# Patient Record
Sex: FEMALE | Race: WHITE | Employment: PART TIME | URBAN - METROPOLITAN AREA
[De-identification: names, ages, dates, MRNs, and addresses within clinical notes are randomized per-mention and may not be internally consistent; named-entity substitution may affect disease eponyms.]

---

## 2017-01-05 ENCOUNTER — OFFICE VISIT (OUTPATIENT)
Dept: FAMILY MEDICINE CLINIC | Age: 32
End: 2017-01-05

## 2017-01-05 VITALS
TEMPERATURE: 98.2 F | HEIGHT: 64 IN | BODY MASS INDEX: 45.07 KG/M2 | WEIGHT: 264 LBS | RESPIRATION RATE: 18 BRPM | HEART RATE: 88 BPM | SYSTOLIC BLOOD PRESSURE: 128 MMHG | DIASTOLIC BLOOD PRESSURE: 79 MMHG

## 2017-01-05 DIAGNOSIS — J01.10 ACUTE NON-RECURRENT FRONTAL SINUSITIS: ICD-10-CM

## 2017-01-05 DIAGNOSIS — H65.02 ACUTE SEROUS OTITIS MEDIA OF LEFT EAR, RECURRENCE NOT SPECIFIED: ICD-10-CM

## 2017-01-05 RX ORDER — AZITHROMYCIN 250 MG/1
TABLET, FILM COATED ORAL
Qty: 6 TAB | Refills: 0 | Status: SHIPPED | OUTPATIENT
Start: 2017-01-05 | End: 2017-01-10

## 2017-01-05 NOTE — MR AVS SNAPSHOT
Visit Information Date & Time Provider Department Dept. Phone Encounter #  
 1/5/2017  8:20 AM Morrison Cogan, MD [address] [facility] [phone] [id number] Your Appointments 1/5/2017  8:20 AM  
ACUTE CARE with Morrison Cogan, MD  
[address] [facility] ([address] [place] [address]) Appt Note: cough, congestion [address] [address]  
[phone]  
  
   
 [address] [address]  
  
    
 1/6/2017  1:00 PM  
ULTRASOUND with JOSE Ramos ([address] [place] [address]) Appt Note: u/s cyst 2-3 month f/u   [address] [facility],[address] [facility] [address]  
[address] [place] [address]  
  
    
 1/6/2017  1:30 PM  
ESTABLISHED PATIENT with Alcides Jerome MD  
[facility] ([address] [place] [address]) Appt Note: u/s cyst 2-3 month f/u   [address] [facility] [address] [place] [address]  
[phone]  
  
   
 [address] [place] [address]  
  
    
 1/27/2017  8:30 AM  
ESTABLISHED PATIENT with Bo Verdugo MD  
Arthritis and [address] [place] [address]) Appt Note: fu 3 mo; 11-29-16 l/m to cancel 1-27-17 appt -kfb; r/s:fu 3 mo  
 [address] [facility] [address]  
[phone]  
  
   
 [address] [address]  
  
    
 2/15/2017  2:00 PM  
IMMUNIZATIONS/INJECTIONS with Morrison Cogan, MD  
[address] [facility] ([address]) Appt Note: depo injection [address] [address]  
[phone]  
  
   
 [address] [place] [address] Road [address] Upcoming Health Maintenance Date Due  
 PAP AKA CERVICAL CYTOLOGY 11/7/2019 DTaP/Tdap/Td series (4 - Td) 11/23/2026 Allergies as of 1/5/2017  Review Complete On: 1/5/2017 By: Morrison Cogan, MD  
  
 Severity Noted Reaction Type Reactions Latex  05/27/2010    Hives Augmentin [Amoxicillin-pot Clavulanate]  04/13/2011    Nausea and Vomiting Codeine  04/13/2011    Nausea and Vomiting Morphine Sulfate  04/13/2011    Nausea and Vomiting Nuts [Tree Nut]  10/27/2016    Swelling Pcn [Penicillins]  04/01/2010    Hives Current Immunizations  Reviewed on 11/23/2016 Name Date Human Papillomavirus 4/14/2008, 12/26/2007, 10/26/2007 Influenza Vaccine 10/14/2014, 10/29/2013 Influenza Vaccine Scott Me) 10/2/2015 Influenza Vaccine (Quad) PF 10/27/2016 Influenza Vaccine Split 10/14/2011, 11/3/2010 Influenza Vaccine Whole 10/26/2007 Pneumococcal Vaccine (Unspecified Type) 11/4/2012  5:36 PM  
 TD Vaccine 11/5/2002 TDAP Vaccine 10/14/2011 Tdap 11/23/2016 Not reviewed this visit You Were Diagnosed With   
  
 Codes Comments Acute non-recurrent frontal sinusitis     ICD-10-CM: J01.10 ICD-9-CM: 934.2 Acute serous otitis media of left ear, recurrence not specified     ICD-10-CM: H65.02 
ICD-9-CM: 381.01 Vitals BP Pulse Temp Resp Height(growth percentile) Weight(growth percentile) 128/79 (BP 1 Location: Left arm, BP Patient Position: Sitting) 88 98.2 °F (36.8 °C) (Oral) 18 5' 4\" (1.626 m) 264 lb (119.7 kg) BMI OB Status Smoking Status 45.32 kg/m2 Injection Never Smoker Vitals History BMI and BSA Data Body Mass Index Body Surface Area  
 45.32 kg/m 2 2.32 m 2 Preferred Pharmacy Pharm[facility] Name Phone [facility] [place] [phone] Your Updated Medication List  
  
   
This list is accurate as of: 1/5/17  8:11 AM.  Always use your most recent med list.  
  
  
  
  
 albuterol 90 mcg/actuation inhaler Commonly known as:  PROAIR HFA INHALE TWO PUFFS EVERY 6 HOURS AS NEEDED FOR WHEEZING  
  
 azithromycin 250 mg tablet Commonly known as:  Bev Rolon Please take as directed benzonatate 200 mg capsule Commonly known as:  TESSALON Take 1 Cap by mouth three (3) times daily as needed for Cough for up to 7 days. cetirizine 10 mg tablet Commonly known as:  ZYRTEC  
TAKE ONE TABLET BY MOUTH ONCE DAILY  
  
 ergocalciferol 50,000 unit capsule Commonly known as:  VITAMIN D2 Take 1 Cap by mouth every seven (7) days. ferrous sulfate 325 mg (65 mg iron) tablet TAKE ONE TABLET BY MOUTH TWICE DAILY  
  
 fluticasone 50 mcg/actuation nasal spray Commonly known as:  Shelvia Birks 2 Sprays by Both Nostrils route daily. hydroxychloroquine 200 mg tablet Commonly known as:  PLAQUENIL Take 1 Tab by mouth two (2) times a day. New dose  
  
 levothyroxine 175 mcg tablet Commonly known as:  SYNTHROID Take 1 Tab by mouth Daily (before breakfast). montelukast 10 mg tablet Commonly known as:  SINGULAIR  
TAKE ONE TABLET BY MOUTH ONCE DAILY  
  
 omeprazole 20 mg capsule Commonly known as:  PRILOSEC  
TAKE ONE CAPSULE BY MOUTH TWICE DAILY PARoxetine 20 mg tablet Commonly known as:  PAXIL TAKE ONE TABLET BY MOUTH IN THE EVENING  
  
 predniSONE 5 mg tablet Commonly known as:  DELTASONE  
10 mg once daily. Lower by 2.5 mg (1/2 tab) every  days SUMAtriptan 100 mg tablet Commonly known as:  IMITREX  
TAKE ONE TABLET BY MOUTH ONCE AS NEEDED FOR MIGRAINE FOR 1 DOSE. Prescriptions Sent to Pharmacy Refills  
 azithromycin (ZITHROMAX Z-AWA) 250 mg tablet 0 Sig: Please take as directed Class: Normal  
 Pharmacy: [address] [place] [address] [address] [place] Ph #: [phone] [facility]! Dear Ricardo Collier: Thank you for requesting a [employer]tica account. Our records indicate that you already have an active eÃ“tica account. You can access your account anytime at [email or web address] Did you know that you can access your hospital and ER discharge instructions at any time in [employer]? You can also review all of your test results from your hospital stay or ER visit. Additional Information If you have questions, please visit the Frequently Asked Questions section of the [employer] website at [email or web address] Remember, The Feed[employer] is NOT to be used for urgent needs. For medical emergencies, dial 911. Now available from your iPhone and Android! Please provide this summary of care documentation to your next provider. Your primary care clinician is listed as Loki Gonzalez. If you have any questions after today's visit, please call [phone].

## 2017-01-05 NOTE — PROGRESS NOTES
Pt here c/o cough, congestion, and nasal drainage x 1 week. States that cough has been productive with thick mucus. Has been taking Sudafed OTC. Pt has been afebrile.

## 2017-01-05 NOTE — PROGRESS NOTES
Pt here c/o cough, congestion, and nasal drainage x 1 week. States that cough has been productive with thick mucus. Has been taking Sudafed OTC. Pt has been afebrile. Subjective:   Farzaneh Angeles is a 36 y.o. female who complains of congestion, productive cough and generalized sinus pain for 5 days, gradually worsening since that time. She denies a history of shortness of breath and wheezing. Evaluation to date: none. Treatment to date: OTC products. Patient does not smoke cigarettes. Relevant PMH: No pertinent additional PMH. Patient Active Problem List   Diagnosis Code    Hypothyroid E03.9    Environmental allergies Z91.09    [place] (hard of hearing) H91.90    Strabismus H50.9    Anemia, iron deficiency D50.9    Asthma J45.909    Long-term use of Plaquenil Z79.899    Vitamin D deficiency E55.9    Polyuria R35.8    Periodic headache syndrome, not intractable G43. C0    Inflammatory polyarthritis (HCC) M06.4     Allergies   Allergen Reactions    Latex Hives    Augmentin [Amoxicillin-Pot Clavulanate] Nausea and Vomiting    Codeine Nausea and Vomiting    Morphine Sulfate Nausea and Vomiting    Nuts [Tree Nut] Swelling    Pcn [Penicillins] Hives        Review of Systems  Pertinent items are noted in HPI. Objective:     Visit Vitals    /79 (BP 1 Location: Left arm, BP Patient Position: Sitting)    Pulse 88    Temp 98.2 °F (36.8 °C) (Oral)    Resp 18    Ht 5' 4\" (1.626 m)    Wt 264 lb (119.7 kg)    BMI 45.32 kg/m2     General:  alert, cooperative, no distress   Eyes: conjunctivae/corneas clear. PERRL, EOM's intact. Fundi benign   Ears: Left TM erythematous   Sinuses: tenderness over generalized maxillary   Mouth:  Lips, mucosa, and tongue normal. Teeth and gums normal   Neck: supple, symmetrical, trachea midline and no adenopathy. Heart: S1 and S2 normal, no murmurs noted.     Lungs: clear to auscultation bilaterally        Assessment/Plan:   otitis media and sinusitis  Suggested symptomatic OTC remedies. Antibiotics per orders. RTC prn. ICD-10-CM ICD-9-CM    1. Acute non-recurrent frontal sinusitis J01.10 461.1 azithromycin (ZITHROMAX Z-AWA) 250 mg tablet   2. Acute serous otitis media of left ear, recurrence not specified H65.02 381.01 azithromycin (ZITHROMAX Z-AWA) 250 mg tablet     Encounter Diagnoses   Name Primary?  Acute non-recurrent frontal sinusitis     Acute serous otitis media of left ear, recurrence not specified      Orders Placed This Encounter    azithromycin (ZITHROMAX Z-AWA) 250 mg tablet   .

## 2017-01-20 ENCOUNTER — OFFICE VISIT (OUTPATIENT)
Dept: OBGYN CLINIC | Age: 32
End: 2017-01-20

## 2017-01-20 DIAGNOSIS — N89.8 VAGINAL WALL CYST: Primary | ICD-10-CM

## 2017-01-20 RX ORDER — MEDROXYPROGESTERONE ACETATE 150 MG/ML
INJECTION, SUSPENSION INTRAMUSCULAR
COMMUNITY
Start: 2017-01-10 | End: 2017-05-10 | Stop reason: SDUPTHER

## 2017-01-20 NOTE — PROGRESS NOTES
Ultrasound followup    Jessica Osborne is a 36 y.o. female is here today to review the results of her ultrasound evaluation. Her U/S evaluation is performed because of a previous encounter revealing a vaginal cyst which was identified 2.5 months ago. She is here for a(n) initial ultrasound study. The sonogram results are:  UTERUS IS ANTEVERTED, NORMAL IN SIZE AND ECHOGENICITY. THERE APPEARS TO BE A LEFT VAGINAL WALL CYST THAT MEASURES 2.1 X 1.9 X 2.1CM. ENDOMETRIUM MEASURES 3MM IN THICKNESS. NO EVIDENCE OF MASS OR ABNORMALITY SEEN WITHIN  THE ENDOMETRIAL CAVITY. RIGHT OVARY APPEARS WITHIN NORMAL LIMITS. A FOLLICULAR CYST IS SEEN. LEFT ADNEXA APPEARS WITHIN NORMAL LIMITS. NO FREE FLUID SEEN IN THE CDS. See detailed report for more information    Total face-to-face time with the patient was 15 minutes, and over half of this time was spent in patient counseling. Patient states the cyst is painful and elects to have it drained today. Procedure note: vaginal wall cyst drainage    Jessica Osborne is a 36 y.o. female [facility] who presents today with a tender vaginal wall cyst. She has elected to have it drained today. The risks, benefits and assets of the procedure were discussed. Her questions were answered, informed consent was obtained, and she had no further questions. Procedure: The vagina was prepped with betadine. Following the prep, a 18 g needle with a 10 cc syringe was used to drain the cyst. Approximately 10-15 cc's of white mucous fluid was released. Bleeding was minimal.   POST PROCEDURE: The patient tolerated the procedure well. There were no complications. She reports relief in her symptoms following the procedure. She will RTO in 4 weeks to see if cyst has re accumulated.

## 2017-02-06 DIAGNOSIS — E55.9 VITAMIN D DEFICIENCY: ICD-10-CM

## 2017-02-06 RX ORDER — ERGOCALCIFEROL 1.25 MG/1
50000 CAPSULE ORAL
Qty: 12 CAP | Refills: 1 | Status: SHIPPED | OUTPATIENT
Start: 2017-02-06 | End: 2017-08-02 | Stop reason: SDUPTHER

## 2017-02-06 NOTE — TELEPHONE ENCOUNTER
From: Farzaneh Angeles  To: Milli Bourgeois NP  Sent: 2/6/2017 9:15 AM EST  Subject: Medication Renewal Request    Original authorizing provider: RICKIE Estevez would like a refill of the following medications:  ergocalciferol (VITAMIN D2) 50,000 unit capsule Milli Bouregois NP]    Preferred pharmacy: [address] [place] [address]:

## 2017-02-15 ENCOUNTER — OFFICE VISIT (OUTPATIENT)
Dept: FAMILY MEDICINE CLINIC | Age: 32
End: 2017-02-15

## 2017-02-15 VITALS
RESPIRATION RATE: 18 BRPM | TEMPERATURE: 98.3 F | OXYGEN SATURATION: 99 % | DIASTOLIC BLOOD PRESSURE: 75 MMHG | HEART RATE: 90 BPM | SYSTOLIC BLOOD PRESSURE: 123 MMHG | BODY MASS INDEX: 45.75 KG/M2 | WEIGHT: 268 LBS | HEIGHT: 64 IN

## 2017-02-15 DIAGNOSIS — N94.6 DYSMENORRHEA: Primary | ICD-10-CM

## 2017-02-15 DIAGNOSIS — E03.9 HYPOTHYROIDISM, UNSPECIFIED TYPE: Chronic | ICD-10-CM

## 2017-02-15 RX ORDER — MEDROXYPROGESTERONE ACETATE 150 MG/ML
150 INJECTION, SUSPENSION INTRAMUSCULAR ONCE
Qty: 1 ML | Refills: 0 | Status: SHIPPED | COMMUNITY
Start: 2017-02-15 | End: 2017-02-15

## 2017-02-15 NOTE — MR AVS SNAPSHOT
Visit Information Date & Time Provider Department Dept. Phone Encounter #  
 2/15/2017  2:20 PM Lorraine Sanchez MD [facility] [phone] [id number] Your Appointments 2/15/2017  2:20 PM  
ESTABLISHED PATIENT with Lorraine Sanchez MD  
[facility] ([address]) Appt Note: depo injection; ck thyroid, depo injection [address] [address] [address] [address]  
[phone]  
  
   
 [address] [address] [address] [address]  
  
    
 2/17/2017  1:30 PM  
FOLLOW UP 10 with Paola Knox MD  
[facility] ([address]) Appt Note: 4wk gyn f/u MH - cysts [address] [facility]  
[phone]  
  
   
 [address] [facility] [address] [place] [address]  
  
    
 2/24/2017 11:00 AM  
ESTABLISHED PATIENT with Bryan Grant MD  
Arthritis and [address] [place] [address] Road) Appt Note: fu 3 mo; 11-29-16 l/m to cancel 1-27-17 appt -kfb; r/s:fu 3 mo; fu 3 mo  
 [address] [facility] [address]  
[phone]  
  
   
 [address] [facility] [address] Upcoming Health Maintenance Date Due  
 PAP AKA CERVICAL CYTOLOGY 11/7/2019 DTaP/Tdap/Td series (4 - Td) 11/23/2026 Allergies as of 2/15/2017  Review Complete On: 2/15/2017 By: Lorraine Sanchez MD  
  
 Severity Noted Reaction Type Reactions Latex  05/27/2010    Hives Augmentin [Amoxicillin-pot Clavulanate]  04/13/2011    Nausea and Vomiting Codeine  04/13/2011    Nausea and Vomiting Morphine Sulfate  04/13/2011    Nausea and Vomiting Nuts [Tree Nut]  10/27/2016    Swelling Pcn [Penicillins]  04/01/2010    Hives Current Immunizations  Reviewed on 11/23/2016 Name Date Human Papillomavirus 4/14/2008, 12/26/2007, 10/26/2007 Influenza Vaccine 10/14/2014, 10/29/2013 Influenza Vaccine Manuelita Forge) 10/2/2015 Influenza Vaccine (Quad) PF 10/27/2016 Influenza Vaccine Split 10/14/2011, 11/3/2010 Influenza Vaccine Whole 10/26/2007 Pneumococcal Vaccine (Unspecified Type) 11/4/2012  5:36 PM  
 TD Vaccine 11/5/2002 TDAP Vaccine 10/14/2011 Tdap 11/23/2016 Not reviewed this visit You Were Diagnosed With   
  
 Codes Comments Dysmenorrhea    -  Primary ICD-10-CM: N94.6 ICD-9-CM: 625.3 Hypothyroidism, unspecified type     ICD-10-CM: E03.9 ICD-9-CM: 749. 9 Vitals BP Pulse Temp Resp Height(growth percentile) Weight(growth percentile) 123/75 (BP 1 Location: Left arm, BP Patient Position: Sitting) 90 98.3 °F (36.8 °C) (Oral) 18 5' 4\" (1.626 m) 268 lb (121.6 kg) SpO2 BMI OB Status Smoking Status 99% 46 kg/m2 Injection Never Smoker Vitals History BMI and BSA Data Body Mass Index Body Surface Area  
 46 kg/m 2 2.34 m 2 [facility] [address] [place] [phone] Your Updated Medication List  
  
   
This list is accurate as of: 2/15/17  1:37 PM.  Always use your most recent med list.  
  
  
  
  
 albuterol 90 mcg/actuation inhaler Commonly known as:  PROAIR HFA INHALE TWO PUFFS EVERY 6 HOURS AS NEEDED FOR WHEEZING  
  
 cetirizine 10 mg tablet Commonly known as:  ZYRTEC  
TAKE ONE TABLET BY MOUTH ONCE DAILY  
  
 ergocalciferol 50,000 unit capsule Commonly known as:  VITAMIN D2 Take 1 Cap by mouth every seven (7) days. ferrous sulfate 325 mg (65 mg iron) tablet TAKE ONE TABLET BY MOUTH TWICE DAILY  
  
 fluticasone 50 mcg/actuation nasal spray Commonly known as:  Lindalee [place]ampton 2 Sprays by Both Nostrils route daily. hydroxychloroquine 200 mg tablet Commonly known as:  PLAQUENIL Take 1 Tab by mouth two (2) times a day. New dose  
  
 levothyroxine 175 mcg tablet Commonly known as:  SYNTHROID Take 1 Tab by mouth Daily (before breakfast). * medroxyPROGESTERone 150 mg/mL Syrg Commonly known as:  DEPO-PROVERA * medroxyPROGESTERone 150 mg/mL Syrg Commonly known as:  DEPO-PROVERA  
1 mL by IntraMUSCular route once for 1 dose. montelukast 10 mg tablet Commonly known as:  SINGULAIR  
TAKE ONE TABLET BY MOUTH ONCE DAILY  
  
 omeprazole 20 mg capsule Commonly known as:  PRILOSEC  
TAKE ONE CAPSULE BY MOUTH TWICE DAILY PARoxetine 20 mg tablet Commonly known as:  PAXIL TAKE ONE TABLET BY MOUTH IN THE EVENING  
  
 predniSONE 5 mg tablet Commonly known as:  DELTASONE  
10 mg once daily. Lower by 2.5 mg (1/2 tab) every  days SUMAtriptan 100 mg tablet Commonly known as:  IMITREX  
TAKE ONE TABLET BY MOUTH ONCE AS NEEDED FOR MIGRAINE FOR 1 DOSE. * Notice: This list has 2 medication(s) that are the same as other medications prescribed for you. Read the directions carefully, and ask your doctor or other care provider to review them with you. We Performed the Following HI THER/PROPH/DIAG INJECTION, SUBCUT/IM [id number] CPT(R)] TSH 3RD GENERATION [90536 CPT(R)] Introducing [facility]! Dear Abbey Colunga: Thank you for requesting a [employer] account. Our records indicate that you already have an active [employer] account. You can access your account anytime at [email or web address] Did you know that you can access your hospital and ER discharge instructions at any time in [employer] You can also review all of your test results from your hospital stay or ER visit. Additional Information If you have questions, please visit the Frequently Asked Questions section of the [employer] website at [email or web address] Remember, [employer] is NOT to be used for urgent needs. For medical emergencies, dial 911. Now available from your iPhone and Android! Please provide this summary of care documentation to your next provider. Your primary care clinician is listed as Chiara Newby.  If you have any questions after today's visit, please call [phone].

## 2017-02-15 NOTE — PROGRESS NOTES
Pt here to get Depo injection. Last injection was given on November 29, 2016. Also requesting to have TSH checked.

## 2017-02-15 NOTE — PROGRESS NOTES
Pt here to get Depo injection. Last injection was given on November 29, 2016. Also requesting to have TSH checked. Pt reports that she feels tired most of the time, also is concerned about weight gain, is struggling to eat a balanced diet. Subjective: (As above and below)     Chief Complaint   Patient presents with    Depo    Labs     TSH     she is a 36y.o. year old female who presents for evaluation. Reviewed PmHx, RxHx, FmHx, SocHx, AllgHx and updated in chart. Review of Systems - negative except as listed above    Objective:     Vitals:    02/15/17 1322   BP: 123/75   Pulse: 90   Resp: 18   Temp: 98.3 °F (36.8 °C)   TempSrc: Oral   SpO2: 99%   Weight: 268 lb (121.6 kg)   Height: 5' 4\" (1.626 m)     Physical Examination: General appearance - alert, well appearing, and in no distress  Mental status - normal mood, behavior, speech, dress, motor activity, and thought processes  Eyes - pupils equal and reactive, extraocular eye movements intact  Mouth - mucous membranes moist, pharynx normal without lesions  Chest - clear to auscultation, no wheezes, rales or rhonchi, symmetric air entry  Heart - normal rate, regular rhythm, normal S1, S2, no murmurs, rubs, clicks or gallops  Musculoskeletal - no joint tenderness, deformity or swelling    Assessment/ Plan:   1. Dysmenorrhea  -depo today  - THER/PROPH/DIAG INJECTION, SUBCUT/IM    2. Hypothyroidism, unspecified type  -recheck level  - TSH 3RD GENERATION     Follow-up Disposition: As needed  I have discussed the diagnosis with the patient and the intended plan as seen in the above orders. The patient has received an after-visit summary and questions were answered concerning future plans.      Medication Side Effects and Warnings were discussed with patient: yes  Patient Labs were reviewed: yes  Patient Past Records were reviewed:  yes    Ferny Rojo M.D.

## 2017-02-16 LAB — TSH SERPL DL<=0.005 MIU/L-ACNC: 16.25 UIU/ML (ref 0.45–4.5)

## 2017-02-16 NOTE — PROGRESS NOTES
Please advise pt that thyroid dose needs to be increased further, please confirm dose pt is taking anf that she is taking medication daily, an hour before eating on an empty stomach.

## 2017-02-16 NOTE — PROGRESS NOTES
Notified pt per Timothy Myers. Pt states that she is taking 175mcg daily, but is not taking medication an hour before eating.

## 2017-02-17 ENCOUNTER — OFFICE VISIT (OUTPATIENT)
Dept: OBGYN CLINIC | Age: 32
End: 2017-02-17

## 2017-02-17 VITALS — HEIGHT: 64 IN | WEIGHT: 268 LBS | BODY MASS INDEX: 45.75 KG/M2 | RESPIRATION RATE: 19 BRPM

## 2017-02-17 DIAGNOSIS — N89.8 VAGINAL WALL CYST: Primary | ICD-10-CM

## 2017-02-17 NOTE — PROGRESS NOTES
Ovarian cyst evaluation    Chief Complaint   Cyst   No LMP recorded. Patient is on Depo Provera. HPI    Cyst History:  36 y.o. female presents for follow up of a LEFT VAGINAL WALL CYST THAT MEASURES 2.1 X 1.9 X 2.1CM last seen on 01/20/2017. Current Method of Contraception is: injection  Her cyst was drained in the office on 01/20/2017 and she is here today to see if it has re-accumulated. Imaging History:  She had  ultrasound at last visit. Additional/Aggravating/Alleviating factors:    She states that the discomfort she thinks she was from the cyst is still present but less so. The patient states that bending and lifting makes the pain worse. The patient reports she has been feeling somewhat better. Past Medical History   Diagnosis Date    Allergies     Anemia 2008    Anxiety     Asthma      inhaler    Depression     Ear infection     Environmental allergies 4/1/2010    GERD (gastroesophageal reflux disease)     Hypothyroid 4/1/2010    Inflammatory arthritis     Lupus (HCC)     Nausea & vomiting     Other ill-defined conditions(799.89) noted 7/25/2012     \"Intellectually  Disabled\" signs consents/self. Mom &  concur    Otitis media 4/1/2010    Strabismus 4/1/2010    Unspecified adverse effect of anesthesia 2/10/2011     slow to wake up, sleepy    Wears hearing aid      Past Surgical History   Procedure Laterality Date    Hx orthopaedic       heel cord lengthing; right thumb surgery (pin placed)    Hx breast reduction  2005    Hx tonsil and adenoidectomy  1990    Hx cholecystectomy  2014    Hx knee arthroscopy Left     Hx other surgical       Ear surgery     Social History     Occupational History    Not on file.      Social History Main Topics    Smoking status: Never Smoker    Smokeless tobacco: Never Used    Alcohol use No    Drug use: No    Sexual activity: No     Family History   Problem Relation Age of Onset    Osteoporosis Other     Arthritis-osteo Other     Breast Cancer Other      maternal great aunt    Breast Cancer Other      Maternal great aunt    Stroke Maternal Aunt     Hypertension Sister        Allergies   Allergen Reactions    Latex Hives    Augmentin [Amoxicillin-Pot Clavulanate] Nausea and Vomiting    Codeine Nausea and Vomiting    Morphine Sulfate Nausea and Vomiting    Nuts [Tree Nut] Swelling    Pcn [Penicillins] Hives     Prior to Admission medications    Medication Sig Start Date End Date Taking? Authorizing Provider   ergocalciferol (VITAMIN D2) 50,000 unit capsule Take 1 Cap by mouth every seven (7) days. 2/6/17   Sid Priest NP   omeprazole (PRILOSEC) 20 mg capsule TAKE ONE CAPSULE BY MOUTH TWICE DAILY 2/1/17   Richi Pritchett NP   medroxyPROGESTERone (DEPO-PROVERA) 150 mg/mL syrg  1/10/17   Historical Provider   levothyroxine (SYNTHROID) 175 mcg tablet Take 1 Tab by mouth Daily (before breakfast). 12/26/16   Danny Suarez NP   predniSONE (DELTASONE) 5 mg tablet 10 mg once daily. Lower by 2.5 mg (1/2 tab) every  days 12/7/16   Vance Padgett MD   cetirizine (ZYRTEC) 10 mg tablet TAKE ONE TABLET BY MOUTH ONCE DAILY 12/2/16   iSd Priest NP   PARoxetine (PAXIL) 20 mg tablet TAKE ONE TABLET BY MOUTH IN THE EVENING 11/29/16   Danny Suarez NP   hydroxychloroquine (PLAQUENIL) 200 mg tablet Take 1 Tab by mouth two (2) times a day. New dose 10/27/16   Vance Padgett MD   albuterol (PROAIR HFA) 90 mcg/actuation inhaler INHALE TWO PUFFS EVERY 6 HOURS AS NEEDED FOR WHEEZING 10/10/16   Danny Suarez NP   montelukast (SINGULAIR) 10 mg tablet TAKE ONE TABLET BY MOUTH ONCE DAILY 9/16/16   Danny Suarez NP   fluticasone (FLONASE) 50 mcg/actuation nasal spray 2 Sprays by Both Nostrils route daily.  5/27/16   Sid Priest NP   SUMAtriptan (IMITREX) 100 mg tablet TAKE ONE TABLET BY MOUTH ONCE AS NEEDED FOR MIGRAINE FOR 1 DOSE. 4/11/16   Danny Suarez NP   ferrous sulfate 325 mg (65 mg iron) tablet TAKE ONE TABLET BY MOUTH TWICE DAILY 3/3/16   Violetta Acevedo MD        Review of Systems - History obtained from the patient  Negative for:   GI: nausea, vomiting, diarrhea, blood in stools  : see HPI  Skin: negative for rash, hives  Endocrine: negative for polyuria, polydypsia  Heme/lymph: negative for bleeding, bruising, lymph node enlargement or tenderness    Objective:    Visit Vitals    Resp 19    Ht 5' 4\" (1.626 m)    Wt 268 lb (121.6 kg)    BMI 46 kg/m2       Physical Exam:   PHYSICAL EXAMINATION    Constitutional  · Appearance: well-nourished, well developed, alert, in no acute distress    HENT  · Head and Face: appears normal    Gastrointestinal  · Abdominal Examination: abdomen non-tender to palpation, normal bowel sounds, no masses present  · Liver and spleen: no hepatomegaly present, spleen not palpable  · Hernias: no hernias identified    Genitourinary  · External Genitalia: normal appearance for age, no discharge present, no tenderness present, no inflammatory lesions present, no masses present, no atrophy present  · Vagina: cyst present in the left upper left sidewall, about 2 cm  · Bladder: non-tender to palpation  · Urethra: appears normal  · Cervix: normal   · Uterus:  · Adnexa:   · Perineum: perineum within normal limits, no evidence of trauma, no rashes or skin lesions present  · Anus: anus within normal limits, no hemorrhoids present  · Inguinal Lymph Nodes: no lymphadenopathy present    Skin  · General Inspection: no rash, no lesions identified    Neurologic/Psychiatric  · Mental Status:  · Orientation: grossly oriented to person, place and time  · Mood and Affect: mood normal, affect appropriate    Assessment:   Recurrent left vaginal sidewall cyst. She desires to have it surgically removed.     Plan:   She will see Lauren Saba for removal.

## 2017-02-27 RX ORDER — CETIRIZINE HCL 10 MG
TABLET ORAL
Qty: 90 TAB | Refills: 0 | Status: SHIPPED | OUTPATIENT
Start: 2017-02-27 | End: 2017-05-22 | Stop reason: SDUPTHER

## 2017-02-27 RX ORDER — MONTELUKAST SODIUM 10 MG/1
TABLET ORAL
Qty: 30 TAB | Refills: 5 | Status: SHIPPED | OUTPATIENT
Start: 2017-02-27 | End: 2017-06-05 | Stop reason: SDUPTHER

## 2017-03-02 RX ORDER — CLONAZEPAM 1 MG/1
TABLET ORAL
Qty: 30 TAB | Refills: 0 | OUTPATIENT
Start: 2017-03-02 | End: 2017-05-16 | Stop reason: SDUPTHER

## 2017-03-02 RX ORDER — HYDROCORTISONE 1 %
CREAM (GRAM) TOPICAL
Qty: 29 G | Refills: 5 | Status: SHIPPED | OUTPATIENT
Start: 2017-03-02 | End: 2018-02-07

## 2017-03-06 ENCOUNTER — OFFICE VISIT (OUTPATIENT)
Dept: FAMILY MEDICINE CLINIC | Age: 32
End: 2017-03-06

## 2017-03-06 VITALS
OXYGEN SATURATION: 97 % | RESPIRATION RATE: 18 BRPM | HEART RATE: 99 BPM | TEMPERATURE: 98.2 F | DIASTOLIC BLOOD PRESSURE: 69 MMHG | HEIGHT: 64 IN | SYSTOLIC BLOOD PRESSURE: 128 MMHG | WEIGHT: 270 LBS | BODY MASS INDEX: 46.1 KG/M2

## 2017-03-06 DIAGNOSIS — T78.40XA ALLERGIC REACTION, INITIAL ENCOUNTER: Primary | ICD-10-CM

## 2017-03-06 RX ORDER — EPINEPHRINE 0.3 MG/.3ML
0.3 INJECTION SUBCUTANEOUS
Qty: 2 SYRINGE | Refills: 0 | Status: SHIPPED | OUTPATIENT
Start: 2017-03-06 | End: 2018-02-22 | Stop reason: SDUPTHER

## 2017-03-06 NOTE — MR AVS SNAPSHOT
Visit Information Date & Time Provider Department Dept. Phone Encounter #  
 3/6/2017  3:15 PM MD Mireya Rogers [facility] [phone] [id number] Your Appointments 3/6/2017  3:15 PM  
ACUTE CARE with MD Mireya Rogers ([facility]) Appt Note: rash on legs N [address] [place] [address] Road [address]  
[phone]  
  
   
 N [address] [place] [address] Road [address]  
  
    
 3/15/2017  3:30 PM  
ESTABLISHED PATIENT with MD Mireya Rogers ([facility]) Appt Note: 1 mon fuv for thyroid N [address] [place] [address] Road [address]  
[phone]  
  
   
 N [address] [place] [address] Road [address]  
  
    
 3/24/2017  1:00 PM  
ESTABLISHED PATIENT with Tia Cao MD  
Arthritis and [address] [facility]) Appt Note: fu 3 mo; 11-29-16 l/m to cancel 1-27-17 appt -kfb; r/s:fu 3 mo; fu 3 mo; 3 month f/up  
 [address] [facility] [address] [facility] [id number] [phone]  
  
   
 [address] [address]  
  
    
 5/10/2017  2:30 PM  
ESTABLISHED PATIENT with MD Mireya Rogers [place] Kalee donnell ([facility]) Appt Note: depo N [address] [place] [address] Road [address]  
[phone] Upcoming Health Maintenance Date Due  
 PAP AKA CERVICAL CYTOLOGY 11/7/2019 DTaP/Tdap/Td series (4 - Td) 11/23/2026 Allergies as of 3/6/2017  Review Complete On: 3/6/2017 By: Eloy Powers MD  
  
 Severity Noted Reaction Type Reactions Latex  05/27/2010    Hives Augmentin [Amoxicillin-pot Clavulanate]  04/13/2011    Nausea and Vomiting Codeine  04/13/2011    Nausea and Vomiting Morphine Sulfate  04/13/2011    Nausea and Vomiting Nuts [Tree Nut]  10/27/2016    Swelling Pcn [Penicillins]  04/01/2010    Hives Current Immunizations  Reviewed on 11/23/2016 Name Date Human Papillomavirus 4/14/2008, 12/26/2007, 10/26/2007 Influenza Vaccine 10/14/2014, 10/29/2013 Influenza Vaccine Tia Rolafts) 10/2/2015 Influenza Vaccine (Quad) PF 10/27/2016 Influenza Vaccine Split 10/14/2011, 11/3/2010 Influenza Vaccine Whole 10/26/2007 Pneumococcal Vaccine (Unspecified Type) 11/4/2012  5:36 PM  
 TD Vaccine 11/5/2002 TDAP Vaccine 10/14/2011 Tdap 11/23/2016 Not reviewed this visit You Were Diagnosed With   
  
 Codes Comments Allergic reaction, initial encounter    -  Primary ICD-10-CM: T78.40XA ICD-9-CM: 583. 3 Vitals BP Pulse Temp Resp Height(growth percentile) Weight(growth percentile) 128/69 (BP 1 Location: Left arm, BP Patient Position: Sitting) 99 98.2 °F (36.8 °C) (Oral) 18 5' 4\" (1.626 m) 270 lb (122.5 kg) SpO2 BMI OB Status Smoking Status 97% 46.35 kg/m2 Injection Never Smoker Vitals History BMI and BSA Data Body Mass Index Body Surface Area  
 46.35 kg/m 2 2.35 m 2 Preferred Pharmacy Pharmacy Name Phone [id number] [place] [place] [place] [phone] Your Updated Medication List  
  
   
This list is accurate as of: 3/6/17  2:24 PM.  Always use your most recent med list.  
  
  
  
  
 albuterol 90 mcg/actuation inhaler Commonly known as:  PROAIR HFA INHALE TWO PUFFS EVERY 6 HOURS AS NEEDED FOR WHEEZING  
  
 cetirizine 10 mg tablet Commonly known as:  ZYRTEC  
TAKE ONE TABLET BY MOUTH ONCE DAILY  
  
 clonazePAM 1 mg tablet Commonly known as:  KlonoPIN  
TAKE ONE TABLET BY MOUTH ONCE DAILY AS NEEDED  
  
 EPINEPHrine 0.3 mg/0.3 mL injection Commonly known as:  EPIPEN 2-AWA  
0.3 mL by IntraMUSCular route once as needed for up to 1 dose. ergocalciferol 50,000 unit capsule Commonly known as:  VITAMIN D2 Take 1 Cap by mouth every seven (7) days. ferrous sulfate 325 mg (65 mg iron) tablet TAKE ONE TABLET BY MOUTH TWICE DAILY fluticasone 50 mcg/actuation nasal spray Commonly known as:  Niharika Finely 2 Sprays by Both Nostrils route daily. hydrocortisone 1 % topical cream  
Commonly known as:  CORTAID  
APPLY THIN LAYER OF CREAM TO AFFECTED AREA ON HANDS TWO TIMES DAILY. hydroxychloroquine 200 mg tablet Commonly known as:  PLAQUENIL Take 1 Tab by mouth two (2) times a day. New dose  
  
 levothyroxine 175 mcg tablet Commonly known as:  SYNTHROID Take 1 Tab by mouth Daily (before breakfast). medroxyPROGESTERone 150 mg/mL Syrg Commonly known as:  DEPO-PROVERA  
  
 montelukast 10 mg tablet Commonly known as:  SINGULAIR  
TAKE ONE TABLET BY MOUTH ONCE DAILY  
  
 omeprazole 20 mg capsule Commonly known as:  PRILOSEC  
TAKE ONE CAPSULE BY MOUTH TWICE DAILY PARoxetine 20 mg tablet Commonly known as:  PAXIL TAKE ONE TABLET BY MOUTH IN THE EVENING  
  
 predniSONE 5 mg tablet Commonly known as:  DELTASONE  
10 mg once daily. Lower by 2.5 mg (1/2 tab) every  days SUMAtriptan 100 mg tablet Commonly known as:  IMITREX  
TAKE ONE TABLET BY MOUTH ONCE AS NEEDED FOR MIGRAINE FOR 1 DOSE. Prescriptions Sent to Pharmacy Refills EPINEPHrine (EPIPEN 2-AWA) 0.3 mg/0.3 mL injection 0 Si.3 mL by IntraMUSCular route once as needed for up to 1 dose. Class: Normal  
 Pharmacy: [facility] [place] Ph #: [phone] Route: IntraMUSCular Introducing [facility]! Dear Ferny Albright: Thank you for requesting a Alorica account. Our records indicate that you already have an active Alorica account. You can access your account anytime at [email or web address] Did you know that you can access your hospital and ER discharge instructions at any time in Alorica? You can also review all of your test results from your hospital stay or ER visit. Additional Information If you have questions, please visit the Frequently Asked Questions section of the Evverhart website at [email or web address] Remember, ClickBus is NOT to be used for urgent needs. For medical emergencies, dial 911. Now available from your iPhone and Android! Please provide this summary of care documentation to your next provider. Your primary care clinician is listed as Sandip Campuzano. If you have any questions after today's visit, please call [phone].

## 2017-03-06 NOTE — PROGRESS NOTES
Pt here with mother c/o sore throat x 1 week. Also reports having itching on face and chest.  States that she ate Peanuts last week despite being allergic to it. Has been taking Benadryl OTC.

## 2017-03-06 NOTE — PROGRESS NOTES
Pt here with mother c/o sore throat x 1 week. Also reports having itching on face and chest.  States that she ate Peanuts last week despite being allergic to it. Has been taking Benadryl OTC. Pt ate peanut brittle on 3/2, pt reports that symptoms have improved but still has some itching. Subjective: (As above and below)     Chief Complaint   Patient presents with    Sore Throat    Skin Problem     she is a 36y.o. year old female who presents for evaluation. Reviewed PmHx, RxHx, FmHx, SocHx, AllgHx and updated in chart. Review of Systems - negative except as listed above    Objective:     Vitals:    03/06/17 1411   BP: 128/69   Pulse: 99   Resp: 18   Temp: 98.2 °F (36.8 °C)   TempSrc: Oral   SpO2: 97%   Weight: 270 lb (122.5 kg)   Height: 5' 4\" (1.626 m)     Physical Examination: General appearance - alert, well appearing, and in no distress  Mental status - normal mood, behavior, speech, dress, motor activity, and thought processes  Mouth - mucous membranes moist, pharynx normal without lesions  Chest - clear to auscultation, no wheezes, rales or rhonchi, symmetric air entry  Heart - normal rate, regular rhythm, normal S1, S2, no murmurs, rubs, clicks or gallops  Skin - normal coloration and turgor, no rashes, no suspicious skin lesions noted    Assessment/ Plan:   1. Allergic reaction, initial encounter  -resolving, epipen prescribed for future just in case use, reviewed need to avoid peanut products     Follow-up Disposition: As needed  I have discussed the diagnosis with the patient and the intended plan as seen in the above orders. The patient has received an after-visit summary and questions were answered concerning future plans.      Medication Side Effects and Warnings were discussed with patient: yes  Patient Labs were reviewed: yes  Patient Past Records were reviewed:  yes    Rhoda Hernandez M.D.

## 2017-03-15 ENCOUNTER — OFFICE VISIT (OUTPATIENT)
Dept: FAMILY MEDICINE CLINIC | Age: 32
End: 2017-03-15

## 2017-03-15 VITALS
HEART RATE: 90 BPM | RESPIRATION RATE: 18 BRPM | HEIGHT: 64 IN | SYSTOLIC BLOOD PRESSURE: 126 MMHG | OXYGEN SATURATION: 99 % | DIASTOLIC BLOOD PRESSURE: 77 MMHG | WEIGHT: 270 LBS | BODY MASS INDEX: 46.1 KG/M2 | TEMPERATURE: 98 F

## 2017-03-15 DIAGNOSIS — E03.9 HYPOTHYROIDISM, UNSPECIFIED TYPE: Primary | Chronic | ICD-10-CM

## 2017-03-15 DIAGNOSIS — L72.9 SKIN CYST: ICD-10-CM

## 2017-03-15 NOTE — MR AVS SNAPSHOT
Visit Information Date & Time Provider Department Dept. Phone Encounter #  
 3/15/2017  3:30 PM Yuki Mars MD [address] [facility] [phone] [id number] Your Appointments 3/15/2017  3:30 PM  
ESTABLISHED PATIENT with Yuki Mars MD  
[address] [facility] ([address]) Appt Note: 1 mon fuv for thyroid  $10 cp 2/17/17  bal $320.74  tg  
 N [address] [place] [address] Road [address]  
[phone]  
  
   
 N [address] [place] [address] Road [address]  
  
    
 3/24/2017  1:00 PM  
ESTABLISHED PATIENT with Nathalie Glover MD  
Arthritis and [address]) Appt Note: fu 3 mo; 11-29-16 l/m to cancel 1-27-17 appt -kfb; r/s:fu 3 mo; fu 3 mo; 3 month f/up  
 [address] [facility] [address]  
[phone]  
  
   
 [address] [address]  
  
    
 5/10/2017  2:30 PM  
ESTABLISHED PATIENT with Yuki Mars MD  
[address] [facility] ([address]) Appt Note: depo N [address] [place] [id number] [phone] Upcoming Health Maintenance Date Due  
 PAP AKA CERVICAL CYTOLOGY 11/7/2019 DTaP/Tdap/Td series (4 - Td) 11/23/2026 Allergies as of 3/15/2017  Review Complete On: 3/15/2017 By: Yuki Mars MD  
  
 Severity Noted Reaction Type Reactions Latex  05/27/2010    Hives Augmentin [Amoxicillin-pot Clavulanate]  04/13/2011    Nausea and Vomiting Codeine  04/13/2011    Nausea and Vomiting Morphine Sulfate  04/13/2011    Nausea and Vomiting Nuts [Tree Nut]  10/27/2016    Swelling Pcn [Penicillins]  04/01/2010    Hives Current Immunizations  Reviewed on 11/23/2016 Name Date Human Papillomavirus 4/14/2008, 12/26/2007, 10/26/2007 Influenza Vaccine 10/14/2014, 10/29/2013 Influenza Vaccine Charlene Lanius) 10/2/2015 Influenza Vaccine (Quad) PF 10/27/2016 Influenza Vaccine Split 10/14/2011, 11/3/2010 Influenza Vaccine Whole 10/26/2007 Pneumococcal Vaccine (Unspecified Type) 11/4/2012  5:36 PM  
 TD Vaccine 11/5/2002 TDAP Vaccine 10/14/2011 Tdap 11/23/2016 Not reviewed this visit You Were Diagnosed With   
  
 Codes Comments Hypothyroidism, unspecified type    -  Primary ICD-10-CM: E03.9 ICD-9-CM: 244.9 Skin cyst     ICD-10-CM: L72.9 ICD-9-CM: 706. 2 Vitals BP Pulse Temp Resp Height(growth percentile) Weight(growth percentile) 126/77 (BP 1 Location: Left arm, BP Patient Position: Sitting) 90 98 °F (36.7 °C) (Oral) 18 5' 4\" (1.626 m) 270 lb (122.5 kg) SpO2 BMI OB Status Smoking Status 99% 46.35 kg/m2 Injection Never Smoker Vitals History BMI and BSA Data Body Mass Index Body Surface Area  
 46.35 kg/m 2 2.35 m 2 Preferred Pharmacy Pharmacy Name Phone [facility] [address] [place] [phone] Your Updated Medication List  
  
   
This list is accurate as of: 3/15/17  3:03 PM.  Always use your most recent med list.  
  
  
  
  
 albuterol 90 mcg/actuation inhaler Commonly known as:  PROAIR HFA INHALE TWO PUFFS EVERY 6 HOURS AS NEEDED FOR WHEEZING  
  
 cetirizine 10 mg tablet Commonly known as:  ZYRTEC  
TAKE ONE TABLET BY MOUTH ONCE DAILY  
  
 clonazePAM 1 mg tablet Commonly known as:  KlonoPIN  
TAKE ONE TABLET BY MOUTH ONCE DAILY AS NEEDED  
  
 EPINEPHrine 0.3 mg/0.3 mL injection Commonly known as:  EPIPEN 2-AWA  
0.3 mL by IntraMUSCular route once as needed for up to 1 dose. ergocalciferol 50,000 unit capsule Commonly known as:  VITAMIN D2 Take 1 Cap by mouth every seven (7) days. ferrous sulfate 325 mg (65 mg iron) tablet TAKE ONE TABLET BY MOUTH TWICE DAILY  
  
 fluticasone 50 mcg/actuation nasal spray Commonly known as:  Slime Serene 2 Sprays by Both Nostrils route daily.   
  
 hydrocortisone 1 % topical cream  
Commonly known as:  CORTAID  
 APPLY THIN LAYER OF CREAM TO AFFECTED AREA ON HANDS TWO TIMES DAILY. hydroxychloroquine 200 mg tablet Commonly known as:  PLAQUENIL Take 1 Tab by mouth two (2) times a day. New dose  
  
 levothyroxine 175 mcg tablet Commonly known as:  SYNTHROID Take 1 Tab by mouth Daily (before breakfast). medroxyPROGESTERone 150 mg/mL Syrg Commonly known as:  DEPO-PROVERA  
  
 montelukast 10 mg tablet Commonly known as:  SINGULAIR  
TAKE ONE TABLET BY MOUTH ONCE DAILY  
  
 omeprazole 20 mg capsule Commonly known as:  PRILOSEC  
TAKE ONE CAPSULE BY MOUTH TWICE DAILY PARoxetine 20 mg tablet Commonly known as:  PAXIL TAKE ONE TABLET BY MOUTH IN THE EVENING  
  
 predniSONE 5 mg tablet Commonly known as:  DELTASONE  
10 mg once daily. Lower by 2.5 mg (1/2 tab) every  days SUMAtriptan 100 mg tablet Commonly known as:  IMITREX  
TAKE ONE TABLET BY MOUTH ONCE AS NEEDED FOR MIGRAINE FOR 1 DOSE. We Performed the Following TSH 3RD GENERATION [37937 CPT(R)] Introducing [facility]! Dear [facility]: Thank you for requesting a Valneva account. Our records indicate that you already have an active Valneva account. You can access your account anytime at [email or web address] Broadband Did you know that you can access your hospital and ER discharge instructions at any time in Valneva? You can also review all of your test results from your hospital stay or ER visit. Additional Information If you have questions, please visit the Frequently Asked Questions section of the Valneva website at [email or web address] Remember, Valneva is NOT to be used for urgent needs. For medical emergencies, dial 911. Now available from your iPhone and Android! Please provide this summary of care documentation to your next provider. Your primary care clinician is listed as Cain Reaves.  If you have any questions after today's visit, please call [phone].

## 2017-03-15 NOTE — PROGRESS NOTES
Pt here to have TSH rechecked. Also c/o lump on right breast that she noticed 2 days ago. Reports having pain in breast when pressure is applied.

## 2017-03-15 NOTE — PROGRESS NOTES
Pt here to have TSH rechecked. Also c/o lump on right breast that she noticed 2 days ago. Reports having pain in breast when pressure is applied. Subjective: (As above and below)     Chief Complaint   Patient presents with    Labs     TSH    Breast Mass     on right breast     she is a 36y.o. year old female who presents for evaluation. Reviewed PmHx, RxHx, FmHx, SocHx, AllgHx and updated in chart. Review of Systems - negative except as listed above    Objective:     Vitals:    03/15/17 1440   BP: 126/77   Pulse: 90   Resp: 18   Temp: 98 °F (36.7 °C)   TempSrc: Oral   SpO2: 99%   Weight: 270 lb (122.5 kg)   Height: 5' 4\" (1.626 m)     Physical Examination: General appearance - alert, well appearing, and in no distress  Mental status - normal mood, behavior, speech, dress, motor activity, and thought processes  Mouth - mucous membranes moist, pharynx normal without lesions  Chest - clear to auscultation, no wheezes, rales or rhonchi, symmetric air entry  Heart - normal rate, regular rhythm, normal S1, S2, no murmurs, rubs, clicks or gallops  Skin - superficial erythematous skin cyst, small amount of drainage, less than 1cm in diameter    Assessment/ Plan:   1. Hypothyroidism, unspecified type  -recheck level  - TSH 3RD GENERATION     2. Skin cyst  -warm compresses, antibacterial ointment    Follow-up Disposition: As needed  I have discussed the diagnosis with the patient and the intended plan as seen in the above orders. The patient has received an after-visit summary and questions were answered concerning future plans.      Medication Side Effects and Warnings were discussed with patient: yes  Patient Labs were reviewed: yes  Patient Past Records were reviewed:  yes    Georgiana Meredith M.D.

## 2017-03-16 LAB — TSH SERPL DL<=0.005 MIU/L-ACNC: 11.03 UIU/ML (ref 0.45–4.5)

## 2017-03-16 RX ORDER — LEVOTHYROXINE SODIUM 200 UG/1
200 TABLET ORAL
Qty: 30 TAB | Refills: 2 | Status: SHIPPED | OUTPATIENT
Start: 2017-03-16 | End: 2017-06-02 | Stop reason: SDUPTHER

## 2017-03-16 NOTE — PROGRESS NOTES
Notified pt per Elkin Dobson. Pt states that she has been taking increased dose of levothyroxine. Advised pt that new RX will be sent to pharmacy on file.

## 2017-03-31 RX ORDER — ALBUTEROL SULFATE 90 UG/1
AEROSOL, METERED RESPIRATORY (INHALATION)
Qty: 9 INHALER | Refills: 0 | Status: SHIPPED | OUTPATIENT
Start: 2017-03-31 | End: 2018-02-22 | Stop reason: SDUPTHER

## 2017-04-03 ENCOUNTER — OFFICE VISIT (OUTPATIENT)
Dept: FAMILY MEDICINE CLINIC | Age: 32
End: 2017-04-03

## 2017-04-03 VITALS
RESPIRATION RATE: 18 BRPM | WEIGHT: 268 LBS | DIASTOLIC BLOOD PRESSURE: 83 MMHG | HEIGHT: 64 IN | SYSTOLIC BLOOD PRESSURE: 122 MMHG | HEART RATE: 88 BPM | OXYGEN SATURATION: 98 % | BODY MASS INDEX: 45.75 KG/M2 | TEMPERATURE: 98.2 F

## 2017-04-03 DIAGNOSIS — R06.83 SNORING: ICD-10-CM

## 2017-04-03 DIAGNOSIS — F41.9 ANXIETY: Primary | ICD-10-CM

## 2017-04-03 RX ORDER — PAROXETINE 30 MG/1
30 TABLET, FILM COATED ORAL DAILY
Qty: 30 TAB | Refills: 5 | Status: SHIPPED | OUTPATIENT
Start: 2017-04-03 | End: 2017-09-27 | Stop reason: SDUPTHER

## 2017-04-03 NOTE — PROGRESS NOTES
Pt here with mother c/o frequent panic attacks x 3 days. Reports that she has been having episodes of hyperventilating during panic attack. Would like to discuss medications. Also requesting an order to have a sleep study.

## 2017-04-03 NOTE — MR AVS SNAPSHOT
Visit Information Date & Time Provider Department Dept. Phone Encounter #  
 4/3/2017 10:10 AM Antonieta Snowden MD 59[address] [facility] [phone] [id number] Your Appointments 4/26/2017  1:45 PM  
ESTABLISHED PATIENT with Antonieta Snowden MD  
[address] [facility] ([address]) Appt Note: thyroid check [address]  [id number] [place] [address] Road [address]  
[phone]  
  
   
 [address] [address]  
  
    
 4/28/2017  2:00 PM  
ESTABLISHED PATIENT with Carlita Paulson MD  
Arthritis and [address]) Appt Note: fu 3 mo; 11-29-16 l/m to cancel 1-27-17 appt -kfb; r/s:fu 3 mo; fu 3 mo; 3 month f/up; 4 mo f/u  
 222 [facility] [address]  
[phone]  
  
   
 [address] [address]  
  
    
 5/10/2017  2:30 PM  
ESTABLISHED PATIENT with Antonieta Snowden MD  
5900 [facility] ([address]) Appt Note: depo N [address] [place] [address] Road [address] [phone] Upcoming Health Maintenance Date Due  
 PAP AKA CERVICAL CYTOLOGY 11/7/2019 DTaP/Tdap/Td series (4 - Td) 11/23/2026 Allergies as of 4/3/2017  Review Complete On: 4/3/2017 By: Antonieta Snowden MD  
  
 Severity Noted Reaction Type Reactions Latex  05/27/2010    Hives Augmentin [Amoxicillin-pot Clavulanate]  04/13/2011    Nausea and Vomiting Codeine  04/13/2011    Nausea and Vomiting Morphine Sulfate  04/13/2011    Nausea and Vomiting Nuts [Tree Nut]  10/27/2016    Swelling Pcn [Penicillins]  04/01/2010    Hives Current Immunizations  Reviewed on 11/23/2016 Name Date Human Papillomavirus 4/14/2008, 12/26/2007, 10/26/2007 Influenza Vaccine 10/14/2014, 10/29/2013 Influenza Vaccine Avanell Randyler) 10/2/2015 Influenza Vaccine (Quad) PF 10/27/2016 Influenza Vaccine Split 10/14/2011, 11/3/2010 Influenza Vaccine Whole 10/26/2007 Pneumococcal Vaccine (Unspecified Type) 11/4/2012  5:36 PM  
 TD Vaccine 11/5/2002 TDAP Vaccine 10/14/2011 Tdap 11/23/2016 Not reviewed this visit You Were Diagnosed With   
  
 Codes Comments Anxiety    -  Primary ICD-10-CM: F41.9 ICD-9-CM: 300.00 Snoring     ICD-10-CM: R06.83 
ICD-9-CM: 786.09 Vitals BP Pulse Temp Resp Height(growth percentile) Weight(growth percentile) 122/83 (BP 1 Location: Left arm, BP Patient Position: Sitting) 88 98.2 °F (36.8 °C) (Oral) 18 5' 4\" (1.626 m) 268 lb (121.6 kg) SpO2 BMI OB Status Smoking Status 98% 46 kg/m2 Injection Never Smoker Vitals History BMI and BSA Data Body Mass Index Body Surface Area  
 46 kg/m 2 2.34 m 2 Preferred Pharmacy Pharmacy Name Phone [place] [id number] [facility] [address] [place] [address] [phone] Your Updated Medication List  
  
   
This list is accurate as of: 4/3/17 10:27 AM.  Always use your most recent med list.  
  
  
  
  
 cetirizine 10 mg tablet Commonly known as:  ZYRTEC  
TAKE ONE TABLET BY MOUTH ONCE DAILY  
  
 clonazePAM 1 mg tablet Commonly known as:  KlonoPIN  
TAKE ONE TABLET BY MOUTH ONCE DAILY AS NEEDED  
  
 EPINEPHrine 0.3 mg/0.3 mL injection Commonly known as:  EPIPEN 2-AWA  
0.3 mL by IntraMUSCular route once as needed for up to 1 dose. ergocalciferol 50,000 unit capsule Commonly known as:  VITAMIN D2 Take 1 Cap by mouth every seven (7) days. ferrous sulfate 325 mg (65 mg iron) tablet TAKE ONE TABLET BY MOUTH TWICE DAILY  
  
 fluticasone 50 mcg/actuation nasal spray Commonly known as:  Sarah Bough 2 Sprays by Both Nostrils route daily. hydrocortisone 1 % topical cream  
Commonly known as:  CORTAID  
APPLY THIN LAYER OF CREAM TO AFFECTED AREA ON HANDS TWO TIMES DAILY. hydroxychloroquine 200 mg tablet Commonly known as:  PLAQUENIL Take 1 Tab by mouth two (2) times a day. New dose levothyroxine 200 mcg tablet Commonly known as:  SYNTHROID Take 1 Tab by mouth Daily (before breakfast). medroxyPROGESTERone 150 mg/mL Syrg Commonly known as:  DEPO-PROVERA  
  
 montelukast 10 mg tablet Commonly known as:  SINGULAIR  
TAKE ONE TABLET BY MOUTH ONCE DAILY  
  
 omeprazole 20 mg capsule Commonly known as:  PRILOSEC  
TAKE ONE CAPSULE BY MOUTH TWICE DAILY PARoxetine 30 mg tablet Commonly known as:  PAXIL Take 1 Tab by mouth daily. PROAIR HFA 90 mcg/actuation inhaler Generic drug:  albuterol INHALE TWO PUFFS BY MOUTH EVERY 6 HOURS AS NEEDED FOR WHEEZING  
  
 SUMAtriptan 100 mg tablet Commonly known as:  IMITREX  
TAKE ONE TABLET BY MOUTH ONCE AS NEEDED FOR MIGRAINE FOR 1 DOSE. Prescriptions Sent to Pharmacy Refills PARoxetine (PAXIL) 30 mg tablet 5 Sig: Take 1 Tab by mouth daily. Class: Normal  
 Pharmacy: [place] [address], [address] [place] [address] #: [phone] Route: Oral  
  
We Performed the Following REFERRAL TO SLEEP STUDIES [REF99 Custom] Comments:  
 Please evaluate patient for snoring. Referral Information Referral ID Referred By Referred To  
  
 [id number] Melvin JONES MD [address] [place] [address] [address]  Phone: [phone] Fax: [phone] Visits Status Start Date End Date 1 New Request 4/3/17 4/3/18 If your referral has a status of pending review or denied, additional information will be sent to support the outcome of this decision. Introducing [facility] & HEALTH SERVICES! Dear Darin Gan: Thank you for requesting a Bubbli account. Our records indicate that you already have an active Bubbli account. You can access your account anytime at [email or web address] At Home Did you know that you can access your hospital and ER discharge instructions at any time in Bubbli?   You can also review all of your test results from your hospital stay or ER visit. Additional Information If you have questions, please visit the Frequently Asked Questions section of the FoxyP2 website at [email or web address] Remember, FoxyP2 is NOT to be used for urgent needs. For medical emergencies, dial 911. Now available from your iPhone and Android! Please provide this summary of care documentation to your next provider. Your primary care clinician is listed as Katia Maher. If you have any questions after today's visit, please call [phone].

## 2017-04-03 NOTE — PROGRESS NOTES
Pt here with mother c/o frequent panic attacks x 3 days. Reports that she has been having episodes of hyperventilating during panic attack. Would like to discuss medications. Also requesting an order to have a sleep study. Subjective: (As above and below)     Chief Complaint   Patient presents with    Panic Attack     she is a 36y.o. year old female who presents for evaluation. Reviewed PmHx, RxHx, FmHx, SocHx, AllgHx and updated in chart. Review of Systems - negative except as listed above    Objective:     Vitals:    04/03/17 0947   BP: 122/83   Pulse: 88   Resp: 18   Temp: 98.2 °F (36.8 °C)   TempSrc: Oral   SpO2: 98%   Weight: 268 lb (121.6 kg)   Height: 5' 4\" (1.626 m)     Physical Examination: General appearance - alert, well appearing, and in no distress  Mental status - normal mood, behavior, speech, dress, motor activity, and thought processes  Eyes - pupils equal and reactive, extraocular eye movements intact  Mouth - mucous membranes moist, pharynx normal without lesions  Chest - clear to auscultation, no wheezes, rales or rhonchi, symmetric air entry  Heart - normal rate, regular rhythm, normal S1, S2, no murmurs, rubs, clicks or gallops  Musculoskeletal - no joint tenderness, deformity or swelling    Assessment/ Plan:   1. Anxiety  -increase to 30mg daily, continue to use Klonopin as needed  - PARoxetine (PAXIL) 30 mg tablet; Take 1 Tab by mouth daily. Dispense: 30 Tab; Refill: 5    2. Snoring  -refer for study  - REFERRAL TO SLEEP STUDIES     Follow-up Disposition: As needed  I have discussed the diagnosis with the patient and the intended plan as seen in the above orders. The patient has received an after-visit summary and questions were answered concerning future plans.      Medication Side Effects and Warnings were discussed with patient: yes  Patient Labs were reviewed: yes  Patient Past Records were reviewed:  yes    Chele Burton M.D.

## 2017-04-21 ENCOUNTER — TELEPHONE (OUTPATIENT)
Dept: FAMILY MEDICINE CLINIC | Age: 32
End: 2017-04-21

## 2017-04-21 DIAGNOSIS — R10.9 ABDOMINAL PAIN, UNSPECIFIED SITE: Primary | ICD-10-CM

## 2017-05-01 RX ORDER — OMEPRAZOLE 20 MG/1
CAPSULE, DELAYED RELEASE ORAL
Qty: 60 CAP | Refills: 0 | Status: SHIPPED | OUTPATIENT
Start: 2017-05-01 | End: 2017-06-02 | Stop reason: SDUPTHER

## 2017-05-10 ENCOUNTER — OFFICE VISIT (OUTPATIENT)
Dept: FAMILY MEDICINE CLINIC | Age: 32
End: 2017-05-10

## 2017-05-10 VITALS
SYSTOLIC BLOOD PRESSURE: 123 MMHG | WEIGHT: 268.4 LBS | HEART RATE: 88 BPM | HEIGHT: 64 IN | DIASTOLIC BLOOD PRESSURE: 82 MMHG | BODY MASS INDEX: 45.82 KG/M2 | TEMPERATURE: 98.2 F | OXYGEN SATURATION: 99 % | RESPIRATION RATE: 18 BRPM

## 2017-05-10 DIAGNOSIS — N92.6 IRREGULAR MENSES: ICD-10-CM

## 2017-05-10 DIAGNOSIS — E03.9 HYPOTHYROIDISM, UNSPECIFIED TYPE: Primary | Chronic | ICD-10-CM

## 2017-05-10 RX ORDER — MEDROXYPROGESTERONE ACETATE 150 MG/ML
150 INJECTION, SUSPENSION INTRAMUSCULAR ONCE
Qty: 1 SYRINGE | Refills: 0 | Status: SHIPPED | COMMUNITY
Start: 2017-05-10 | End: 2017-05-10

## 2017-05-10 NOTE — MR AVS SNAPSHOT
Visit Information Date & Time Provider Department Dept. Phone Encounter #  
 5/10/2017  2:30 PM Kelsey Torrez MD 5900 [facility] [phone] [id number] Follow-up Instructions Return in about 3 months (around 8/10/2017) for Depo Provera. Follow-up and Disposition History Your Appointments 5/12/2017  2:30 PM  
ESTABLISHED PATIENT with Maxwell Cardenas MD  
Arthritis and [address] [facility] Appt Note: fu 3 mo; 11-29-16 l/m to cancel 1-27-17 appt -kfb; r/s:fu 3 mo; fu 3 mo; 3 month f/up; 4 mo f/u; f/u  
 [address] [place] [address]  
[phone]  
  
   
 [address] [address]  
  
    
 6/2/2017 10:40 AM  
New Patient with Cyndy Montana MD  
[address] ([facility]) Appt Note: NP_ ref by Dr Acevedo_ snoring, O2 desat_ [facility]IL[facility]; [facility] 99 [phone] 9191 [facility] [address] Upcoming Health Maintenance Date Due INFLUENZA AGE 9 TO ADULT 8/1/2017 PAP AKA CERVICAL CYTOLOGY 11/7/2019 DTaP/Tdap/Td series (4 - Td) 11/23/2026 Allergies as of 5/10/2017  Review Complete On: 5/10/2017 By: Kelsey Torrez MD  
  
 Severity Noted Reaction Type Reactions Latex  05/27/2010    Hives Augmentin [Amoxicillin-pot Clavulanate]  04/13/2011    Nausea and Vomiting Codeine  04/13/2011    Nausea and Vomiting Morphine Sulfate  04/13/2011    Nausea and Vomiting Nuts [Tree Nut]  10/27/2016    Swelling Pcn [Penicillins]  04/01/2010    Hives Current Immunizations  Reviewed on 11/23/2016 Name Date Human Papillomavirus 4/14/2008, 12/26/2007, 10/26/2007 Influenza Vaccine 10/14/2014, 10/29/2013 Influenza Vaccine Deangelo ) 10/2/2015 Influenza Vaccine (Quad) PF 10/27/2016 Influenza Vaccine Split 10/14/2011, 11/3/2010 Influenza Vaccine Whole 10/26/2007 Pneumococcal Vaccine (Unspecified Type) 11/4/2012  5:36 PM  
 TD Vaccine 11/5/2002 TDAP Vaccine 10/14/2011 Tdap 11/23/2016 Not reviewed this visit You Were Diagnosed With   
  
 Codes Comments Hypothyroidism, unspecified type    -  Primary ICD-10-CM: E03.9 ICD-9-CM: 244.9 Irregular menses     ICD-10-CM: N92.6 ICD-9-CM: 626.4 Vitals BP Pulse Temp Resp Height(growth percentile) Weight(growth percentile) 123/82 88 98.2 °F (36.8 °C) (Oral) 18 5' 4\" (1.626 m) 268 lb 6.4 oz (121.7 kg) SpO2 BMI OB Status Smoking Status 99% 46.07 kg/m2 Injection Never Smoker Vitals History BMI and BSA Data Body Mass Index Body Surface Area 46.07 kg/m 2 2.34 m 2 Preferred Pharmacy Pharmacy Name Phone [facility] [address] [phone] Your Updated Medication List  
  
   
This list is accurate as of: 5/10/17  3:08 PM.  Always use your most recent med list.  
  
  
  
  
 cetirizine 10 mg tablet Commonly known as:  ZYRTEC  
TAKE ONE TABLET BY MOUTH ONCE DAILY  
  
 clonazePAM 1 mg tablet Commonly known as:  KlonoPIN  
TAKE ONE TABLET BY MOUTH ONCE DAILY AS NEEDED  
  
 EPINEPHrine 0.3 mg/0.3 mL injection Commonly known as:  EPIPEN 2-AWA  
0.3 mL by IntraMUSCular route once as needed for up to 1 dose. ergocalciferol 50,000 unit capsule Commonly known as:  VITAMIN D2 Take 1 Cap by mouth every seven (7) days. ferrous sulfate 325 mg (65 mg iron) tablet TAKE ONE TABLET BY MOUTH TWICE DAILY  
  
 fluticasone 50 mcg/actuation nasal spray Commonly known as:  Agustin Noss 2 Sprays by Both Nostrils route daily. hydrocortisone 1 % topical cream  
Commonly known as:  CORTAID  
APPLY THIN LAYER OF CREAM TO AFFECTED AREA ON HANDS TWO TIMES DAILY. hydroxychloroquine 200 mg tablet Commonly known as:  PLAQUENIL Take 1 Tab by mouth two (2) times a day. levothyroxine 200 mcg tablet Commonly known as:  SYNTHROID Take 1 Tab by mouth Daily (before breakfast). medroxyPROGESTERone 150 mg/mL Syrg Commonly known as:  DEPO-PROVERA  
1 mL by IntraMUSCular route once for 1 dose. montelukast 10 mg tablet Commonly known as:  SINGULAIR  
TAKE ONE TABLET BY MOUTH ONCE DAILY  
  
 omeprazole 20 mg capsule Commonly known as:  PRILOSEC  
TAKE ONE CAPSULE BY MOUTH TWICE DAILY PARoxetine 30 mg tablet Commonly known as:  PAXIL Take 1 Tab by mouth daily. PROAIR HFA 90 mcg/actuation inhaler Generic drug:  albuterol INHALE TWO PUFFS BY MOUTH EVERY 6 HOURS AS NEEDED FOR WHEEZING  
  
 SUMAtriptan 100 mg tablet Commonly known as:  IMITREX  
TAKE ONE TABLET BY MOUTH ONCE AS NEEDED FOR MIGRAINE FOR 1 DOSE. We Performed the Following TSH 3RD GENERATION [42754 CPT(R)] Follow-up Instructions Return in about 3 months (around 8/10/2017) for Depo Provera. Introducing [facility] & HEALTH SERVICES! Dear Pool Ruff: Thank you for requesting a phorus account. Our records indicate that you already have an active phorus account. You can access your account anytime at [email or web address] Did you know that you can access your hospital and ER discharge instructions at any time in phorus? You can also review all of your test results from your hospital stay or ER visit. Additional Information If you have questions, please visit the Frequently Asked Questions section of the phorus website at [email or web address] Remember, phorus is NOT to be used for urgent needs. For medical emergencies, dial 911. Now available from your iPhone and Android! Please provide this summary of care documentation to your next provider. Your primary care clinician is listed as Juliana Hutchison. If you have any questions after today's visit, please call [phone].

## 2017-05-10 NOTE — PROGRESS NOTES
Chief Complaint   Patient presents with    Thyroid Problem     Recheck Thyroid    Sinus Pain     c/o sinus headaches begin Sunday    Hand Swelling     c/o bilateral hand swelling    Depo     1. Have you been to the ER, urgent care clinic since your last visit? Hospitalized since your last visit? No    2. Have you seen or consulted any other health care providers outside of the [address] [place] [address] since your last visit? Include any pap smears or colon screening. Yes When: Dr. Cierra James, Dr. Lincoln Ram- Rheumatology      Subjective: (As above and below)     Chief Complaint   Patient presents with    Thyroid Problem     Recheck Thyroid    Sinus Pain     c/o sinus headaches begin Sunday    Hand Swelling     c/o bilateral hand swelling    Depo     she is a 36y.o. year old female who presents for evaluation. Reviewed PmHx, RxHx, FmHx, SocHx, AllgHx and updated in chart. Review of Systems - negative except as listed above    Objective:     Vitals:    05/10/17 1450   BP: 123/82   Pulse: 88   Resp: 18   Temp: 98.2 °F (36.8 °C)   TempSrc: Oral   SpO2: 99%   Weight: 268 lb 6.4 oz (121.7 kg)   Height: 5' 4\" (1.626 m)     Physical Examination: General appearance - alert, well appearing, and in no distress  Mental status - normal mood, behavior, speech, dress, motor activity, and thought processes  Eyes - pupils equal and reactive, extraocular eye movements intact  Nose - normal and patent, no erythema, discharge or polyps  Mouth - mucous membranes moist, pharynx normal without lesions  Chest - clear to auscultation, no wheezes, rales or rhonchi, symmetric air entry  Heart - normal rate, regular rhythm, normal S1, S2, no murmurs, rubs, clicks or gallops  Musculoskeletal - diffuse joint pain in BUE    Assessment/ Plan:   1. Hypothyroidism, unspecified type  -recheck level  - TSH 3RD GENERATION    2.  Irregular menses  -depo given today  - medroxyPROGESTERone (DEPO-PROVERA) 150 mg/mL syrg; 1 mL by IntraMUSCular route once for 1 dose. Dispense: 1 Syringe; Refill: 0     Follow-up Disposition:  Return in about 3 months (around 8/10/2017) for Depo Provera. I have discussed the diagnosis with the patient and the intended plan as seen in the above orders. The patient has received an after-visit summary and questions were answered concerning future plans.      Medication Side Effects and Warnings were discussed with patient: yes  Patient Labs were reviewed: yes  Patient Past Records were reviewed:  yes    James Bella M.D.

## 2017-05-10 NOTE — PROGRESS NOTES
Chief Complaint   Patient presents with    Thyroid Problem     Recheck Thyroid    Sinus Pain     c/o sinus headaches begin Sunday    Hand Swelling     c/o bilateral hand swelling    Depo     1. Have you been to the ER, urgent care clinic since your last visit? Hospitalized since your last visit? No    2. Have you seen or consulted any other health care providers outside of the [address] [place] [address] since your last visit? Include any pap smears or colon screening. Yes When: Dr. Diaz Diego, Dr. Andrea Montana- Rheumatology    Date last pap: 11/04/2016  Last Depo-Provera: 02/15/2017  Side Effects if any: Patient denies side effects of depo provera, denies abnormal bleeding/spotting  Serum HCG indicated? No indicated  Depo-Provera 150 mg administered (R) Deltoid IM Route, Patient tolerated well.   Next appointment due July 26-August 9

## 2017-05-11 LAB — TSH SERPL DL<=0.005 MIU/L-ACNC: 10.96 UIU/ML (ref 0.45–4.5)

## 2017-05-12 ENCOUNTER — OFFICE VISIT (OUTPATIENT)
Dept: RHEUMATOLOGY | Age: 32
End: 2017-05-12

## 2017-05-12 VITALS
WEIGHT: 268.4 LBS | HEIGHT: 64 IN | DIASTOLIC BLOOD PRESSURE: 70 MMHG | OXYGEN SATURATION: 99 % | TEMPERATURE: 98.1 F | RESPIRATION RATE: 18 BRPM | HEART RATE: 81 BPM | BODY MASS INDEX: 45.82 KG/M2 | SYSTOLIC BLOOD PRESSURE: 115 MMHG

## 2017-05-12 DIAGNOSIS — E03.9 HYPOTHYROIDISM, UNSPECIFIED TYPE: Chronic | ICD-10-CM

## 2017-05-12 DIAGNOSIS — Z79.899 LONG-TERM USE OF PLAQUENIL: ICD-10-CM

## 2017-05-12 DIAGNOSIS — M06.4 INFLAMMATORY POLYARTHRITIS (HCC): Primary | ICD-10-CM

## 2017-05-12 NOTE — PATIENT INSTRUCTIONS
Continue Plaquenil (hydroxychloroquine) twice daily    Review thyroid with Dr. Cheryl Sterling appointment if continued or increasing pain/ swelling    Urgent care if increasing pain or swelling of calf/ lower leg

## 2017-05-12 NOTE — PROGRESS NOTES
Thyroid remains underactive, please confirm that pt is taking medication and at currently listed dose.

## 2017-05-12 NOTE — MR AVS SNAPSHOT
Visit Information Date & Time Provider Department Dept. Phone Encounter #  
 5/12/2017  2:30 PM Zayda Farmer MD Arthritis and [address] [facility] [id number] Your Appointments 5/12/2017  2:30 PM  
ESTABLISHED PATIENT with Zayda Farmer MD  
Arthritis and [address]) Appt Note: fu 3 mo; 11-29-16 l/m to cancel 1-27-17 appt -kfb; r/s:fu 3 mo; fu 3 mo; 3 month f/up; 4 mo f/u; f/u  
 [address]  
[phone]  
  
   
 [address] [address]  
  
    
 6/2/2017 10:40 AM  
New Patient with Jose Hand MD  
[address] ([address]) Appt Note: NP_ ref by Dr Acevedo_ snoring, O2 desat_ [facility]; .[facility] [address] [place] [phone] 9[address] [facility] [address] Upcoming Health Maintenance Date Due INFLUENZA AGE 9 TO ADULT 8/1/2017 PAP AKA CERVICAL CYTOLOGY 11/7/2019 DTaP/Tdap/Td series (4 - Td) 11/23/2026 Allergies as of 5/12/2017  Review Complete On: 5/12/2017 By: Zayda Farmer MD  
  
 Severity Noted Reaction Type Reactions Latex  05/27/2010    Hives Augmentin [Amoxicillin-pot Clavulanate]  04/13/2011    Nausea and Vomiting Codeine  04/13/2011    Nausea and Vomiting Morphine Sulfate  04/13/2011    Nausea and Vomiting Nuts [Tree Nut]  10/27/2016    Swelling Pcn [Penicillins]  04/01/2010    Hives Current Immunizations  Reviewed on 11/23/2016 Name Date Human Papillomavirus 4/14/2008, 12/26/2007, 10/26/2007 Influenza Vaccine 10/14/2014, 10/29/2013 Influenza Vaccine Evins Sluder) 10/2/2015 Influenza Vaccine (Quad) PF 10/27/2016 Influenza Vaccine Split 10/14/2011, 11/3/2010 Influenza Vaccine Whole 10/26/2007 Pneumococcal Vaccine (Unspecified Type) 11/4/2012  5:36 PM  
 TD Vaccine 11/5/2002 TDAP Vaccine 10/14/2011 Tdap 11/23/2016 Not reviewed this visit You Were Diagnosed With   
  
 Codes Comments Infl[facility]arth[facility] ([facility]    -  Primary ICD-10-CM: N64.7 ICD-9-CM: 714.9 Hypothyroidism, unspecified type     ICD-10-CM: E03.9 ICD-9-CM: 244.9 Long-term use of Plaquenil     ICD-10-CM: Z79.899 ICD-9-CM: V58.69 Vitals BP Pulse Temp Resp Height(growth percentile) Weight(growth percentile) 115/70 (BP 1 Location: Right arm, BP Patient Position: Sitting) 81 98.1 °F (36.7 °C) (Oral) 18 5' 4\" (1.626 m) 268 lb 6.4 oz (121.7 kg) SpO2 BMI OB Status Smoking Status 99% 46.07 kg/m2 Injection Never Smoker Vitals History BMI and BSA Data Body Mass Index Body Surface Area 46.07 kg/m 2 2.34 m 2 Preferred Pharmacy [facility] Phone [facility] [address] [place] [phone] Your Updated Medication List  
  
   
This list is accurate as of: 5/12/17 11:30 AM.  Always use your most recent med list.  
  
  
  
  
 cetirizine 10 mg tablet Commonly known as:  ZYRTEC  
TAKE ONE TABLET BY MOUTH ONCE DAILY  
  
 clonazePAM 1 mg tablet Commonly known as:  KlonoPIN  
TAKE ONE TABLET BY MOUTH ONCE DAILY AS NEEDED  
  
 EPINEPHrine 0.3 mg/0.3 mL injection Commonly known as:  EPIPEN 2-AWA  
0.3 mL by IntraMUSCular route once as needed for up to 1 dose. ergocalciferol 50,000 unit capsule Commonly known as:  VITAMIN D2 Take 1 Cap by mouth every seven (7) days. ferrous sulfate 325 mg (65 mg iron) tablet TAKE ONE TABLET BY MOUTH TWICE DAILY  
  
 fluticasone 50 mcg/actuation nasal spray Commonly known as:  Sangeeta Smack 2 Sprays by Both Nostrils route daily. hydrocortisone 1 % topical cream  
Commonly known as:  CORTAID  
APPLY THIN LAYER OF CREAM TO AFFECTED AREA ON HANDS TWO TIMES DAILY. hydroxychloroquine 200 mg tablet Commonly known as:  PLAQUENIL  
 Take 1 Tab by mouth two (2) times a day. levothyroxine 200 mcg tablet Commonly known as:  SYNTHROID Take 1 Tab by mouth Daily (before breakfast). montelukast 10 mg tablet Commonly known as:  SINGULAIR  
TAKE ONE TABLET BY MOUTH ONCE DAILY  
  
 omeprazole 20 mg capsule Commonly known as:  PRILOSEC  
TAKE ONE CAPSULE BY MOUTH TWICE DAILY PARoxetine 30 mg tablet Commonly known as:  PAXIL Take 1 Tab by mouth daily. PROAIR HFA 90 mcg/actuation inhaler Generic drug:  albuterol INHALE TWO PUFFS BY MOUTH EVERY 6 HOURS AS NEEDED FOR WHEEZING  
  
 SUMAtriptan 100 mg tablet Commonly known as:  IMITREX  
TAKE ONE TABLET BY MOUTH ONCE AS NEEDED FOR MIGRAINE FOR 1 DOSE. We Performed the Following METHYLPREDNISOLONE ACETATE INJECTION 20 MG [ HCP] Comments:  
 Submit quantity per 20 mg injection METHYLPREDNISOLONE ACETATE INJECTION 40 MG [ HCP] Comments:  
 Submit quantity per 40 mg injection THER/PROPH/DIAG INJECTION, SUBCUT/IM [id number] CPT(R)] Patient Instructions Continue Plaquenil (hydroxychloroquine) twice daily Review thyroid with Dr. Tracey Quick Sooner appointment if continued or increasing pain/ swelling Urgent care if increasing pain or swelling of calf/ lower leg Introducing [facility] & HEALTH SERVICES! Dear Joseph Chavez: Thank you for requesting a Grupo IMO account. Our records indicate that you already have an active Grupo IMO account. You can access your account anytime at [email or web address] Did you know that you can access your hospital and ER discharge instructions at any time in Grupo IMO? You can also review all of your test results from your hospital stay or ER visit. Additional Information If you have questions, please visit the Frequently Asked Questions section of the Grupo IMO website at [email or web address] Remember, Grupo IMO is NOT to be used for urgent needs.  For medical emergencies, dial 911. Now available from your iPhone and Android! Please provide this summary of care documentation to your next provider. Your primary care clinician is listed as Savana Flores. If you have any questions after today's visit, please call [phone].

## 2017-05-12 NOTE — PROGRESS NOTES
HISTORY OF PRESENT ILLNESS  Kalyani Perea is a 36 y.o. female. She presents with her mother. HPI Patient presents for follow up of inflammatory arthritis. She has noted pain and swelling of the left ankle and lower leg beginning on Wednesday. No trauma reported. She has some pain on the right lower leg as well. She had been doing well. AM stiffness of 30 minutes is noted. She notes no photosensitivity. She has no rash. She has no sicca symptoms. She is taking and tolerating Plaquenil 200 mg twice daily (better at twice daily dosing compared with once daily). She will see the eye doctor next month. She takes Tylenol if needed for pain (perhaps every other day on average). Current Outpatient Prescriptions   Medication Sig Dispense Refill    omeprazole (PRILOSEC) 20 mg capsule TAKE ONE CAPSULE BY MOUTH TWICE DAILY 60 Cap 0    hydroxychloroquine (PLAQUENIL) 200 mg tablet Take 1 Tab by mouth two (2) times a day. 60 Tab 3    PARoxetine (PAXIL) 30 mg tablet Take 1 Tab by mouth daily. 30 Tab 5    PROAIR HFA 90 mcg/actuation inhaler INHALE TWO PUFFS BY MOUTH EVERY 6 HOURS AS NEEDED FOR WHEEZING 9 Inhaler 0    levothyroxine (SYNTHROID) 200 mcg tablet Take 1 Tab by mouth Daily (before breakfast). 30 Tab 2    clonazePAM (KLONOPIN) 1 mg tablet TAKE ONE TABLET BY MOUTH ONCE DAILY AS NEEDED 30 Tab 0    hydrocortisone (CORTAID) 1 % topical cream APPLY THIN LAYER OF CREAM TO AFFECTED AREA ON HANDS TWO TIMES DAILY. 29 g 5    cetirizine (ZYRTEC) 10 mg tablet TAKE ONE TABLET BY MOUTH ONCE DAILY 90 Tab 0    montelukast (SINGULAIR) 10 mg tablet TAKE ONE TABLET BY MOUTH ONCE DAILY 30 Tab 5    ergocalciferol (VITAMIN D2) 50,000 unit capsule Take 1 Cap by mouth every seven (7) days. 12 Cap 1    fluticasone (FLONASE) 50 mcg/actuation nasal spray 2 Sprays by Both Nostrils route daily.  1 Bottle 2    SUMAtriptan (IMITREX) 100 mg tablet TAKE ONE TABLET BY MOUTH ONCE AS NEEDED FOR MIGRAINE FOR 1 DOSE. 6 Tab 5    ferrous sulfate 325 mg (65 mg iron) tablet TAKE ONE TABLET BY MOUTH TWICE DAILY 60 Tab 5    EPINEPHrine (EPIPEN 2-AWA) 0.3 mg/0.3 mL injection 0.3 mL by IntraMUSCular route once as needed for up to 1 dose. 2 Syringe 0     Allergies   Allergen Reactions    Latex Hives    Augmentin [Amoxicillin-Pot Clavulanate] Nausea and Vomiting    Codeine Nausea and Vomiting    Morphine Sulfate Nausea and Vomiting    Nuts [Tree Nut] Swelling    Pcn [Penicillins] Hives       Review of Systems   Constitutional: Negative for fever. Eyes: Negative for blurred vision. Cardiovascular: Positive for leg swelling. Gastrointestinal:        +dysphagia   Musculoskeletal: Positive for falls. Endo/Heme/Allergies: Positive for polydipsia. Physical Exam   Vitals reviewed. GENERAL: well developed, well nourished, in no apparent distress   O/P: clear  EYES: lids and conjunctiva are normal;   O/P clear with normal mucosa  NECK: Neck is supple with full range of motion;   RESPIRATORY: lungs clear with BS=B/L   CARDIOVASCULAR: trace edema bilaterally; no cords, significant calf tenderness, or Homans.  RR  GASTROINTESTINAL: soft; no tenderness;   LYMPHATIC: no enlargement of cervical nodes;   MUSCULOSKELETAL:  -peripheral joints FROM  -synovitis left 2-3 mcps, right 2-4 mcps, ankles, and bilateral 2-4 mtps  SKIN: no acute rash  PSYCHIATRIC: mental status: alert and oriented x 3; good insight and judgement;     Lab Results   Component Value Date/Time    WBC 8.2 10/28/2016 02:47 PM    WBC 8.5 05/08/2012 09:33 AM    HGB 13.8 10/28/2016 02:47 PM    HCT 41.4 10/28/2016 02:47 PM    PLATELET 061 68/82/3181 02:47 PM    MCV 94 10/28/2016 02:47 PM     Lab Results   Component Value Date/Time    Sed rate (ESR) 8 10/28/2016 02:47 PM     Lab Results   Component Value Date/Time    C-Reactive protein 0.31 05/25/2010 07:14 PM     Lab Results   Component Value Date/Time    Sodium 140 10/28/2016 02:47 PM    Potassium 3.9 10/28/2016 02:47 PM    Chloride 101 10/28/2016 02:47 PM    CO2 25 10/28/2016 02:47 PM    Anion gap 10 11/03/2012 06:38 PM    Glucose 95 10/28/2016 02:47 PM    BUN 9 10/28/2016 02:47 PM    Creatinine 0.95 10/28/2016 02:47 PM    BUN/Creatinine ratio 9 10/28/2016 02:47 PM    GFR est AA 94 10/28/2016 02:47 PM    GFR est non-AA 81 10/28/2016 02:47 PM    Calcium 9.5 10/28/2016 02:47 PM    Bilirubin, total 0.7 10/28/2016 02:47 PM    AST (SGOT) 20 10/28/2016 02:47 PM    Alk. phosphatase 90 10/28/2016 02:47 PM    Protein, total 6.7 10/28/2016 02:47 PM    Albumin 4.6 10/28/2016 02:47 PM    Globulin 3.6 11/03/2012 06:38 PM    A-G Ratio 2.2 10/28/2016 02:47 PM    ALT (SGPT) 26 10/28/2016 02:47 PM     Lab Results   Component Value Date/Time    VITAMIN D, 25-HYDROXY 28.8 10/28/2016 02:47 PM       Lab Results   Component Value Date/Time    TSH 10.960 05/10/2017 03:07 PM     Indication: diffuse joint pain. Using aseptic technique, the following medication was administered into the left deltoid: methylprednisolone 60 mg. Procedure was tolerated well. ASSESSMENT and PLAN    ICD-10-CM ICD-9-CM    1. Inflammatory polyarthritis ([facility] [address]): Symmetric inflammatory arthritis. GABE screen positive with negative CHRIS. RF and CCP negative. SSB positive. DDX includes seronegative RA (most likely), lupus, or sjogren's disease. Symptoms had flared lowering Plaquenil to once daily but have overall improved with 200 mg BID. Flare of symptoms over past few days. Current leg swelling likely related to arthritis flare. M06.4 714.9 METHYLPREDNISOLONE ACETATE INJECTION 20 MG  -Plaquenil 200 mg BID. If overall doing well at next visit, lower to 300 mg daily total dose  -comfortable, low impact exercise encouraged  -if increasing leg swelling or calf tenderness, seek urgent care      METHYLPREDNISOLONE ACETATE INJECTION 40 MG      THER/PROPH/DIAG INJECTION, SUBCUT/IM   2.  Hypothyroidism, unspecified type: recently increased LT4 dose with TSH this weak still above 10 E03.9 244.9 -she will call Ms. Narciso Light to review LT4 dosing  -discussed proper mode of taking medication   3.  Long-term use of Plaquenil Z79.899 V58.69 Plaquenil eye monitoring

## 2017-05-16 ENCOUNTER — TELEPHONE (OUTPATIENT)
Dept: FAMILY MEDICINE CLINIC | Age: 32
End: 2017-05-16

## 2017-05-16 RX ORDER — CLONAZEPAM 1 MG/1
1 TABLET ORAL
Qty: 30 TAB | Refills: 2 | OUTPATIENT
Start: 2017-05-16 | End: 2017-10-19 | Stop reason: SDUPTHER

## 2017-05-16 RX ORDER — LEVOTHYROXINE SODIUM 25 UG/1
25 TABLET ORAL
Qty: 30 TAB | Refills: 1 | Status: SHIPPED | OUTPATIENT
Start: 2017-05-16 | End: 2017-07-12 | Stop reason: SDUPTHER

## 2017-05-16 NOTE — TELEPHONE ENCOUNTER
From: Ari Valdes  To: Gege Barbosa NP  Sent: 5/16/2017 9:59 AM EDT  Subject: Medication Renewal Request    Original authorizing provider: RICKIE Adan would like a refill of the following medications:  clonazePAM (KLONOPIN) 1 mg tablet Gege Barbosa NP]    Preferred pharmacy: [place] [address]:  can it please be called in today as I am going to [facility] today

## 2017-05-23 RX ORDER — CETIRIZINE HCL 10 MG
TABLET ORAL
Qty: 90 TAB | Refills: 0 | Status: SHIPPED | OUTPATIENT
Start: 2017-05-23 | End: 2017-08-27 | Stop reason: SDUPTHER

## 2017-05-30 ENCOUNTER — OFFICE VISIT (OUTPATIENT)
Dept: FAMILY MEDICINE CLINIC | Age: 32
End: 2017-05-30

## 2017-05-30 VITALS
TEMPERATURE: 98 F | DIASTOLIC BLOOD PRESSURE: 67 MMHG | WEIGHT: 264 LBS | RESPIRATION RATE: 18 BRPM | OXYGEN SATURATION: 98 % | SYSTOLIC BLOOD PRESSURE: 108 MMHG | HEIGHT: 64 IN | HEART RATE: 88 BPM | BODY MASS INDEX: 45.07 KG/M2

## 2017-05-30 DIAGNOSIS — N61.1 ABSCESS OF RIGHT BREAST: ICD-10-CM

## 2017-05-30 DIAGNOSIS — J01.00 ACUTE MAXILLARY SINUSITIS, RECURRENCE NOT SPECIFIED: ICD-10-CM

## 2017-05-30 DIAGNOSIS — H66.91 ACUTE OTITIS MEDIA, RIGHT: Primary | ICD-10-CM

## 2017-05-30 RX ORDER — SULFAMETHOXAZOLE AND TRIMETHOPRIM 800; 160 MG/1; MG/1
1 TABLET ORAL 2 TIMES DAILY
Qty: 20 TAB | Refills: 0 | Status: SHIPPED | OUTPATIENT
Start: 2017-05-30 | End: 2017-06-09

## 2017-05-30 RX ORDER — HYDROMORPHONE HYDROCHLORIDE 2 MG/1
TABLET ORAL
COMMUNITY
Start: 2017-03-31 | End: 2017-07-10 | Stop reason: ALTCHOICE

## 2017-05-30 NOTE — MR AVS SNAPSHOT
Visit Information Date & Time Provider Department Dept. Phone Encounter #  
 5/30/2017  3:00 PM Evangelina Tyler NP [address] [phone] [id number] Follow-up Instructions Return if symptoms worsen or fail to improve. Your Appointments 6/2/2017 10:40 AM  
New Patient with Ronda Patricio MD  
[address] ([facility]) Appt Note: NP_ ref by Dr Acevedo_ snoring, O2 desat_ [facility]; .[place] [address] [place] [id number] [address] [facility] [id number]  
  
    
 6/29/2017  9:30 AM  
ESTABLISHED PATIENT with Sri Padilla MD  
[facility] ([facility]) Appt Note: Pt, 6 wk chk up, ET, 5/16/17  
 [address] [id number] [phone]  
  
   
 [address] [address] [address]  
  
    
 8/18/2017 11:30 AM  
ESTABLISHED PATIENT with Jake Reed MD  
Arthritis and [address] [facility]) Appt Note: fu 3 mo  
 [address] [facility] [address]  
[phone]  
  
   
 [address] [facility] [address] [address] Upcoming Health Maintenance Date Due INFLUENZA AGE 9 TO ADULT 8/1/2017 PAP AKA CERVICAL CYTOLOGY 11/7/2019 DTaP/Tdap/Td series (4 - Td) 11/23/2026 Allergies as of 5/30/2017  Review Complete On: 5/30/2017 By: Evangelina Tyler NP Severity Noted Reaction Type Reactions Latex  05/27/2010    Hives Augmentin [Amoxicillin-pot Clavulanate]  04/13/2011    Nausea and Vomiting Codeine  04/13/2011    Nausea and Vomiting Morphine Sulfate  04/13/2011    Nausea and Vomiting Nuts [Tree Nut]  10/27/2016    Swelling Pcn [Penicillins]  04/01/2010    Hives Current Immunizations  Reviewed on 11/23/2016 Name Date Human Papillomavirus 4/14/2008, 12/26/2007, 10/26/2007 Influenza Vaccine 10/14/2014, 10/29/2013 Influenza Vaccine Brandee Amanda) 10/2/2015 Influenza Vaccine (Quad) PF 10/27/2016 Influenza Vaccine Split 10/14/2011, 11/3/2010 Influenza Vaccine Whole 10/26/2007 Pneumococcal Vaccine (Unspecified Type) 11/4/2012  5:36 PM  
 TD Vaccine 11/5/2002 TDAP Vaccine 10/14/2011 Tdap 11/23/2016 Not reviewed this visit You Were Diagnosed With   
  
 Codes Comments Acute otitis media, right    -  Primary ICD-10-CM: H66.91 
ICD-9-CM: 382. 9 Acute maxillary sinusitis, recurrence not specified     ICD-10-CM: J01.00 ICD-9-CM: 461.0 Abscess of right breast     ICD-10-CM: N61.1 ICD-9-CM: 611.0 Vitals BP Pulse Temp Resp Height(growth percentile) Weight(growth percentile) 108/67 (BP 1 Location: Right arm, BP Patient Position: Sitting) 88 98 °F (36.7 °C) (Oral) 18 5' 4\" (1.626 m) 264 lb (119.7 kg) SpO2 BMI OB Status Smoking Status 98% 45.32 kg/m2 Injection Never Smoker Vitals History BMI and BSA Data Body Mass Index Body Surface Area  
 45.32 kg/m 2 2.32 m 2 Preferred Pharmacy Pharmacy Name Phone [id number] [place] [address] [phone] Your Updated Medication List  
  
   
This list is accurate as of: 5/30/17  3:25 PM.  Always use your most recent med list.  
  
  
  
  
 cetirizine 10 mg tablet Commonly known as:  ZYRTEC  
TAKE ONE TABLET BY MOUTH ONCE DAILY  
  
 clonazePAM 1 mg tablet Commonly known as:  Felisha Medici Take 1 Tab by mouth daily as needed. EPINEPHrine 0.3 mg/0.3 mL injection Commonly known as:  EPIPEN 2-AWA  
0.3 mL by IntraMUSCular route once as needed for up to 1 dose. ergocalciferol 50,000 unit capsule Commonly known as:  VITAMIN D2 Take 1 Cap by mouth every seven (7) days. ferrous sulfate 325 mg (65 mg iron) tablet TAKE ONE TABLET BY MOUTH TWICE DAILY  
  
 fluticasone 50 mcg/actuation nasal spray Commonly known as:  Vladimir James 2 Sprays by Both Nostrils route daily. hydrocortisone 1 % topical cream  
Commonly known as:  CORTAID  
APPLY THIN LAYER OF CREAM TO AFFECTED AREA ON HANDS TWO TIMES DAILY. HYDROmorphone 2 mg tablet Commonly known as:  DILAUDID  
  
 hydroxychloroquine 200 mg tablet Commonly known as:  PLAQUENIL Take 1 Tab by mouth two (2) times a day. * levothyroxine 200 mcg tablet Commonly known as:  SYNTHROID Take 1 Tab by mouth Daily (before breakfast). * levothyroxine 25 mcg tablet Commonly known as:  SYNTHROID Take 1 Tab by mouth Daily (before breakfast). Please take with 200mcg tablet daily  
  
 montelukast 10 mg tablet Commonly known as:  SINGULAIR  
TAKE ONE TABLET BY MOUTH ONCE DAILY  
  
 omeprazole 20 mg capsule Commonly known as:  PRILOSEC  
TAKE ONE CAPSULE BY MOUTH TWICE DAILY PARoxetine 30 mg tablet Commonly known as:  PAXIL Take 1 Tab by mouth daily. PROAIR HFA 90 mcg/actuation inhaler Generic drug:  albuterol INHALE TWO PUFFS BY MOUTH EVERY 6 HOURS AS NEEDED FOR WHEEZING  
  
 SUMAtriptan 100 mg tablet Commonly known as:  IMITREX  
TAKE ONE TABLET BY MOUTH ONCE AS NEEDED FOR MIGRAINE FOR 1 DOSE.  
  
 trimethoprim-sulfamethoxazole 160-800 mg per tablet Commonly known as:  BACTRIM DS, SEPTRA DS Take 1 Tab by mouth two (2) times a day for 10 days. * Notice: This list has 2 medication(s) that are the same as other medications prescribed for you. Read the directions carefully, and ask your doctor or other care provider to review them with you. Prescriptions Sent to Pharmacy Refills  
 trimethoprim-sulfamethoxazole (BACTRIM DS, SEPTRA DS) 160-800 mg per tablet 0 Sig: Take 1 Tab by mouth two (2) times a day for 10 days. Class: Normal  
 Pharmacy: [address] [facility] Ph #: [phone] Route: Oral  
  
We Performed the Following MRSA SCREENING CULTURE [41823 CPT(R)] Follow-up Instructions Return if symptoms worsen or fail to improve. Introducing [facility]! Dear Lorie Huff: Thank you for requesting a [employer] account. Our records indicate that you already have an active [employer] account. You can access your account anytime at [email or web address] Did you know that you can access your hospital and ER discharge instructions at any time in [employer]? You can also review all of your test results from your hospital stay or ER visit. Additional Information If you have questions, please visit the Frequently Asked Questions section of the [employer] website at [email or web address] Remember, [employer] is NOT to be used for urgent needs. For medical emergencies, dial 911. Now available from your iPhone and Android! Please provide this summary of care documentation to your next provider. Your primary care clinician is listed as Shruti Chanel. If you have any questions after today's visit, please call [phone].

## 2017-05-30 NOTE — PROGRESS NOTES
Chief Complaint   Patient presents with    Ear Pain    Head Pain    Skin Problem     rt breast     Patient in office today for ear pain and head pain that began Friday; have been treating with OTC sudafed with no relief;     Pt states skin problem began Thursday; have not treated with otc. 1. Have you been to the ER, urgent care clinic since your last visit? Hospitalized since your last visit? No    2. Have you seen or consulted any other health care providers outside of the [address] [place] [address] since your last visit? Include any pap smears or colon screening. No      Denies any sick contacts. Denies any recent abx. Sx include right ear pain and sinus congestion / pain. Sinus congestion is thick and dark. Noticed skin problem on right breast a few days ago. Denies any fever or chills. Has not applied anything OTC. Denies previously having boils. Denies any other concerns at this time. Chief Complaint   Patient presents with    Ear Pain    Head Pain    Skin Problem     rt breast     she is a 36y.o. year old female who presents for evalution. Reviewed PmHx, RxHx, FmHx, SocHx, AllgHx and updated and dated in the chart.     Review of Systems - negative except as listed above in the HPI    Objective:     Vitals:    05/30/17 1508   BP: 108/67   Pulse: 88   Resp: 18   Temp: 98 °F (36.7 °C)   TempSrc: Oral   SpO2: 98%   Weight: 264 lb (119.7 kg)   Height: 5' 4\" (1.626 m)     Physical Examination: General appearance - alert, well appearing, and in no distress  Eyes - pupils equal and reactive, extraocular eye movements intact  Ears - left ear normal, right TM red, dull, bulging  Nose - mucosal congestion, mucosal erythema, clear rhinorrhea and sinus tenderness noted bilateral maxillary and frontal sinuses with percussion  Mouth - mucous membranes moist, pharynx normal without lesions  Neck - supple, no significant adenopathy  Chest - clear to auscultation, no wheezes, rales or rhonchi, symmetric air entry  Heart - normal rate, regular rhythm, normal S1, S2, no murmurs  Skin - superficial skin infection that is approx dime sized present on right lateral breast that is draining mucoid drainage, no surrounding erythema swelling or redness concerning for cellulitis    Assessment/ Plan:   Gladys Carroll was seen today for ear pain, head pain and skin problem. Diagnoses and all orders for this visit:    Acute otitis media, right / Acute maxillary sinusitis, recurrence not specified  Bactrim should cover sx. Push fluids. Rest. Saline nasal spray for nasal congestion. OTC motrin/apap for fevers. RTC if sx persist or worsen. Abscess of right breast  -     MRSA SCREENING CULTURE  -     trimethoprim-sulfamethoxazole (BACTRIM DS, SEPTRA DS) 160-800 mg per tablet; Take 1 Tab by mouth two (2) times a day for 10 days. Complete as directed. Reviewed SEs/ADRs of medication. Enc to keep area clean and monitor for worsening sx. Follow-up Disposition:  Return if symptoms worsen or fail to improve. I have discussed the diagnosis with the patient and the intended plan as seen in the above orders. The patient has received an after-visit summary and questions were answered concerning future plans. Medication Side Effects and Warnings were discussed with patient: yes  Patient Labs were reviewed and or requested: no  Patient Past Records were reviewed and or requested  yes  Patient / Caregiver Understanding of treatment plan was verbalized during office visit YES    RUSTY Dang    There are no Patient Instructions on file for this visit.

## 2017-05-30 NOTE — PROGRESS NOTES
Chief Complaint   Patient presents with    Ear Pain    Head Pain    Skin Problem     rt breast     Patient in office today for ear pain and head pain that began Friday; have been treating sudafed with no relief; pt states skin problem began Thursday; have not treated with otc. 1. Have you been to the ER, urgent care clinic since your last visit? Hospitalized since your last visit? No    2. Have you seen or consulted any other health care providers outside of the [address] [place] [address] since your last visit? Include any pap smears or colon screening.  No

## 2017-06-02 LAB — MRSA SPEC QL CULT: NEGATIVE

## 2017-06-02 RX ORDER — OMEPRAZOLE 20 MG/1
CAPSULE, DELAYED RELEASE ORAL
Qty: 60 CAP | Refills: 0 | Status: SHIPPED | OUTPATIENT
Start: 2017-06-02 | End: 2017-07-02 | Stop reason: SDUPTHER

## 2017-06-02 RX ORDER — LEVOTHYROXINE SODIUM 200 UG/1
TABLET ORAL
Qty: 30 TAB | Refills: 0 | Status: SHIPPED | OUTPATIENT
Start: 2017-06-02 | End: 2017-06-05 | Stop reason: SDUPTHER

## 2017-06-05 RX ORDER — MONTELUKAST SODIUM 10 MG/1
TABLET ORAL
Qty: 30 TAB | Refills: 5 | Status: SHIPPED | OUTPATIENT
Start: 2017-06-05 | End: 2018-08-14 | Stop reason: SDUPTHER

## 2017-06-05 RX ORDER — LEVOTHYROXINE SODIUM 200 UG/1
200 TABLET ORAL
Qty: 30 TAB | Refills: 5 | Status: SHIPPED | OUTPATIENT
Start: 2017-06-05 | End: 2017-12-26 | Stop reason: SDUPTHER

## 2017-06-05 NOTE — TELEPHONE ENCOUNTER
From: Maia Vela  To:  Will Villa MD  Sent: 6/5/2017 10:12 AM EDT  Subject: Medication Renewal Request    Original authorizing provider: MD Maia Mclaughlin would like a refill of the following medications:  levothyroxine (SYNTHROID) 200 mcg tablet Robles Acevedo MD]    Preferred pharmacy: [address]    Comment:      Medication renewals requested in this message routed to other providers:  montelukast (SINGULAIR) 10 mg tablet Santos Crow NP]

## 2017-06-05 NOTE — TELEPHONE ENCOUNTER
From: Ethan Dave  To:  Leno Louie NP  Sent: 6/5/2017 10:12 AM EDT  Subject: Medication Renewal Request    Original authorizing provider: RICKIE Cooper would like a refill of the following medications:  montelukast (SINGULAIR) 10 mg tablet Leno Louie NP]    Preferred pharmacy: [address]    Comment:      Medication renewals requested in this message routed to other providers:  levothyroxine (SYNTHROID) 200 mcg tablet Scot Schirmer Risser, MD]

## 2017-06-15 DIAGNOSIS — G43.C0 PERIODIC HEADACHE SYNDROME, NOT INTRACTABLE: ICD-10-CM

## 2017-06-15 RX ORDER — SUMATRIPTAN 100 MG/1
TABLET, FILM COATED ORAL
Qty: 6 TAB | Refills: 0 | Status: SHIPPED | OUTPATIENT
Start: 2017-06-15 | End: 2018-02-22 | Stop reason: SDUPTHER

## 2017-06-22 ENCOUNTER — TELEPHONE (OUTPATIENT)
Dept: RHEUMATOLOGY | Age: 32
End: 2017-06-22

## 2017-06-22 RX ORDER — PREDNISONE 5 MG/1
TABLET ORAL
Qty: 16 TAB | Refills: 0 | Status: SHIPPED | OUTPATIENT
Start: 2017-06-22 | End: 2017-07-10 | Stop reason: ALTCHOICE

## 2017-06-22 NOTE — TELEPHONE ENCOUNTER
Returned call and spoke with pt who stated she is having pain in her left knee and left hip and would like a steroid pack called into the pharmacy. Last office visit was 5/12/17.

## 2017-06-22 NOTE — TELEPHONE ENCOUNTER
patient would like a return call regarding getting prednisone called in for joint pain [phone]

## 2017-06-23 NOTE — TELEPHONE ENCOUNTER
Patient returned call, and was informed to get the Prednisone Patricia sent to [facility], and call next week if not feeling any better.

## 2017-06-27 ENCOUNTER — TELEPHONE (OUTPATIENT)
Dept: FAMILY MEDICINE CLINIC | Age: 32
End: 2017-06-27

## 2017-06-27 DIAGNOSIS — M25.50 PAIN IN JOINT, SITE UNSPECIFIED: Primary | ICD-10-CM

## 2017-07-05 RX ORDER — OMEPRAZOLE 20 MG/1
CAPSULE, DELAYED RELEASE ORAL
Qty: 60 CAP | Refills: 0 | Status: SHIPPED | OUTPATIENT
Start: 2017-07-05 | End: 2017-07-10 | Stop reason: SDUPTHER

## 2017-07-10 ENCOUNTER — OFFICE VISIT (OUTPATIENT)
Dept: FAMILY MEDICINE CLINIC | Age: 32
End: 2017-07-10

## 2017-07-10 VITALS
OXYGEN SATURATION: 99 % | BODY MASS INDEX: 46.26 KG/M2 | RESPIRATION RATE: 22 BRPM | WEIGHT: 271 LBS | SYSTOLIC BLOOD PRESSURE: 113 MMHG | TEMPERATURE: 99.7 F | HEIGHT: 64 IN | HEART RATE: 104 BPM | DIASTOLIC BLOOD PRESSURE: 78 MMHG

## 2017-07-10 DIAGNOSIS — E03.9 ACQUIRED HYPOTHYROIDISM: Primary | ICD-10-CM

## 2017-07-10 DIAGNOSIS — K21.9 GASTROESOPHAGEAL REFLUX DISEASE WITHOUT ESOPHAGITIS: ICD-10-CM

## 2017-07-10 RX ORDER — OMEPRAZOLE 40 MG/1
CAPSULE, DELAYED RELEASE ORAL
Qty: 60 CAP | Refills: 0 | Status: SHIPPED | OUTPATIENT
Start: 2017-07-10 | End: 2017-09-22

## 2017-07-10 NOTE — MR AVS SNAPSHOT
Visit Information Date & Time Provider Department Dept. Phone Encounter #  
 7/10/2017  1:45 PM Lakeisha Romeo MD [address] [facility] [phone] [id number] Your Appointments 7/13/2017  3:00 PM  
ESTABLISHED PATIENT with Lakeisha Romeo MD  
[facility] ([facility]) Appt Note: Pt, 6 wk chk up, ET, 5/16/17; $10.00 cp; [address]  [address] [address] [phone]  
  
   
 [address] [address]  
  
    
 7/27/2017  9:40 AM  
New Patient with Lily Khoury MD  
[address] ([facility]) Appt Note: NP_ ref by Dr Acevedo_ snoring, O2 desat_ [employer] HMO; .RTS; pt rescheduled [address] [facility] [address] [phone] 9[facility] [phone] 8/18/2017 11:30 AM  
ESTABLISHED PATIENT with Dwight Weinberg MD  
Arthritis and [address] [facility]) Appt Note: fu 3 mo  
 [address] [facility] [id number] [phone]  
  
   
 [address] [facility] [address] [address] Upcoming Health Maintenance Date Due INFLUENZA AGE 9 TO ADULT 8/1/2017 PAP AKA CERVICAL CYTOLOGY 11/7/2019 DTaP/Tdap/Td series (4 - Td) 11/23/2026 Allergies as of 7/10/2017  Review Complete On: 7/10/2017 By: Lakeisha Romeo MD  
  
 Severity Noted Reaction Type Reactions Latex  05/27/2010    Hives Augmentin [Amoxicillin-pot Clavulanate]  04/13/2011    Nausea and Vomiting Codeine  04/13/2011    Nausea and Vomiting Morphine Sulfate  04/13/2011    Nausea and Vomiting Nuts [Tree Nut]  10/27/2016    Swelling Pcn [Penicillins]  04/01/2010    Hives Current Immunizations  Reviewed on 11/23/2016 Name Date Human Papillomavirus 4/14/2008, 12/26/2007, 10/26/2007 Influenza Vaccine 10/14/2014, 10/29/2013 Influenza Vaccine Indra Balderas) 10/2/2015 Influenza Vaccine (Quad) PF 10/27/2016 Influenza Vaccine Split 10/14/2011, 11/3/2010 Influenza Vaccine Whole 10/26/2007 Pneumococcal Vaccine (Unspecified Type) 11/4/2012  5:36 PM  
 TD Vaccine 11/5/2002 TDAP Vaccine 10/14/2011 Tdap 11/23/2016 Not reviewed this visit You Were Diagnosed With   
  
 Codes Comments Acquired hypothyroidism    -  Primary ICD-10-CM: E03.9 ICD-9-CM: 244.9 Gastroesophageal reflux disease without esophagitis     ICD-10-CM: K21.9 ICD-9-CM: 530.81 Vitals BP Pulse Temp Resp Height(growth percentile) Weight(growth percentile) 113/78 (BP 1 Location: Left arm, BP Patient Position: Sitting) (!) 104 99.7 °F (37.6 °C) (Oral) 22 5' 4\" (1.626 m) 271 lb (122.9 kg) SpO2 BMI OB Status Smoking Status 99% 46.52 kg/m2 Injection Never Smoker Vitals History BMI and BSA Data Body Mass Index Body Surface Area  
 46.52 kg/m 2 2.36 m 2 Preferred Pharmacy Pharmacy Name Phone [facility] - [place], [address] [phone] Your Updated Medication List  
  
   
This list is accurate as of: 7/10/17  2:25 PM.  Always use your most recent med list.  
  
  
  
  
 cetirizine 10 mg tablet Commonly known as:  ZYRTEC  
TAKE ONE TABLET BY MOUTH ONCE DAILY  
  
 clonazePAM 1 mg tablet Commonly known as:  Kenna Lone Take 1 Tab by mouth daily as needed. EPINEPHrine 0.3 mg/0.3 mL injection Commonly known as:  EPIPEN 2-AWA  
0.3 mL by IntraMUSCular route once as needed for up to 1 dose. ergocalciferol 50,000 unit capsule Commonly known as:  VITAMIN D2 Take 1 Cap by mouth every seven (7) days. ferrous sulfate 325 mg (65 mg iron) tablet TAKE ONE TABLET BY MOUTH TWICE DAILY  
  
 fluticasone 50 mcg/actuation nasal spray Commonly known as:  Martin Bob 2 Sprays by Both Nostrils route daily.   
  
 hydrocortisone 1 % topical cream  
Commonly known as:  CORTAID  
 APPLY THIN LAYER OF CREAM TO AFFECTED AREA ON HANDS TWO TIMES DAILY. hydroxychloroquine 200 mg tablet Commonly known as:  PLAQUENIL Take 1 Tab by mouth two (2) times a day. * levothyroxine 25 mcg tablet Commonly known as:  SYNTHROID Take 1 Tab by mouth Daily (before breakfast). Please take with 200mcg tablet daily * levothyroxine 200 mcg tablet Commonly known as:  SYNTHROID Take 1 Tab by mouth Daily (before breakfast). montelukast 10 mg tablet Commonly known as:  SINGULAIR  
TAKE ONE TABLET BY MOUTH ONCE DAILY  
  
 omeprazole 40 mg capsule Commonly known as:  PRILOSEC  
TAKE ONE CAPSULE BY MOUTH TWICE DAILY PARoxetine 30 mg tablet Commonly known as:  PAXIL Take 1 Tab by mouth daily. PROAIR HFA 90 mcg/actuation inhaler Generic drug:  albuterol INHALE TWO PUFFS BY MOUTH EVERY 6 HOURS AS NEEDED FOR WHEEZING  
  
 SUMAtriptan 100 mg tablet Commonly known as:  IMITREX  
TAKE ONE TABLET BY MOUTH ONCE AS NEEDED FOR MIGRAINE FOR ONE DOSE * Notice: This list has 2 medication(s) that are the same as other medications prescribed for you. Read the directions carefully, and ask your doctor or other care provider to review them with you. Prescriptions Sent to Pharmacy Refills  
 omeprazole (PRILOSEC) 40 mg capsule 0 Sig: TAKE ONE CAPSULE BY MOUTH TWICE DAILY Class: Normal  
 Pharmacy: [place] [address] [address] [place] [address] #: [phone] We Performed the Following TSH 3RD GENERATION [52098 CPT(R)] Introducing [facility] & HEALTH SERVICES! Dear Satish Lyles: Thank you for requesting a [employer] account. Our records indicate that you already have an active [employer] account. You can access your account anytime at [email or web address] Did you know that you can access your hospital and ER discharge instructions at any time in [employer]?   You can also review all of your test results from your hospital stay or ER visit. Additional Information If you have questions, please visit the Frequently Asked Questions section of the Heroku website at [email or web address] Remember, Heroku is NOT to be used for urgent needs. For medical emergencies, dial 911. Now available from your iPhone and Android! Please provide this summary of care documentation to your next provider. Your primary care clinician is listed as Lorin Valle. If you have any questions after today's visit, please call [phone].

## 2017-07-10 NOTE — PROGRESS NOTES
Chief Complaint   Patient presents with    Headache    Ear Pain     Left     Patient seen in the office today for c/o of left ear pain and consistent h/a.  Patient also reports she has pain at her umbilical area when she urinates

## 2017-07-10 NOTE — PROGRESS NOTES
Chief Complaint   Patient presents with    Headache    Ear Pain     Left     Patient seen in the office today for c/o of left ear pain and consistent h/a. Patient also reports she has pain at her epigastric area before and after bowel movement, worse when eating greasy or heavy foods/meals. Subjective: (As above and below)     Chief Complaint   Patient presents with    Headache    Ear Pain     Left     she is a 36y.o. year old female who presents for evaluation. Reviewed PmHx, RxHx, FmHx, SocHx, AllgHx and updated in chart. Review of Systems - negative except as listed above    Objective:     Vitals:    07/10/17 1352   BP: 113/78   Pulse: (!) 104   Resp: 22   Temp: 99.7 °F (37.6 °C)   TempSrc: Oral   SpO2: 99%   Weight: 271 lb (122.9 kg)   Height: 5' 4\" (1.626 m)     Physical Examination: General appearance - alert, well appearing, and in no distress  Mental status - normal mood, behavior, speech, dress, motor activity, and thought processes  Eyes - pupils equal and reactive, extraocular eye movements intact  Mouth - mucous membranes moist, pharynx normal without lesions  Chest - clear to auscultation, no wheezes, rales or rhonchi, symmetric air entry  Heart - normal rate, regular rhythm, normal S1, S2, no murmurs, rubs, clicks or gallops  Abd- epigastric tenderness, ND, normal bowel sounds    Assessment/ Plan:   1. Acquired hypothyroidism  -check levels  - TSH 3RD GENERATION    2. Gastroesophageal reflux disease without esophagitis  -increase dose x 1 month  - omeprazole (PRILOSEC) 40 mg capsule; TAKE ONE CAPSULE BY MOUTH TWICE DAILY  Dispense: 60 Cap; Refill: 0     Follow-up Disposition: As needed  I have discussed the diagnosis with the patient and the intended plan as seen in the above orders. The patient has received an after-visit summary and questions were answered concerning future plans.      Medication Side Effects and Warnings were discussed with patient: yes  Patient Labs were reviewed: yes  Patient Past Records were reviewed:  yes    Fredi Johns M.D.

## 2017-07-11 ENCOUNTER — DOCUMENTATION ONLY (OUTPATIENT)
Dept: FAMILY MEDICINE CLINIC | Age: 32
End: 2017-07-11

## 2017-07-11 LAB — TSH SERPL DL<=0.005 MIU/L-ACNC: 2.66 UIU/ML (ref 0.45–4.5)

## 2017-07-12 RX ORDER — LEVOTHYROXINE SODIUM 25 UG/1
25 TABLET ORAL
Qty: 30 TAB | Refills: 5 | Status: SHIPPED | OUTPATIENT
Start: 2017-07-12 | End: 2017-12-26 | Stop reason: SDUPTHER

## 2017-07-12 NOTE — TELEPHONE ENCOUNTER
From: Diane Colby  To: Mya Washington MD  Sent: 7/12/2017 5:22 AM EDT  Subject: Medication Renewal Request    Original authorizing provider: MD Beck Morse.  Reji Burkett would like a refill of the following medications:  levothyroxine (SYNTHROID) 25 mcg tablet Sunday Acevedo MD]    Preferred pharmacy: [address]    Comment:

## 2017-07-29 RX ORDER — OMEPRAZOLE 20 MG/1
CAPSULE, DELAYED RELEASE ORAL
Qty: 60 CAP | Refills: 0 | Status: SHIPPED | OUTPATIENT
Start: 2017-07-29 | End: 2017-08-27 | Stop reason: SDUPTHER

## 2017-08-01 ENCOUNTER — TELEPHONE (OUTPATIENT)
Dept: FAMILY MEDICINE CLINIC | Age: 32
End: 2017-08-01

## 2017-08-01 DIAGNOSIS — Z79.899 LONG-TERM USE OF PLAQUENIL: ICD-10-CM

## 2017-08-01 DIAGNOSIS — R06.83 SNORING: Primary | ICD-10-CM

## 2017-08-01 NOTE — TELEPHONE ENCOUNTER
----- Message from Lauri Burkett sent at 8/1/2017 10:00 AM EDT -----  Regarding: Referral Request  Contact: [phone]  Doctor Acevedo   I  need a referral for to do see doctor Carlos Hannah   sleep study  doctor my apt is August 31st at 10:00   here is the phone number [phone] and the fax number [phone]   I also need one for Doctor [facility]  at [facility]  the apt is on 8/16/17  the reason I am seeing doctor Marylene Blakes is because  long term use of plquniel   here is the phone number [phone] I do not have the fax machine number   if you have any Questions here is my cell phone number

## 2017-08-02 DIAGNOSIS — E55.9 VITAMIN D DEFICIENCY: ICD-10-CM

## 2017-08-03 RX ORDER — ERGOCALCIFEROL 1.25 MG/1
50000 CAPSULE ORAL
Qty: 12 CAP | Refills: 1 | Status: SHIPPED | OUTPATIENT
Start: 2017-08-03 | End: 2018-01-10 | Stop reason: SDUPTHER

## 2017-08-09 ENCOUNTER — TELEPHONE (OUTPATIENT)
Dept: RHEUMATOLOGY | Age: 32
End: 2017-08-09

## 2017-08-09 RX ORDER — METHYLPREDNISOLONE 4 MG/1
TABLET ORAL
Qty: 1 DOSE PACK | Refills: 0 | Status: SHIPPED | OUTPATIENT
Start: 2017-08-09 | End: 2017-09-22

## 2017-08-09 NOTE — TELEPHONE ENCOUNTER
----- Message from Hardy Maria LPN sent at 0/0/6451 12:28 PM EDT -----  Regarding: FW: Dr. Aubree Panda      ----- Message -----     From: Lesli Sandoval     Sent: 8/9/2017  11:08 AM       To: [facility] Nurse Pool  Subject: FW: Dr. Aubree Panda                          Appointment scheduled for September 19 at 2pm  ----- Message -----     From: Kimberly Rodas     Sent: 8/9/2017   8:40 AM       To: [facility] Office Pool  Subject: Dr. Ishmael Kline is requesting Prednisone due to an arthritis flare up and uses the [facility] on file. Pt is also requesting a r/s'd f/u appt next month. Pt best contact [phone].

## 2017-08-09 NOTE — TELEPHONE ENCOUNTER
Called and spoke to pt, informed of the following; Rx for medrol dose pack sent to [address] [place] [address] on file. As long as no fever or acute infection, take dose pack I will call in (not with ibuprofen). Please call at week's end to confirm she is improving, per Dr. Tomi Kawasaki.

## 2017-08-09 NOTE — TELEPHONE ENCOUNTER
As long as no fever or acute infection, take dose pack I will call in (not with ibuprofen). Please call at week's end to confirm she is improving.

## 2017-08-09 NOTE — TELEPHONE ENCOUNTER
Called and spoke to pt,  is having a flare, reports pain in feet and ankles 7/10 and some swelling.  started taking Ibuprofen 400 mg once daily on yesterday but would like Rx for prednisone to help with symptoms. Pt states she made an apt for September 18 th but would need to reschedule due to work. Clarified with pt apt is on 19 th at 2 pm. Pt  is able to make that apt.

## 2017-08-22 ENCOUNTER — OFFICE VISIT (OUTPATIENT)
Dept: FAMILY MEDICINE CLINIC | Age: 32
End: 2017-08-22

## 2017-08-22 VITALS
HEART RATE: 81 BPM | BODY MASS INDEX: 45.93 KG/M2 | DIASTOLIC BLOOD PRESSURE: 82 MMHG | TEMPERATURE: 98.1 F | SYSTOLIC BLOOD PRESSURE: 120 MMHG | WEIGHT: 269 LBS | OXYGEN SATURATION: 98 % | RESPIRATION RATE: 18 BRPM | HEIGHT: 64 IN

## 2017-08-22 DIAGNOSIS — J01.00 ACUTE NON-RECURRENT MAXILLARY SINUSITIS: Primary | ICD-10-CM

## 2017-08-22 RX ORDER — POLYETHYLENE GLYCOL 3350 17 G/17G
17 POWDER, FOR SOLUTION ORAL DAILY
Qty: 510 G | Refills: 2 | Status: SHIPPED | OUTPATIENT
Start: 2017-08-22 | End: 2017-09-21

## 2017-08-22 RX ORDER — AZITHROMYCIN 250 MG/1
TABLET, FILM COATED ORAL
Qty: 6 TAB | Refills: 0 | Status: SHIPPED | OUTPATIENT
Start: 2017-08-22 | End: 2017-08-27

## 2017-08-22 NOTE — PROGRESS NOTES
Pt here c/o cough, congestion, and sinus pressure x 4 days. States that cough has been non productive. Also reports having bilateral ear pain. Has been taking Sudafed OTC.

## 2017-08-22 NOTE — PROGRESS NOTES
Pt here c/o cough, congestion, and sinus pressure x 4 days. States that cough has been non productive. Also reports having bilateral ear pain. Has been taking Sudafed OTC. Subjective:   Judah Bradshaw is a 36 y.o. female who complains of congestion and generalized sinus pain for 6 days, gradually worsening since that time. She denies a history of shortness of breath and wheezing. Evaluation to date: none. Treatment to date: OTC products. Patient does not smoke cigarettes. Relevant PMH: No pertinent additional PMH. Patient Active Problem List   Diagnosis Code    Hypothyroid E03.9    Environmental allergies Z91.09    [place] (hard of hearing) H91.90    Strabismus H50.9    Anemia, iron deficiency D50.9    Asthma J45.909    Long-term use of Plaquenil Z79.899    Vitamin D deficiency E55.9    Polyuria R35.8    Periodic headache syndrome, not intractable G43. C0    Inflammatory polyarthritis (HCC) M06.4     Allergies   Allergen Reactions    Latex Hives    Augmentin [Amoxicillin-Pot Clavulanate] Nausea and Vomiting    Codeine Nausea and Vomiting    Morphine Sulfate Nausea and Vomiting    Nuts [Tree Nut] Swelling    Pcn [Penicillins] Hives        Review of Systems  Pertinent items are noted in HPI. Objective:     Visit Vitals    /82 (BP 1 Location: Left arm, BP Patient Position: Sitting)    Pulse 81    Temp 98.1 °F (36.7 °C) (Oral)    Resp 18    Ht 5' 4\" (1.626 m)    Wt 269 lb (122 kg)    SpO2 98%    BMI 46.17 kg/m2     General:  alert, cooperative, no distress   Eyes: conjunctivae/corneas clear. PERRL, EOM's intact. Fundi benign   Ears: normal TM's and external ear canals AU   Sinuses: tenderness over generalized maxillary   Mouth:  Lips, mucosa, and tongue normal. Teeth and gums normal   Neck: supple, symmetrical, trachea midline and no adenopathy. Heart: S1 and S2 normal, no murmurs noted.     Lungs: clear to auscultation bilaterally        Assessment/Plan: sinusitis  Suggested symptomatic OTC remedies. Antibiotics per orders. RTC prn. ICD-10-CM ICD-9-CM    1. Acute non-recurrent maxillary sinusitis J01.00 461.0 azithromycin (ZITHROMAX Z-AWA) 250 mg tablet     Encounter Diagnoses   Name Primary?  Acute non-recurrent maxillary sinusitis Yes     Orders Placed This Encounter    azithromycin (ZITHROMAX Z-AWA) 250 mg tablet   .

## 2017-08-22 NOTE — MR AVS SNAPSHOT
Visit Information Date & Time Provider Department Dept. Phone Encounter #  
 8/22/2017  8:20 AM Lizzeth Ogden MD [facility] [facility] [phone] [id number] Your Appointments 8/22/2017  8:20 AM  
ACUTE CARE with Jose Acevedo MD  
[address] [facility] ([address]) Appt Note: pt has a possible cold. pt wont have CP$. st 8/21/17  
 [address] [place] [address] [address] [address]  
[phone]  
  
   
 [address] [address]  
  
    
 9/19/2017  2:00 PM  
ESTABLISHED PATIENT with Davon Mtz MD  
[address] ([address]) Appt Note: fu appointment with Dr. Maya Phillips [facility] [address]  
[phone]  
  
   
 [address] [place] [address]  
  
    
 10/16/2017  3:00 PM  
New Patient with Corrina Figueredo MD  
[address] ([address]) Appt Note: NP_ ref by Dr Acevedo_ snoring, O2 desat_ [employer] HMO; .RTS; pt rescheduled; pt rs. No authorization required ref # [id number]; R/S  
 [address] [place] [phone] 9[address] [facility] [address] Upcoming Health Maintenance Date Due INFLUENZA AGE 9 TO ADULT 8/1/2017 PAP AKA CERVICAL CYTOLOGY 11/7/2019 DTaP/Tdap/Td series (4 - Td) 11/23/2026 Allergies as of 8/22/2017  Review Complete On: 8/22/2017 By: Lizzeth Ogden MD  
  
 Severity Noted Reaction Type Reactions Latex  05/27/2010    Hives Augmentin [Amoxicillin-pot Clavulanate]  04/13/2011    Nausea and Vomiting Codeine  04/13/2011    Nausea and Vomiting Morphine Sulfate  04/13/2011    Nausea and Vomiting Nuts [Tree Nut]  10/27/2016    Swelling Pcn [Penicillins]  04/01/2010    Hives Current Immunizations  Reviewed on 11/23/2016 Name Date Human Papillomavirus 4/14/2008, 12/26/2007, 10/26/2007 Influenza Vaccine 10/14/2014, 10/29/2013 Influenza Vaccine Christina Brink) 10/2/2015 Influenza Vaccine (Quad) PF 10/27/2016 Influenza Vaccine Split 10/14/2011, 11/3/2010 Influenza Vaccine Whole 10/26/2007 TD Vaccine 11/5/2002 TDAP Vaccine 10/14/2011 Tdap 11/23/2016 ZZZ-RETIRED (DO NOT USE) Pneumococcal Vaccine (Unspecified Type) 11/4/2012  5:36 PM  
  
 Not reviewed this visit You Were Diagnosed With   
  
 Codes Comments Acute non-recurrent maxillary sinusitis    -  Primary ICD-10-CM: J01.00 ICD-9-CM: 461.0 Vitals BP Pulse Temp Resp Height(growth percentile) Weight(growth percentile) 120/82 (BP 1 Location: Left arm, BP Patient Position: Sitting) 81 98.1 °F (36.7 °C) (Oral) 18 5' 4\" (1.626 m) 269 lb (122 kg) SpO2 BMI OB Status Smoking Status 98% 46.17 kg/m2 Injection Never Smoker Vitals History BMI and BSA Data Body Mass Index Body Surface Area  
 46.17 kg/m 2 2.35 m 2 Preferred Pharmacy Pharmacy Name Phone [facility] - [place], [address] [phone] Your Updated Medication List  
  
   
This list is accurate as of: 8/22/17  8:14 AM.  Always use your most recent med list.  
  
  
  
  
 azithromycin 250 mg tablet Commonly known as:  Betsy Triana Please take as directed  
  
 cetirizine 10 mg tablet Commonly known as:  ZYRTEC  
TAKE ONE TABLET BY MOUTH ONCE DAILY  
  
 clonazePAM 1 mg tablet Commonly known as:  Fabio Daley Take 1 Tab by mouth daily as needed. EPINEPHrine 0.3 mg/0.3 mL injection Commonly known as:  EPIPEN 2-AWA  
0.3 mL by IntraMUSCular route once as needed for up to 1 dose. ergocalciferol 50,000 unit capsule Commonly known as:  VITAMIN D2 Take 1 Cap by mouth every seven (7) days. ferrous sulfate 325 mg (65 mg iron) tablet TAKE ONE TABLET BY MOUTH TWICE DAILY  
  
 fluticasone 50 mcg/actuation nasal spray Commonly known as:  [facility] 2 Sprays by Both Nostrils route daily. hydrocortisone 1 % topical cream  
Commonly known as:  CORTAID  
APPLY THIN LAYER OF CREAM TO AFFECTED AREA ON HANDS TWO TIMES DAILY. hydroxychloroquine 200 mg tablet Commonly known as:  PLAQUENIL Take 1 Tab by mouth two (2) times a day. * levothyroxine 200 mcg tablet Commonly known as:  SYNTHROID Take 1 Tab by mouth Daily (before breakfast). * levothyroxine 25 mcg tablet Commonly known as:  SYNTHROID Take 1 Tab by mouth Daily (before breakfast). Please take with 200mcg tablet daily  
  
 methylPREDNISolone 4 mg tablet Commonly known as:  Warrick Soda Take each day's dose in AM with food. Do not take with ibuprofen or other NSAIDS  
  
 montelukast 10 mg tablet Commonly known as:  SINGULAIR  
TAKE ONE TABLET BY MOUTH ONCE DAILY  
  
 * omeprazole 40 mg capsule Commonly known as:  PRILOSEC  
TAKE ONE CAPSULE BY MOUTH TWICE DAILY  
  
 * omeprazole 20 mg capsule Commonly known as:  PRILOSEC  
TAKE ONE CAPSULE BY MOUTH TWICE DAILY PARoxetine 30 mg tablet Commonly known as:  PAXIL Take 1 Tab by mouth daily. polyethylene glycol 17 gram/dose powder Commonly known as:  [place] Take 17 g by mouth daily for 30 days. PROAIR HFA 90 mcg/actuation inhaler Generic drug:  albuterol INHALE TWO PUFFS BY MOUTH EVERY 6 HOURS AS NEEDED FOR WHEEZING  
  
 SUMAtriptan 100 mg tablet Commonly known as:  IMITREX  
TAKE ONE TABLET BY MOUTH ONCE AS NEEDED FOR MIGRAINE FOR ONE DOSE * Notice: This list has 4 medication(s) that are the same as other medications prescribed for you. Read the directions carefully, and ask your doctor or other care provider to review them with you. Prescriptions Sent to Pharmacy Refills  
 azithromycin (ZITHROMAX Z-AWA) 250 mg tablet 0 Sig: Please take as directed  Class: Normal  
 Pharmacy: [facility], [address] [place] [address] Ph #: [phone]  
 polyethylene glycol (MIRALAX) 17 gram/dose powder 2 Sig: Take 17 g by mouth daily for 30 days. Class: Normal  
 Pharmacy: [address] Ph #: [phone] Route: Oral  
  
Introducing [facility] & HEALTH SERVICES! Dear Priscilla Corrales: Thank you for requesting a JAYS account. Our records indicate that you already have an active JAYS account. You can access your account anytime at [email or web address] Did you know that you can access your hospital and ER discharge instructions at any time in JAYS? You can also review all of your test results from your hospital stay or ER visit. Additional Information If you have questions, please visit the Frequently Asked Questions section of the JAYS website at [email or web address] Remember, JAYS is NOT to be used for urgent needs. For medical emergencies, dial 911. Now available from your iPhone and Android! Please provide this summary of care documentation to your next provider. Your primary care clinician is listed as Nik Galan. If you have any questions after today's visit, please call [phone].

## 2017-08-28 RX ORDER — OMEPRAZOLE 20 MG/1
CAPSULE, DELAYED RELEASE ORAL
Qty: 60 CAP | Refills: 0 | Status: SHIPPED | OUTPATIENT
Start: 2017-08-28 | End: 2017-09-22 | Stop reason: SDUPTHER

## 2017-08-28 RX ORDER — CETIRIZINE HCL 10 MG
TABLET ORAL
Qty: 90 TAB | Refills: 0 | Status: SHIPPED | OUTPATIENT
Start: 2017-08-28 | End: 2017-11-25 | Stop reason: SDUPTHER

## 2017-08-29 ENCOUNTER — TELEPHONE (OUTPATIENT)
Dept: FAMILY MEDICINE CLINIC | Age: 32
End: 2017-08-29

## 2017-08-29 DIAGNOSIS — N20.0 KIDNEY STONE: Primary | ICD-10-CM

## 2017-08-29 RX ORDER — BENZONATATE 200 MG/1
200 CAPSULE ORAL
Qty: 30 CAP | Refills: 0 | Status: SHIPPED | OUTPATIENT
Start: 2017-08-29 | End: 2017-09-05

## 2017-08-29 RX ORDER — HYDROXYCHLOROQUINE SULFATE 200 MG/1
TABLET, FILM COATED ORAL
Qty: 60 TAB | Refills: 3 | Status: SHIPPED | OUTPATIENT
Start: 2017-08-29 | End: 2018-02-07

## 2017-08-29 NOTE — TELEPHONE ENCOUNTER
Pt states that she was seen last for cough. Cough is still persistent. Pt would like a cough med. [phone].

## 2017-09-22 ENCOUNTER — OFFICE VISIT (OUTPATIENT)
Dept: FAMILY MEDICINE CLINIC | Age: 32
End: 2017-09-22

## 2017-09-22 VITALS
SYSTOLIC BLOOD PRESSURE: 135 MMHG | HEART RATE: 103 BPM | BODY MASS INDEX: 46.78 KG/M2 | HEIGHT: 64 IN | RESPIRATION RATE: 18 BRPM | WEIGHT: 274 LBS | TEMPERATURE: 97.7 F | DIASTOLIC BLOOD PRESSURE: 84 MMHG | OXYGEN SATURATION: 98 %

## 2017-09-22 DIAGNOSIS — H10.9 BACTERIAL CONJUNCTIVITIS OF RIGHT EYE: ICD-10-CM

## 2017-09-22 DIAGNOSIS — J01.00 ACUTE MAXILLARY SINUSITIS, RECURRENCE NOT SPECIFIED: Primary | ICD-10-CM

## 2017-09-22 DIAGNOSIS — H66.91 ACUTE RIGHT OTITIS MEDIA: ICD-10-CM

## 2017-09-22 DIAGNOSIS — K21.9 GASTROESOPHAGEAL REFLUX DISEASE WITHOUT ESOPHAGITIS: ICD-10-CM

## 2017-09-22 RX ORDER — CEFDINIR 300 MG/1
300 CAPSULE ORAL 2 TIMES DAILY
Qty: 20 CAP | Refills: 0 | Status: SHIPPED | OUTPATIENT
Start: 2017-09-22 | End: 2017-10-02

## 2017-09-22 RX ORDER — MEDROXYPROGESTERONE ACETATE 150 MG/ML
150 INJECTION, SUSPENSION INTRAMUSCULAR ONCE
COMMUNITY
End: 2018-01-09

## 2017-09-22 RX ORDER — POLYMYXIN B SULFATE AND TRIMETHOPRIM 1; 10000 MG/ML; [USP'U]/ML
1 SOLUTION OPHTHALMIC EVERY 6 HOURS
Qty: 10 ML | Refills: 0 | Status: SHIPPED | OUTPATIENT
Start: 2017-09-22 | End: 2017-10-05

## 2017-09-22 RX ORDER — OMEPRAZOLE 20 MG/1
CAPSULE, DELAYED RELEASE ORAL
Qty: 60 CAP | Refills: 5 | Status: SHIPPED | OUTPATIENT
Start: 2017-09-22 | End: 2018-02-22 | Stop reason: SDUPTHER

## 2017-09-22 NOTE — PROGRESS NOTES
Chief Complaint   Patient presents with    Nasal Congestion    Cough    Sore Throat     Patient in office today for sx that began Monday; have been treating with robitussin with no relief noted. 1. Have you been to the ER, urgent care clinic since your last visit? Hospitalized since your last visit? No    2. Have you seen or consulted any other health care providers outside of the [address] [place] [address] since your last visit? Include any pap smears or colon screening.  No

## 2017-09-22 NOTE — MR AVS SNAPSHOT
Visit Information Date & Time Provider Department Dept. Phone Encounter #  
 9/22/2017  7:00 AM Svetlana Arce NP [address] [facility] [phone] [id number] Your Appointments 10/16/2017  3:00 PM  
New Patient with Rafaela Yung MD  
[address] [employer] (José Miguel Quezada) Appt Note: NP_ ref by Dr Acevedo_ snoring, O2 desat_ [employer] HMO; .RTS; pt rescheduled; pt rs. No authorization required ref # [id number]; R/S  
 [address] [facility] [address] [address] [facility] [address] Upcoming Health Maintenance Date Due INFLUENZA AGE 9 TO ADULT 8/1/2017 PAP AKA CERVICAL CYTOLOGY 11/7/2019 DTaP/Tdap/Td series (4 - Td) 11/23/2026 Allergies as of 9/22/2017  Review Complete On: 9/22/2017 By: Svetlana Arce NP Severity Noted Reaction Type Reactions Latex  05/27/2010    Hives Augmentin [Amoxicillin-pot Clavulanate]  04/13/2011    Nausea and Vomiting Codeine  04/13/2011    Nausea and Vomiting Morphine Sulfate  04/13/2011    Nausea and Vomiting Nuts [Tree Nut]  10/27/2016    Swelling Pcn [Penicillins]  04/01/2010    Hives Current Immunizations  Reviewed on 11/23/2016 Name Date Human Papillomavirus 4/14/2008, 12/26/2007, 10/26/2007 Influenza Vaccine 9/16/2017, 10/14/2014, 10/29/2013 Influenza Vaccine [facility] 10/2/2015 Influenza Vaccine (Quad) PF 10/27/2016 Influenza Vaccine Split 10/14/2011, 11/3/2010 Influenza Vaccine Whole 10/26/2007 TD Vaccine 11/5/2002 TDAP Vaccine 10/14/2011 Tdap 11/23/2016 ZZZ-RETIRED (DO NOT USE) Pneumococcal Vaccine (Unspecified Type) 11/4/2012  5:36 PM  
  
 Not reviewed this visit You Were Diagnosed With   
  
 Codes Comments Acute maxillary sinusitis, recurrence not specified    -  Primary ICD-10-CM: J01.00 ICD-9-CM: 461.0  Acute right otitis media     ICD-10-CM: H66.91 
 ICD-9-CM: 382.9 Bacterial conjunctivitis of right eye     ICD-10-CM: H10.9 ICD-9-CM: 372.39, 041.9 Gastroesophageal reflux disease without esophagitis     ICD-10-CM: K21.9 ICD-9-CM: 530.81 Vitals BP Pulse Temp Resp Height(growth percentile) Weight(growth percentile) 135/84 (BP 1 Location: Right arm, BP Patient Position: Sitting) (!) 103 97.7 °F (36.5 °C) (Oral) 18 5' 4\" (1.626 m) 274 lb (124.3 kg) SpO2 BMI OB Status Smoking Status 98% 47.03 kg/m2 Injection Never Smoker Vitals History BMI and BSA Data Body Mass Index Body Surface Area 47.03 kg/m 2 2.37 m 2 [facility] [place] [place] [phone] Your Updated Medication List  
  
   
This list is accurate as of: 9/22/17  7:57 AM.  Always use your most recent med list.  
  
  
  
  
 cefdinir 300 mg capsule Commonly known as:  OMNICEF Take 1 Cap by mouth two (2) times a day for 10 days. cetirizine 10 mg tablet Commonly known as:  ZYRTEC  
TAKE ONE TABLET BY MOUTH ONCE DAILY  
  
 clonazePAM 1 mg tablet Commonly known as:  Ramirez Primer Take 1 Tab by mouth daily as needed. DEPO-PROVERA 150 mg/mL injection Generic drug:  medroxyPROGESTERone 150 mg by IntraMUSCular route once. EPINEPHrine 0.3 mg/0.3 mL injection Commonly known as:  EPIPEN 2-AWA  
0.3 mL by IntraMUSCular route once as needed for up to 1 dose. ergocalciferol 50,000 unit capsule Commonly known as:  VITAMIN D2 Take 1 Cap by mouth every seven (7) days. ferrous sulfate 325 mg (65 mg iron) tablet TAKE ONE TABLET BY MOUTH TWICE DAILY  
  
 fluticasone 50 mcg/actuation nasal spray Commonly known as:  Ruthe Para 2 Sprays by Both Nostrils route daily. hydrocortisone 1 % topical cream  
Commonly known as:  CORTAID  
APPLY THIN LAYER OF CREAM TO AFFECTED AREA ON HANDS TWO TIMES DAILY. hydroxychloroquine 200 mg tablet Commonly known as:  PLAQUENIL  
TAKE ONE TABLET BY MOUTH TWICE DAILY * levothyroxine 200 mcg tablet Commonly known as:  SYNTHROID Take 1 Tab by mouth Daily (before breakfast). * levothyroxine 25 mcg tablet Commonly known as:  SYNTHROID Take 1 Tab by mouth Daily (before breakfast). Please take with 200mcg tablet daily  
  
 montelukast 10 mg tablet Commonly known as:  SINGULAIR  
TAKE ONE TABLET BY MOUTH ONCE DAILY  
  
 omeprazole 20 mg capsule Commonly known as:  PRILOSEC  
TAKE ONE CAPSULE BY MOUTH TWICE DAILY PARoxetine 30 mg tablet Commonly known as:  PAXIL Take 1 Tab by mouth daily. PROAIR HFA 90 mcg/actuation inhaler Generic drug:  albuterol INHALE TWO PUFFS BY MOUTH EVERY 6 HOURS AS NEEDED FOR WHEEZING  
  
 SUMAtriptan 100 mg tablet Commonly known as:  IMITREX  
TAKE ONE TABLET BY MOUTH ONCE AS NEEDED FOR MIGRAINE FOR ONE DOSE  
  
 trimethoprim-polymyxin b ophthalmic solution Commonly known as:  POLYTRIM Administer 1 Drop to right eye every six (6) hours. * Notice: This list has 2 medication(s) that are the same as other medications prescribed for you. Read the directions carefully, and ask your doctor or other care provider to review them with you. Prescriptions Sent to Pharmacy Refills  
 omeprazole (PRILOSEC) 20 mg capsule 5 Sig: TAKE ONE CAPSULE BY MOUTH TWICE DAILY Class: Normal  
 Pharmacy: [address] [place] [address] Ph #: [phone]  
 trimethoprim-polymyxin b (POLYTRIM) ophthalmic solution 0 Sig: Administer 1 Drop to right eye every six (6) hours. Class: Normal  
 Pharmacy: [address] Ph #: [phone] Route: Right Eye  
 cefdinir (OMNICEF) 300 mg capsule 0 Sig: Take 1 Cap by mouth two (2) times a day for 10 days.   
 Class: Normal  
 Pharmacy: [address] #: [phone] Route: Oral  
  
Introducing [facility]! Dear Ravi Dubose: Thank you for requesting a [employer] account. Our records indicate that you already have an active [employer] account. You can access your account anytime at [email or web address] Did you know that you can access your hospital and ER discharge instructions at any time in [employer]? You can also review all of your test results from your hospital stay or ER visit. Additional Information If you have questions, please visit the Frequently Asked Questions section of the [employer] website at [email or web address] Remember, [employer] is NOT to be used for urgent needs. For medical emergencies, dial 911. Now available from your iPhone and Android! Please provide this summary of care documentation to your next provider. Your primary care clinician is listed as Verona Nance. If you have any questions after today's visit, please call [phone].

## 2017-09-22 NOTE — PROGRESS NOTES
Chief Complaint   Patient presents with    Nasal Congestion    Cough    Sore Throat     Patient in office today for sx that began Monday; have been treating with robitussin with no relief noted. 1. Have you been to the ER, urgent care clinic since your last visit? Hospitalized since your last visit? No    2. Have you seen or consulted any other health care providers outside of the [address] [place] [address] since your last visit? Include any pap smears or colon screening. No    Sick contacts include mother who is also in the office c/o similar sx. Pt was on abx for sinusitis 1 month ago. Completed zpack as directed. Has thick green nasal congestion. Sinus pressure and congestion. Eye congestion. Reports cough that is productive. Denies any fever. Denies any SOB and dyspnea. Denies any relief with taking robitussin OTC. Has been taking her allergy daily including nasal spray. Denies any other concerns at this time. Chief Complaint   Patient presents with    Nasal Congestion    Cough    Sore Throat     she is a 36y.o. year old female who presents for evalution. Reviewed PmHx, RxHx, FmHx, SocHx, AllgHx and updated and dated in the chart.     Review of Systems - negative except as listed above in the HPI    Objective:     Vitals:    09/22/17 0710   BP: 135/84   Pulse: (!) 103   Resp: 18   Temp: 97.7 °F (36.5 °C)   TempSrc: Oral   SpO2: 98%   Weight: 274 lb (124.3 kg)   Height: 5' 4\" (1.626 m)     Physical Examination: General appearance - alert, well appearing, and in no distress  Mental status - normal mood, behavior, speech, dress, motor activity, and thought processes  Eyes - pupils equal and reactive, extraocular eye movements intact  Ears - bilateral TM's and external ear canals normal  Nose - normal and patent, no erythema, discharge or polyps and normal nontender sinuses  Mouth - mucous membranes moist, pharynx normal without lesions  Neck - supple, no significant adenopathy, carotids upstroke normal bilaterally, no bruits, thyroid exam: thyroid is normal in size without nodules or tenderness  Chest - clear to auscultation, no wheezes, rales or rhonchi, symmetric air entry  Heart - normal rate, regular rhythm, normal S1, S2, no murmurs    Assessment/ Plan:   Diagnoses and all orders for this visit:    1. Acute maxillary sinusitis, recurrence not specified / 2. Acute right otitis media  -     cefdinir (OMNICEF) 300 mg capsule; Take 1 Cap by mouth two (2) times a day for 10 days. Start and complete full course of omnicef. Dwp ADRs/SEs of medication. Push fluids. Rest. Saline nasal spray for nasal congestion. OTC motrin/apap for fevers. RTC if sx persist or worsen. 3. Bacterial conjunctivitis of right eye  -     trimethoprim-polymyxin b (POLYTRIM) ophthalmic solution; Administer 1 Drop to right eye every six (6) hours. Administer as directed. Reviewed other supportive measures. Enc frequent hand hygiene. Follow up if sx persist or worsen. 4. Gastroesophageal reflux disease without esophagitis  -     omeprazole (PRILOSEC) 20 mg capsule; TAKE ONE CAPSULE BY MOUTH TWICE DAILY  Refilled rx. Follow-up Disposition:  Return if symptoms worsen or fail to improve. I have discussed the diagnosis with the patient and the intended plan as seen in the above orders. The patient has received an after-visit summary and questions were answered concerning future plans. Medication Side Effects and Warnings were discussed with patient: yes  Patient Labs were reviewed and or requested: yes  Patient Past Records were reviewed and or requested  yes  Patient / Caregiver Understanding of treatment plan was verbalized during office visit YES    RUSTY Rudolph    There are no Patient Instructions on file for this visit.

## 2017-09-27 DIAGNOSIS — F41.9 ANXIETY: ICD-10-CM

## 2017-09-28 RX ORDER — PAROXETINE 30 MG/1
TABLET, FILM COATED ORAL
Qty: 30 TAB | Refills: 5 | Status: SHIPPED | OUTPATIENT
Start: 2017-09-28 | End: 2018-02-07 | Stop reason: ALTCHOICE

## 2017-10-03 ENCOUNTER — TELEPHONE (OUTPATIENT)
Dept: RHEUMATOLOGY | Age: 32
End: 2017-10-03

## 2017-10-03 DIAGNOSIS — M25.532 LEFT WRIST PAIN: Primary | ICD-10-CM

## 2017-10-03 NOTE — TELEPHONE ENCOUNTER
Patient is having pain left wrist, fingers and hip is requesting to have predisone called in no appointments available soon.   [phone]

## 2017-10-03 NOTE — TELEPHONE ENCOUNTER
Called patient she is not able to come in any this week due to her work schedule. She will call when she has some availability.

## 2017-10-03 NOTE — TELEPHONE ENCOUNTER
Returned patient's call [phone] has been experiencing left hip & wrist pain the last week. Currently patient is taking her prescribed medications and Ibuprofen with no relief. She would like a prescription for Prednisone sent to pharmacy on file. Advised patient she had not been seen since 5/2017 and needed a follow up appointment. At this patient stated she does not have transportation. Please advise.

## 2017-10-03 NOTE — TELEPHONE ENCOUNTER
Please see if she is able to come in for appointment this week, as I have not seen her since May. If any trauma to painful areas (such as fall), please seek urgent attention.

## 2017-10-05 ENCOUNTER — OFFICE VISIT (OUTPATIENT)
Dept: FAMILY MEDICINE CLINIC | Age: 32
End: 2017-10-05

## 2017-10-05 VITALS
RESPIRATION RATE: 18 BRPM | DIASTOLIC BLOOD PRESSURE: 79 MMHG | SYSTOLIC BLOOD PRESSURE: 115 MMHG | WEIGHT: 274 LBS | HEART RATE: 93 BPM | OXYGEN SATURATION: 98 % | TEMPERATURE: 98 F | BODY MASS INDEX: 46.78 KG/M2 | HEIGHT: 64 IN

## 2017-10-05 DIAGNOSIS — M79.89 PAIN AND SWELLING OF LEFT LOWER LEG: Primary | ICD-10-CM

## 2017-10-05 DIAGNOSIS — S80.812A ABRASION, LEFT LOWER LEG, INITIAL ENCOUNTER: ICD-10-CM

## 2017-10-05 DIAGNOSIS — L03.116 CELLULITIS OF LEFT LEG: ICD-10-CM

## 2017-10-05 DIAGNOSIS — M79.662 PAIN AND SWELLING OF LEFT LOWER LEG: Primary | ICD-10-CM

## 2017-10-05 DIAGNOSIS — E03.9 HYPOTHYROIDISM, UNSPECIFIED TYPE: ICD-10-CM

## 2017-10-05 RX ORDER — SULFAMETHOXAZOLE AND TRIMETHOPRIM 800; 160 MG/1; MG/1
1 TABLET ORAL 2 TIMES DAILY
Qty: 20 TAB | Refills: 0 | Status: SHIPPED | OUTPATIENT
Start: 2017-10-05 | End: 2017-10-15

## 2017-10-05 NOTE — MR AVS SNAPSHOT
Visit Information Date & Time Provider Department Dept. Phone Encounter #  
 10/5/2017  2:30 PM Ness Saldaña NP [address] [facility] [phone] [id number] Follow-up Instructions Return if symptoms worsen or fail to improve. Your Appointments 10/5/2017  2:30 PM  
ACUTE CARE with Ness Saldaña NP [address] ([facility]) Appt Note: hip pain, sore on foot N [address] [place] [address] Road [address] [phone]  
  
   
 [address] [place] [address] Road [address]  
  
    
 10/13/2017 10:30 AM  
PHYSICAL PRE OP with Ness Saldaña NP [address] [facility] ([facility]) Appt Note: cpe with fasting labs N [address] [place] [address] Road [address] [phone]  
  
   
 N [address] [place] [address] Road [address]  
  
    
 10/16/2017  3:00 PM  
New Patient with Annemarie Ramirez MD  
[address] ([facility]) Appt Note: NP_ ref by Dr Acevedo_ snoring, O2 desat_ [employer] HMO; .RTS; pt rescheduled; pt rs. No authorization required ref # [id number]; R/S  
 [address] [facility] [id number] 9[address] [facility] [address]  
  
    
 12/28/2017 10:00 AM  
ESTABLISHED PATIENT with Arcadio Mendoza MD  
[address] ([facility]) Appt Note: f/u  
 [address] [facility] [facility] [address]  
[phone]  
  
   
 [address] [facility] 7 [address] Upcoming Health Maintenance Date Due  
 PAP AKA CERVICAL CYTOLOGY 11/7/2019 DTaP/Tdap/Td series (4 - Td) 11/23/2026 Allergies as of 10/5/2017  Review Complete On: 10/5/2017 By: Ness Saldaña NP Severity Noted Reaction Type Reactions Latex  05/27/2010    Hives Augmentin [Amoxicillin-pot Clavulanate]  04/13/2011    Nausea and Vomiting Codeine  04/13/2011    Nausea and Vomiting Morphine Sulfate  04/13/2011    Nausea and Vomiting Nuts [Tree Nut]  10/27/2016    Swelling Pcn [Penicillins]  04/01/2010    Hives Current Immunizations  Reviewed on 11/23/2016 Name Date Human Papillomavirus 4/14/2008, 12/26/2007, 10/26/2007 Influenza Vaccine 9/16/2017, 10/14/2014, 10/29/2013 Influenza Vaccine Veroariana Wells) 10/2/2015 Influenza Vaccine (Quad) PF 10/27/2016 Influenza Vaccine Split 10/14/2011, 11/3/2010 Influenza Vaccine Whole 10/26/2007 TD Vaccine 11/5/2002 TDAP Vaccine 10/14/2011 Tdap 11/23/2016 ZZZ-RETIRED (DO NOT USE) Pneumococcal Vaccine (Unspecified Type) 11/4/2012  5:36 PM  
  
 Not reviewed this visit You Were Diagnosed With   
  
 Codes Comments Pain and swelling of left lower leg    -  Primary ICD-10-CM: M79.662, M79.89 ICD-9-CM: 729.5, 729.81 Abrasion, left lower leg, initial encounter     ICD-10-CM: R27.331G ICD-9-CM: 916.0 Cellulitis of left leg     ICD-10-CM: L03.116 ICD-9-CM: 682.6 Hypothyroidism, unspecified type     ICD-10-CM: E03.9 ICD-9-CM: 778. 9 Vitals BP Pulse Temp Resp Height(growth percentile) Weight(growth percentile) 115/79 (BP 1 Location: Right arm, BP Patient Position: Sitting) 93 98 °F (36.7 °C) (Oral) 18 5' 4\" (1.626 m) 274 lb (124.3 kg) SpO2 BMI OB Status Smoking Status 98% 47.03 kg/m2 Injection Never Smoker Vitals History BMI and BSA Data Body Mass Index Body Surface Area 47.03 kg/m 2 2.37 m 2 Pref[facility] [address] [place] [phone] Your Updated Medication List  
  
   
This list is accurate as of: 10/5/17  2:16 PM.  Always use your most recent med list.  
  
  
  
  
 cetirizine 10 mg tablet Commonly known as:  ZYRTEC  
TAKE ONE TABLET BY MOUTH ONCE DAILY  
  
 clonazePAM 1 mg tablet Commonly known as:  Elta Halsted Take 1 Tab by mouth daily as needed. DEPO-PROVERA 150 mg/mL injection Generic drug:  medroxyPROGESTERone 150 mg by IntraMUSCular route once. EPINEPHrine 0.3 mg/0.3 mL injection Commonly known as:  EPIPEN 2-AWA  
0.3 mL by IntraMUSCular route once as needed for up to 1 dose. ergocalciferol 50,000 unit capsule Commonly known as:  VITAMIN D2 Take 1 Cap by mouth every seven (7) days. ferrous sulfate 325 mg (65 mg iron) tablet TAKE ONE TABLET BY MOUTH TWICE DAILY  
  
 fluticasone 50 mcg/actuation nasal spray Commonly known as:  [place] 2 Sprays by Both Nostrils route daily. hydrocortisone 1 % topical cream  
Commonly known as:  CORTAID  
APPLY THIN LAYER OF CREAM TO AFFECTED AREA ON HANDS TWO TIMES DAILY. hydroxychloroquine 200 mg tablet Commonly known as:  PLAQUENIL  
TAKE ONE TABLET BY MOUTH TWICE DAILY * levothyroxine 200 mcg tablet Commonly known as:  SYNTHROID Take 1 Tab by mouth Daily (before breakfast). * levothyroxine 25 mcg tablet Commonly known as:  SYNTHROID Take 1 Tab by mouth Daily (before breakfast). Please take with 200mcg tablet daily  
  
 montelukast 10 mg tablet Commonly known as:  SINGULAIR  
TAKE ONE TABLET BY MOUTH ONCE DAILY  
  
 omeprazole 20 mg capsule Commonly known as:  PRILOSEC  
TAKE ONE CAPSULE BY MOUTH TWICE DAILY PARoxetine 30 mg tablet Commonly known as:  PAXIL TAKE ONE TABLET BY MOUTH ONCE DAILY PROAIR HFA 90 mcg/actuation inhaler Generic drug:  albuterol INHALE TWO PUFFS BY MOUTH EVERY 6 HOURS AS NEEDED FOR WHEEZING  
  
 SUMAtriptan 100 mg tablet Commonly known as:  IMITREX  
TAKE ONE TABLET BY MOUTH ONCE AS NEEDED FOR MIGRAINE FOR ONE DOSE  
  
 trimethoprim-sulfamethoxazole 160-800 mg per tablet Commonly known as:  BACTRIM DS, SEPTRA DS Take 1 Tab by mouth two (2) times a day for 10 days. * Notice:   This list has 2 medication(s) that are the same as other medications prescribed for you. Read the directions carefully, and ask your doctor or other care provider to review them with you. Prescriptions Sent to Pharmacy Refills  
 trimethoprim-sulfamethoxazole (BACTRIM DS, SEPTRA DS) 160-800 mg per tablet 0 Sig: Take 1 Tab by mouth two (2) times a day for 10 days. Class: Normal  
 Pharmacy: [address] [place] Ph #: [phone] Route: Oral  
  
We Performed the Following CBC WITH AUTOMATED DIFF [22324 CPT(R)] D DIMER [id number] CPT(R)] METABOLIC PANEL, COMPREHENSIVE [04760 CPT(R)] TSH 3RD GENERATION [66911 CPT(R)] Follow-up Instructions Return if symptoms worsen or fail to improve. Patient Instructions Cellulitis: Care Instructions Your Care Instructions Cellulitis is a skin infection. It often occurs after a break in the skin from a scrape, cut, bite, or puncture, or after a rash. The doctor has checked you carefully, but problems can develop later. If you notice any problems or new symptoms, get medical treatment right away. Follow-up care is a key part of your treatment and safety. Be sure to make and go to all appointments, and call your doctor if you are having problems. It's also a good idea to know your test results and keep a list of the medicines you take. How can you care for yourself at home? · Take your antibiotics as directed. Do not stop taking them just because you feel better. You need to take the full course of antibiotics. · Prop up the infected area on pillows to reduce pain and swelling. Try to keep the area above the level of your heart as often as you can. · If your doctor told you how to care for your wound, follow your doctor's instructions. If you did not get instructions, follow this general advice: ¨ Wash the wound with clean water 2 times a day. Don't use hydrogen peroxide or alcohol, which can slow healing. ¨ You may cover the wound with a thin layer of petroleum jelly, such as Vaseline, and a nonstick bandage. ¨ Apply more petroleum jelly and replace the bandage as needed. · Be safe with medicines. Take pain medicines exactly as directed. ¨ If the doctor gave you a prescription medicine for pain, take it as prescribed. ¨ If you are not taking a prescription pain medicine, ask your doctor if you can take an over-the-counter medicine. To prevent cellulitis in the future · Try to prevent cuts, scrapes, or other injuries to your skin. Cellulitis most often occurs where there is a break in the skin. · If you get a scrape, cut, mild burn, or bite, wash the wound with clean water as soon as you can to help avoid infection. Don't use hydrogen peroxide or alcohol, which can slow healing. · If you have swelling in your legs (edema), support stockings and good skin care may help prevent leg sores and cellulitis. · Take care of your feet, especially if you have diabetes or other conditions that increase the risk of infection. Wear shoes and socks. Do not go barefoot. If you have athlete's foot or other skin problems on your feet, talk to your doctor about how to treat them. When should you call for help? Call your doctor now or seek immediate medical care if: 
· You have signs that your infection is getting worse, such as: 
¨ Increased pain, swelling, warmth, or redness. ¨ Red streaks leading from the area. ¨ Pus draining from the area. ¨ A fever. · You get a rash. Watch closely for changes in your health, and be sure to contact your doctor if: 
· You are not getting better after 1 day (24 hours). · You do not get better as expected. Where can you learn more? Go to http://sallie-bladimir.info/. Desiree Etienne in the search box to learn more about \"Cellulitis: Care Instructions. \" Current as of: October 13, 2016 Content Version: 11.3 © 3739-2736 Healthwise, Incorporated. Care instructions adapted under license by [employer] (which disclaims liability or warranty for this information). If you have questions about a medical condition or this instruction, always ask your healthcare professional. [address] any warranty or liability for your use of this information. Introducing [facility]! Dear Ru Allen: Thank you for requesting a "[employer] account. Our records indicate that you already have an active "[employer] account. You can access your account anytime at [email or web address] Did you know that you can access your hospital and ER discharge instructions at any time in "[employer]? You can also review all of your test results from your hospital stay or ER visit. Additional Information If you have questions, please visit the Frequently Asked Questions section of the "[employer] website at [email or web address] Remember, "[employer] is NOT to be used for urgent needs. For medical emergencies, dial 911. Now available from your iPhone and Android! Please provide this summary of care documentation to your next provider. Your primary care clinician is listed as Meryl Mulligan. If you have any questions after today's visit, please call [phone].

## 2017-10-05 NOTE — PROGRESS NOTES
Chief Complaint   Patient presents with    Knee Swelling     left    Ankle swelling     left     Patient in office today for knee and ankle swelling that began couple days ago; denies injury; have been treating with ibuprofen. 1. Have you been to the ER, urgent care clinic since your last visit? Hospitalized since your last visit? No    2. Have you seen or consulted any other health care providers outside of the [address] [place] [address] since your last visit? Include any pap smears or colon screening.  No

## 2017-10-05 NOTE — PROGRESS NOTES
Chief Complaint   Patient presents with    Knee Swelling     left    Ankle swelling     left     Patient in office today for knee and ankle swelling that began couple days ago; denies injury; have been treating with ibuprofen. 1. Have you been to the ER, urgent care clinic since your last visit? Hospitalized since your last visit? No    2. Have you seen or consulted any other health care providers outside of the [address] [place] [address] since your last visit? Include any pap smears or colon screening. No    Initially had a place on the left foot that she was scratching. Turned into a sore a few days ago and swelling started around that time. Has progressively worsened. Reports pain at the site of the sore and also in the lateral knee. Denies any trauma or injury to the knee. Has been taking motrin which helps a little. Denies any other concerns at this time. Chief Complaint   Patient presents with    Knee Swelling     left    Ankle swelling     left     she is a 36y.o. year old female who presents for evalution. Reviewed PmHx, RxHx, FmHx, SocHx, AllgHx and updated and dated in the chart.     Review of Systems - negative except as listed above in the HPI    Objective:     Vitals:    10/05/17 1342   BP: 115/79   Pulse: 93   Resp: 18   Temp: 98 °F (36.7 °C)   TempSrc: Oral   SpO2: 98%   Weight: 274 lb (124.3 kg)   Height: 5' 4\" (1.626 m)     Physical Examination: General appearance - alert, well appearing, and in no distress and overweight  Mental status - normal mood, behavior, speech, dress, motor activity, and thought processes  Chest - clear to auscultation, no wheezes, rales or rhonchi, symmetric air entry  Heart - normal rate, regular rhythm, normal S1, S2, no murmurs  Musculoskeletal - abnormal exam of left foot and ankle, reports pain with ankle ROM but ROM intact  Extremities - peripheral pulses normal, trace ankle and pedal edema, no clubbing or cyanosis; reports pain with deep palpation of the left calf  Skin - dime sized abrasion present on top of left foot with surrounding erythema     Assessment/ Plan:   Diagnoses and all orders for this visit:    1. Pain and swelling of left lower leg  -     METABOLIC PANEL, COMPREHENSIVE  -     CBC WITH AUTOMATED DIFF  -     D DIMER  Will notify results and deviate plan based on findings. If d dimer positive will order stat venous duplex for further evaluation. 2. Abrasion, left lower leg, initial encounter / 3. Cellulitis of left leg  -     trimethoprim-sulfamethoxazole (BACTRIM DS, SEPTRA DS) 160-800 mg per tablet; Take 1 Tab by mouth two (2) times a day for 10 days. Start bactrim as directed. Reviewed SEs/aDRs of medication. Enc pt to continue to monitor closely. Reviewed other supportive measures. Follow up if sx persist or worsen. 4. Hypothyroidism, unspecified type  -     TSH 3RD GENERATION  Updating thyroid labs. Will notify results and deviate plan based on findings. Follow-up Disposition:  Return if symptoms worsen or fail to improve. I have discussed the diagnosis with the patient and the intended plan as seen in the above orders. The patient has received an after-visit summary and questions were answered concerning future plans. Medication Side Effects and Warnings were discussed with patient: yes  Patient Labs were reviewed and or requested: yes  Patient Past Records were reviewed and or requested  yes  Patient / Caregiver Understanding of treatment plan was verbalized during office visit YES    RUSTY Cardona    Patient Instructions        Cellulitis: Care Instructions  Your Care Instructions    Cellulitis is a skin infection. It often occurs after a break in the skin from a scrape, cut, bite, or puncture, or after a rash. The doctor has checked you carefully, but problems can develop later. If you notice any problems or new symptoms, get medical treatment right away.   Follow-up care is a key part of your treatment and safety. Be sure to make and go to all appointments, and call your doctor if you are having problems. It's also a good idea to know your test results and keep a list of the medicines you take. How can you care for yourself at home? · Take your antibiotics as directed. Do not stop taking them just because you feel better. You need to take the full course of antibiotics. · Prop up the infected area on pillows to reduce pain and swelling. Try to keep the area above the level of your heart as often as you can. · If your doctor told you how to care for your wound, follow your doctor's instructions. If you did not get instructions, follow this general advice:  ¨ Wash the wound with clean water 2 times a day. Don't use hydrogen peroxide or alcohol, which can slow healing. ¨ You may cover the wound with a thin layer of petroleum jelly, such as Vaseline, and a nonstick bandage. ¨ Apply more petroleum jelly and replace the bandage as needed. · Be safe with medicines. Take pain medicines exactly as directed. ¨ If the doctor gave you a prescription medicine for pain, take it as prescribed. ¨ If you are not taking a prescription pain medicine, ask your doctor if you can take an over-the-counter medicine. To prevent cellulitis in the future  · Try to prevent cuts, scrapes, or other injuries to your skin. Cellulitis most often occurs where there is a break in the skin. · If you get a scrape, cut, mild burn, or bite, wash the wound with clean water as soon as you can to help avoid infection. Don't use hydrogen peroxide or alcohol, which can slow healing. · If you have swelling in your legs (edema), support stockings and good skin care may help prevent leg sores and cellulitis. · Take care of your feet, especially if you have diabetes or other conditions that increase the risk of infection. Wear shoes and socks. Do not go barefoot.  If you have athlete's foot or other skin problems on your feet, talk to your doctor about how to treat them. When should you call for help? Call your doctor now or seek immediate medical care if:  · You have signs that your infection is getting worse, such as:  ¨ Increased pain, swelling, warmth, or redness. ¨ Red streaks leading from the area. ¨ Pus draining from the area. ¨ A fever. · You get a rash. Watch closely for changes in your health, and be sure to contact your doctor if:  · You are not getting better after 1 day (24 hours). · You do not get better as expected. Where can you learn more? Go to http://sallie-bladimir.info/. Phyllis Sample in the search box to learn more about \"Cellulitis: Care Instructions. \"  Current as of: October 13, 2016  Content Version: 11.3  © 8712-1225 [employer]. Care instructions adapted under license by [employer] (which disclaims liability or warranty for this information). If you have questions about a medical condition or this instruction, always ask your healthcare professional. [address] any warranty or liability for your use of this information.

## 2017-10-05 NOTE — PATIENT INSTRUCTIONS

## 2017-10-06 LAB
ALBUMIN SERPL-MCNC: 4.4 G/DL (ref 3.5–5.5)
ALBUMIN/GLOB SERPL: 2 {RATIO} (ref 1.2–2.2)
ALP SERPL-CCNC: 97 IU/L (ref 39–117)
ALT SERPL-CCNC: 32 IU/L (ref 0–32)
AST SERPL-CCNC: 22 IU/L (ref 0–40)
BASOPHILS # BLD AUTO: 0 X10E3/UL (ref 0–0.2)
BASOPHILS NFR BLD AUTO: 0 %
BILIRUB SERPL-MCNC: 0.8 MG/DL (ref 0–1.2)
BUN SERPL-MCNC: 9 MG/DL (ref 6–20)
BUN/CREAT SERPL: 11 (ref 9–23)
CALCIUM SERPL-MCNC: 8.9 MG/DL (ref 8.7–10.2)
CHLORIDE SERPL-SCNC: 102 MMOL/L (ref 96–106)
CO2 SERPL-SCNC: 22 MMOL/L (ref 18–29)
CREAT SERPL-MCNC: 0.81 MG/DL (ref 0.57–1)
D DIMER PPP FEU-MCNC: 0.35 MG/L FEU (ref 0–0.49)
EOSINOPHIL # BLD AUTO: 0.2 X10E3/UL (ref 0–0.4)
EOSINOPHIL NFR BLD AUTO: 3 %
ERYTHROCYTE [DISTWIDTH] IN BLOOD BY AUTOMATED COUNT: 13.4 % (ref 12.3–15.4)
GLOBULIN SER CALC-MCNC: 2.2 G/DL (ref 1.5–4.5)
GLUCOSE SERPL-MCNC: 80 MG/DL (ref 65–99)
HCT VFR BLD AUTO: 39.3 % (ref 34–46.6)
HGB BLD-MCNC: 13 G/DL (ref 11.1–15.9)
IMM GRANULOCYTES # BLD: 0 X10E3/UL (ref 0–0.1)
IMM GRANULOCYTES NFR BLD: 0 %
LYMPHOCYTES # BLD AUTO: 2.6 X10E3/UL (ref 0.7–3.1)
LYMPHOCYTES NFR BLD AUTO: 31 %
MCH RBC QN AUTO: 30.7 PG (ref 26.6–33)
MCHC RBC AUTO-ENTMCNC: 33.1 G/DL (ref 31.5–35.7)
MCV RBC AUTO: 93 FL (ref 79–97)
MONOCYTES # BLD AUTO: 0.6 X10E3/UL (ref 0.1–0.9)
MONOCYTES NFR BLD AUTO: 7 %
NEUTROPHILS # BLD AUTO: 4.9 X10E3/UL (ref 1.4–7)
NEUTROPHILS NFR BLD AUTO: 59 %
PLATELET # BLD AUTO: 290 X10E3/UL (ref 150–379)
POTASSIUM SERPL-SCNC: 3.6 MMOL/L (ref 3.5–5.2)
PROT SERPL-MCNC: 6.6 G/DL (ref 6–8.5)
RBC # BLD AUTO: 4.24 X10E6/UL (ref 3.77–5.28)
SODIUM SERPL-SCNC: 141 MMOL/L (ref 134–144)
TSH SERPL DL<=0.005 MIU/L-ACNC: 1.36 UIU/ML (ref 0.45–4.5)
WBC # BLD AUTO: 8.5 X10E3/UL (ref 3.4–10.8)

## 2017-10-06 NOTE — PROGRESS NOTES
Please notify pt the followin. D dimer is normal suggesting that her leg pain and swelling is not due to a blood clot. Enc to continue with plan discussed during OV and follow up if sx persist or worsen. 2. Normal thyroid function on current dose of medication.    All other labs are perfectly normal.

## 2017-10-11 ENCOUNTER — DOCUMENTATION ONLY (OUTPATIENT)
Dept: SLEEP MEDICINE | Age: 32
End: 2017-10-11

## 2017-10-13 ENCOUNTER — OFFICE VISIT (OUTPATIENT)
Dept: FAMILY MEDICINE CLINIC | Age: 32
End: 2017-10-13

## 2017-10-13 VITALS
SYSTOLIC BLOOD PRESSURE: 121 MMHG | BODY MASS INDEX: 46.78 KG/M2 | TEMPERATURE: 98.5 F | HEART RATE: 93 BPM | RESPIRATION RATE: 18 BRPM | HEIGHT: 64 IN | WEIGHT: 274 LBS | OXYGEN SATURATION: 98 % | DIASTOLIC BLOOD PRESSURE: 79 MMHG

## 2017-10-13 DIAGNOSIS — M25.562 ACUTE PAIN OF LEFT KNEE: ICD-10-CM

## 2017-10-13 DIAGNOSIS — E66.01 MORBID OBESITY WITH BMI OF 45.0-49.9, ADULT (HCC): ICD-10-CM

## 2017-10-13 DIAGNOSIS — Z00.00 MEDICARE ANNUAL WELLNESS VISIT, SUBSEQUENT: Primary | ICD-10-CM

## 2017-10-13 NOTE — MR AVS SNAPSHOT
Visit Information Date & Time Provider Department Dept. Phone Encounter #  
 10/13/2017 10:30 AM Ibis Fernandez NP [address] [facility] [phone] [id number] Follow-up Instructions Return in about 3 months (around 1/13/2018). Your Appointments 10/16/2017  3:00 PM  
New Patient with Huber Ross MD  
[address] ([facility]) Appt Note: NP_ ref by Dr Acevedo_ snoring, O2 desat_ [employer]; .RTS; pt rescheduled; pt rs. No authorization required ref # [id number]; R/S  
 [address] [id number] [address] [facility] [id number]  
  
    
 12/28/2017 10:00 AM  
ESTABLISHED PATIENT with Rachael Brennan MD  
[address] ([facility]) Appt Note: f/u  
 [address] [facility] [address]  
[phone]  
  
   
 [address] [facility] [id number] Upcoming Health Maintenance Date Due  
 MEDICARE YEARLY EXAM 10/14/2018 PAP AKA CERVICAL CYTOLOGY 11/7/2019 DTaP/Tdap/Td series (4 - Td) 11/23/2026 Allergies as of 10/13/2017  Review Complete On: 10/13/2017 By: Ibis Fernandez NP Severity Noted Reaction Type Reactions Latex  05/27/2010    Hives Augmentin [Amoxicillin-pot Clavulanate]  04/13/2011    Nausea and Vomiting Codeine  04/13/2011    Nausea and Vomiting Morphine Sulfate  04/13/2011    Nausea and Vomiting Nuts [Tree Nut]  10/27/2016    Swelling Pcn [Penicillins]  04/01/2010    Hives Current Immunizations  Reviewed on 11/23/2016 Name Date Human Papillomavirus 4/14/2008, 12/26/2007, 10/26/2007 Influenza Vaccine 9/16/2017, 10/14/2014, 10/29/2013 Influenza Vaccine [place]) 10/2/2015 Influenza Vaccine (Quad) PF 10/27/2016 Influenza Vaccine Split 10/14/2011, 11/3/2010 Influenza Vaccine Whole 10/26/2007 TD Vaccine 11/5/2002 TDAP Vaccine 10/14/2011 Tdap 11/23/2016 ZZZ-RETIRED (DO NOT USE) Pneumococcal Vaccine (Unspecified Type) 11/4/2012  5:36 PM  
  
 Not reviewed this visit You Were Diagnosed With   
  
 Codes Comments Medicare annual wellness visit, subsequent    -  Primary ICD-10-CM: Z00.00 ICD-9-CM: V70.0 Acute pain of left knee     ICD-10-CM: M25.562 ICD-9-CM: 719.46 Morbid obesity with BMI of 45.0-49.9, adult (HCC)     ICD-10-CM: E66.01, Z68.42 
ICD-9-CM: 278.01, V85.42 Vitals BP Pulse Temp Resp Height(growth percentile) Weight(growth percentile) 121/79 (BP 1 Location: Right arm, BP Patient Position: Sitting) 93 98.5 °F (36.9 °C) (Oral) 18 5' 4\" (1.626 m) 274 lb (124.3 kg) SpO2 BMI OB Status Smoking Status 98% 47.03 kg/m2 Injection Never Smoker Vitals History BMI and BSA Data Body Mass Index Body Surface Area 47.03 kg/m 2 2.37 m 2 Preferred Pharmacy Pharmacy Name Phone [facility] [place], [address] [phone] Your Updated Medication List  
  
   
This list is accurate as of: 10/13/17 11:07 AM.  Always use your most recent med list.  
  
  
  
  
 cetirizine 10 mg tablet Commonly known as:  ZYRTEC  
TAKE ONE TABLET BY MOUTH ONCE DAILY  
  
 clonazePAM 1 mg tablet Commonly known as:  Althea Patch Take 1 Tab by mouth daily as needed. DEPO-PROVERA 150 mg/mL injection Generic drug:  medroxyPROGESTERone 150 mg by IntraMUSCular route once. EPINEPHrine 0.3 mg/0.3 mL injection Commonly known as:  EPIPEN 2-AWA  
0.3 mL by IntraMUSCular route once as needed for up to 1 dose. ergocalciferol 50,000 unit capsule Commonly known as:  VITAMIN D2 Take 1 Cap by mouth every seven (7) days. ferrous sulfate 325 mg (65 mg iron) tablet TAKE ONE TABLET BY MOUTH TWICE DAILY  
  
 fluticasone 50 mcg/actuation nasal spray Commonly known as:  Jaida Tyrone 2 Sprays by Both Nostrils route daily. hydrocortisone 1 % topical cream  
Commonly known as:  CORTAID  
APPLY THIN LAYER OF CREAM TO AFFECTED AREA ON HANDS TWO TIMES DAILY. hydroxychloroquine 200 mg tablet Commonly known as:  PLAQUENIL  
TAKE ONE TABLET BY MOUTH TWICE DAILY * levothyroxine 200 mcg tablet Commonly known as:  SYNTHROID Take 1 Tab by mouth Daily (before breakfast). * levothyroxine 25 mcg tablet Commonly known as:  SYNTHROID Take 1 Tab by mouth Daily (before breakfast). Please take with 200mcg tablet daily  
  
 montelukast 10 mg tablet Commonly known as:  SINGULAIR  
TAKE ONE TABLET BY MOUTH ONCE DAILY  
  
 omeprazole 20 mg capsule Commonly known as:  PRILOSEC  
TAKE ONE CAPSULE BY MOUTH TWICE DAILY PARoxetine 30 mg tablet Commonly known as:  PAXIL TAKE ONE TABLET BY MOUTH ONCE DAILY PROAIR HFA 90 mcg/actuation inhaler Generic drug:  albuterol INHALE TWO PUFFS BY MOUTH EVERY 6 HOURS AS NEEDED FOR WHEEZING  
  
 SUMAtriptan 100 mg tablet Commonly known as:  IMITREX  
TAKE ONE TABLET BY MOUTH ONCE AS NEEDED FOR MIGRAINE FOR ONE DOSE  
  
 trimethoprim-sulfamethoxazole 160-800 mg per tablet Commonly known as:  BACTRIM DS, SEPTRA DS Take 1 Tab by mouth two (2) times a day for 10 days. * Notice: This list has 2 medication(s) that are the same as other medications prescribed for you. Read the directions carefully, and ask your doctor or other care provider to review them with you. We Performed the Following REFERRAL TO ORTHOPEDIC SURGERY [REF62 Custom] Follow-up Instructions Return in about 3 months (around 1/13/2018). Referral Information Referral ID Referred By Referred To  
  
 [id number] Santos Lemos MD   
   [address] [place] [address] Phone: [phone] Fax: [phone] Visits Status Start Date End Date 1 New Request 10/13/17 10/13/18 If your referral has a status of pending review or denied, additional information will be sent to support the outcome of this decision. Patient Instructions Medicare Wellness Visit, Female The best way to live healthy is to have a healthy lifestyle by eating a well-balanced diet, exercising regularly, limiting alcohol and stopping smoking. Regular physical exams and screening tests are another way to keep healthy. Preventive exams provided by your health care provider can find health problems before they become diseases or illnesses. Preventive services including immunizations, screening tests, monitoring and exams can help you take care of your own health. All people over age 72 should have a pneumovax  and and a prevnar shot to prevent pneumonia. These are once in a lifetime unless you and your provider decide differently. All people over 65 should have a yearly flu shot and a tetanus vaccine every 10 years. A bone mass density to screen for osteoporosis or thinning of the bones should be done every 2 years after 65. Screening for diabetes mellitus with a blood sugar test should be done every year. Glaucoma is a disease of the eye due to increased ocular pressure that can lead to blindness and it should be done every year by an eye professional. 
 
Cardiovascular screening tests that check for elevated lipids (fatty part of blood) which can lead to heart disease and strokes should be done every 5 years. Colorectal screening that evaluates for blood or polyps in your colon should be done yearly as a stool test or every five years as a flexible sigmoidoscope or every 10 years as a colonoscopy up to age 76. Breast cancer screening with a mammogram is recommended biennially  for women age 54-69.  
 
Screening for cervical cancer with a pap smear and pelvic exam is recommended for women after age 72 years every 2 years up to age 79 or when the provider and patient decide to stop. If there is a history of cervical abnormalities or other increased risk for cancer then the test is recommended yearly. Hepatitis C screening is also recommended for anyone born between [birth year] through [address]. A shingles vaccine is also recommended once in a lifetime after age 61. Your Medicare Wellness Exam is recommended annually. Here is a list of your current Health Maintenance items with a due date: 
Health Maintenance Due Topic Date Due  
 Annual Well Visit  12/04/2016 Introducing [facility] SERVICES! Dear Sandra Nieto: Thank you for requesting a [employer] account. Our records indicate that you already have an active Net[employer] account. You can access your account anytime at [email or web address] Did you know that you can access your hospital and ER discharge instructions at any time in Net[employer]? You can also review all of your test results from your hospital stay or ER visit. Additional Information If you have questions, please visit the Frequently Asked Questions section of the [employer] website at [email or web address] Remember, Net[employer]y is NOT to be used for urgent needs. For medical emergencies, dial 911. Now available from your iPhone and Android! Please provide this summary of care documentation to your next provider. Your primary care clinician is listed as Sarah Carlos. If you have any questions after today's visit, please call [phone].

## 2017-10-13 NOTE — PROGRESS NOTES
Chief Complaint   Patient presents with   Naz Felipe     Patient in office today for cpe and labs;pt is not fasting. Pt states left knee has not improved since lov; states knee feels like it cant support weight. 1. Have you been to the ER, urgent care clinic since your last visit? Hospitalized since your last visit? No    2. Have you seen or consulted any other health care providers outside of the [address] [place] [address] since your last visit? Include any pap smears or colon screening.  No

## 2017-10-13 NOTE — PROGRESS NOTES
Chief Complaint   Patient presents with   Naz Felipe     Patient in office today for cpe and labs; pt is not fasting. Pt states left knee has not improved since lov; states knee feels like it cant support weight. Continues to have pain that comes and goes in the left knee. Worse with prolonged standing. Will sometimes feel like it is going to give out. Feels weaker. Denies any trauma or injury to the knee. Has a history of history of knee surgery when she was a teenager. Dr. Claudeen Robinson. Kadys at times. This is a Subsequent Medicare Annual Wellness Exam (AWV) (Performed 12 months after IPPE or effective date of Medicare Part B enrollment, Once in a lifetime)    I have reviewed the patient's medical history in detail and updated the computerized patient record. History     Past Medical History:   Diagnosis Date    Allergies     Anemia 2008    Anxiety     Asthma     inhaler    Depression     Ear infection     Environmental allergies 4/1/2010    GERD (gastroesophageal reflux disease)     Hypothyroid 4/1/2010    Inflammatory arthritis     Lupus     Nausea & vomiting     Other ill-defined conditions(799.89) noted 7/25/2012    \"Intellectually  Disabled\" signs consents/self. Mom &  concur    Otitis media 4/1/2010    Strabismus 4/1/2010    Unspecified adverse effect of anesthesia 2/10/2011    slow to wake up, sleepy    Wears hearing aid       Past Surgical History:   Procedure Laterality Date    HX BREAST REDUCTION  2005    HX CHOLECYSTECTOMY  2014    HX KNEE ARTHROSCOPY Left     HX ORTHOPAEDIC      heel cord lengthing; right thumb surgery (pin placed)    HX OTHER SURGICAL      Ear surgery    HX TONSIL AND ADENOIDECTOMY  1990     Current Outpatient Prescriptions   Medication Sig Dispense Refill    trimethoprim-sulfamethoxazole (BACTRIM DS, SEPTRA DS) 160-800 mg per tablet Take 1 Tab by mouth two (2) times a day for 10 days.  20 Tab 0    PARoxetine (PAXIL) 30 mg tablet TAKE ONE TABLET BY MOUTH ONCE DAILY 30 Tab 5    medroxyPROGESTERone (DEPO-PROVERA) 150 mg/mL injection 150 mg by IntraMUSCular route once.  omeprazole (PRILOSEC) 20 mg capsule TAKE ONE CAPSULE BY MOUTH TWICE DAILY 60 Cap 5    hydroxychloroquine (PLAQUENIL) 200 mg tablet TAKE ONE TABLET BY MOUTH TWICE DAILY 60 Tab 3    cetirizine (ZYRTEC) 10 mg tablet TAKE ONE TABLET BY MOUTH ONCE DAILY 90 Tab 0    ergocalciferol (VITAMIN D2) 50,000 unit capsule Take 1 Cap by mouth every seven (7) days. 12 Cap 1    levothyroxine (SYNTHROID) 25 mcg tablet Take 1 Tab by mouth Daily (before breakfast). Please take with 200mcg tablet daily 30 Tab 5    SUMAtriptan (IMITREX) 100 mg tablet TAKE ONE TABLET BY MOUTH ONCE AS NEEDED FOR MIGRAINE FOR ONE DOSE 6 Tab 0    levothyroxine (SYNTHROID) 200 mcg tablet Take 1 Tab by mouth Daily (before breakfast). 30 Tab 5    montelukast (SINGULAIR) 10 mg tablet TAKE ONE TABLET BY MOUTH ONCE DAILY 30 Tab 5    clonazePAM (KLONOPIN) 1 mg tablet Take 1 Tab by mouth daily as needed. 30 Tab 2    PROAIR HFA 90 mcg/actuation inhaler INHALE TWO PUFFS BY MOUTH EVERY 6 HOURS AS NEEDED FOR WHEEZING 9 Inhaler 0    EPINEPHrine (EPIPEN 2-AWA) 0.3 mg/0.3 mL injection 0.3 mL by IntraMUSCular route once as needed for up to 1 dose. 2 Syringe 0    hydrocortisone (CORTAID) 1 % topical cream APPLY THIN LAYER OF CREAM TO AFFECTED AREA ON HANDS TWO TIMES DAILY. 29 g 5    fluticasone (FLONASE) 50 mcg/actuation nasal spray 2 Sprays by Both Nostrils route daily.  1 Bottle 2    ferrous sulfate 325 mg (65 mg iron) tablet TAKE ONE TABLET BY MOUTH TWICE DAILY 60 Tab 5     Allergies   Allergen Reactions    Latex Hives    Augmentin [Amoxicillin-Pot Clavulanate] Nausea and Vomiting    Codeine Nausea and Vomiting    Morphine Sulfate Nausea and Vomiting    Nuts [Tree Nut] Swelling    Pcn [Penicillins] Hives     Family History   Problem Relation Age of Onset    Osteoporosis Other     Arthritis-osteo Other  Breast Cancer Other      maternal great aunt    Breast Cancer Other      Maternal great aunt    Stroke Maternal Aunt     Hypertension Sister      Social History   Substance Use Topics    Smoking status: Never Smoker    Smokeless tobacco: Never Used    Alcohol use No     Patient Active Problem List   Diagnosis Code    Hypothyroid E03.9    Environmental allergies Z91.09    [place] (hard of hearing) H91.90    Strabismus H50.9    Anemia, iron deficiency D50.9    Asthma J45.909    Long-term use of Plaquenil Z79.899    Vitamin D deficiency E55.9    Polyuria R35.8    Periodic headache syndrome, not intractable G43. C0    Inflammatory polyarthritis ([facility] [address] M06.4       Depression Risk Factor Screening:     PHQ over the last two weeks 10/13/2017   PHQ Not Done Active Diagnosis of Depression or Bipolar Disorder   Little interest or pleasure in doing things -   Feeling down, depressed or hopeless -   Total Score PHQ 2 -     Alcohol Risk Factor Screening: You do not drink alcohol or very rarely. Functional Ability and Level of Safety:   Hearing Loss  Hearing is good. Activities of Daily Living  The home contains: no safety equipment. Patient does total self care    Fall RiskFall Risk Assessment, last 12 mths 7/10/2017   Able to walk? Yes   Fall in past 12 months?  No       Abuse Screen  Patient is not abused    Cognitive Screening   Evaluation of Cognitive Function:  Has your family/caregiver stated any concerns about your memory: no  Normal    Patient Care Team   Patient Care Team:  Rosa Wallace NP as PCP - General (Family Practice)  Betzy Shirley LPN as Ambulatory Care Navigator (Family Practice)  Jason Nielsen MD as Physician (Obstetrics & Gynecology)    Assessment/Plan   Education and counseling provided:  Are appropriate based on today's review and evaluation      Health Maintenance Due   Topic Date Due    MEDICARE Giovani Reganxander  12/04/2016     Chief Complaint   Patient presents with   Subha Rutherford Physical    Labs     she is a 29y.o. year old female who presents for evalution. Reviewed PmHx, RxHx, FmHx, SocHx, AllgHx and updated and dated in the chart. Review of Systems - negative except as listed above in the HPI    Objective:     Vitals:    10/13/17 1031   BP: 121/79   Pulse: 93   Resp: 18   Temp: 98.5 °F (36.9 °C)   TempSrc: Oral   SpO2: 98%   Weight: 274 lb (124.3 kg)   Height: 5' 4\" (1.626 m)     Physical Examination: General appearance - alert, well appearing, and in no distress and overweight  Mental status - normal mood, behavior  Eyes - pupils equal and reactive, extraocular eye movements intact  Ears - bilateral TM's and external ear canals normal  Nose - normal and patent, no erythema, discharge or polyps and normal nontender sinuses  Mouth - mucous membranes moist, pharynx normal without lesions  Neck - supple, no significant adenopathy, thyroid exam: thyroid is normal in size without nodules or tenderness  Chest - clear to auscultation, no wheezes, rales or rhonchi, symmetric air entry  Heart - normal rate, regular rhythm, normal S1, S2, no murmurs  Musculoskeletal - abnormal exam of left knee, reports pain with weight bearing; pain with knee ROM, trace swelling present   Extremities - peripheral pulses normal, no ankle edema, no clubbing or cyanosis  Skin - normal coloration and turgor, no rashes, no suspicious skin lesions noted    Assessment/ Plan:   Diagnoses and all orders for this visit:    1. Medicare annual wellness visit, subsequent  Annual medicare wellness exam complete. 2. Acute pain of left knee  -     REFERRAL TO ORTHOPEDIC SURGERY  Referral to Dr. Miryam Reynoso for further evaluation. 3. Morbid obesity with BMI of 45.0-49.9, adult ([address]  Enc pt to work on making some diet and lifestyle changes to help facilitate weight loss. Recommended 15 lbs weight loss in 3 months. Follow-up Disposition:  Return in about 3 months (around 1/13/2018).     I have discussed the diagnosis with the patient and the intended plan as seen in the above orders. The patient has received an after-visit summary and questions were answered concerning future plans. Medication Side Effects and Warnings were discussed with patient: yes  Patient Labs were reviewed and or requested: yes  Patient Past Records were reviewed and or requested  yes  Patient / Caregiver Understanding of treatment plan was verbalized during office visit RUSTY Anaya    Patient Instructions       Medicare Wellness Visit, Female    The best way to live healthy is to have a healthy lifestyle by eating a well-balanced diet, exercising regularly, limiting alcohol and stopping smoking. Regular physical exams and screening tests are another way to keep healthy. Preventive exams provided by your health care provider can find health problems before they become diseases or illnesses. Preventive services including immunizations, screening tests, monitoring and exams can help you take care of your own health. All people over age 72 should have a pneumovax  and and a prevnar shot to prevent pneumonia. These are once in a lifetime unless you and your provider decide differently. All people over 65 should have a yearly flu shot and a tetanus vaccine every 10 years. A bone mass density to screen for osteoporosis or thinning of the bones should be done every 2 years after 65. Screening for diabetes mellitus with a blood sugar test should be done every year. Glaucoma is a disease of the eye due to increased ocular pressure that can lead to blindness and it should be done every year by an eye professional.    Cardiovascular screening tests that check for elevated lipids (fatty part of blood) which can lead to heart disease and strokes should be done every 5 years.     Colorectal screening that evaluates for blood or polyps in your colon should be done yearly as a stool test or every five years as a flexible sigmoidoscope or every 10 years as a colonoscopy up to age 76. Breast cancer screening with a mammogram is recommended biennially  for women age 54-69. Screening for cervical cancer with a pap smear and pelvic exam is recommended for women after age 72 years every 2 years up to age 79 or when the provider and patient decide to stop. If there is a history of cervical abnormalities or other increased risk for cancer then the test is recommended yearly. Hepatitis C screening is also recommended for anyone born between [birth year] through [address]. A shingles vaccine is also recommended once in a lifetime after age 61. Your Medicare Wellness Exam is recommended annually.     Here is a list of your current Health Maintenance items with a due date:  Health Maintenance Due   Topic Date Due    Annual Well Visit  12/04/2016

## 2017-10-13 NOTE — PATIENT INSTRUCTIONS

## 2017-10-16 ENCOUNTER — DOCUMENTATION ONLY (OUTPATIENT)
Dept: SLEEP MEDICINE | Age: 32
End: 2017-10-16

## 2017-10-18 ENCOUNTER — TELEPHONE (OUTPATIENT)
Dept: FAMILY MEDICINE CLINIC | Age: 32
End: 2017-10-18

## 2017-10-18 NOTE — TELEPHONE ENCOUNTER
Pt called requesting a Rx to help her sleep and with her anxiety, she states that her mother is currently in the hospital and she is unable to sleep and having panic attacks. Please advise pt [phone].

## 2017-10-19 RX ORDER — CLONAZEPAM 1 MG/1
1 TABLET ORAL
Qty: 30 TAB | Refills: 2 | OUTPATIENT
Start: 2017-10-19 | End: 2018-01-23 | Stop reason: SDUPTHER

## 2017-10-19 NOTE — TELEPHONE ENCOUNTER
Spoke with pt advised of Np recommendations; medication has been called into pharmacy;pt stated she understood.

## 2017-10-19 NOTE — TELEPHONE ENCOUNTER
Lets start with refilling her klonopin. Enc pt to only take one tab nightly as needed for insomnia due to anxiety. Follow up if sx persist or worsen. Please call in rx for klonopin to pharmacy on file.

## 2017-11-01 ENCOUNTER — OFFICE VISIT (OUTPATIENT)
Dept: FAMILY MEDICINE CLINIC | Age: 32
End: 2017-11-01

## 2017-11-01 VITALS
WEIGHT: 266 LBS | SYSTOLIC BLOOD PRESSURE: 107 MMHG | RESPIRATION RATE: 18 BRPM | OXYGEN SATURATION: 98 % | BODY MASS INDEX: 45.41 KG/M2 | HEIGHT: 64 IN | HEART RATE: 84 BPM | DIASTOLIC BLOOD PRESSURE: 69 MMHG | TEMPERATURE: 98.1 F

## 2017-11-01 DIAGNOSIS — F41.9 ANXIETY: ICD-10-CM

## 2017-11-01 DIAGNOSIS — F43.21 GRIEF REACTION: ICD-10-CM

## 2017-11-01 DIAGNOSIS — J30.9 CHRONIC ALLERGIC RHINITIS, UNSPECIFIED SEASONALITY, UNSPECIFIED TRIGGER: Primary | ICD-10-CM

## 2017-11-01 RX ORDER — HYDROXYZINE PAMOATE 25 MG/1
25 CAPSULE ORAL
Qty: 30 CAP | Refills: 2 | Status: SHIPPED | OUTPATIENT
Start: 2017-11-01 | End: 2017-12-01

## 2017-11-01 NOTE — PROGRESS NOTES
Pt here c/o bilateral ear pain since yesterday. Also reports having panic attacks. Would like to discuss changing current anxiety medication.

## 2017-11-01 NOTE — PROGRESS NOTES
Pt here c/o bilateral ear pain since yesterday. Also reports having panic attacks. Would like to discuss changing current anxiety medication. Pt's mother, who she lived with, recently passed away. Pt is living on her own for the first time. Pt has a . She has a counseling appt in 2 days to establish. Subjective: (As above and below)     Chief Complaint   Patient presents with    Panic Attack    Ear Pain     she is a 29y.o. year old female who presents for evaluation. Reviewed PmHx, RxHx, FmHx, SocHx, AllgHx and updated in chart. Review of Systems - negative except as listed above    Objective:     Vitals:    11/01/17 1319   BP: 107/69   Pulse: 84   Resp: 18   Temp: 98.1 °F (36.7 °C)   TempSrc: Oral   SpO2: 98%   Weight: 266 lb (120.7 kg)   Height: 5' 4\" (1.626 m)     Physical Examination: General appearance - alert, well appearing, and in no distress  Mental status - normal mood, behavior, speech, dress, motor activity, and thought processes  Eyes - pupils equal and reactive, extraocular eye movements intact  Mouth - mucous membranes moist, pharynx normal without lesions  Chest - clear to auscultation, no wheezes, rales or rhonchi, symmetric air entry  Heart - normal rate, regular rhythm, normal S1, S2, no murmurs, rubs, clicks or gallops  Musculoskeletal - no joint tenderness, deformity or swelling    Assessment/ Plan:   1. Chronic allergic rhinitis, unspecified seasonality, unspecified trigger  -continue Zyrtec    2. Grief reaction  -agree with counseling as scheduled    3. Anxiety  -add vistaril as needed     Follow-up Disposition: As needed  I have discussed the diagnosis with the patient and the intended plan as seen in the above orders. The patient has received an after-visit summary and questions were answered concerning future plans.      Medication Side Effects and Warnings were discussed with patient: yes  Patient Labs were reviewed: yes  Patient Past Records were reviewed: phi Smiley M.D.

## 2017-11-01 NOTE — MR AVS SNAPSHOT
Visit Information Date & Time Provider Department Dept. Phone Encounter #  
 11/1/2017  1:20 PM Alaina Armstrong MD [facility] [phone] [id number] Your Appointments 12/28/2017 10:00 AM  
ESTABLISHED PATIENT with Rachael Brennan MD  
[address] ([address]) Appt Note: f/u  
 [address] [facility] [address]  
  
   
 [address] [place] [id number]  
  
    
 1/16/2018 11:30 AM  
ESTABLISHED PATIENT with Ibis Fernandez NP [facility] ([address]) Appt Note: weight check [address] [address]  
[phone]  
  
   
 [address] [address] [address] Upcoming Health Maintenance Date Due  
 MEDICARE YEARLY EXAM 10/14/2018 PAP AKA CERVICAL CYTOLOGY 11/7/2019 DTaP/Tdap/Td series (4 - Td) 11/23/2026 Allergies as of 11/1/2017  Review Complete On: 11/1/2017 By: Alaina Armstrong MD  
  
 Severity Noted Reaction Type Reactions Latex  05/27/2010    Hives Augmentin [Amoxicillin-pot Clavulanate]  04/13/2011    Nausea and Vomiting Codeine  04/13/2011    Nausea and Vomiting Morphine Sulfate  04/13/2011    Nausea and Vomiting Nuts [Tree Nut]  10/27/2016    Swelling Pcn [Penicillins]  04/01/2010    Hives Current Immunizations  Reviewed on 11/23/2016 Name Date Human Papillomavirus 4/14/2008, 12/26/2007, 10/26/2007 Influenza Vaccine 9/16/2017, 10/14/2014, 10/29/2013 Influenza Vaccine Oliverio Charles) 10/2/2015 Influenza Vaccine (Quad) PF 10/27/2016 Influenza Vaccine Split 10/14/2011, 11/3/2010 Influenza Vaccine Whole 10/26/2007 TD Vaccine 11/5/2002 TDAP Vaccine 10/14/2011 Tdap 11/23/2016 ZZZ-RETIRED (DO NOT USE) Pneumococcal Vaccine (Unspecified Type) 11/4/2012  5:36 PM  
  
 Not reviewed this visit You Were Diagnosed With   
  
 Codes Comments Chronic allergic rhinitis, unspecified seasonality, unspecified trigger    -  Primary ICD-10-CM: J30.9 ICD-9-CM: 477.9 Grief reaction     ICD-10-CM: F43.20 ICD-9-CM: 309.0 Anxiety     ICD-10-CM: F41.9 ICD-9-CM: 300.00 Vitals BP Pulse Temp Resp Height(growth percentile) Weight(growth percentile) 107/69 (BP 1 Location: Left arm, BP Patient Position: Sitting) 84 98.1 °F (36.7 °C) (Oral) 18 5' 4\" (1.626 m) 266 lb (120.7 kg) SpO2 BMI OB Status Smoking Status 98% 45.66 kg/m2 Injection Never Smoker Vitals History BMI and BSA Data Body Mass Index Body Surface Area  
 45.66 kg/m 2 2.33 m 2 Preferred Pharmacy Pharmacy Name Phone [facility], [address] [place] [address] [phone] Your Updated Medication List  
  
   
This list is accurate as of: 11/1/17  1:43 PM.  Always use your most recent med list.  
  
  
  
  
 cetirizine 10 mg tablet Commonly known as:  ZYRTEC  
TAKE ONE TABLET BY MOUTH ONCE DAILY  
  
 clonazePAM 1 mg tablet Commonly known as:  Gwyndolyn Rasher Take 1 Tab by mouth daily as needed. DEPO-PROVERA 150 mg/mL injection Generic drug:  medroxyPROGESTERone 150 mg by IntraMUSCular route once. EPINEPHrine 0.3 mg/0.3 mL injection Commonly known as:  EPIPEN 2-AWA  
0.3 mL by IntraMUSCular route once as needed for up to 1 dose. ergocalciferol 50,000 unit capsule Commonly known as:  VITAMIN D2 Take 1 Cap by mouth every seven (7) days. ferrous sulfate 325 mg (65 mg iron) tablet TAKE ONE TABLET BY MOUTH TWICE DAILY  
  
 fluticasone 50 mcg/actuation nasal spray Commonly known as:  Susan Jumper 2 Sprays by Both Nostrils route daily. hydrocortisone 1 % topical cream  
Commonly known as:  CORTAID  
APPLY THIN LAYER OF CREAM TO AFFECTED AREA ON HANDS TWO TIMES DAILY. hydroxychloroquine 200 mg tablet Commonly known as:  PLAQUENIL  
TAKE ONE TABLET BY MOUTH TWICE DAILY hydrOXYzine pamoate 25 mg capsule Commonly known as:  VISTARIL Take 1 Cap by mouth two (2) times daily as needed for Anxiety for up to 30 days. * levothyroxine 200 mcg tablet Commonly known as:  SYNTHROID Take 1 Tab by mouth Daily (before breakfast). * levothyroxine 25 mcg tablet Commonly known as:  SYNTHROID Take 1 Tab by mouth Daily (before breakfast). Please take with 200mcg tablet daily  
  
 montelukast 10 mg tablet Commonly known as:  SINGULAIR  
TAKE ONE TABLET BY MOUTH ONCE DAILY  
  
 omeprazole 20 mg capsule Commonly known as:  PRILOSEC  
TAKE ONE CAPSULE BY MOUTH TWICE DAILY PARoxetine 30 mg tablet Commonly known as:  PAXIL TAKE ONE TABLET BY MOUTH ONCE DAILY PROAIR HFA 90 mcg/actuation inhaler Generic drug:  albuterol INHALE TWO PUFFS BY MOUTH EVERY 6 HOURS AS NEEDED FOR WHEEZING  
  
 SUMAtriptan 100 mg tablet Commonly known as:  IMITREX  
TAKE ONE TABLET BY MOUTH ONCE AS NEEDED FOR MIGRAINE FOR ONE DOSE * Notice: This list has 2 medication(s) that are the same as other medications prescribed for you. Read the directions carefully, and ask your doctor or other care provider to review them with you. Prescriptions Sent to Pharmacy Refills  
 hydrOXYzine pamoate (VISTARIL) 25 mg capsule 2 Sig: Take 1 Cap by mouth two (2) times daily as needed for Anxiety for up to 30 days. Class: Normal  
 Pharmacy: [address] #: [phone] Route: Oral  
  
Introducing [facility]! Dear Ehsan Coreas: Thank you for requesting a [employer] account. Our records indicate that you already have an active [employer] account. You can access your account anytime at [email or web address] Did you know that you can access your hospital and ER discharge instructions at any time in [employer]? You can also review all of your test results from your hospital stay or ER visit. Additional Information If you have questions, please visit the Frequently Asked Questions section of the [employer] website at [email or web address] Remember, [employer] is NOT to be used for urgent needs. For medical emergencies, dial 911. Now available from your iPhone and Android! Please provide this summary of care documentation to your next provider. Your primary care clinician is listed as June Villa. If you have any questions after today's visit, please call [phone].

## 2017-11-03 ENCOUNTER — TELEPHONE (OUTPATIENT)
Dept: FAMILY MEDICINE CLINIC | Age: 32
End: 2017-11-03

## 2017-11-03 NOTE — TELEPHONE ENCOUNTER
Pt calling and states that saw Dr Acevedo on wed and was given new medication Hydroxyzine pamoate and she has a question as to can she take it with her other meds called Clonazepam and Paxil. Please call hr back at [phone].

## 2017-11-14 ENCOUNTER — OFFICE VISIT (OUTPATIENT)
Dept: FAMILY MEDICINE CLINIC | Age: 32
End: 2017-11-14

## 2017-11-14 VITALS
HEIGHT: 64 IN | DIASTOLIC BLOOD PRESSURE: 74 MMHG | WEIGHT: 272.38 LBS | SYSTOLIC BLOOD PRESSURE: 104 MMHG | HEART RATE: 83 BPM | TEMPERATURE: 97.8 F | OXYGEN SATURATION: 97 % | RESPIRATION RATE: 16 BRPM | BODY MASS INDEX: 46.5 KG/M2

## 2017-11-14 DIAGNOSIS — M54.50 ACUTE MIDLINE LOW BACK PAIN WITHOUT SCIATICA: ICD-10-CM

## 2017-11-14 DIAGNOSIS — R35.0 FREQUENT URINATION: ICD-10-CM

## 2017-11-14 DIAGNOSIS — N76.0 ACUTE VAGINITIS: Primary | ICD-10-CM

## 2017-11-14 DIAGNOSIS — R30.0 BURNING WITH URINATION: ICD-10-CM

## 2017-11-14 DIAGNOSIS — R51.9 SINUS HEADACHE: ICD-10-CM

## 2017-11-14 LAB
BILIRUB UR QL STRIP: NEGATIVE
GLUCOSE UR-MCNC: NEGATIVE MG/DL
KETONES P FAST UR STRIP-MCNC: NEGATIVE MG/DL
PH UR STRIP: 6.5 [PH] (ref 4.6–8)
PROT UR QL STRIP: NEGATIVE
SP GR UR STRIP: 1.01 (ref 1–1.03)
UA UROBILINOGEN AMB POC: NORMAL (ref 0.2–1)
URINALYSIS CLARITY POC: CLEAR
URINALYSIS COLOR POC: YELLOW
URINE BLOOD POC: NORMAL
URINE LEUKOCYTES POC: NEGATIVE
URINE NITRITES POC: NEGATIVE

## 2017-11-14 RX ORDER — FLUCONAZOLE 150 MG/1
150 TABLET ORAL DAILY
Qty: 2 TAB | Refills: 0 | Status: SHIPPED | OUTPATIENT
Start: 2017-11-14 | End: 2017-11-15

## 2017-11-14 RX ORDER — FLUTICASONE PROPIONATE 50 MCG
2 SPRAY, SUSPENSION (ML) NASAL DAILY
Qty: 1 BOTTLE | Refills: 2 | Status: SHIPPED | OUTPATIENT
Start: 2017-11-14 | End: 2019-01-09 | Stop reason: SDUPTHER

## 2017-11-14 NOTE — MR AVS SNAPSHOT
Visit Information Date & Time Provider Department Dept. Phone Encounter #  
 11/14/2017  9:00 AM Dorcas Beth NP [address] [facility] [phone] [id number] Your Appointments 12/28/2017 10:00 AM  
ESTABLISHED PATIENT with Jeanna Sage MD  
[address] ([facility] Appt Note: f/u  
 [address] [facility] [place] [address] by [facility]  
  
   
 [address] [place] [address]  
  
    
 1/16/2018 11:30 AM  
ESTABLISHED PATIENT with Milli Bourgeois NP [address] [facility] ([facility]) Appt Note: weight check [address] [address]  
[phone]  
  
   
 [address] [address] Upcoming Health Maintenance Date Due  
 MEDICARE YEARLY EXAM 10/14/2018 PAP AKA CERVICAL CYTOLOGY 11/7/2019 DTaP/Tdap/Td series (4 - Td) 11/23/2026 Allergies as of 11/14/2017  Review Complete On: 11/14/2017 By: Tom Reaves LPN Severity Noted Reaction Type Reactions Latex  05/27/2010    Hives Augmentin [Amoxicillin-pot Clavulanate]  04/13/2011    Nausea and Vomiting Codeine  04/13/2011    Nausea and Vomiting Morphine Sulfate  04/13/2011    Nausea and Vomiting Nuts [Tree Nut]  10/27/2016    Swelling Pcn [Penicillins]  04/01/2010    Hives Current Immunizations  Reviewed on 11/23/2016 Name Date Human Papillomavirus 4/14/2008, 12/26/2007, 10/26/2007 Influenza Vaccine 9/16/2017, 10/14/2014, 10/29/2013 Influenza Vaccine Alcario Ravens) 10/2/2015 Influenza Vaccine (Quad) PF 10/27/2016 Influenza Vaccine Split 10/14/2011, 11/3/2010 Influenza Vaccine Whole 10/26/2007 TD Vaccine 11/5/2002 TDAP Vaccine 10/14/2011 Tdap 11/23/2016 ZZZ-RETIRED (DO NOT USE) Pneumococcal Vaccine (Unspecified Type) 11/4/2012  5:36 PM  
  
 Not reviewed this visit You Were Diagnosed With   
  
 Codes Comments Acute vaginitis    -  Primary ICD-10-CM: N76.0 ICD-9-CM: 616.10 Acute midline low back pain without sciatica     ICD-10-CM: M54.5 ICD-9-CM: 724.2 Frequent urination     ICD-10-CM: R35.0 ICD-9-CM: 788.41 Burning with urination     ICD-10-CM: R30.0 ICD-9-CM: 788.1 Sinus headache     ICD-10-CM: R51 ICD-9-CM: 914. 0 Vitals BP Pulse Temp Resp Height(growth percentile) Weight(growth percentile) 104/74 83 97.8 °F (36.6 °C) (Oral) 16 5' 4\" (1.626 m) 272 lb 6 oz (123.5 kg) LMP SpO2 BMI OB Status Smoking Status 10/26/2017 (Exact Date) 97% 46.75 kg/m2 Having regular periods Never Smoker Vitals History BMI and BSA Data Body Mass Index Body Surface Area 46.75 kg/m 2 2.36 m 2 Preferred Pharmacy Pharmacy Name Phone [facility] [phone] Your Updated Medication List  
  
   
This list is accurate as of: 11/14/17  9:39 AM.  Always use your most recent med list.  
  
  
  
  
 cetirizine 10 mg tablet Commonly known as:  ZYRTEC  
TAKE ONE TABLET BY MOUTH ONCE DAILY  
  
 clonazePAM 1 mg tablet Commonly known as:  Jignesh Senait Take 1 Tab by mouth daily as needed. DEPO-PROVERA 150 mg/mL injection Generic drug:  medroxyPROGESTERone 150 mg by IntraMUSCular route once. EPINEPHrine 0.3 mg/0.3 mL injection Commonly known as:  EPIPEN 2-AWA  
0.3 mL by IntraMUSCular route once as needed for up to 1 dose. ergocalciferol 50,000 unit capsule Commonly known as:  VITAMIN D2 Take 1 Cap by mouth every seven (7) days. ferrous sulfate 325 mg (65 mg iron) tablet TAKE ONE TABLET BY MOUTH TWICE DAILY  
  
 fluconazole 150 mg tablet Commonly known as:  DIFLUCAN Take 1 Tab by mouth daily for 1 day. Repeat 4 days  
  
 fluticasone 50 mcg/actuation nasal spray Commonly known as:  Saeed Sacks 2 Sprays by Both Nostrils route daily.   
  
 hydrocortisone 1 % topical cream  
 Commonly known as:  CORTAID  
APPLY THIN LAYER OF CREAM TO AFFECTED AREA ON HANDS TWO TIMES DAILY. hydroxychloroquine 200 mg tablet Commonly known as:  PLAQUENIL  
TAKE ONE TABLET BY MOUTH TWICE DAILY  
  
 hydrOXYzine pamoate 25 mg capsule Commonly known as:  VISTARIL Take 1 Cap by mouth two (2) times daily as needed for Anxiety for up to 30 days. * levothyroxine 200 mcg tablet Commonly known as:  SYNTHROID Take 1 Tab by mouth Daily (before breakfast). * levothyroxine 25 mcg tablet Commonly known as:  SYNTHROID Take 1 Tab by mouth Daily (before breakfast). Please take with 200mcg tablet daily  
  
 montelukast 10 mg tablet Commonly known as:  SINGULAIR  
TAKE ONE TABLET BY MOUTH ONCE DAILY  
  
 omeprazole 20 mg capsule Commonly known as:  PRILOSEC  
TAKE ONE CAPSULE BY MOUTH TWICE DAILY PARoxetine 30 mg tablet Commonly known as:  PAXIL TAKE ONE TABLET BY MOUTH ONCE DAILY PROAIR HFA 90 mcg/actuation inhaler Generic drug:  albuterol INHALE TWO PUFFS BY MOUTH EVERY 6 HOURS AS NEEDED FOR WHEEZING  
  
 SUMAtriptan 100 mg tablet Commonly known as:  IMITREX  
TAKE ONE TABLET BY MOUTH ONCE AS NEEDED FOR MIGRAINE FOR ONE DOSE * Notice: This list has 2 medication(s) that are the same as other medications prescribed for you. Read the directions carefully, and ask your doctor or other care provider to review them with you. Prescriptions Sent to Pharmacy Refills  
 fluconazole (DIFLUCAN) 150 mg tablet 0 Sig: Take 1 Tab by mouth daily for 1 day. Repeat 4 days Class: Normal  
 Pharmacy: [address] Ph #: [phone] Route: Oral  
 fluticasone (FLONASE) 50 mcg/actuation nasal spray 2 Si Sprays by Both Nostrils route daily. Class: Normal  
 Pharmacy: [place] [address] [address] [place] [address] Ph #: [phone] Route: Both Nostrils We Performed the Following AMB POC URINALYSIS DIP STICK AUTO W/O MICRO [48368 CPT(R)] Introducing [facility] SERVICES! Dear Sandra Nieto: Thank you for requesting a [employer] account. Our records indicate that you already have an active [employer] account. You can access your account anytime at [email or web address] Did you know that you can access your hospital and ER discharge instructions at any time in [employer]? You can also review all of your test results from your hospital stay or ER visit. Additional Information If you have questions, please visit the Frequently Asked Questions section of the [employer] website at [email or web address] Remember, [employer] is NOT to be used for urgent needs. For medical emergencies, dial 911. Now available from your iPhone and Android! Please provide this summary of care documentation to your next provider. Your primary care clinician is listed as Sarah Carlos. If you have any questions after today's visit, please call [phone].

## 2017-11-14 NOTE — PROGRESS NOTES
1. Have you been to the ER, urgent care clinic since your last visit? Hospitalized since your last visit? No    2. Have you seen or consulted any other health care providers outside of the [address] [place] [address] since your last visit? Include any pap smears or colon screening. No    Chief Complaint   Patient presents with    Ear Pain     bilateral, sinus congestion x 5 days    LOW BACK PAIN     frequent urination, burning with urination x 5 day     Pt states she has bilateral ear pain & sinus congestion x 5 days. Pt states she has low back pain, frequent urination, & burning with urination x 5 day.

## 2017-11-15 NOTE — PROGRESS NOTES
HISTORY OF PRESENT ILLNESS  Gracy Santillan is a 29 y.o. female. HPI  Here for urinary tract complaints of burning with urination  She is having some vaginal dc as well  Having sinus pressure and ear pain major as well  No fever or chills, secretions are clear    ROS  A comprehensive review of system was obtained and negative except findings in the HPI    Visit Vitals    /74    Pulse 83    Temp 97.8 °F (36.6 °C) (Oral)    Resp 16    Ht 5' 4\" (1.626 m)    Wt 272 lb 6 oz (123.5 kg)    LMP 10/26/2017 (Exact Date)    SpO2 97%    BMI 46.75 kg/m2     Physical Exam   Constitutional: She is oriented to person, place, and time. She appears well-developed and well-nourished. Neck: No JVD present. Cardiovascular: Normal rate, regular rhythm and intact distal pulses. Exam reveals no gallop and no friction rub. No murmur heard. Pulmonary/Chest: Effort normal and breath sounds normal. No respiratory distress. She has no wheezes. Musculoskeletal: She exhibits no edema. Neurological: She is alert and oriented to person, place, and time. Skin: Skin is warm. Nursing note and vitals reviewed. ASSESSMENT and PLAN  Encounter Diagnoses   Name Primary?  Acute vaginitis Yes    Acute midline low back pain without sciatica     Frequent urination     Burning with urination     Sinus headache      Orders Placed This Encounter    AMB POC URINALYSIS DIP STICK AUTO W/O MICRO    fluconazole (DIFLUCAN) 150 mg tablet    fluticasone (FLONASE) 50 mcg/actuation nasal spray     Given flonase for nasal congestion and ETD  Urine dip clean, given diflucan for yeast infection, reviewed triggers    I have discussed the diagnosis with the patient and the intended plan as seen in the above orders. The patient has received an after-visit summary and questions were answered concerning future plans. Patient conveyed understanding of the plan at the time of the visit.     Jazmin Carmona, MSN, ANP 11/14/2017

## 2017-11-26 RX ORDER — CETIRIZINE HCL 10 MG
TABLET ORAL
Qty: 90 TAB | Refills: 0 | Status: SHIPPED | OUTPATIENT
Start: 2017-11-26 | End: 2018-08-14 | Stop reason: SDUPTHER

## 2017-11-30 ENCOUNTER — OFFICE VISIT (OUTPATIENT)
Dept: FAMILY MEDICINE CLINIC | Age: 32
End: 2017-11-30

## 2017-11-30 VITALS
TEMPERATURE: 98.5 F | HEIGHT: 64 IN | RESPIRATION RATE: 18 BRPM | HEART RATE: 83 BPM | OXYGEN SATURATION: 98 % | DIASTOLIC BLOOD PRESSURE: 83 MMHG | SYSTOLIC BLOOD PRESSURE: 123 MMHG | WEIGHT: 274 LBS | BODY MASS INDEX: 46.78 KG/M2

## 2017-11-30 DIAGNOSIS — L73.2 HIDRADENITIS: ICD-10-CM

## 2017-11-30 DIAGNOSIS — N61.1 BOIL, BREAST: Primary | ICD-10-CM

## 2017-11-30 PROBLEM — E66.01 OBESITY, MORBID (HCC): Status: ACTIVE | Noted: 2017-11-30

## 2017-11-30 RX ORDER — MUPIROCIN 20 MG/G
OINTMENT TOPICAL
Qty: 22 G | Refills: 0 | Status: SHIPPED | OUTPATIENT
Start: 2017-11-30 | End: 2018-02-07

## 2017-11-30 RX ORDER — FLUCONAZOLE 150 MG/1
150 TABLET ORAL DAILY
Qty: 1 TAB | Refills: 1 | Status: SHIPPED | OUTPATIENT
Start: 2017-11-30 | End: 2017-12-01

## 2017-11-30 RX ORDER — CLINDAMYCIN PHOSPHATE 11.9 MG/ML
SOLUTION TOPICAL
Qty: 60 ML | Refills: 1 | Status: SHIPPED | OUTPATIENT
Start: 2017-11-30 | End: 2018-02-07

## 2017-11-30 RX ORDER — SULFAMETHOXAZOLE AND TRIMETHOPRIM 800; 160 MG/1; MG/1
2 TABLET ORAL 2 TIMES DAILY
Qty: 40 TAB | Refills: 0 | Status: SHIPPED | OUTPATIENT
Start: 2017-11-30 | End: 2017-12-10

## 2017-11-30 NOTE — PROGRESS NOTES
Chief Complaint   Patient presents with    Skin Problem     Patient in office today for skin problem that began last week; pt states skin problem is located underneath right breast.  Have been treating with neosporin, with no improvement noted. 1. Have you been to the ER, urgent care clinic since your last visit? Hospitalized since your last visit? No    2. Have you seen or consulted any other health care providers outside of the [address] [place] [address] since your last visit? Include any pap smears or colon screening. No    Denies any recent abx. Has a history of boils on the breast.     Denies any other concerns at this time. Chief Complaint   Patient presents with    Skin Problem     she is a 29y.o. year old female who presents for evalution. Reviewed PmHx, RxHx, FmHx, SocHx, AllgHx and updated and dated in the chart. Review of Systems - negative except as listed above in the HPI    Objective:     Vitals:    11/30/17 1331   BP: 123/83   Pulse: 83   Resp: 18   Temp: 98.5 °F (36.9 °C)   TempSrc: Oral   SpO2: 98%   Weight: 274 lb (124.3 kg)   Height: 5' 4\" (1.626 m)     Physical Examination: General appearance - alert, well appearing, and in no distress  Mental status - normal mood, behavior, speech, dress, motor activity, and thought processes  Chest - clear to auscultation, no wheezes, rales or rhonchi, symmetric air entry  Heart - normal rate, regular rhythm, normal S1, S2, no murmurs, rubs, clicks or gallops  Skin - right breast with 2 superficial boils present; one at 9:00 a few inches from the nipple that is approx dime sized, 1 inch border of erythema, tender to touch and erythematous; second boil is between breast skin fold at 7:00, superficial, pea sized, and not as inflamed    Assessment/ Plan:   Diagnoses and all orders for this visit:    1. Boil, breast  -     trimethoprim-sulfamethoxazole (BACTRIM DS, SEPTRA DS) 160-800 mg per tablet;  Take 2 Tabs by mouth two (2) times a day for 10 days.  -     fluconazole (DIFLUCAN) 150 mg tablet; Take 1 Tab by mouth daily for 1 day. -     mupirocin (BACTROBAN) 2 % ointment; Apply thin layer to affected areas on breast twice daily for up to 10 days. Complete abx regimen as directed. Reviewed SEs/ADrs of medication. Apply abx ointment BID. Diflucan once meds complete. Enc pt to continue to monitor closely and follow up if sx persist or worsen. 2. Hidradenitis  -     clindamycin (CLEOCIN T) 1 % external solution; Spray thin layer on bilateral breasts daily as needed to prevent boils. Use thin film on affected area  Recommended applying daily after showering to prevent recurrent skin infection. Reviewed other supportive measures. Follow-up Disposition:  Return if symptoms worsen or fail to improve. I have discussed the diagnosis with the patient and the intended plan as seen in the above orders. The patient has received an after-visit summary and questions were answered concerning future plans. Medication Side Effects and Warnings were discussed with patient: yes  Patient Labs were reviewed and or requested: yes  Patient Past Records were reviewed and or requested  yes  Patient / Caregiver Understanding of treatment plan was verbalized during office visit YES    RUSTY Diaz    There are no Patient Instructions on file for this visit.

## 2017-11-30 NOTE — PROGRESS NOTES
Chief Complaint   Patient presents with    Skin Problem     Patient in office today for skin problem that began last week; pt states skin problem is located underneath right breast.  Have been treating with neosporin, with no improvement noted. 1. Have you been to the ER, urgent care clinic since your last visit? Hospitalized since your last visit? No    2. Have you seen or consulted any other health care providers outside of the [address] [place] [address] since your last visit? Include any pap smears or colon screening.  No

## 2017-11-30 NOTE — MR AVS SNAPSHOT
Visit Information Date & Time Provider Department Dept. Phone Encounter #  
 11/30/2017  1:15 PM Kelly Back NP [address] [facility] [phone] [id number] Follow-up Instructions Return if symptoms worsen or fail to improve. Your Appointments 12/28/2017 10:00 AM  
ESTABLISHED PATIENT with Bo Verdugo MD  
[address] ([address]) Appt Note: f/u  
 [facility] [address] [facility] [address]  
  
    
 1/16/2018 11:30 AM  
ESTABLISHED PATIENT with Kelly Back NP [address] [facility] ([address]) Appt Note: weight check [address] [address] [address]  
[phone]  
  
   
 [address] [address] [address] [address] Upcoming Health Maintenance Date Due  
 MEDICARE YEARLY EXAM 10/14/2018 PAP AKA CERVICAL CYTOLOGY 11/7/2019 DTaP/Tdap/Td series (4 - Td) 11/23/2026 Allergies as of 11/30/2017  Review Complete On: 11/30/2017 By: Kelly Back NP Severity Noted Reaction Type Reactions Latex  05/27/2010    Hives Augmentin [Amoxicillin-pot Clavulanate]  04/13/2011    Nausea and Vomiting Codeine  04/13/2011    Nausea and Vomiting Morphine Sulfate  04/13/2011    Nausea and Vomiting Nuts [Tree Nut]  10/27/2016    Swelling Pcn [Penicillins]  04/01/2010    Hives Current Immunizations  Reviewed on 11/23/2016 Name Date Human Papillomavirus 4/14/2008, 12/26/2007, 10/26/2007 Influenza Vaccine 9/16/2017, 10/14/2014, 10/29/2013 Influenza Vaccine Arletha Giselle) 10/2/2015 Influenza Vaccine (Quad) PF 10/27/2016 Influenza Vaccine Split 10/14/2011, 11/3/2010 Influenza Vaccine Whole 10/26/2007 TD Vaccine 11/5/2002 TDAP Vaccine 10/14/2011 Tdap 11/23/2016 ZZZ-RETIRED (DO NOT USE) Pneumococcal Vaccine (Unspecified Type) 11/4/2012  5:36 PM  
  
 Not reviewed this visit You Were Diagnosed With   
  
 Codes Comments Boil, breast    -  Primary ICD-10-CM: N61.1 ICD-9-CM: 680.2 Hidradenitis     ICD-10-CM: L73.2 ICD-9-CM: 705.83 Vitals BP Pulse Temp Resp Height(growth percentile) Weight(growth percentile) 123/83 (BP 1 Location: Right arm, BP Patient Position: Sitting) 83 98.5 °F (36.9 °C) (Oral) 18 5' 4\" (1.626 m) 274 lb (124.3 kg) LMP SpO2 BMI OB Status Smoking Status 11/22/2017 98% 47.03 kg/m2 Having regular periods Never Smoker Vitals History BMI and BSA Data Body Mass Index Body Surface Area 47.03 kg/m 2 2.37 m 2 Preferred Pharmacy Pharmacy Name Phone [facility] [place] [phone] Your Updated Medication List  
  
   
This list is accurate as of: 11/30/17  1:44 PM.  Always use your most recent med list.  
  
  
  
  
 cetirizine 10 mg tablet Commonly known as:  ZYRTEC  
TAKE ONE TABLET BY MOUTH ONCE DAILY  
  
 clindamycin 1 % external solution Commonly known as:  CLEOCIN T Spray thin layer on bilateral breasts daily as needed to prevent boils. Use thin film on affected area  
  
 clonazePAM 1 mg tablet Commonly known as:  Althea Patch Take 1 Tab by mouth daily as needed. DEPO-PROVERA 150 mg/mL injection Generic drug:  medroxyPROGESTERone 150 mg by IntraMUSCular route once. EPINEPHrine 0.3 mg/0.3 mL injection Commonly known as:  EPIPEN 2-AWA  
0.3 mL by IntraMUSCular route once as needed for up to 1 dose. ergocalciferol 50,000 unit capsule Commonly known as:  VITAMIN D2 Take 1 Cap by mouth every seven (7) days. ferrous sulfate 325 mg (65 mg iron) tablet TAKE ONE TABLET BY MOUTH TWICE DAILY  
  
 fluconazole 150 mg tablet Commonly known as:  DIFLUCAN Take 1 Tab by mouth daily for 1 day. fluticasone 50 mcg/actuation nasal spray Commonly known as:  Jaida Chicago 2 Sprays by Both Nostrils route daily. hydrocortisone 1 % topical cream  
Commonly known as:  CORTAID  
APPLY THIN LAYER OF CREAM TO AFFECTED AREA ON HANDS TWO TIMES DAILY. hydroxychloroquine 200 mg tablet Commonly known as:  PLAQUENIL  
TAKE ONE TABLET BY MOUTH TWICE DAILY  
  
 hydrOXYzine pamoate 25 mg capsule Commonly known as:  VISTARIL Take 1 Cap by mouth two (2) times daily as needed for Anxiety for up to 30 days. * levothyroxine 200 mcg tablet Commonly known as:  SYNTHROID Take 1 Tab by mouth Daily (before breakfast). * levothyroxine 25 mcg tablet Commonly known as:  SYNTHROID Take 1 Tab by mouth Daily (before breakfast). Please take with 200mcg tablet daily  
  
 montelukast 10 mg tablet Commonly known as:  SINGULAIR  
TAKE ONE TABLET BY MOUTH ONCE DAILY  
  
 mupirocin 2 % ointment Commonly known as:  [employer] Apply thin layer to affected areas on breast twice daily for up to 10 days. omeprazole 20 mg capsule Commonly known as:  PRILOSEC  
TAKE ONE CAPSULE BY MOUTH TWICE DAILY PARoxetine 30 mg tablet Commonly known as:  PAXIL TAKE ONE TABLET BY MOUTH ONCE DAILY PROAIR HFA 90 mcg/actuation inhaler Generic drug:  albuterol INHALE TWO PUFFS BY MOUTH EVERY 6 HOURS AS NEEDED FOR WHEEZING  
  
 SUMAtriptan 100 mg tablet Commonly known as:  IMITREX  
TAKE ONE TABLET BY MOUTH ONCE AS NEEDED FOR MIGRAINE FOR ONE DOSE  
  
 trimethoprim-sulfamethoxazole 160-800 mg per tablet Commonly known as:  BACTRIM DS, SEPTRA DS Take 2 Tabs by mouth two (2) times a day for 10 days. * Notice: This list has 2 medication(s) that are the same as other medications prescribed for you. Read the directions carefully, and ask your doctor or other care provider to review them with you. Prescriptions Sent to Pharmacy Refills  
 trimethoprim-sulfamethoxazole (BACTRIM DS, SEPTRA DS) 160-800 mg per tablet 0 Sig: Take 2 Tabs by mouth two (2) times a day for 10 days. Class: Normal  
 Pharmacy: [address] Ph #: [phone] Route: Oral  
 fluconazole (DIFLUCAN) 150 mg tablet 1 Sig: Take 1 Tab by mouth daily for 1 day. Class: Normal  
 Pharmacy: [address] Ph #: [phone] Route: Oral  
 clindamycin (CLEOCIN T) 1 % external solution 1 Sig: Spray thin layer on bilateral breasts daily as needed to prevent boils. Use thin film on affected area Class: Normal  
 Pharmacy: [address] [place] [address] Ph #: [phone]  
 mupirocin (BACTROBAN) 2 % ointment 0 Sig: Apply thin layer to affected areas on breast twice daily for up to 10 days. Class: Normal  
 Pharmacy: [address] Ph #: [phone] Follow-up Instructions Return if symptoms worsen or fail to improve. Introducing [facility] & HEALTH SERVICES! Dear Shan Espinoza: Thank you for requesting a [employer] account. Our records indicate that you already have an active [employer] account. You can access your account anytime at [email or web address] Did you know that you can access your hospital and ER discharge instructions at any time in [employer]? You can also review all of your test results from your hospital stay or ER visit. Additional Information If you have questions, please visit the Frequently Asked Questions section of the [employer] website at [email or web address] Remember, [employer] is NOT to be used for urgent needs. For medical emergencies, dial 911. Now available from your iPhone and Android! Please provide this summary of care documentation to your next provider. Your primary care clinician is listed as Radha Cook. If you have any questions after today's visit, please call [phone].

## 2017-12-26 RX ORDER — LEVOTHYROXINE SODIUM 200 UG/1
TABLET ORAL
Qty: 30 TAB | Refills: 5 | Status: SHIPPED | COMMUNITY
Start: 2017-12-26 | End: 2018-06-20 | Stop reason: SDUPTHER

## 2017-12-26 RX ORDER — LEVOTHYROXINE SODIUM 25 UG/1
TABLET ORAL
Qty: 30 TAB | Refills: 5 | Status: SHIPPED | COMMUNITY
Start: 2017-12-26 | End: 2018-01-10 | Stop reason: DRUGHIGH

## 2017-12-27 ENCOUNTER — OFFICE VISIT (OUTPATIENT)
Dept: FAMILY MEDICINE CLINIC | Age: 32
End: 2017-12-27

## 2017-12-27 VITALS
HEIGHT: 64 IN | SYSTOLIC BLOOD PRESSURE: 126 MMHG | HEART RATE: 62 BPM | TEMPERATURE: 98.3 F | RESPIRATION RATE: 18 BRPM | OXYGEN SATURATION: 98 % | DIASTOLIC BLOOD PRESSURE: 87 MMHG | WEIGHT: 272 LBS | BODY MASS INDEX: 46.44 KG/M2

## 2017-12-27 DIAGNOSIS — R68.89 FLU-LIKE SYMPTOMS: ICD-10-CM

## 2017-12-27 DIAGNOSIS — J06.9 ACUTE URI: Primary | ICD-10-CM

## 2017-12-27 PROBLEM — F33.9 RECURRENT DEPRESSION (HCC): Status: ACTIVE | Noted: 2017-12-27

## 2017-12-27 LAB
QUICKVUE INFLUENZA TEST: NEGATIVE
VALID INTERNAL CONTROL?: YES

## 2017-12-27 NOTE — PROGRESS NOTES
Chief Complaint   Patient presents with    Cough    Nasal Congestion    Head Pain     Patient in office today for sx that began Saturday; have been treating with allergy medication with no improvement. 1. Have you been to the ER, urgent care clinic since your last visit? Hospitalized since your last visit? No    2. Have you seen or consulted any other health care providers outside of the [address] [place] [address] since your last visit? Include any pap smears or colon screening.  No

## 2017-12-27 NOTE — MR AVS SNAPSHOT
Visit Information Date & Time Provider Department Dept. Phone Encounter #  
 12/27/2017  7:00 AM Zayra Shah NP [facility] [phone] [id number] Follow-up Instructions Return if symptoms worsen or fail to improve. Your Appointments 1/16/2018 11:30 AM  
ESTABLISHED PATIENT with Zayra Shah NP [facility] ([address]) Appt Note: weight check [address] [address] [address]  
[phone]  
  
   
 [id number] [place] [address] Road [address]  
  
    
 4/2/2018  2:40 PM  
ACUTE CARE with Yaneth Lau MD  
[address] ([address]) Appt Note:   
 [address] [address]  
[phone]  
  
   
 [facility] [address] Upcoming Health Maintenance Date Due  
 MEDICARE YEARLY EXAM 10/14/2018 PAP AKA CERVICAL CYTOLOGY 11/7/2019 DTaP/Tdap/Td series (4 - Td) 11/23/2026 Allergies as of 12/27/2017  Review Complete On: 12/27/2017 By: Zayra Shah NP Severity Noted Reaction Type Reactions Latex  05/27/2010    Hives Augmentin [Amoxicillin-pot Clavulanate]  04/13/2011    Nausea and Vomiting Codeine  04/13/2011    Nausea and Vomiting Morphine Sulfate  04/13/2011    Nausea and Vomiting Nuts [Tree Nut]  10/27/2016    Swelling Pcn [Penicillins]  04/01/2010    Hives Current Immunizations  Reviewed on 11/23/2016 Name Date Human Papillomavirus 4/14/2008, 12/26/2007, 10/26/2007 Influenza Vaccine 9/16/2017, 10/14/2014, 10/29/2013 Influenza Vaccine Namrata Cuellar) 10/2/2015 Influenza Vaccine (Quad) PF 10/27/2016 Influenza Vaccine Split 10/14/2011, 11/3/2010 Influenza Vaccine Whole 10/26/2007 TD Vaccine 11/5/2002 TDAP Vaccine 10/14/2011 Tdap 11/23/2016 ZZZ-RETIRED (DO NOT USE) Pneumococcal Vaccine (Unspecified Type) 11/4/2012  5:36 PM  
  
 Not reviewed this visit You Were Diagnosed With   
  
 Codes Comments Acute URI    -  Primary ICD-10-CM: J06.9 ICD-9-CM: 465.9 Flu-like symptoms     ICD-10-CM: R68.89 ICD-9-CM: 780.99 Vitals BP Pulse Temp Resp Height(growth percentile) Weight(growth percentile) 126/87 (BP 1 Location: Right arm, BP Patient Position: Sitting) 62 98.3 °F (36.8 °C) (Oral) 18 5' 4\" (1.626 m) 272 lb (123.4 kg) LMP SpO2 BMI OB Status Smoking Status 12/19/2017 98% 46.69 kg/m2 Having regular periods Never Smoker BMI and BSA Data Body Mass Index Body Surface Area  
 46.69 kg/m 2 2.36 m 2 Preferred Pharmacy [facility] [place], [address] [phone] Your Updated Medication List  
  
   
This list is accurate as of: 12/27/17  7:52 AM.  Always use your most recent med list.  
  
  
  
  
 cetirizine 10 mg tablet Commonly known as:  ZYRTEC  
TAKE ONE TABLET BY MOUTH ONCE DAILY  
  
 clindamycin 1 % external solution Commonly known as:  CLEOCIN T Spray thin layer on bilateral breasts daily as needed to prevent boils. Use thin film on affected area  
  
 clonazePAM 1 mg tablet Commonly known as:  Maggy Sparkill Take 1 Tab by mouth daily as needed. DEPO-PROVERA 150 mg/mL injection Generic drug:  medroxyPROGESTERone 150 mg by IntraMUSCular route once. EPINEPHrine 0.3 mg/0.3 mL injection Commonly known as:  EPIPEN 2-AWA  
0.3 mL by IntraMUSCular route once as needed for up to 1 dose. ergocalciferol 50,000 unit capsule Commonly known as:  VITAMIN D2 Take 1 Cap by mouth every seven (7) days. ferrous sulfate 325 mg (65 mg iron) tablet TAKE ONE TABLET BY MOUTH TWICE DAILY  
  
 fluticasone 50 mcg/actuation nasal spray Commonly known as:  Chiara Blizzard 2 Sprays by Both Nostrils route daily.   
  
 hydrocortisone 1 % topical cream  
Commonly known as:  CORTAID  
 APPLY THIN LAYER OF CREAM TO AFFECTED AREA ON HANDS TWO TIMES DAILY. hydroxychloroquine 200 mg tablet Commonly known as:  PLAQUENIL  
TAKE ONE TABLET BY MOUTH TWICE DAILY * levothyroxine 200 mcg tablet Commonly known as:  SYNTHROID  
TAKE ONE TABLET BY MOUTH ONCE DAILY BEFORE BREAKFAST * levothyroxine 25 mcg tablet Commonly known as:  SYNTHROID  
TAKE ONE TABLET BY MOUTH ONCE DAILY BEFORE  BREAKFAST  (PLEASE  TAKE  WITH  200MCG  TABLETS  DAILY)  
  
 loratadine-pseudoephedrine 5-120 mg per tablet Commonly known as:  CLARITIN-D 12 HOUR Take 1 Tab by mouth two (2) times a day. montelukast 10 mg tablet Commonly known as:  SINGULAIR  
TAKE ONE TABLET BY MOUTH ONCE DAILY  
  
 mupirocin 2 % ointment Commonly known as:  [facility] Apply thin layer to affected areas on breast twice daily for up to 10 days. omeprazole 20 mg capsule Commonly known as:  PRILOSEC  
TAKE ONE CAPSULE BY MOUTH TWICE DAILY PARoxetine 30 mg tablet Commonly known as:  PAXIL TAKE ONE TABLET BY MOUTH ONCE DAILY PROAIR HFA 90 mcg/actuation inhaler Generic drug:  albuterol INHALE TWO PUFFS BY MOUTH EVERY 6 HOURS AS NEEDED FOR WHEEZING  
  
 SUMAtriptan 100 mg tablet Commonly known as:  IMITREX  
TAKE ONE TABLET BY MOUTH ONCE AS NEEDED FOR MIGRAINE FOR ONE DOSE * Notice: This list has 2 medication(s) that are the same as other medications prescribed for you. Read the directions carefully, and ask your doctor or other care provider to review them with you. Prescriptions Sent to Pharmacy Refills  
 loratadine-pseudoephedrine (CLARITIN-D 12 HOUR) 5-120 mg per tablet 0 Sig: Take 1 Tab by mouth two (2) times a day. Class: Normal  
 Pharmacy: [address] [place] [address], [address] [place] [address] Ph #: [phone] Route: Oral  
  
We Performed the Following AMB POC RAPID INFLUENZA TEST [74841 CPT(R)] Follow-up Instructions Return if symptoms worsen or fail to improve. Introducing [facility] & HEALTH SERVICES! Dear Ricardo Collier: Thank you for requesting a [employer] account. Our records indicate that you already have an active [employer] account. You can access your account anytime at [email or web address] Did you know that you can access your hospital and ER discharge instructions at any time in [employer]? You can also review all of your test results from your hospital stay or ER visit. Additional Information If you have questions, please visit the Frequently Asked Questions section of the [employer] website at [email or web address] Remember, [employer] is NOT to be used for urgent needs. For medical emergencies, dial 911. Now available from your iPhone and Android! Please provide this summary of care documentation to your next provider. Your primary care clinician is listed as Dianne Curran. If you have any questions after today's visit, please call [phone].

## 2017-12-29 RX ORDER — HYDROXYCHLOROQUINE SULFATE 200 MG/1
TABLET, FILM COATED ORAL
Qty: 60 TAB | Refills: 3 | OUTPATIENT
Start: 2017-12-29

## 2018-01-09 ENCOUNTER — OFFICE VISIT (OUTPATIENT)
Dept: FAMILY MEDICINE CLINIC | Age: 33
End: 2018-01-09

## 2018-01-09 VITALS
SYSTOLIC BLOOD PRESSURE: 115 MMHG | WEIGHT: 272 LBS | OXYGEN SATURATION: 98 % | DIASTOLIC BLOOD PRESSURE: 79 MMHG | RESPIRATION RATE: 18 BRPM | HEIGHT: 64 IN | HEART RATE: 92 BPM | BODY MASS INDEX: 46.44 KG/M2 | TEMPERATURE: 98.1 F

## 2018-01-09 DIAGNOSIS — J02.9 SORE THROAT: ICD-10-CM

## 2018-01-09 DIAGNOSIS — J06.9 ACUTE URI: Primary | ICD-10-CM

## 2018-01-09 DIAGNOSIS — E03.9 HYPOTHYROIDISM, UNSPECIFIED TYPE: Chronic | ICD-10-CM

## 2018-01-09 DIAGNOSIS — R53.83 FATIGUE, UNSPECIFIED TYPE: ICD-10-CM

## 2018-01-09 LAB
S PYO AG THROAT QL: NEGATIVE
VALID INTERNAL CONTROL?: YES

## 2018-01-09 RX ORDER — POLYETHYLENE GLYCOL 3350 17 G/17G
POWDER, FOR SOLUTION ORAL
COMMUNITY
Start: 2018-01-03 | End: 2018-05-07 | Stop reason: SDUPTHER

## 2018-01-09 RX ORDER — AZITHROMYCIN 250 MG/1
TABLET, FILM COATED ORAL
Qty: 6 TAB | Refills: 0 | Status: SHIPPED | OUTPATIENT
Start: 2018-01-09 | End: 2018-01-14

## 2018-01-09 NOTE — PROGRESS NOTES
Chief Complaint   Patient presents with    Ear Pain    Fatigue    Sore Throat     Patient in office today for sx that began Thursday. Have been treating with clairitn-d with slight relief noted. Denies any fever. Denies any recent abx. Sick contacts include neighbor. Sore throat that is persistent. Constant. Has some associated nasal congestion. Thick green congestion. Denies any cough. Denies any other concerns at this time. Chief Complaint   Patient presents with    Ear Pain    Fatigue    Sore Throat     she is a 29y.o. year old female who presents for evalution. Reviewed PmHx, RxHx, FmHx, SocHx, AllgHx and updated and dated in the chart. Review of Systems - negative except as listed above in the HPI    Objective:     Vitals:    01/09/18 1322   BP: 115/79   Pulse: 92   Resp: 18   Temp: 98.1 °F (36.7 °C)   TempSrc: Oral   SpO2: 98%   Weight: 272 lb (123.4 kg)   Height: 5' 4\" (1.626 m)     Physical Examination: General appearance - alert, well appearing, and in no distress  Eyes - pupils equal and reactive, extraocular eye movements intact  Ears - bilateral TM's and external ear canals normal; ceruminosis noted  Nose - mucosal congestion, mucosal erythema, purulent rhinorrhea and sinus tenderness noted bilateral maxillary sinuses with percussion  Mouth - mucous membranes moist, pharynx normal without lesions  Neck - supple, no significant adenopathy  Chest - clear to auscultation, no wheezes, rales or rhonchi, symmetric air entry  Heart - normal rate, regular rhythm, normal S1, S2, no murmurs    Assessment/ Plan:   Diagnoses and all orders for this visit:    1. Acute URI  -     azithromycin (ZITHROMAX) 250 mg tablet; Take 2 tablets today, then take 1 tablet daily  Start and complete full course of zithromax. Dwp ADRs/SEs of medication. Push fluids. Rest. Saline nasal spray for nasal congestion. OTC motrin/apap for fevers. RTC if sx persist or worsen.   2. Sore throat  -     AMB POC RAPID STREP A  Neg strep. 3. Fatigue, unspecified type  -     METABOLIC PANEL, COMPREHENSIVE  -     CBC WITH AUTOMATED DIFF  -     IRON PROFILE  -     VITAMIN D, 25 HYDROXY  Will notify results and deviate plan based on findings. Recommended pt consider completing sleep study for further evaluation if labs are all normal.   4. Hypothyroidism, unspecified type  -     TSH 3RD GENERATION  Updating thyroid labs. Follow-up Disposition:  Return if symptoms worsen or fail to improve. I have discussed the diagnosis with the patient and the intended plan as seen in the above orders. The patient has received an after-visit summary and questions were answered concerning future plans. Medication Side Effects and Warnings were discussed with patient: yes  Patient Labs were reviewed and or requested: yes  Patient Past Records were reviewed and or requested  yes  Patient / Caregiver Understanding of treatment plan was verbalized during office visit YES    RUSTY Gilman    Patient Instructions   I have prescribed you the antibiotic Azithromycin. Take this medication as directed and complete the entire course, even if you're feeling better. If you miss a dose, take it as soon as possible. Take this medication on an empty stomach and if you're going to be out in the sun make sure you wear sunscreen to avoid developing a photosensitivity reaction. Common side effects of this medication include abdominal pain, nausea, and diarrhea. Notify your provider if symptoms do not improve one week after completing the course of this antibiotic.

## 2018-01-09 NOTE — PROGRESS NOTES
Chief Complaint   Patient presents with    Ear Pain    Fatigue    Sore Throat     Patient in office today for sx that began Thursday. Have been treating with clairitn-d with slight relief noted. 1. Have you been to the ER, urgent care clinic since your last visit? Hospitalized since your last visit? No    2. Have you seen or consulted any other health care providers outside of the [address] [place] [address] since your last visit? Include any pap smears or colon screening.  No

## 2018-01-09 NOTE — MR AVS SNAPSHOT
Visit Information Date & Time Provider Department Dept. Phone Encounter #  
 1/9/2018  1:15 PM Tong Barnett NP [facility] [phone] [id number] Follow-up Instructions Return if symptoms worsen or fail to improve. Your Appointments 1/16/2018 11:30 AM  
ESTABLISHED PATIENT with Tong Barnett NP [address] [facility] [facility] [address] ([facility] Appt Note: weight check [address] [id number] [phone]  
  
   
 [address] [address]  
  
    
 4/2/2018  2:40 PM  
ACUTE CARE with Hugo Green MD  
[address] ([facility] Appt Note: ra  
 [address] [place] [address] [facility] [address]  
[phone]  
  
   
 [facility] [address] Upcoming Health Maintenance Date Due  
 MEDICARE YEARLY EXAM 10/14/2018 PAP AKA CERVICAL CYTOLOGY 11/7/2019 DTaP/Tdap/Td series (4 - Td) 11/23/2026 Allergies as of 1/9/2018  Review Complete On: 1/9/2018 By: Tong Barnett NP Severity Noted Reaction Type Reactions Latex  05/27/2010    Hives Augmentin [Amoxicillin-pot Clavulanate]  04/13/2011    Nausea and Vomiting Codeine  04/13/2011    Nausea and Vomiting Morphine Sulfate  04/13/2011    Nausea and Vomiting Nuts [Tree Nut]  10/27/2016    Swelling Pcn [Penicillins]  04/01/2010    Hives Current Immunizations  Reviewed on 11/23/2016 Name Date Human Papillomavirus 4/14/2008, 12/26/2007, 10/26/2007 Influenza Vaccine 9/16/2017, 10/14/2014, 10/29/2013 Influenza Vaccine Janeann Rome) 10/2/2015 Influenza Vaccine (Quad) PF 10/27/2016 Influenza Vaccine Split 10/14/2011, 11/3/2010 Influenza Vaccine Whole 10/26/2007 TD Vaccine 11/5/2002 TDAP Vaccine 10/14/2011 Tdap 11/23/2016 ZZZ-RETIRED (DO NOT USE) Pneumococcal Vaccine (Unspecified Type) 11/4/2012  5:36 PM  
  
 Not reviewed this visit You Were Diagnosed With   
 Codes Comments Acute URI    -  Primary ICD-10-CM: J06.9 ICD-9-CM: 465.9 Sore throat     ICD-10-CM: J02.9 ICD-9-CM: 170 Fatigue, unspecified type     ICD-10-CM: R53.83 ICD-9-CM: 780.79 Hypothyroidism, unspecified type     ICD-10-CM: E03.9 ICD-9-CM: 582. 9 Vitals BP Pulse Temp Resp Height(growth percentile) Weight(growth percentile) 115/79 (BP 1 Location: Right arm, BP Patient Position: Sitting) 92 98.1 °F (36.7 °C) (Oral) 18 5' 4\" (1.626 m) 272 lb (123.4 kg) LMP SpO2 BMI OB Status Smoking Status 12/19/2017 98% 46.69 kg/m2 Having regular periods Never Smoker BMI and BSA Data Body Mass Index Body Surface Area  
 46.69 kg/m 2 2.36 m 2 [facility] [address] [phone] Your Updated Medication List  
  
   
This list is accurate as of: 1/9/18  1:47 PM.  Always use your most recent med list.  
  
  
  
  
 azithromycin 250 mg tablet Commonly known as:  Percy Vasquez Take 2 tablets today, then take 1 tablet daily  
  
 cetirizine 10 mg tablet Commonly known as:  ZYRTEC  
TAKE ONE TABLET BY MOUTH ONCE DAILY  
  
 clindamycin 1 % external solution Commonly known as:  CLEOCIN T Spray thin layer on bilateral breasts daily as needed to prevent boils. Use thin film on affected area  
  
 clonazePAM 1 mg tablet Commonly known as:  Sheila Courser Take 1 Tab by mouth daily as needed. EPINEPHrine 0.3 mg/0.3 mL injection Commonly known as:  EPIPEN 2-AWA  
0.3 mL by IntraMUSCular route once as needed for up to 1 dose. ergocalciferol 50,000 unit capsule Commonly known as:  VITAMIN D2 Take 1 Cap by mouth every seven (7) days. ferrous sulfate 325 mg (65 mg iron) tablet TAKE ONE TABLET BY MOUTH TWICE DAILY  
  
 fluticasone 50 mcg/actuation nasal spray Commonly known as:  Sony Lover 2 Sprays by Both Nostrils route daily. hydrocortisone 1 % topical cream  
Commonly known as:  CORTAID  
APPLY THIN LAYER OF CREAM TO AFFECTED AREA ON HANDS TWO TIMES DAILY. hydroxychloroquine 200 mg tablet Commonly known as:  PLAQUENIL  
TAKE ONE TABLET BY MOUTH TWICE DAILY * levothyroxine 200 mcg tablet Commonly known as:  SYNTHROID  
TAKE ONE TABLET BY MOUTH ONCE DAILY BEFORE BREAKFAST * levothyroxine 25 mcg tablet Commonly known as:  SYNTHROID  
TAKE ONE TABLET BY MOUTH ONCE DAILY BEFORE  BREAKFAST  (PLEASE  TAKE  WITH  200MCG  TABLETS  DAILY)  
  
 loratadine-pseudoephedrine 5-120 mg per tablet Commonly known as:  CLARITIN-D 12 HOUR Take 1 Tab by mouth two (2) times a day. montelukast 10 mg tablet Commonly known as:  SINGULAIR  
TAKE ONE TABLET BY MOUTH ONCE DAILY  
  
 mupirocin 2 % ointment Commonly known as:  [facility] Apply thin layer to affected areas on breast twice daily for up to 10 days. omeprazole 20 mg capsule Commonly known as:  PRILOSEC  
TAKE ONE CAPSULE BY MOUTH TWICE DAILY PARoxetine 30 mg tablet Commonly known as:  PAXIL TAKE ONE TABLET BY MOUTH ONCE DAILY polyethylene glycol 17 gram/dose powder Commonly known as:  Amee Ibarra PROAIR HFA 90 mcg/actuation inhaler Generic drug:  albuterol INHALE TWO PUFFS BY MOUTH EVERY 6 HOURS AS NEEDED FOR WHEEZING  
  
 SUMAtriptan 100 mg tablet Commonly known as:  IMITREX  
TAKE ONE TABLET BY MOUTH ONCE AS NEEDED FOR MIGRAINE FOR ONE DOSE * Notice: This list has 2 medication(s) that are the same as other medications prescribed for you. Read the directions carefully, and ask your doctor or other care provider to review them with you. Prescriptions Sent to Pharmacy Refills  
 azithromycin (ZITHROMAX) 250 mg tablet 0 Sig: Take 2 tablets today, then take 1 tablet daily Class: Normal  
 [facility] [address] [facility] [facility] #: [phone] We Performed the Following AMB POC RAPID STREP A [45620 CPT(R)] CBC WITH AUTOMATED DIFF [57594 CPT(R)] IRON PROFILE [id number] CPT(R)] METABOLIC PANEL, COMPREHENSIVE [35023 CPT(R)] TSH 3RD GENERATION [50837 CPT(R)] VITAMIN D, 25 HYDROXY [id number] CPT(R)] Follow-up Instructions Return if symptoms worsen or fail to improve. Patient Instructions I have prescribed you the antibiotic Azithromycin. Take this medication as directed and complete the entire course, even if you're feeling better. If you miss a dose, take it as soon as possible. Take this medication on an empty stomach and if you're going to be out in the sun make sure you wear sunscreen to avoid developing a photosensitivity reaction. Common side effects of this medication include abdominal pain, nausea, and diarrhea. Notify your provider if symptoms do not improve one week after completing the course of this antibiotic. Introducing [facility] & HEALTH SERVICES! Dear Raj Barboza: Thank you for requesting a [employer] account. Our records indicate that you already have an active [employer] account. You can access your account anytime at [email or web address] Did you know that you can access your hospital and ER discharge instructions at any time in [employer]? You can also review all of your test results from your hospital stay or ER visit. Additional Information If you have questions, please visit the Frequently Asked Questions section of the [employer] website at [email or web address] Remember, [employer] is NOT to be used for urgent needs. For medical emergencies, dial 911. Now available from your iPhone and Android! Please provide this summary of care documentation to your next provider. Your primary care clinician is listed as Harper Olvera. If you have any questions after today's visit, please call [phone].

## 2018-01-10 DIAGNOSIS — E03.9 HYPOTHYROIDISM, UNSPECIFIED TYPE: Primary | ICD-10-CM

## 2018-01-10 DIAGNOSIS — E55.9 VITAMIN D DEFICIENCY: ICD-10-CM

## 2018-01-10 LAB
25(OH)D3+25(OH)D2 SERPL-MCNC: 26 NG/ML (ref 30–100)
ALBUMIN SERPL-MCNC: 4.1 G/DL (ref 3.5–5.5)
ALBUMIN/GLOB SERPL: 1.6 {RATIO} (ref 1.2–2.2)
ALP SERPL-CCNC: 107 IU/L (ref 39–117)
ALT SERPL-CCNC: 30 IU/L (ref 0–32)
AST SERPL-CCNC: 22 IU/L (ref 0–40)
BASOPHILS # BLD AUTO: 0 X10E3/UL (ref 0–0.2)
BASOPHILS NFR BLD AUTO: 0 %
BILIRUB SERPL-MCNC: 0.5 MG/DL (ref 0–1.2)
BUN SERPL-MCNC: 8 MG/DL (ref 6–20)
BUN/CREAT SERPL: 12 (ref 9–23)
CALCIUM SERPL-MCNC: 9 MG/DL (ref 8.7–10.2)
CHLORIDE SERPL-SCNC: 101 MMOL/L (ref 96–106)
CO2 SERPL-SCNC: 23 MMOL/L (ref 18–29)
CREAT SERPL-MCNC: 0.68 MG/DL (ref 0.57–1)
EOSINOPHIL # BLD AUTO: 0.5 X10E3/UL (ref 0–0.4)
EOSINOPHIL NFR BLD AUTO: 5 %
ERYTHROCYTE [DISTWIDTH] IN BLOOD BY AUTOMATED COUNT: 14.5 % (ref 12.3–15.4)
GLOBULIN SER CALC-MCNC: 2.6 G/DL (ref 1.5–4.5)
GLUCOSE SERPL-MCNC: 105 MG/DL (ref 65–99)
HCT VFR BLD AUTO: 39.7 % (ref 34–46.6)
HGB BLD-MCNC: 13.3 G/DL (ref 11.1–15.9)
IMM GRANULOCYTES # BLD: 0 X10E3/UL (ref 0–0.1)
IMM GRANULOCYTES NFR BLD: 0 %
IRON SATN MFR SERPL: 16 % (ref 15–55)
IRON SERPL-MCNC: 47 UG/DL (ref 27–159)
LYMPHOCYTES # BLD AUTO: 2.9 X10E3/UL (ref 0.7–3.1)
LYMPHOCYTES NFR BLD AUTO: 32 %
MCH RBC QN AUTO: 30 PG (ref 26.6–33)
MCHC RBC AUTO-ENTMCNC: 33.5 G/DL (ref 31.5–35.7)
MCV RBC AUTO: 90 FL (ref 79–97)
MONOCYTES # BLD AUTO: 0.5 X10E3/UL (ref 0.1–0.9)
MONOCYTES NFR BLD AUTO: 5 %
NEUTROPHILS # BLD AUTO: 5.1 X10E3/UL (ref 1.4–7)
NEUTROPHILS NFR BLD AUTO: 58 %
PLATELET # BLD AUTO: 301 X10E3/UL (ref 150–379)
POTASSIUM SERPL-SCNC: 4.1 MMOL/L (ref 3.5–5.2)
PROT SERPL-MCNC: 6.7 G/DL (ref 6–8.5)
RBC # BLD AUTO: 4.43 X10E6/UL (ref 3.77–5.28)
SODIUM SERPL-SCNC: 140 MMOL/L (ref 134–144)
TIBC SERPL-MCNC: 286 UG/DL (ref 250–450)
TSH SERPL DL<=0.005 MIU/L-ACNC: 6.27 UIU/ML (ref 0.45–4.5)
UIBC SERPL-MCNC: 239 UG/DL (ref 131–425)
WBC # BLD AUTO: 9 X10E3/UL (ref 3.4–10.8)

## 2018-01-10 RX ORDER — ERGOCALCIFEROL 1.25 MG/1
50000 CAPSULE ORAL
Qty: 12 CAP | Refills: 1 | Status: SHIPPED | OUTPATIENT
Start: 2018-01-10 | End: 2018-01-23 | Stop reason: SDUPTHER

## 2018-01-10 RX ORDER — LEVOTHYROXINE SODIUM 50 UG/1
50 TABLET ORAL
Qty: 30 TAB | Refills: 2 | Status: SHIPPED | OUTPATIENT
Start: 2018-01-10 | End: 2018-04-10 | Stop reason: SDUPTHER

## 2018-01-11 NOTE — PROGRESS NOTES
Please notify pt the followin. Thyroid function slow suggesting that dose of thyroid medication not strong enough. Therefore I have changed her 25 mcg dose to 50 mcg (to take with 200 mcg dose so total daily dose is 250). She can double up on whatever 25 mcg tabs she has left and then start new 50 mcg tabs. I recommend we repeat TSH in 4-6 weeks. 2. Vitamin D is also low. I have refilled her weekly high dose vitamin D supplement, please make sure pt is taking. 3. Iron levels are normal.   If after a few weeks of medication adjustment pt is still experiencing fatigue I would like her to consider completing a sleep study for further evaluation.

## 2018-01-11 NOTE — PROGRESS NOTES
Pt idx3 notified and verbalized understanding. States she is taking abx but is still having a lot of nasal congestion, has been taking OTC w/o effect. Would like to know if there is anything stronger she could use.

## 2018-01-23 ENCOUNTER — OFFICE VISIT (OUTPATIENT)
Dept: FAMILY MEDICINE CLINIC | Age: 33
End: 2018-01-23

## 2018-01-23 VITALS
HEART RATE: 96 BPM | DIASTOLIC BLOOD PRESSURE: 76 MMHG | OXYGEN SATURATION: 95 % | SYSTOLIC BLOOD PRESSURE: 121 MMHG | TEMPERATURE: 98.7 F | RESPIRATION RATE: 21 BRPM | BODY MASS INDEX: 47.15 KG/M2 | HEIGHT: 64 IN | WEIGHT: 276.2 LBS

## 2018-01-23 DIAGNOSIS — F41.0 PANIC ATTACKS: ICD-10-CM

## 2018-01-23 DIAGNOSIS — E55.9 VITAMIN D DEFICIENCY: ICD-10-CM

## 2018-01-23 DIAGNOSIS — F41.1 GAD (GENERALIZED ANXIETY DISORDER): Primary | ICD-10-CM

## 2018-01-23 DIAGNOSIS — F32.A DEPRESSIVE DISORDER: ICD-10-CM

## 2018-01-23 DIAGNOSIS — S69.92XA FINGER INJURY, LEFT, INITIAL ENCOUNTER: ICD-10-CM

## 2018-01-23 RX ORDER — VENLAFAXINE HYDROCHLORIDE 75 MG/1
75 CAPSULE, EXTENDED RELEASE ORAL DAILY
Qty: 7 CAP | Refills: 0 | Status: SHIPPED | OUTPATIENT
Start: 2018-01-23 | End: 2018-02-07 | Stop reason: ALTCHOICE

## 2018-01-23 RX ORDER — VENLAFAXINE HYDROCHLORIDE 37.5 MG/1
37.5 CAPSULE, EXTENDED RELEASE ORAL DAILY
Qty: 7 CAP | Refills: 0 | Status: SHIPPED | OUTPATIENT
Start: 2018-01-23 | End: 2018-02-07 | Stop reason: ALTCHOICE

## 2018-01-23 RX ORDER — ERGOCALCIFEROL 1.25 MG/1
CAPSULE ORAL
Qty: 4 CAP | Refills: 5 | Status: SHIPPED | OUTPATIENT
Start: 2018-01-23 | End: 2018-08-14 | Stop reason: SDUPTHER

## 2018-01-23 RX ORDER — VENLAFAXINE HYDROCHLORIDE 150 MG/1
150 CAPSULE, EXTENDED RELEASE ORAL DAILY
Qty: 30 CAP | Refills: 2 | Status: SHIPPED | OUTPATIENT
Start: 2018-01-23 | End: 2018-01-24 | Stop reason: SDUPTHER

## 2018-01-23 RX ORDER — CLONAZEPAM 1 MG/1
1 TABLET ORAL
Qty: 30 TAB | Refills: 2 | Status: SHIPPED | OUTPATIENT
Start: 2018-01-23 | End: 2018-07-20 | Stop reason: SDUPTHER

## 2018-01-23 RX ORDER — PAROXETINE HYDROCHLORIDE 20 MG/1
20 TABLET, FILM COATED ORAL DAILY
Qty: 7 TAB | Refills: 0 | Status: SHIPPED | OUTPATIENT
Start: 2018-01-23 | End: 2018-02-07 | Stop reason: ALTCHOICE

## 2018-01-23 RX ORDER — PAROXETINE 10 MG/1
10 TABLET, FILM COATED ORAL DAILY
Qty: 7 TAB | Refills: 0 | Status: SHIPPED | OUTPATIENT
Start: 2018-01-23 | End: 2018-02-07 | Stop reason: ALTCHOICE

## 2018-01-23 NOTE — PATIENT INSTRUCTIONS
Apply a compressive ACE bandage. Rest and elevate the affected painful area. Apply cold compresses intermittently as needed. As pain recedes, begin normal activities slowly as tolerated. Call if symptoms persist.    On days 1-7: take 20 mg Paxil with 37.5 mg Effexor. On days 8-14: take 10 mg Paxil with 75 mg Effexor. On day 15 and there after take 150 mg Effexor and STOP your Paxil. Venlafaxine (By mouth)   Venlafaxine ([id number])  Treats depression, generalized anxiety disorder, panic disorder, and social anxiety disorder. Brand Name(s): Effexor XR   There may be other brand names for this medicine. When This Medicine Should Not Be Used: This medicine is not right for everyone. Do not use it if you had an allergic reaction to venlafaxine or desvenlafaxine succinate. How to Use This Medicine:   Long Acting Capsule, Tablet, Long Acting Tablet  · Take your medicine as directed. Your dose may need to be changed several times to find what works best for you. · It is best to take the extended-release capsule at the same time each day (either in the morning or evening). · It is best to take this medicine with food or milk. · Swallow the extended-release capsule whole. Do not crush, break, or chew it. Do not place the capsule in a liquid. · If you cannot swallow the extended-release capsule, you may open it and pour the medicine into a small amount of soft food such as pudding, yogurt, or applesauce. Stir this mixture well and swallow it without chewing. · This medicine should come with a Medication Guide. Ask your pharmacist for a copy if you do not have one. · Missed dose: Take a dose as soon as you remember. If it is almost time for your next dose, wait until then and take a regular dose. Do not take extra medicine to make up for a missed dose. · Store the medicine in a closed container at room temperature, away from heat, moisture, and direct light.   Drugs and Foods to Avoid:   Ask your doctor or pharmacist before using any other medicine, including over-the-counter medicines, vitamins, and herbal products. · Do not use this medicine if you have used an MAO inhibitor within the past 14 days. Do not take an MAO inhibitor for at least 7 days after you stop this medicine. · Some medicines can affect how venlafaxine works. Tell your doctor if you are using any of the following:   ¨ Buspirone, cimetidine, fentanyl, ketoconazole, lithium, metoprolol, mirtazapine, Jameson's wort, tramadol, or tryptophan supplements  ¨ Amphetamines  ¨ Blood thinner (including warfarin)  ¨ Diuretic (water pill)  ¨ Medicine for migraine headaches  ¨ Medicine to lose weight (including phentermine)  ¨ NSAID pain or arthritis medicine (including aspirin, celecoxib, diclofenac, ibuprofen, naproxen)  ¨ Tricyclic antidepressant  · Do not drink alcohol while you are using this medicine. · Tell your doctor if you use anything else that makes you sleepy. Some examples are allergy medicine, narcotic pain medicine, and alcohol. Warnings While Using This Medicine:   · Tell your doctor if you are pregnant or breastfeeding, or if you have kidney disease, liver disease, glaucoma, heart disease, high blood pressure, or thyroid problems. Tell your doctor if you have a history of marlin, seizures, heart attack, or stroke. · This medicine can increase thoughts of suicide. Tell your doctor right away if you start to feel depressed and have thoughts about hurting yourself. · This medicine may cause the following problems:   ¨ Serotonin syndrome (when used with certain medicines)  ¨ Increased cholesterol levels  ¨ Increased blood pressure  ¨ Increased risk of bleeding problems  ¨ Low sodium levels  ¨ Interstitial lung disease and eosinophilic pneumonia  · This medicine may make you dizzy or drowsy. Do not drive or do anything that could be dangerous until you know how this medicine affects you. · Do not stop using this medicine suddenly. Your doctor will need to slowly decrease your dose before you stop it completely. · Tell any doctor or dentist who treats you that you are using this medicine. This medicine may affect certain medical test results. · Your doctor will do lab tests at regular visits to check on the effects of this medicine. Keep all appointments. · Keep all medicine out of the reach of children. Never share your medicine with anyone. Possible Side Effects While Using This Medicine:   Call your doctor right away if you notice any of these side effects:  · Allergic reaction: Itching or hives, swelling in your face or hands, swelling or tingling in your mouth or throat, chest tightness, trouble breathing  · Anxiety, restlessness, fever, sweating, muscle spasms, nausea, vomiting, diarrhea, seeing or hearing things that are not there  · Blistering, peeling, red skin rash  · Chest pain, cough, trouble breathing  · Confusion, weakness, and muscle twitching  · Eye pain, vision changes, seeing halos around lights  · Fast or pounding heartbeat  · Feeling more excited or energetic than usual  · Headache, trouble concentrating, memory problems, unsteadiness  · Seizures  · Unusual behavior, thoughts of hurting yourself or others, trouble sleeping, nervousness, unusual dreams  · Unusual bleeding or bruising  If you notice these less serious side effects, talk with your doctor:   · Dry mouth  · Mild nausea, constipation, vomiting, loss of appetite, weight loss  · Sexual problems  · Sleepiness or unusual drowsiness, dizziness  If you notice other side effects that you think are caused by this medicine, tell your doctor. Call your doctor for medical advice about side effects. You may report side effects to FDA at 5-519-FDA-1267  © 2017 [address] Information is for End User's use only and may not be sold, redistributed or otherwise used for commercial purposes. The above information is an  only.  It is not intended as medical advice for individual conditions or treatments. Talk to your doctor, nurse or pharmacist before following any medical regimen to see if it is safe and effective for you.

## 2018-01-23 NOTE — MR AVS SNAPSHOT
[address] [address] 
[phone] Patient: Olga Manzo MRN:  [id number] Visit Information Date & Time Provider Department Dept. Phone Encounter #  
 2018  3:00 PM Viji Das NP [facility] [phone] [id number] Follow-up Instructions Return if symptoms worsen or fail to improve. Your Appointments 2018  2:40 PM  
ACUTE CARE with Puneet Muñoz MD  
[address] ([address] [facility]) Appt Note: ra  
 [address] [facility] [address]  
[phone]  
  
   
 [address] [facility] 7 [id number] Upcoming Health Maintenance Date Due  
 MEDICARE YEARLY EXAM 10/14/2018 PAP AKA CERVICAL CYTOLOGY 2019 DTaP/Tdap/Td series (4 - Td) 2026 Allergies as of 2018  Review Complete On: 2018 By: Viji Das NP Severity Noted Reaction Type Reactions Latex  2010    Hives Augmentin [Amoxicillin-pot Clavulanate]  2011    Nausea and Vomiting Codeine  2011    Nausea and Vomiting Morphine Sulfate  2011    Nausea and Vomiting Nuts [Tree Nut]  10/27/2016    Swelling Pcn [Penicillins]  2010    Hives Current Immunizations  Reviewed on 2016 Name Date Human Papillomavirus 2008, 2007, 10/26/2007 Influenza Vaccine 2017, 10/14/2014, 10/29/2013 Influenza Vaccine [place]) 10/2/2015 Influenza Vaccine (Quad) PF 10/27/2016 Influenza Vaccine Split 10/14/2011, 11/3/2010 Influenza Vaccine Whole 10/26/2007 TD Vaccine 2002 TDAP Vaccine 10/14/2011 Tdap 2016 ZZZ-RETIRED (DO NOT USE) Pneumococcal Vaccine (Unspecified Type) 2012  5:36 PM  
  
 Not reviewed this visit You Were Diagnosed With   
  
 Codes Comments PRASAD (generalized anxiety disorder)    -  Primary ICD-10-CM: F41.1 ICD-9-CM: 300.02 Depressive disorder     ICD-10-CM: F32.9 ICD-9-CM: 114 Panic attacks     ICD-10-CM: F41.0 ICD-9-CM: 300.01 Finger injury, left, initial encounter     ICD-10-CM: R55.07PB ICD-9-CM: 959.5 Vitals BP Pulse Temp Resp Height(growth percentile) Weight(growth percentile) 121/76 (BP 1 Location: Left arm, BP Patient Position: Sitting) 96 98.7 °F (37.1 °C) (Oral) 21 5' 4\" (1.626 m) 276 lb 3.2 oz (125.3 kg) SpO2 BMI OB Status Smoking Status 95% 47.41 kg/m2 Having regular periods Never Smoker Vitals History BMI and BSA Data Body Mass Index Body Surface Area  
 47.41 kg/m 2 2.38 m 2 Preferred Pharmacy Pharmacy Name Phone [address] [phone] Your Updated Medication List  
  
   
This list is accurate as of: 1/23/18  3:34 PM.  Always use your most recent med list.  
  
  
  
  
 cetirizine 10 mg tablet Commonly known as:  ZYRTEC  
TAKE ONE TABLET BY MOUTH ONCE DAILY  
  
 clindamycin 1 % external solution Commonly known as:  CLEOCIN T Spray thin layer on bilateral breasts daily as needed to prevent boils. Use thin film on affected area  
  
 clonazePAM 1 mg tablet Commonly known as:  [place] Take 1 Tab by mouth daily as needed. EPINEPHrine 0.3 mg/0.3 mL injection Commonly known as:  EPIPEN 2-AWA  
0.3 mL by IntraMUSCular route once as needed for up to 1 dose. ferrous sulfate 325 mg (65 mg iron) tablet TAKE ONE TABLET BY MOUTH TWICE DAILY  
  
 fluticasone 50 mcg/actuation nasal spray Commonly known as:  Leeann Harpreet 2 Sprays by Both Nostrils route daily. hydrocortisone 1 % topical cream  
Commonly known as:  CORTAID  
APPLY THIN LAYER OF CREAM TO AFFECTED AREA ON HANDS TWO TIMES DAILY. hydroxychloroquine 200 mg tablet Commonly known as:  PLAQUENIL  
TAKE ONE TABLET BY MOUTH TWICE DAILY * levothyroxine 200 mcg tablet Commonly known as:  SYNTHROID  
TAKE ONE TABLET BY MOUTH ONCE DAILY BEFORE BREAKFAST * levothyroxine 50 mcg tablet Commonly known as:  SYNTHROID Take 1 Tab by mouth Daily (before breakfast). Take with 200 mcg dose. loratadine-pseudoephedrine 5-120 mg per tablet Commonly known as:  CLARITIN-D 12 HOUR Take 1 Tab by mouth two (2) times a day. montelukast 10 mg tablet Commonly known as:  SINGULAIR  
TAKE ONE TABLET BY MOUTH ONCE DAILY  
  
 mupirocin 2 % ointment Commonly known as:  [facility] Apply thin layer to affected areas on breast twice daily for up to 10 days. omeprazole 20 mg capsule Commonly known as:  PRILOSEC  
TAKE ONE CAPSULE BY MOUTH TWICE DAILY * PARoxetine 30 mg tablet Commonly known as:  PAXIL TAKE ONE TABLET BY MOUTH ONCE DAILY * PARoxetine 20 mg tablet Commonly known as:  PAXIL Take 1 Tab by mouth daily. Taper dose days 1-7. * PARoxetine 10 mg tablet Commonly known as:  PAXIL Take 1 Tab by mouth daily. Taper dose days 8-14. polyethylene glycol 17 gram/dose powder Commonly known as:  Odella Cadet PROAIR HFA 90 mcg/actuation inhaler Generic drug:  albuterol INHALE TWO PUFFS BY MOUTH EVERY 6 HOURS AS NEEDED FOR WHEEZING  
  
 SUMAtriptan 100 mg tablet Commonly known as:  IMITREX  
TAKE ONE TABLET BY MOUTH ONCE AS NEEDED FOR MIGRAINE FOR ONE DOSE  
  
 * venlafaxine-SR 37.5 mg capsule Commonly known as:  EFFEXOR-XR Take 1 Cap by mouth daily. Starting dose days 1-7. * venlafaxine-SR 75 mg capsule Commonly known as:  EFFEXOR-XR Take 1 Cap by mouth daily. Taper dose days 8-14. * venlafaxine- mg capsule Commonly known as:  EFFEXOR-XR Take 1 Cap by mouth daily. Maintenance dose. VITAMIN D2 50,000 unit capsule Generic drug:  ergocalciferol TAKE ONE CAPSULE BY MOUTH EVERY 7 DAYS * Notice:   This list has 8 medication(s) that are the same as other medications prescribed for you. Read the directions carefully, and ask your doctor or other care provider to review them with you. Prescriptions Printed Refills  
 clonazePAM (KLONOPIN) 1 mg tablet 2 Sig: Take 1 Tab by mouth daily as needed. Class: Print Route: Oral  
  
Prescriptions Sent to Pharmacy Refills  
 venlafaxine-SR (EFFEXOR-XR) 37.5 mg capsule 0 Sig: Take 1 Cap by mouth daily. Starting dose days 1-7. Class: Normal  
 Pharmacy: [address] Ph #: [phone] Route: Oral  
 venlafaxine-SR (EFFEXOR-XR) 75 mg capsule 0 Sig: Take 1 Cap by mouth daily. Taper dose days 8-14. Class: Normal  
 Pharmacy: [facility] [address] Ph #: [phone] Route: Oral  
 venlafaxine-SR (EFFEXOR-XR) 150 mg capsule 2 Sig: Take 1 Cap by mouth daily. Maintenance dose. Class: Normal  
 Pharmacy: [facility] [address] Ph #: [phone] Route: Oral  
 PARoxetine (PAXIL) 20 mg tablet 0 Sig: Take 1 Tab by mouth daily. Taper dose days 1-7. Class: Normal  
 Pharmacy: [facility] [address] Ph #: [phone] Route: Oral  
 PARoxetine (PAXIL) 10 mg tablet 0 Sig: Take 1 Tab by mouth daily. Taper dose days 8-14. Class: Normal  
 Pharmacy: [facility] [address] Ph #: [phone] Route: Oral  
  
Follow-up Instructions Return if symptoms worsen or fail to improve. To-Do List   
 01/23/2018 Imaging:  XR 5TH FINGER LT MIN 2 V Patient Instructions Apply a compressive ACE bandage. Rest and elevate the affected painful area. Apply cold compresses intermittently as needed. As pain recedes, begin normal activities slowly as tolerated. Call if symptoms persist. 
 
On days 1-7: take 20 mg Paxil with 37.5 mg Effexor. On days 8-14: take 10 mg Paxil with 75 mg Effexor. On day 15 and there after take 150 mg Effexor and STOP your Paxil. Venlafaxine (By mouth) Venlafaxine ([id number]-PPZ-een) Treats depression, generalized anxiety disorder, panic disorder, and social anxiety disorder. Brand Name(s): Effexor XR There may be other brand names for this medicine. When This Medicine Should Not Be Used: This medicine is not right for everyone. Do not use it if you had an allergic reaction to venlafaxine or desvenlafaxine succinate. How to Use This Medicine:  
Long Acting Capsule, Tablet, Long Acting Tablet · Take your medicine as directed. Your dose may need to be changed several times to find what works best for you. · It is best to take the extended-release capsule at the same time each day (either in the morning or evening). · It is best to take this medicine with food or milk. · Swallow the extended-release capsule whole. Do not crush, break, or chew it. Do not place the capsule in a liquid. · If you cannot swallow the extended-release capsule, you may open it and pour the medicine into a small amount of soft food such as pudding, yogurt, or applesauce. Stir this mixture well and swallow it without chewing. · This medicine should come with a Medication Guide. Ask your pharmacist for a copy if you do not have one. · Missed dose: Take a dose as soon as you remember. If it is almost time for your next dose, wait until then and take a regular dose. Do not take extra medicine to make up for a missed dose. · Store the medicine in a closed container at room temperature, away from heat, moisture, and direct light. Drugs and Foods to Avoid: Ask your doctor or pharmacist before using any other medicine, including over-the-counter medicines, vitamins, and herbal products. · Do not use this medicine if you have used an MAO inhibitor within the past 14 days.  Do not take an MAO inhibitor for at least 7 days after you stop this medicine. · Some medicines can affect how venlafaxine works. Tell your doctor if you are using any of the following:  
¨ Buspirone, cimetidine, fentanyl, ketoconazole, lithium, metoprolol, mirtazapine, Jameson's wort, tramadol, or tryptophan supplements ¨ Amphetamines ¨ Blood thinner (including warfarin) ¨ Diuretic (water pill) ¨ Medicine for migraine headaches ¨ Medicine to lose weight (including phentermine) ¨ NSAID pain or arthritis medicine (including aspirin, celecoxib, diclofenac, ibuprofen, naproxen) ¨ Tricyclic antidepressant · Do not drink alcohol while you are using this medicine. · Tell your doctor if you use anything else that makes you sleepy. Some examples are allergy medicine, narcotic pain medicine, and alcohol. Warnings While Using This Medicine: · Tell your doctor if you are pregnant or breastfeeding, or if you have kidney disease, liver disease, glaucoma, heart disease, high blood pressure, or thyroid problems. Tell your doctor if you have a history of marlin, seizures, heart attack, or stroke. · This medicine can increase thoughts of suicide. Tell your doctor right away if you start to feel depressed and have thoughts about hurting yourself. · This medicine may cause the following problems:  
¨ Serotonin syndrome (when used with certain medicines) ¨ Increased cholesterol levels ¨ Increased blood pressure ¨ Increased risk of bleeding problems ¨ Low sodium levels ¨ Interstitial lung disease and eosinophilic pneumonia · This medicine may make you dizzy or drowsy. Do not drive or do anything that could be dangerous until you know how this medicine affects you. · Do not stop using this medicine suddenly. Your doctor will need to slowly decrease your dose before you stop it completely. · Tell any doctor or dentist who treats you that you are using this medicine. This medicine may affect certain medical test results. · Your doctor will do lab tests at regular visits to check on the effects of this medicine. Keep all appointments. · Keep all medicine out of the reach of children. Never share your medicine with anyone. Possible Side Effects While Using This Medicine:  
Call your doctor right away if you notice any of these side effects: · Allergic reaction: Itching or hives, swelling in your face or hands, swelling or tingling in your mouth or throat, chest tightness, trouble breathing · Anxiety, restlessness, fever, sweating, muscle spasms, nausea, vomiting, diarrhea, seeing or hearing things that are not there · Blistering, peeling, red skin rash · Chest pain, cough, trouble breathing · Confusion, weakness, and muscle twitching · Eye pain, vision changes, seeing halos around lights · Fast or pounding heartbeat · Feeling more excited or energetic than usual 
· Headache, trouble concentrating, memory problems, unsteadiness · Seizures · Unusual behavior, thoughts of hurting yourself or others, trouble sleeping, nervousness, unusual dreams · Unusual bleeding or bruising If you notice these less serious side effects, talk with your doctor: · Dry mouth · Mild nausea, constipation, vomiting, loss of appetite, weight loss · Sexual problems · Sleepiness or unusual drowsiness, dizziness If you notice other side effects that you think are caused by this medicine, tell your doctor. Call your doctor for medical advice about side effects. You may report side effects to FDA at 4-838-FDA-6419 © 2017 [address] Information is for End User's use only and may not be sold, redistributed or otherwise used for commercial purposes. The above information is an  only. It is not intended as medical advice for individual conditions or treatments. Talk to your doctor, nurse or pharmacist before following any medical regimen to see if it is safe and effective for you. Introducing [facility] SERVICES! Dear Lukas Mirza: Thank you for requesting a [employer] account. Our records indicate that you already have an active [employer] account. You can access your account anytime at [email or web address] Did you know that you can access your hospital and ER discharge instructions at any time in [employer]? You can also review all of your test results from your hospital stay or ER visit. Additional Information If you have questions, please visit the Frequently Asked Questions section of the [employer] website at [email or web address] Remember, [employer] is NOT to be used for urgent needs. For medical emergencies, dial 911. Now available from your iPhone and Android! Please provide this summary of care documentation to your next provider. Your primary care clinician is listed as Miky Kay. If you have any questions after today's visit, please call [phone].

## 2018-01-23 NOTE — PROGRESS NOTES
Chief Complaint   Patient presents with    Anxiety     \"worried alot\"    Finger Swelling     left pinky finger     Pt has been having panic attacks due to cousin thinking she needs to live a group home. Has been having panic attacks every now and then. Talks to her cousin every once and a while. Last panic attack was on Saturday. Took a klonopin which made her feel calmer. Having panic attacks at work also. Pt has been on paxil for a long time. Interested in trying something different. Has not previously been on effexor. Finger got caught in a basket in [employer] on Friday. Pain with ROM. Denies any snapping or popping. Pain at the PIP joint. Has been applying ice. Taking tylenol for pain as needed. Pain has staying the same. Has not been able to rest it. Types a lot at work. Denies any other concerns at this time. Chief Complaint   Patient presents with    Anxiety     \"worried alot\"    Finger Swelling     left pinky finger     she is a 29y.o. year old female who presents for evalution. Reviewed PmHx, RxHx, FmHx, SocHx, AllgHx and updated and dated in the chart.     Review of Systems - negative except as listed above in the HPI    Objective:     Vitals:    01/23/18 1448   BP: 121/76   Pulse: 96   Resp: 21   Temp: 98.7 °F (37.1 °C)   TempSrc: Oral   SpO2: 95%   Weight: 276 lb 3.2 oz (125.3 kg)   Height: 5' 4\" (1.626 m)     Physical Examination: General appearance - alert, well appearing, and in no distress  Mental status - depressed mood, anxious  Eyes - pupils equal and reactive, extraocular eye movements intact  Ears - bilateral TM's and external ear canals normal  Nose - normal and patent, no erythema, discharge or polyps and normal nontender sinuses  Mouth - mucous membranes moist, pharynx normal without lesions  Neck - supple, no significant adenopathy  Chest - clear to auscultation, no wheezes, rales or rhonchi, symmetric air entry  Heart - normal rate, regular rhythm, normal S1, S2, no murmurs  Extremities - reports pain with palpation of the left fifth digit at the PIP joint, finger ROM intact, visible swelling with no ecchymosis     Assessment/ Plan:   Diagnoses and all orders for this visit:    1. PRASAD (generalized anxiety disorder) / 2. Depressive disorder  -     venlafaxine-SR (EFFEXOR-XR) 37.5 mg capsule; Take 1 Cap by mouth daily. Starting dose days 1-7.  -     venlafaxine-SR (EFFEXOR-XR) 75 mg capsule; Take 1 Cap by mouth daily. Taper dose days 8-14.  -     PARoxetine (PAXIL) 20 mg tablet; Take 1 Tab by mouth daily. Taper dose days 1-7.  -     PARoxetine (PAXIL) 10 mg tablet; Take 1 Tab by mouth daily. Taper dose days 8-14. Stop paxil and start effexor daily. Reviewed SEs/ADRs of medication. See taper instructions in patient instructions. Enc pt to follow up if sx persist or worsen or if medication SEs intolerable. 3. Panic attacks  -     clonazePAM (KLONOPIN) 1 mg tablet; Take 1 Tab by mouth daily as needed. Refilled rx. Enc to continue using sparingly as needed for acute anxiety sx. Reinforced SEs/ADRs of medication and how to take responsibly. 4. Finger injury, left, initial encounter  -     XR 5TH FINGER LT MIN 2 V; Future  Will notify results and deviate plan based on findings. Buddy taped finger. Enc rest, ice, and PRN anti inflammatories. Follow-up Disposition:  Return if symptoms worsen or fail to improve. I have discussed the diagnosis with the patient and the intended plan as seen in the above orders. The patient has received an after-visit summary and questions were answered concerning future plans.      Medication Side Effects and Warnings were discussed with patient: yes  Patient Labs were reviewed and or requested: yes  Patient Past Records were reviewed and or requested  yes  Patient / Caregiver Understanding of treatment plan was verbalized during office visit YES    RUSTY Ro    Patient Instructions   Apply a compressive ACE bandage. Rest and elevate the affected painful area. Apply cold compresses intermittently as needed. As pain recedes, begin normal activities slowly as tolerated. Call if symptoms persist.    On days 1-7: take 20 mg Paxil with 37.5 mg Effexor. On days 8-14: take 10 mg Paxil with 75 mg Effexor. On day 15 and there after take 150 mg Effexor and STOP your Paxil. Venlafaxine (By mouth)   Venlafaxine ([id number])  Treats depression, generalized anxiety disorder, panic disorder, and social anxiety disorder. Brand Name(s): Effexor XR   There may be other brand names for this medicine. When This Medicine Should Not Be Used: This medicine is not right for everyone. Do not use it if you had an allergic reaction to venlafaxine or desvenlafaxine succinate. How to Use This Medicine:   Long Acting Capsule, Tablet, Long Acting Tablet  · Take your medicine as directed. Your dose may need to be changed several times to find what works best for you. · It is best to take the extended-release capsule at the same time each day (either in the morning or evening). · It is best to take this medicine with food or milk. · Swallow the extended-release capsule whole. Do not crush, break, or chew it. Do not place the capsule in a liquid. · If you cannot swallow the extended-release capsule, you may open it and pour the medicine into a small amount of soft food such as pudding, yogurt, or applesauce. Stir this mixture well and swallow it without chewing. · This medicine should come with a Medication Guide. Ask your pharmacist for a copy if you do not have one. · Missed dose: Take a dose as soon as you remember. If it is almost time for your next dose, wait until then and take a regular dose. Do not take extra medicine to make up for a missed dose. · Store the medicine in a closed container at room temperature, away from heat, moisture, and direct light.   Drugs and Foods to Avoid:   Ask your doctor or pharmacist before using any other medicine, including over-the-counter medicines, vitamins, and herbal products. · Do not use this medicine if you have used an MAO inhibitor within the past 14 days. Do not take an MAO inhibitor for at least 7 days after you stop this medicine. · Some medicines can affect how venlafaxine works. Tell your doctor if you are using any of the following:   ¨ Buspirone, cimetidine, fentanyl, ketoconazole, lithium, metoprolol, mirtazapine, Jameson's wort, tramadol, or tryptophan supplements  ¨ Amphetamines  ¨ Blood thinner (including warfarin)  ¨ Diuretic (water pill)  ¨ Medicine for migraine headaches  ¨ Medicine to lose weight (including phentermine)  ¨ NSAID pain or arthritis medicine (including aspirin, celecoxib, diclofenac, ibuprofen, naproxen)  ¨ Tricyclic antidepressant  · Do not drink alcohol while you are using this medicine. · Tell your doctor if you use anything else that makes you sleepy. Some examples are allergy medicine, narcotic pain medicine, and alcohol. Warnings While Using This Medicine:   · Tell your doctor if you are pregnant or breastfeeding, or if you have kidney disease, liver disease, glaucoma, heart disease, high blood pressure, or thyroid problems. Tell your doctor if you have a history of marlin, seizures, heart attack, or stroke. · This medicine can increase thoughts of suicide. Tell your doctor right away if you start to feel depressed and have thoughts about hurting yourself. · This medicine may cause the following problems:   ¨ Serotonin syndrome (when used with certain medicines)  ¨ Increased cholesterol levels  ¨ Increased blood pressure  ¨ Increased risk of bleeding problems  ¨ Low sodium levels  ¨ Interstitial lung disease and eosinophilic pneumonia  · This medicine may make you dizzy or drowsy. Do not drive or do anything that could be dangerous until you know how this medicine affects you. · Do not stop using this medicine suddenly.  Your doctor will need to slowly decrease your dose before you stop it completely. · Tell any doctor or dentist who treats you that you are using this medicine. This medicine may affect certain medical test results. · Your doctor will do lab tests at regular visits to check on the effects of this medicine. Keep all appointments. · Keep all medicine out of the reach of children. Never share your medicine with anyone. Possible Side Effects While Using This Medicine:   Call your doctor right away if you notice any of these side effects:  · Allergic reaction: Itching or hives, swelling in your face or hands, swelling or tingling in your mouth or throat, chest tightness, trouble breathing  · Anxiety, restlessness, fever, sweating, muscle spasms, nausea, vomiting, diarrhea, seeing or hearing things that are not there  · Blistering, peeling, red skin rash  · Chest pain, cough, trouble breathing  · Confusion, weakness, and muscle twitching  · Eye pain, vision changes, seeing halos around lights  · Fast or pounding heartbeat  · Feeling more excited or energetic than usual  · Headache, trouble concentrating, memory problems, unsteadiness  · Seizures  · Unusual behavior, thoughts of hurting yourself or others, trouble sleeping, nervousness, unusual dreams  · Unusual bleeding or bruising  If you notice these less serious side effects, talk with your doctor:   · Dry mouth  · Mild nausea, constipation, vomiting, loss of appetite, weight loss  · Sexual problems  · Sleepiness or unusual drowsiness, dizziness  If you notice other side effects that you think are caused by this medicine, tell your doctor. Call your doctor for medical advice about side effects. You may report side effects to FDA at 1-419-FDA-0072  © 2017 [employer] [address] [employer] Information is for End User's use only and may not be sold, redistributed or otherwise used for commercial purposes. The above information is an  only.  It is not intended as medical advice for individual conditions or treatments. Talk to your doctor, nurse or pharmacist before following any medical regimen to see if it is safe and effective for you.

## 2018-01-23 NOTE — PROGRESS NOTES
Chief Complaint   Patient presents with    Anxiety     \"worried alot\"    Finger Swelling     left pinky finger     Pt seen in the office today for c/o of increased anxiety due to added stressors,\"my aunt\" She has c/o of \"worrying a lot\". Pt endorses taking all medication as written. Also with c/o of left hand pinky swelling with pain. She reports catching her finger in the basket at [facility]4 days ago. Has not tried OTC for pain.

## 2018-01-24 ENCOUNTER — TELEPHONE (OUTPATIENT)
Dept: FAMILY MEDICINE CLINIC | Age: 33
End: 2018-01-24

## 2018-01-24 DIAGNOSIS — F41.1 GAD (GENERALIZED ANXIETY DISORDER): ICD-10-CM

## 2018-01-24 RX ORDER — VENLAFAXINE HYDROCHLORIDE 150 MG/1
150 CAPSULE, EXTENDED RELEASE ORAL DAILY
Qty: 30 CAP | Refills: 2 | Status: SHIPPED | OUTPATIENT
Start: 2018-01-24 | End: 2018-05-01 | Stop reason: SDUPTHER

## 2018-01-24 RX ORDER — VENLAFAXINE HYDROCHLORIDE 150 MG/1
150 CAPSULE, EXTENDED RELEASE ORAL DAILY
Qty: 30 CAP | Refills: 2 | Status: SHIPPED | OUTPATIENT
Start: 2018-01-24 | End: 2018-01-24 | Stop reason: SDUPTHER

## 2018-01-24 NOTE — TELEPHONE ENCOUNTER
From: [facility]  To: Keely Coello MD  Sent: 1/24/2018 12:24 PM EST  Subject: Medication Renewal Request    Original authorizing provider: MD Taurus Velasco.  Sawmu Wheatleyroibnmargarette would like a refill of the following medications:  venlafaxine-SR (EFFEXOR-XR) 150 mg capsule Rk Acevedo MD]    Preferred pharmacy: [address] [place] [address]:  [facility] said that they don't have the Effexor 150 prescription that you sent here is their number [phone]

## 2018-01-24 NOTE — TELEPHONE ENCOUNTER
Pt called stating that [address] did not receive Rx Effexor-XR 150mg cap that Caprice Mora sent yesterday, all the other scripts were received. Pt requesting that another provider resend today because Caprice Mora is out of the office today and she is out of this dosage of medication.

## 2018-01-25 ENCOUNTER — HOSPITAL ENCOUNTER (OUTPATIENT)
Dept: GENERAL RADIOLOGY | Age: 33
Discharge: HOME | End: 2018-01-25
Payer: MEDICARE

## 2018-01-25 DIAGNOSIS — M06.9 RHEUMATOID ARTHRITIS INVOLVING MULTIPLE JOINTS (HCC): Primary | ICD-10-CM

## 2018-01-25 DIAGNOSIS — S69.92XA FINGER INJURY, LEFT, INITIAL ENCOUNTER: ICD-10-CM

## 2018-01-25 PROCEDURE — 73140 X-RAY EXAM OF FINGER(S): CPT

## 2018-01-25 NOTE — PROGRESS NOTES
The following message was sent to pt via everyArt portal in reference to lab results: The following message was sent to pt via everyArt portal in reference to lab results:    Good morning Mya,     Attached are the results of your finger xray. Great news, nothing is broken! There is some soft tissue swelling present which can occur with sprains. I recommend you continue to rest the finger, apply ice, and take over the counter ibuprofen for the pain and inflammation. Things should go back to normal over the next 2 weeks. Please let me know if you have any questions or concerns regarding these results.    RUSTY Karimi

## 2018-02-02 ENCOUNTER — OFFICE VISIT (OUTPATIENT)
Dept: FAMILY MEDICINE CLINIC | Age: 33
End: 2018-02-02

## 2018-02-02 VITALS
OXYGEN SATURATION: 98 % | HEART RATE: 83 BPM | HEIGHT: 64 IN | WEIGHT: 277 LBS | RESPIRATION RATE: 18 BRPM | TEMPERATURE: 99.1 F | DIASTOLIC BLOOD PRESSURE: 98 MMHG | BODY MASS INDEX: 47.29 KG/M2 | SYSTOLIC BLOOD PRESSURE: 132 MMHG

## 2018-02-02 DIAGNOSIS — R35.0 URINARY FREQUENCY: ICD-10-CM

## 2018-02-02 DIAGNOSIS — M54.50 ACUTE RIGHT-SIDED LOW BACK PAIN WITHOUT SCIATICA: Primary | ICD-10-CM

## 2018-02-02 LAB
BILIRUB UR QL STRIP: NEGATIVE
GLUCOSE UR-MCNC: NEGATIVE MG/DL
KETONES P FAST UR STRIP-MCNC: NEGATIVE MG/DL
PH UR STRIP: 6 [PH] (ref 4.6–8)
PROT UR QL STRIP: NEGATIVE
SP GR UR STRIP: 1 (ref 1–1.03)
UA UROBILINOGEN AMB POC: NORMAL (ref 0.2–1)
URINALYSIS CLARITY POC: CLEAR
URINALYSIS COLOR POC: YELLOW
URINE BLOOD POC: NEGATIVE
URINE LEUKOCYTES POC: NEGATIVE
URINE NITRITES POC: NEGATIVE

## 2018-02-02 RX ORDER — DICLOFENAC SODIUM 10 MG/G
4 GEL TOPICAL 4 TIMES DAILY
Qty: 100 G | Refills: 1 | Status: SHIPPED | OUTPATIENT
Start: 2018-02-02 | End: 2018-04-24

## 2018-02-02 NOTE — PROGRESS NOTES
Pt c/o right sided back pain since Tuesday   + urinary frequency and pain  Results for orders placed or performed in visit on 02/02/18   AMB POC URINALYSIS DIP STICK AUTO W/O MICRO   Result Value Ref Range    Color (UA POC) Yellow     Clarity (UA POC) Clear     Glucose (UA POC) Negative Negative    Bilirubin (UA POC) Negative Negative    Ketones (UA POC) Negative Negative    Specific gravity (UA POC) 1.005 1.001 - 1.035    Blood (UA POC) Negative Negative    pH (UA POC) 6.0 4.6 - 8.0    Protein (UA POC) Negative Negative    Urobilinogen (UA POC) 0.2 mg/dL 0.2 - 1    Nitrites (UA POC) Negative Negative    Leukocyte esterase (UA POC) Negative Negative           Chief Complaint   Patient presents with    Back Pain     She is a 29 y.o. female who presents for evalution. Reviewed PmHx, RxHx, FmHx, SocHx, AllgHx and updated and dated in the chart. Patient Active Problem List    Diagnosis    Recurrent depression ([address]    Obesity, morbid ([address]    Inflammatory polyarthritis ([address]    Periodic headache syndrome, not intractable    Polyuria    Long-term use of Plaquenil    Vitamin D deficiency    Asthma    Anemia, iron deficiency    Hypothyroid    Environmental allergies    [place] (hard of hearing)    Strabismus       Review of Systems - negative except as listed above in the HPI    Objective:     Vitals:    02/02/18 0932   BP: (!) 132/98   Pulse: 83   Resp: 18   Temp: 99.1 °F (37.3 °C)   TempSrc: Oral   SpO2: 98%   Weight: 277 lb (125.6 kg)   Height: 5' 4\" (1.626 m)     Physical Examination: General appearance - alert, well appearing, and in no distress  Back exam - limited range of motion, tenderness noted R lumbar area to palp    Assessment/ Plan:   Diagnoses and all orders for this visit:    1. Acute right-sided low back pain without sciatica  -     diclofenac (VOLTAREN) 1 % gel; Apply 4 g to affected area four (4) times daily.     2. Urinary frequency  -     AMB POC URINALYSIS DIP STICK AUTO W/O MICRO-neg       Follow-up Disposition:  Return if symptoms worsen or fail to improve. I have discussed the diagnosis with the patient and the intended plan as seen in the above orders. The patient understands and agrees with the plan. The patient has received an after-visit summary and questions were answered concerning future plans. Medication Side Effects and Warnings were discussed with patient  Patient Labs were reviewed and or requested:  Patient Past Records were reviewed and or requested    Dipti Hernandez M.D. There are no Patient Instructions on file for this visit.

## 2018-02-02 NOTE — MR AVS SNAPSHOT
[id number] [place] [address] Road [id number] [phone] Patient: Axel Mendez MRN:  [id number] Visit Information Date & Time Provider Department Dept. Phone Encounter #  
 2018 10:30 AM Alejandra Bartholomew MD [address] [facility] [phone] [id number] Follow-up Instructions Return if symptoms worsen or fail to improve. Your Appointments 2018 10:30 AM  
ACUTE CARE with Alejandra Bartholomew MD  
[address] [facility] [address] [address]) Appt Note: back pain [address] [address] [address] [id number] [phone]  
  
   
 [address] [address] [address] [address]  
  
    
 2018  2:40 PM  
ACUTE CARE with Joana Son MD  
[address] ([address]) Appt Note: ra  
 [address] [address]  
[phone]  
  
   
 [facility] [id number] Upcoming Health Maintenance Date Due  
 MEDICARE YEARLY EXAM 10/14/2018 PAP AKA CERVICAL CYTOLOGY 2019 DTaP/Tdap/Td series (4 - Td) 2026 Allergies as of 2018  Review Complete On: 2018 By: Alejandra Bartholomew MD  
  
 Severity Noted Reaction Type Reactions Latex  2010    Hives Augmentin [Amoxicillin-pot Clavulanate]  2011    Nausea and Vomiting Codeine  2011    Nausea and Vomiting Morphine Sulfate  2011    Nausea and Vomiting Nuts [Tree Nut]  10/27/2016    Swelling Pcn [Penicillins]  2010    Hives Current Immunizations  Reviewed on 2016 Name Date Human Papillomavirus 2008, 2007, 10/26/2007 Influenza Vaccine 2017, 10/14/2014, 10/29/2013 Influenza Vaccine Deborha Inoue) 10/2/2015 Influenza Vaccine (Quad) PF 10/27/2016 Influenza Vaccine Split 10/14/2011, 11/3/2010 Influenza Vaccine Whole 10/26/2007 TD Vaccine 2002 TDAP Vaccine 10/14/2011 Tdap 2016 ZZZ-RETIRED (DO NOT USE) Pneumococcal Vaccine (Unspecified Type) 11/4/2012  5:36 PM  
  
 Not reviewed this visit You Were Diagnosed With   
  
 Codes Comments Acute right-sided low back pain without sciatica    -  Primary ICD-10-CM: M54.5 ICD-9-CM: 724.2 Urinary frequency     ICD-10-CM: R35.0 ICD-9-CM: 788.41 Vitals BP Pulse Temp Resp Height(growth percentile) Weight(growth percentile) (!) 132/98 83 99.1 °F (37.3 °C) (Oral) 18 5' 4\" (1.626 m) 277 lb (125.6 kg) SpO2 BMI OB Status Smoking Status 98% 47.55 kg/m2 Having regular periods Never Smoker Vitals History BMI and BSA Data Body Mass Index Body Surface Area  
 47.55 kg/m 2 2.38 m 2 Preferred Pharmacy Pharmacy Name Phone [address] [phone] Your Updated Medication List  
  
   
This list is accurate as of: 2/2/18  9:45 AM.  Always use your most recent med list.  
  
  
  
  
 cetirizine 10 mg tablet Commonly known as:  ZYRTEC  
TAKE ONE TABLET BY MOUTH ONCE DAILY  
  
 clindamycin 1 % external solution Commonly known as:  CLEOCIN T Spray thin layer on bilateral breasts daily as needed to prevent boils. Use thin film on affected area  
  
 clonazePAM 1 mg tablet Commonly known as:  Thresea Gavel Take 1 Tab by mouth daily as needed. diclofenac 1 % Gel Commonly known as:  VOLTAREN Apply 4 g to affected area four (4) times daily. EPINEPHrine 0.3 mg/0.3 mL injection Commonly known as:  EPIPEN 2-AWA  
0.3 mL by IntraMUSCular route once as needed for up to 1 dose. ferrous sulfate 325 mg (65 mg iron) tablet TAKE ONE TABLET BY MOUTH TWICE DAILY  
  
 fluticasone 50 mcg/actuation nasal spray Commonly known as:  Susanne New 2 Sprays by Both Nostrils route daily. hydrocortisone 1 % topical cream  
Commonly known as:  CORTAID  
APPLY THIN LAYER OF CREAM TO AFFECTED AREA ON HANDS TWO TIMES DAILY. hydroxychloroquine 200 mg tablet Commonly known as:  PLAQUENIL  
TAKE ONE TABLET BY MOUTH TWICE DAILY * levothyroxine 200 mcg tablet Commonly known as:  SYNTHROID  
TAKE ONE TABLET BY MOUTH ONCE DAILY BEFORE BREAKFAST * levothyroxine 50 mcg tablet Commonly known as:  SYNTHROID Take 1 Tab by mouth Daily (before breakfast). Take with 200 mcg dose. loratadine-pseudoephedrine 5-120 mg per tablet Commonly known as:  CLARITIN-D 12 HOUR Take 1 Tab by mouth two (2) times a day. montelukast 10 mg tablet Commonly known as:  SINGULAIR  
TAKE ONE TABLET BY MOUTH ONCE DAILY  
  
 mupirocin 2 % ointment Commonly known as:  [facility] Apply thin layer to affected areas on breast twice daily for up to 10 days. omeprazole 20 mg capsule Commonly known as:  PRILOSEC  
TAKE ONE CAPSULE BY MOUTH TWICE DAILY * PARoxetine 30 mg tablet Commonly known as:  PAXIL TAKE ONE TABLET BY MOUTH ONCE DAILY * PARoxetine 20 mg tablet Commonly known as:  PAXIL Take 1 Tab by mouth daily. Taper dose days 1-7. * PARoxetine 10 mg tablet Commonly known as:  PAXIL Take 1 Tab by mouth daily. Taper dose days 8-14. polyethylene glycol 17 gram/dose powder Commonly known as:  Reche Agreste PROAIR HFA 90 mcg/actuation inhaler Generic drug:  albuterol INHALE TWO PUFFS BY MOUTH EVERY 6 HOURS AS NEEDED FOR WHEEZING  
  
 SUMAtriptan 100 mg tablet Commonly known as:  IMITREX  
TAKE ONE TABLET BY MOUTH ONCE AS NEEDED FOR MIGRAINE FOR ONE DOSE  
  
 * venlafaxine-SR 37.5 mg capsule Commonly known as:  EFFEXOR-XR Take 1 Cap by mouth daily. Starting dose days 1-7. * venlafaxine-SR 75 mg capsule Commonly known as:  EFFEXOR-XR Take 1 Cap by mouth daily. Taper dose days 8-14. * venlafaxine- mg capsule Commonly known as:  EFFEXOR-XR Take 1 Cap by mouth daily. Maintenance dose. VITAMIN D2 50,000 unit capsule Generic drug:  ergocalciferol TAKE ONE CAPSULE BY MOUTH EVERY 7 DAYS * Notice: This list has 8 medication(s) that are the same as other medications prescribed for you. Read the directions carefully, and ask your doctor or other care provider to review them with you. Prescriptions Sent to Pharmacy Refills  
 diclofenac (VOLTAREN) 1 % gel 1 Sig: Apply 4 g to affected area four (4) times daily. Class: Normal  
 [facility] [address] Ph #: [phone] Route: Topical  
  
We Performed the Following AMB POC URINALYSIS DIP STICK AUTO W/O MICRO [38884 CPT(R)] Follow-up Instructions Return if symptoms worsen or fail to improve. Introducing [facility] SERVICES! Dear Kelly Dockery: Thank you for requesting a [employer] account. Our records indicate that you already have an active Intell[employer] account. You can access your account anytime at [email or web address] Did you know that you can access your hospital and ER discharge instructions at any time in [employer]? You can also review all of your test results from your hospital stay or ER visit. Additional Information If you have questions, please visit the Frequently Asked Questions section of the [employer] website at [email or web address] Remember, [employer]ell[employer]ote is NOT to be used for urgent needs. For medical emergencies, dial 911. Now available from your iPhone and Android! Please provide this summary of care documentation to your next provider. Your primary care clinician is listed as Paola Baires. If you have any questions after today's visit, please call [phone].

## 2018-02-06 ENCOUNTER — TELEPHONE (OUTPATIENT)
Dept: FAMILY MEDICINE CLINIC | Age: 33
End: 2018-02-06

## 2018-02-06 NOTE — TELEPHONE ENCOUNTER
PT called c/o nausea, vomiting, HA and congestion. Advised pt that we have no appt's until tomorrow. Made pt an appt for tomorrow and advised that if her sx's worsen she should seek urgent care. In the meantime pt is to use supportive measures. Pt verbalized understanding.

## 2018-02-07 ENCOUNTER — OFFICE VISIT (OUTPATIENT)
Dept: FAMILY MEDICINE CLINIC | Age: 33
End: 2018-02-07

## 2018-02-07 VITALS
WEIGHT: 276 LBS | SYSTOLIC BLOOD PRESSURE: 129 MMHG | DIASTOLIC BLOOD PRESSURE: 80 MMHG | RESPIRATION RATE: 16 BRPM | HEART RATE: 87 BPM | HEIGHT: 64 IN | BODY MASS INDEX: 47.12 KG/M2 | TEMPERATURE: 98.6 F | OXYGEN SATURATION: 99 %

## 2018-02-07 DIAGNOSIS — J01.00 ACUTE NON-RECURRENT MAXILLARY SINUSITIS: Primary | ICD-10-CM

## 2018-02-07 DIAGNOSIS — G43.C0 PERIODIC HEADACHE SYNDROME, NOT INTRACTABLE: ICD-10-CM

## 2018-02-07 RX ORDER — CEPHALEXIN 500 MG/1
500 CAPSULE ORAL 2 TIMES DAILY
Qty: 20 CAP | Refills: 0 | Status: SHIPPED | OUTPATIENT
Start: 2018-02-07 | End: 2018-02-17

## 2018-02-07 RX ORDER — KETOROLAC TROMETHAMINE 30 MG/ML
60 INJECTION, SOLUTION INTRAMUSCULAR; INTRAVENOUS ONCE
Qty: 1 VIAL | Refills: 0
Start: 2018-02-07 | End: 2018-02-07

## 2018-02-07 RX ORDER — FLUCONAZOLE 150 MG/1
150 TABLET ORAL DAILY
Qty: 2 TAB | Refills: 0 | Status: SHIPPED | OUTPATIENT
Start: 2018-02-07 | End: 2018-02-08

## 2018-02-07 NOTE — LETTER
2/7/2018 7:36 AM 
 
Ms. Olga Hendrickson [place] [id number] Please excuse Ms. Kristin Barron from work today and tomorrow due to illness. Sincerely, Artemio Dukes NP

## 2018-02-07 NOTE — MR AVS SNAPSHOT
[address] [address] 
[phone] Patient: Amanda Pickett MRN:  [id number] Visit Information Date & Time Provider Department Dept. Phone Encounter #  
 2018  7:00 AM Lindsay Alcocer NP [facility] [phone] [id number] Follow-up Instructions Return if symptoms worsen or fail to improve. Your Appointments 2018  2:40 PM  
ACUTE CARE with Sherita Sosa MD  
[address]di ([address]) Appt Note: ra  
 [address] [facility] [id number] [phone]  
  
   
 [facility] [address] Upcoming Health Maintenance Date Due  
 MEDICARE YEARLY EXAM 10/14/2018 PAP AKA CERVICAL CYTOLOGY 2019 DTaP/Tdap/Td series (4 - Td) 2026 Allergies as of 2018  Review Complete On: 2018 By: Lindsay Alcocer NP Severity Noted Reaction Type Reactions Latex  2010    Hives Augmentin [Amoxicillin-pot Clavulanate]  2011    Nausea and Vomiting Codeine  2011    Nausea and Vomiting Morphine Sulfate  2011    Nausea and Vomiting Nuts [Tree Nut]  10/27/2016    Swelling Pcn [Penicillins]  2010    Hives Current Immunizations  Reviewed on 2016 Name Date Human Papillomavirus 2008, 2007, 10/26/2007 Influenza Vaccine 2017, 10/14/2014, 10/29/2013 Influenza Vaccine Peggi Italo) 10/2/2015 Influenza Vaccine (Quad) PF 10/27/2016 Influenza Vaccine Split 10/14/2011, 11/3/2010 Influenza Vaccine Whole 10/26/2007 TD Vaccine 2002 TDAP Vaccine 10/14/2011 Tdap 2016 ZZZ-RETIRED (DO NOT USE) Pneumococcal Vaccine (Unspecified Type) 2012  5:36 PM  
  
 Not reviewed this visit You Were Diagnosed With   
  
 Codes Comments  Acute non-recurrent maxillary sinusitis    -  Primary ICD-10-CM: J01.00 
 ICD-9-CM: 461.0 Periodic headache syndrome, not intractable     ICD-10-CM: G43. C0 ICD-9-CM: 346.20 Vitals BP Pulse Temp Resp Height(growth percentile) Weight(growth percentile) 129/80 87 98.6 °F (37 °C) (Oral) 16 5' 4\" (1.626 m) 276 lb (125.2 kg) LMP SpO2 BMI OB Status Smoking Status 01/17/2018 (Exact Date) 99% 47.38 kg/m2 Having regular periods Never Smoker Vitals History BMI and BSA Data Body Mass Index Body Surface Area  
 47.38 kg/m 2 2.38 m 2 Preferred Pharmacy Pharmacy Name Phone [address] [place] [phone] Your Updated Medication List  
  
   
This list is accurate as of: 2/7/18  7:37 AM.  Always use your most recent med list.  
  
  
  
  
 cephALEXin 500 mg capsule Commonly known as:  Branda Soares Take 1 Cap by mouth two (2) times a day for 10 days. cetirizine 10 mg tablet Commonly known as:  ZYRTEC  
TAKE ONE TABLET BY MOUTH ONCE DAILY  
  
 clonazePAM 1 mg tablet Commonly known as:  Thresea Gavel Take 1 Tab by mouth daily as needed. diclofenac 1 % Gel Commonly known as:  VOLTAREN Apply 4 g to affected area four (4) times daily. EPINEPHrine 0.3 mg/0.3 mL injection Commonly known as:  EPIPEN 2-AWA  
0.3 mL by IntraMUSCular route once as needed for up to 1 dose. ferrous sulfate 325 mg (65 mg iron) tablet TAKE ONE TABLET BY MOUTH TWICE DAILY  
  
 fluconazole 150 mg tablet Commonly known as:  DIFLUCAN Take 1 Tab by mouth daily for 1 day. May repeat in 3 days if needed  
  
 fluticasone 50 mcg/actuation nasal spray Commonly known as:  Susanne New 2 Sprays by Both Nostrils route daily. ketorolac tromethamine 60 mg/2 mL Soln Commonly known as:  TORADOL  
2 mL by IntraMUSCular route once for 1 dose. * levothyroxine 200 mcg tablet Commonly known as:  SYNTHROID  
TAKE ONE TABLET BY MOUTH ONCE DAILY BEFORE BREAKFAST * levothyroxine 50 mcg tablet Commonly known as:  SYNTHROID Take 1 Tab by mouth Daily (before breakfast). Take with 200 mcg dose. loratadine-pseudoephedrine 5-120 mg per tablet Commonly known as:  CLARITIN-D 12 HOUR Take 1 Tab by mouth two (2) times a day. montelukast 10 mg tablet Commonly known as:  SINGULAIR  
TAKE ONE TABLET BY MOUTH ONCE DAILY  
  
 omeprazole 20 mg capsule Commonly known as:  PRILOSEC  
TAKE ONE CAPSULE BY MOUTH TWICE DAILY polyethylene glycol 17 gram/dose powder Commonly known as:  Shanthi Donny PROAIR HFA 90 mcg/actuation inhaler Generic drug:  albuterol INHALE TWO PUFFS BY MOUTH EVERY 6 HOURS AS NEEDED FOR WHEEZING  
  
 SUMAtriptan 100 mg tablet Commonly known as:  IMITREX  
TAKE ONE TABLET BY MOUTH ONCE AS NEEDED FOR MIGRAINE FOR ONE DOSE  
  
 venlafaxine- mg capsule Commonly known as:  EFFEXOR-XR Take 1 Cap by mouth daily. Maintenance dose. VITAMIN D2 50,000 unit capsule Generic drug:  ergocalciferol TAKE ONE CAPSULE BY MOUTH EVERY 7 DAYS * Notice: This list has 2 medication(s) that are the same as other medications prescribed for you. Read the directions carefully, and ask your doctor or other care provider to review them with you. Prescriptions Sent to Pharmacy Refills  
 cephALEXin (KEFLEX) 500 mg capsule 0 Sig: Take 1 Cap by mouth two (2) times a day for 10 days. Class: Normal  
 Pharmacy: [facility] [address], [address] [place] [address] Ph #: [phone] Route: Oral  
 fluconazole (DIFLUCAN) 150 mg tablet 0 Sig: Take 1 Tab by mouth daily for 1 day. May repeat in 3 days if needed Class: Normal  
 [facility] [address] Ph #: [phone] Route: Oral  
  
We Performed the Following KETOROLAC TROMETHAMINE INJ [ [facility]] [place] THER/PROPH/DIAG INJECTION, SUBCUT/IM [id number] CPT(R)] Follow-up Instructions Return if symptoms worsen or fail to improve. Patient Instructions Sinusitis: Care Instructions Your Care Instructions Sinusitis is an infection of the lining of the sinus cavities in your head. Sinusitis often follows a cold. It causes pain and pressure in your head and face. In most cases, sinusitis gets better on its own in 1 to 2 weeks. But some mild symptoms may last for several weeks. Sometimes antibiotics are needed. Follow-up care is a key part of your treatment and safety. Be sure to make and go to all appointments, and call your doctor if you are having problems. It's also a good idea to know your test results and keep a list of the medicines you take. How can you care for yourself at home? · Take an over-the-counter pain medicine, such as acetaminophen (Tylenol), ibuprofen (Advil, Motrin), or naproxen (Aleve). Read and follow all instructions on the label. · If the doctor prescribed antibiotics, take them as directed. Do not stop taking them just because you feel better. You need to take the full course of antibiotics. · Be careful when taking over-the-counter cold or flu medicines and Tylenol at the same time. Many of these medicines have acetaminophen, which is Tylenol. Read the labels to make sure that you are not taking more than the recommended dose. Too much acetaminophen (Tylenol) can be harmful. · Breathe warm, moist air from a steamy shower, a hot bath, or a sink filled with hot water. Avoid cold, dry air. Using a humidifier in your home may help. Follow the directions for cleaning the machine. · Use saline (saltwater) nasal washes to help keep your nasal passages open and wash out mucus and bacteria. You can buy saline nose drops at a grocery store or drugstore. Or you can make your own at home by adding 1 teaspoon of salt and 1 teaspoon of baking soda to 2 cups of distilled water.  If you make your own, fill a bulb syringe with the solution, insert the tip into your nostril, and squeeze gently. Zara Frostams your nose. · Put a hot, wet towel or a warm gel pack on your face 3 or 4 times a day for 5 to 10 minutes each time. · Try a decongestant nasal spray like oxymetazoline (Afrin). Do not use it for more than 3 days in a row. Using it for more than 3 days can make your congestion worse. When should you call for help? Call your doctor now or seek immediate medical care if: 
? · You have new or worse swelling or redness in your face or around your eyes. ? · You have a new or higher fever. ? Watch closely for changes in your health, and be sure to contact your doctor if: 
? · You have new or worse facial pain. ? · The mucus from your nose becomes thicker (like pus) or has new blood in it. ? · You are not getting better as expected. Where can you learn more? Go to http://sallie-bladimir.info/. Enter S745 in the search box to learn more about \"Sinusitis: Care Instructions. \" Current as of: May 12, 2017 Content Version: 11.4 © 0684-3696 [employer]. Care instructions adapted under license by [employer] (which disclaims liability or warranty for this information). If you have questions about a medical condition or this instruction, always ask your healthcare professional. [address] any warranty or liability for your use of this information. Introducing [facility]! Dear Ania Gonzalez: Thank you for requesting a Night Out account. Our records indicate that you already have an active Night Out account. You can access your account anytime at [email or web address] Did you know that you can access your hospital and ER discharge instructions at any time in Night Out? You can also review all of your test results from your hospital stay or ER visit. Additional Information If you have questions, please visit the Frequently Asked Questions section of the [facility] website at [email or web address] Remember, [facility] is NOT to be used for urgent needs. For medical emergencies, dial 911. Now available from your iPhone and Android! Please provide this summary of care documentation to your next provider. Your primary care clinician is listed as Amanda Farrell. If you have any questions after today's visit, please call [phone].

## 2018-02-07 NOTE — PROGRESS NOTES
Chief Complaint   Patient presents with    Vomiting     x1 day    Headache     x1 day     she is a 29y.o. year old female who presents for evalution. Pt states started with headache on Monday, yesterday vomited once. Having some congestion and ear pain. Has been taking Tylenol. No fever or chills. No body aches. Reviewed PmHx, RxHx, FmHx, SocHx, AllgHx and updated and dated in the chart. Review of Systems - negative except as listed above in the HPI    Objective:     Vitals:    02/07/18 0719   BP: 129/80   Pulse: 87   Resp: 16   Temp: 98.6 °F (37 °C)   TempSrc: Oral   SpO2: 99%   Weight: 276 lb (125.2 kg)   Height: 5' 4\" (1.626 m)     Physical Examination: General appearance - alert, well appearing, and in mild distress  Ears - bilateral TM's and external ear canals normal  Nose - mucosal congestion, mucosal erythema and sinus tenderness noted frontal and maxillary   Mouth - mucous membranes moist, pharynx normal without lesions and erythematous  Neck - supple, no significant adenopathy  Chest - clear to auscultation, no wheezes, rales or rhonchi, symmetric air entry  Heart - normal rate, regular rhythm, normal S1, S2, no murmurs, rubs, clicks or gallops    Assessment/ Plan:   Diagnoses and all orders for this visit:    1. Acute non-recurrent maxillary sinusitis  -     cephALEXin (KEFLEX) 500 mg capsule; Take 1 Cap by mouth two (2) times a day for 10 days. -     fluconazole (DIFLUCAN) 150 mg tablet; Take 1 Tab by mouth daily for 1 day. May repeat in 3 days if needed  New rx. Discussed and encouraged rest and clear fluids, if congestion is thick should avoid dairy. Recommended hot showers with steam and elevating head of bed. May use Vitamin C or Echinacea in addition to current therapy. 2. Periodic headache syndrome, not intractable  -     ketorolac tromethamine (TORADOL) 60 mg/2 mL soln; 2 mL by IntraMUSCular route once for 1 dose.   -     KETOROLAC TROMETHAMINE INJ  -     [place] THER/PROPH/DIAG INJECTION, SUBCUT/IM  New rx. Pt voiced understanding regarding plan of care. Follow-up Disposition:  Return if symptoms worsen or fail to improve. I have discussed the diagnosis with the patient and the intended plan as seen in the above orders. The patient has received an after-visit summary and questions were answered concerning future plans.      Medication Side Effects and Warnings were discussed with patient    Fabio Mccain NP

## 2018-02-07 NOTE — PROGRESS NOTES
1. Have you been to the ER, urgent care clinic since your last visit? Hospitalized since your last visit? No    2. Have you seen or consulted any other health care providers outside of the [address] [place] [address] since your last visit? Include any pap smears or colon screening.  No   Chief Complaint   Patient presents with    Vomiting     x1 day    Headache     x1 day

## 2018-02-07 NOTE — PATIENT INSTRUCTIONS
Sinusitis: Care Instructions  Your Care Instructions    Sinusitis is an infection of the lining of the sinus cavities in your head. Sinusitis often follows a cold. It causes pain and pressure in your head and face. In most cases, sinusitis gets better on its own in 1 to 2 weeks. But some mild symptoms may last for several weeks. Sometimes antibiotics are needed. Follow-up care is a key part of your treatment and safety. Be sure to make and go to all appointments, and call your doctor if you are having problems. It's also a good idea to know your test results and keep a list of the medicines you take. How can you care for yourself at home? · Take an over-the-counter pain medicine, such as acetaminophen (Tylenol), ibuprofen (Advil, Motrin), or naproxen (Aleve). Read and follow all instructions on the label. · If the doctor prescribed antibiotics, take them as directed. Do not stop taking them just because you feel better. You need to take the full course of antibiotics. · Be careful when taking over-the-counter cold or flu medicines and Tylenol at the same time. Many of these medicines have acetaminophen, which is Tylenol. Read the labels to make sure that you are not taking more than the recommended dose. Too much acetaminophen (Tylenol) can be harmful. · Breathe warm, moist air from a steamy shower, a hot bath, or a sink filled with hot water. Avoid cold, dry air. Using a humidifier in your home may help. Follow the directions for cleaning the machine. · Use saline (saltwater) nasal washes to help keep your nasal passages open and wash out mucus and bacteria. You can buy saline nose drops at a grocery store or drugstore. Or you can make your own at home by adding 1 teaspoon of salt and 1 teaspoon of baking soda to 2 cups of distilled water. If you make your own, fill a bulb syringe with the solution, insert the tip into your nostril, and squeeze gently. Recardo Jered your nose.   · Put a hot, wet towel or a warm gel pack on your face 3 or 4 times a day for 5 to 10 minutes each time. · Try a decongestant nasal spray like oxymetazoline (Afrin). Do not use it for more than 3 days in a row. Using it for more than 3 days can make your congestion worse. When should you call for help? Call your doctor now or seek immediate medical care if:  ? · You have new or worse swelling or redness in your face or around your eyes. ? · You have a new or higher fever. ? Watch closely for changes in your health, and be sure to contact your doctor if:  ? · You have new or worse facial pain. ? · The mucus from your nose becomes thicker (like pus) or has new blood in it. ? · You are not getting better as expected. Where can you learn more? Go to http://sallie-bladimir.info/. Enter P500 in the search box to learn more about \"Sinusitis: Care Instructions. \"  Current as of: May 12, 2017  Content Version: 11.4  © 4155-7838 [employer]. Care instructions adapted under license by [employer] (which disclaims liability or warranty for this information). If you have questions about a medical condition or this instruction, always ask your healthcare professional. [address] any warranty or liability for your use of this information.

## 2018-02-22 DIAGNOSIS — K21.9 GASTROESOPHAGEAL REFLUX DISEASE WITHOUT ESOPHAGITIS: ICD-10-CM

## 2018-02-22 DIAGNOSIS — G43.C0 PERIODIC HEADACHE SYNDROME, NOT INTRACTABLE: ICD-10-CM

## 2018-02-22 RX ORDER — SUMATRIPTAN 100 MG/1
100 TABLET, FILM COATED ORAL
Qty: 6 TAB | Refills: 2 | Status: SHIPPED | OUTPATIENT
Start: 2018-02-22 | End: 2019-07-16

## 2018-02-22 RX ORDER — OMEPRAZOLE 20 MG/1
CAPSULE, DELAYED RELEASE ORAL
Qty: 60 CAP | Refills: 5 | Status: SHIPPED | OUTPATIENT
Start: 2018-02-22 | End: 2018-03-25 | Stop reason: SDUPTHER

## 2018-02-22 RX ORDER — ALBUTEROL SULFATE 90 UG/1
2 AEROSOL, METERED RESPIRATORY (INHALATION)
Qty: 9 INHALER | Refills: 0 | Status: SHIPPED | OUTPATIENT
Start: 2018-02-22 | End: 2018-08-14 | Stop reason: SDUPTHER

## 2018-02-22 RX ORDER — EPINEPHRINE 0.3 MG/.3ML
0.3 INJECTION SUBCUTANEOUS
Qty: 2 SYRINGE | Refills: 0 | Status: SHIPPED | OUTPATIENT
Start: 2018-02-22 | End: 2018-06-12 | Stop reason: SDUPTHER

## 2018-02-22 NOTE — TELEPHONE ENCOUNTER
From: Jarrett Oreilly  To: Rafael Mcarthur MD  Sent: 2/22/2018 11:37 AM EST  Subject: Medication Renewal Request    Original authorizing provider: MD Will Magana.  Issac Bowenro would like a refill of the following medications:  EPINEPHrine (EPIPEN 2-AWA) 0.3 mg/0.3 mL injection Kye Acevedo MD]    Preferred pharmacy: [address] [place] [address]    Comment:  a new prescibiton needs to be sent in the old one expires on the dates above    Medication renewals requested in this message routed to other providers:  [facility] HFA 90 mcg/actuation inhaler Oralee Sluder Joselo Aguillon NP]  SUMAtriptan (IMITREX) 100 mg tablet Danielle Valencia NP]  omeprazole (PRILOSEC) 20 mg capsule Amee Stephen NP]

## 2018-03-12 ENCOUNTER — OFFICE VISIT (OUTPATIENT)
Dept: FAMILY MEDICINE CLINIC | Age: 33
End: 2018-03-12

## 2018-03-12 VITALS
TEMPERATURE: 98 F | HEIGHT: 64 IN | RESPIRATION RATE: 18 BRPM | BODY MASS INDEX: 47.46 KG/M2 | DIASTOLIC BLOOD PRESSURE: 59 MMHG | SYSTOLIC BLOOD PRESSURE: 111 MMHG | HEART RATE: 92 BPM | WEIGHT: 278 LBS | OXYGEN SATURATION: 98 %

## 2018-03-12 DIAGNOSIS — E55.9 VITAMIN D DEFICIENCY: ICD-10-CM

## 2018-03-12 DIAGNOSIS — E66.01 OBESITY, MORBID (HCC): ICD-10-CM

## 2018-03-12 DIAGNOSIS — R55 SYNCOPE, UNSPECIFIED SYNCOPE TYPE: Primary | ICD-10-CM

## 2018-03-12 DIAGNOSIS — S69.92XA THUMB INJURY, LEFT, INITIAL ENCOUNTER: ICD-10-CM

## 2018-03-12 DIAGNOSIS — E03.9 HYPOTHYROIDISM, UNSPECIFIED TYPE: ICD-10-CM

## 2018-03-12 DIAGNOSIS — R73.01 IMPAIRED FASTING GLUCOSE: ICD-10-CM

## 2018-03-12 NOTE — PATIENT INSTRUCTIONS
Vasovagal Syncope: Care Instructions  Your Care Instructions    Vasovagal syncope (say \"fix-zto-ASTTony ARIZMENDI-kuh-pee\")is sudden dizziness or fainting that can be set off by things such as pain, stress, fear, or trauma. You may sweat or feel lightheaded, sick to your stomach, or tingly. The problem causes the heart rate to slow and the blood vessels to widen, or dilate, for a short time. When this happens, blood pools in the lower body, and less blood goes to the brain. You can usually get relief by lying down with your legs raised (elevated). This helps more blood to flow to your brain and may help relieve symptoms like feeling dizzy. Some doctors may recommend a technique that involves tensing your fists and arms. This type of fainting is often easy to predict. For example, it happens to some people when they see blood or have to get a shot. They may feel symptoms before they faint. An episode of vasovagal syncope usually responds well to self-care. Other treatment often isn't needed. But if the fainting keeps happening, your doctor may suggest further treatments. Follow-up care is a key part of your treatment and safety. Be sure to make and go to all appointments, and call your doctor if you are having problems. It's also a good idea to know your test results and keep a list of the medicines you take. How can you care for yourself at home? · Drink plenty of fluids to prevent dehydration. If you have kidney, heart, or liver disease and have to limit fluids, talk with your doctor before you increase your fluid intake. · Try to avoid things that you think may set off vasovagal syncope. · Talk to your doctor about any medicines you take. Some medicines may increase the chance of this condition occurring. · If you feel symptoms, lie down with your legs raised. Talk to your doctor about what to do if your symptoms come back. When should you call for help?   Call 911 anytime you think you may need emergency care. For example, call if:  ? · You have symptoms of a heart problem. These may include:  ¨ Chest pain or pressure. ¨ Severe trouble breathing. ¨ A fast or irregular heartbeat. ? Watch closely for changes in your health, and be sure to contact your doctor if:  ? · You have more episodes of fainting at home. ? · You do not get better as expected. Where can you learn more? Go to http://sallie-bladimir.info/. Enter L754 in the search box to learn more about \"Vasovagal Syncope: Care Instructions. \"  Current as of: March 20, 2017  Content Version: 11.4  © 9025-3337 [employer]. Care instructions adapted under license by [employer] (which disclaims liability or warranty for this information). If you have questions about a medical condition or this instruction, always ask your healthcare professional. [address] any warranty or liability for your use of this information.

## 2018-03-12 NOTE — PROGRESS NOTES
Chief Complaint   Patient presents with    Loss of Consciousness    Labs     Patient in office today for f[facility]u on [facility] and recheck on tsh levels. [facility] on Wednesday due to syncope episode while stepping out of the shower. Pt states left thumb was injured,xrays were normal. Pt has an appt with ortho on Wednesday. Rt thumb pain still present, have been treating with Tylenol. 1. Have you been to the ER, urgent care clinic since your last visit? Hospitalized since your last visit? No    2. Have you seen or consulted any other health care providers outside of the [address] [place] [address] since your last visit? Include any pap smears or colon screening.  No

## 2018-03-12 NOTE — LETTER
NOTIFICATION RETURN TO WORK / SCHOOL 
 
3/12/2018 10:12 AM 
 
Ms. Kalee Davey [address] [place] [address]  [id number] To Whom It May Concern: 
 
Kalee Davey is currently under the care of [address]. She will return to work/school on:  March 12, 2018 If there are questions or concerns please have the patient contact our office.  
 
 
 
Sincerely, 
 
 
Erven Klinefelter, NP

## 2018-03-12 NOTE — PROGRESS NOTES
Chief Complaint   Patient presents with    Loss of Consciousness    Labs     Patient in office today for f[facility] on [facility] and recheck on tsh levels. [facility] due to syncope episode while stepping out of the shower. Pt states left thumb was injured, xrays were normal. Pt has an appt with ortho on Wednesday. Rt thumb pain still present, have been treating with Tylenol. Incident occurred on Wednesday at 6:30 pm. Was in the shower, went to get out of the shower and woke up on the floor. Denies any slipping or hitting head on anything. Right arm when in litter box and left hand landed on the floor. Thinks she was laying on top of the hand. Doesn't recall how long she was out of it. Thinks it was for a few minutes. Denies any history of this happening previously. Denies any dizziness or lightheadedness. Pt had eaten prior. Denies any associated cp, sob, and dyspnea. Denies bending over to pick something up. Has an appointment with Dr. Vikash Lewis on Wednesday to have thumb checked. Has a splint and arm if wrapped. Denies being broken. Denies any other concerns at this time. Chief Complaint   Patient presents with    Loss of Consciousness    Labs     she is a 29y.o. year old female who presents for evalution. Reviewed PmHx, RxHx, FmHx, SocHx, AllgHx and updated and dated in the chart.     Review of Systems - negative except as listed above in the HPI    Objective:     Vitals:    03/12/18 0918   BP: 111/59   Pulse: 92   Resp: 18   Temp: 98 °F (36.7 °C)   TempSrc: Oral   SpO2: 98%   Weight: 278 lb (126.1 kg)   Height: 5' 4\" (1.626 m)     Physical Examination: General appearance - alert, well appearing, and in no distress  Mental status - normal mood, behavior, speech, dress, motor activity, and thought processes  Eyes - pupils equal and reactive, extraocular eye movements intact  Ears - bilateral TM's and external ear canals normal  Nose - normal and patent, no erythema, discharge or polyps and normal nontender sinuses  Mouth - mucous membranes moist, pharynx normal without lesions  Neck - supple, no significant adenopathy, carotids upstroke normal bilaterally, no bruits, thyroid exam: thyroid is normal in size without nodules or tenderness  Chest - clear to auscultation, no wheezes, rales or rhonchi, symmetric air entry  Heart - normal rate, regular rhythm, normal S1, S2, no murmurs  Extremities - peripheral pulses normal, no ankle edema, no clubbing or cyanosis  Skin - normal coloration and turgor, no rashes, no suspicious skin lesions noted    Assessment/ Plan:   Diagnoses and all orders for this visit:    1. Syncope, unspecified syncope type  -     METABOLIC PANEL, COMPREHENSIVE  -     CBC WITH AUTOMATED DIFF  -     AMB POC EKG ROUTINE W/ 12 LEADS, INTER & REP  Will notify results and deviate plan based on findings. EKG normal. Reviewed supportive and preventive measures, see pt instructions. If syncope occurs again, will need to see neurology and consider head imaging for further evaluation. Reviewed acute / worsening s/sx that warrant more immediate medical attention and pt verbalized understanding of this. 2. Thumb injury, left, initial encounter  Continue with plan to see ortho this week for re-evaluation. 3. Hypothyroidism, unspecified type  -     TSH 3RD GENERATION  Will notify results and deviate plan based on findings. 4. Vitamin D deficiency  -     VITAMIN D, 25 HYDROXY  Updating vitamin D level today. 5. Obesity, morbid ([address]  -     LIPID PANEL  Enc pt to continue with motivation to follow a healthy diet and increase exercise as tolerated. Reports recently having joined the gym. 6. Impaired fasting glucose  -     HEMOGLOBIN A1C WITH EAG  Will notify results and deviate plan based on findings. Follow-up Disposition:  Return if symptoms worsen or fail to improve.     I have discussed the diagnosis with the patient and the intended plan as seen in the above orders. The patient has received an after-visit summary and questions were answered concerning future plans. Medication Side Effects and Warnings were discussed with patient: yes  Patient Labs were reviewed and or requested: yes  Patient Past Records were reviewed and or requested  yes  Patient / Caregiver Understanding of treatment plan was verbalized during office visit YES    RUSTY Cline    Patient Instructions          Vasovagal Syncope: Care Instructions  Your Care Instructions    Vasovagal syncope (say \"bkx-hyt-DNT-gul CCGT-jwb-gol\")is sudden dizziness or fainting that can be set off by things such as pain, stress, fear, or trauma. You may sweat or feel lightheaded, sick to your stomach, or tingly. The problem causes the heart rate to slow and the blood vessels to widen, or dilate, for a short time. When this happens, blood pools in the lower body, and less blood goes to the brain. You can usually get relief by lying down with your legs raised (elevated). This helps more blood to flow to your brain and may help relieve symptoms like feeling dizzy. Some doctors may recommend a technique that involves tensing your fists and arms. This type of fainting is often easy to predict. For example, it happens to some people when they see blood or have to get a shot. They may feel symptoms before they faint. An episode of vasovagal syncope usually responds well to self-care. Other treatment often isn't needed. But if the fainting keeps happening, your doctor may suggest further treatments. Follow-up care is a key part of your treatment and safety. Be sure to make and go to all appointments, and call your doctor if you are having problems. It's also a good idea to know your test results and keep a list of the medicines you take. How can you care for yourself at home? · Drink plenty of fluids to prevent dehydration.  If you have kidney, heart, or liver disease and have to limit fluids, talk with your doctor before you increase your fluid intake. · Try to avoid things that you think may set off vasovagal syncope. · Talk to your doctor about any medicines you take. Some medicines may increase the chance of this condition occurring. · If you feel symptoms, lie down with your legs raised. Talk to your doctor about what to do if your symptoms come back. When should you call for help? Call 911 anytime you think you may need emergency care. For example, call if:  ? · You have symptoms of a heart problem. These may include:  ¨ Chest pain or pressure. ¨ Severe trouble breathing. ¨ A fast or irregular heartbeat. ? Watch closely for changes in your health, and be sure to contact your doctor if:  ? · You have more episodes of fainting at home. ? · You do not get better as expected. Where can you learn more? Go to http://sallie-bladimir.info/. Enter L754 in the search box to learn more about \"Vasovagal Syncope: Care Instructions. \"  Current as of: March 20, 2017  Content Version: 11.4  © 0807-4340 [employer]. Care instructions adapted under license by [employer] (which disclaims liability or warranty for this information). If you have questions about a medical condition or this instruction, always ask your healthcare professional. [address] any warranty or liability for your use of this information.

## 2018-03-12 NOTE — MR AVS SNAPSHOT
[address] [address] 
[phone] Patient: Sharon Lyles MRN:  [id number] Visit Information Date & Time Provider Department Dept. Phone Encounter #  
 3/12/2018  9:30 AM Kasey Trinidad NP [address] [facility] [phone] [id number] Follow-up Instructions Return if symptoms worsen or fail to improve. Your Appointments 2018  2:40 PM  
ACUTE CARE with Shyann San MD  
[address] ([facility]) Appt Note: ra  
 [address] [address]  
[phone]  
  
   
 [facility] [address] Upcoming Health Maintenance Date Due  
 MEDICARE YEARLY EXAM 10/14/2018 PAP AKA CERVICAL CYTOLOGY 2019 DTaP/Tdap/Td series (4 - Td) 2026 Allergies as of 3/12/2018  Review Complete On: 3/12/2018 By: Kasey Trinidad NP Severity Noted Reaction Type Reactions Latex  2010    Hives Augmentin [Amoxicillin-pot Clavulanate]  2011    Nausea and Vomiting Codeine  2011    Nausea and Vomiting Morphine Sulfate  2011    Nausea and Vomiting Nuts [Tree Nut]  10/27/2016    Swelling Pcn [Penicillins]  2010    Hives Current Immunizations  Reviewed on 2016 Name Date Human Papillomavirus 2008, 2007, 10/26/2007 Influenza Vaccine 2017, 10/14/2014, 10/29/2013 Influenza Vaccine Judene Bark) 10/2/2015 Influenza Vaccine (Quad) PF 10/27/2016 Influenza Vaccine Split 10/14/2011, 11/3/2010 Influenza Vaccine Whole 10/26/2007 TD Vaccine 2002 TDAP Vaccine 10/14/2011 Tdap 2016 ZZZ-RETIRED (DO NOT USE) Pneumococcal Vaccine (Unspecified Type) 2012  5:36 PM  
  
 Not reviewed this visit You Were Diagnosed With   
  
 Codes Comments Hypothyroidism, unspecified type    -  Primary ICD-10-CM: E03.9 ICD-9-CM: 244.9 Syncope, unspecified syncope type     ICD-10-CM: R55 
ICD-9-CM: 780. 2 Vitamin D deficiency     ICD-10-CM: E55.9 ICD-9-CM: 268.9 Obesity, morbid ([facility] [address]     ICD-10-CM: E66.01 
ICD-9-CM: 278.01 Impaired fasting glucose     ICD-10-CM: R73.01 
ICD-9-CM: 790.21 Vitals BP Pulse Temp Resp Height(growth percentile) Weight(growth percentile) 111/59 (BP 1 Location: Right arm, BP Patient Position: Sitting) 92 98 °F (36.7 °C) (Oral) 18 5' 4\" (1.626 m) 278 lb (126.1 kg) LMP SpO2 BMI OB Status Smoking Status 01/26/2018 98% 47.72 kg/m2 Having regular periods Never Smoker Vitals History BMI and BSA Data Body Mass Index Body Surface Area  
 47.72 kg/m 2 2.39 m 2 Preferred Pharmacy Pharmacy Name Phone [address] [phone] Your Updated Medication List  
  
   
This list is accurate as of 3/12/18 10:02 AM.  Always use your most recent med list.  
  
  
  
  
 albuterol 90 mcg/actuation inhaler Commonly known as:  PROAIR HFA Take 2 Puffs by inhalation every four (4) hours as needed for Wheezing. cetirizine 10 mg tablet Commonly known as:  ZYRTEC  
TAKE ONE TABLET BY MOUTH ONCE DAILY  
  
 clonazePAM 1 mg tablet Commonly known as:  Roanna Gordy Take 1 Tab by mouth daily as needed. diclofenac 1 % Gel Commonly known as:  VOLTAREN Apply 4 g to affected area four (4) times daily. EPINEPHrine 0.3 mg/0.3 mL injection Commonly known as:  EPIPEN 2-AWA  
0.3 mL by IntraMUSCular route once as needed for up to 1 dose. ferrous sulfate 325 mg (65 mg iron) tablet TAKE ONE TABLET BY MOUTH TWICE DAILY  
  
 fluticasone 50 mcg/actuation nasal spray Commonly known as:  Alberto Remedies 2 Sprays by Both Nostrils route daily. * levothyroxine 200 mcg tablet Commonly known as:  SYNTHROID  
TAKE ONE TABLET BY MOUTH ONCE DAILY BEFORE BREAKFAST * levothyroxine 50 mcg tablet Commonly known as:  SYNTHROID Take 1 Tab by mouth Daily (before breakfast). Take with 200 mcg dose. loratadine-pseudoephedrine 5-120 mg per tablet Commonly known as:  CLARITIN-D 12 HOUR Take 1 Tab by mouth two (2) times a day. montelukast 10 mg tablet Commonly known as:  SINGULAIR  
TAKE ONE TABLET BY MOUTH ONCE DAILY  
  
 omeprazole 20 mg capsule Commonly known as:  PRILOSEC  
TAKE ONE CAPSULE BY MOUTH TWICE DAILY polyethylene glycol 17 gram/dose powder Commonly known as:  MIRALAX  
  
 SUMAtriptan 100 mg tablet Commonly known as:  IMITREX Take 1 Tab by mouth once as needed for Migraine for up to 1 dose. venlafaxine- mg capsule Commonly known as:  EFFEXOR-XR Take 1 Cap by mouth daily. Maintenance dose. VITAMIN D2 50,000 unit capsule Generic drug:  ergocalciferol TAKE ONE CAPSULE BY MOUTH EVERY 7 DAYS * Notice: This list has 2 medication(s) that are the same as other medications prescribed for you. Read the directions carefully, and ask your doctor or other care provider to review them with you. We Performed the Following AMB POC EKG ROUTINE W/ 12 LEADS, INTER & REP [51173 CPT(R)] CBC WITH AUTOMATED DIFF [69466 CPT(R)] HEMOGLOBIN A1C WITH EAG [26269 CPT(R)] LIPID PANEL [84190 CPT(R)] METABOLIC PANEL, COMPREHENSIVE [55526 CPT(R)] TSH 3RD GENERATION [55115 CPT(R)] VITAMIN D, 25 HYDROXY [id number] CPT(R)] Follow-up Instructions Return if symptoms worsen or fail to improve. Patient Instructions Vasovagal Syncope: Care Instructions Your Care Instructions Vasovagal syncope (say \"iyc-lbe-UAZ-eanl YOWY-ghy-pzx\")is sudden dizziness or fainting that can be set off by things such as pain, stress, fear, or trauma. You may sweat or feel lightheaded, sick to your stomach, or tingly.  
The problem causes the heart rate to slow and the blood vessels to widen, or dilate, for a short time. When this happens, blood pools in the lower body, and less blood goes to the brain. You can usually get relief by lying down with your legs raised (elevated). This helps more blood to flow to your brain and may help relieve symptoms like feeling dizzy. Some doctors may recommend a technique that involves tensing your fists and arms. This type of fainting is often easy to predict. For example, it happens to some people when they see blood or have to get a shot. They may feel symptoms before they faint. An episode of vasovagal syncope usually responds well to self-care. Other treatment often isn't needed. But if the fainting keeps happening, your doctor may suggest further treatments. Follow-up care is a key part of your treatment and safety. Be sure to make and go to all appointments, and call your doctor if you are having problems. It's also a good idea to know your test results and keep a list of the medicines you take. How can you care for yourself at home? · Drink plenty of fluids to prevent dehydration. If you have kidney, heart, or liver disease and have to limit fluids, talk with your doctor before you increase your fluid intake. · Try to avoid things that you think may set off vasovagal syncope. · Talk to your doctor about any medicines you take. Some medicines may increase the chance of this condition occurring. · If you feel symptoms, lie down with your legs raised. Talk to your doctor about what to do if your symptoms come back. When should you call for help? Call 911 anytime you think you may need emergency care. For example, call if: 
? · You have symptoms of a heart problem. These may include: ¨ Chest pain or pressure. ¨ Severe trouble breathing. ¨ A fast or irregular heartbeat. ? Watch closely for changes in your health, and be sure to contact your doctor if: 
? · You have more episodes of fainting at home. ? · You do not get better as expected. Where can you learn more? Go to http://sallie-bladimir.info/. Enter L754 in the search box to learn more about \"Vasovagal Syncope: Care Instructions. \" Current as of: March 20, 2017 Content Version: 11.4 © 8887-9433 [employer]. Care instructions adapted under license by [employer] (which disclaims liability or warranty for this information). If you have questions about a medical condition or this instruction, always ask your healthcare professional. [address] any warranty or liability for your use of this information. Introducing [facility]! Dear Nupur Carranza: Thank you for requesting a Daily Secret account. Our records indicate that you already have an active Daily Secret account. You can access your account anytime at [email or web address] Did you know that you can access your hospital and ER discharge instructions at any time in Daily Secret? You can also review all of your test results from your hospital stay or ER visit. Additional Information If you have questions, please visit the Frequently Asked Questions section of the Daily Secret website at [email or web address] Remember, Daily Secret is NOT to be used for urgent needs. For medical emergencies, dial 911. Now available from your iPhone and Android! Please provide this summary of care documentation to your next provider. Your primary care clinician is listed as Amilcar Campoverde. If you have any questions after today's visit, please call [phone].

## 2018-03-13 LAB
25(OH)D3+25(OH)D2 SERPL-MCNC: 28.2 NG/ML (ref 30–100)
ALBUMIN SERPL-MCNC: 4.3 G/DL (ref 3.5–5.5)
ALBUMIN/GLOB SERPL: 2 {RATIO} (ref 1.2–2.2)
ALP SERPL-CCNC: 111 IU/L (ref 39–117)
ALT SERPL-CCNC: 40 IU/L (ref 0–32)
AST SERPL-CCNC: 25 IU/L (ref 0–40)
BASOPHILS # BLD AUTO: 0 X10E3/UL (ref 0–0.2)
BASOPHILS NFR BLD AUTO: 0 %
BILIRUB SERPL-MCNC: 0.4 MG/DL (ref 0–1.2)
BUN SERPL-MCNC: 11 MG/DL (ref 6–20)
BUN/CREAT SERPL: 15 (ref 9–23)
CALCIUM SERPL-MCNC: 9.1 MG/DL (ref 8.7–10.2)
CHLORIDE SERPL-SCNC: 105 MMOL/L (ref 96–106)
CHOLEST SERPL-MCNC: 184 MG/DL (ref 100–199)
CO2 SERPL-SCNC: 25 MMOL/L (ref 18–29)
CREAT SERPL-MCNC: 0.72 MG/DL (ref 0.57–1)
EOSINOPHIL # BLD AUTO: 0.3 X10E3/UL (ref 0–0.4)
EOSINOPHIL NFR BLD AUTO: 3 %
ERYTHROCYTE [DISTWIDTH] IN BLOOD BY AUTOMATED COUNT: 14 % (ref 12.3–15.4)
EST. AVERAGE GLUCOSE BLD GHB EST-MCNC: 100 MG/DL
GFR SERPLBLD CREATININE-BSD FMLA CKD-EPI: 113 ML/MIN/1.73
GFR SERPLBLD CREATININE-BSD FMLA CKD-EPI: 130 ML/MIN/1.73
GLOBULIN SER CALC-MCNC: 2.1 G/DL (ref 1.5–4.5)
GLUCOSE SERPL-MCNC: 87 MG/DL (ref 65–99)
HBA1C MFR BLD: 5.1 % (ref 4.8–5.6)
HCT VFR BLD AUTO: 39.3 % (ref 34–46.6)
HDLC SERPL-MCNC: 40 MG/DL
HGB BLD-MCNC: 12.9 G/DL (ref 11.1–15.9)
IMM GRANULOCYTES # BLD: 0 X10E3/UL (ref 0–0.1)
IMM GRANULOCYTES NFR BLD: 0 %
INTERPRETATION, 910389: NORMAL
LDLC SERPL CALC-MCNC: 126 MG/DL (ref 0–99)
LYMPHOCYTES # BLD AUTO: 2.7 X10E3/UL (ref 0.7–3.1)
LYMPHOCYTES NFR BLD AUTO: 27 %
MCH RBC QN AUTO: 30.4 PG (ref 26.6–33)
MCHC RBC AUTO-ENTMCNC: 32.8 G/DL (ref 31.5–35.7)
MCV RBC AUTO: 93 FL (ref 79–97)
MONOCYTES # BLD AUTO: 0.6 X10E3/UL (ref 0.1–0.9)
MONOCYTES NFR BLD AUTO: 6 %
NEUTROPHILS # BLD AUTO: 6.4 X10E3/UL (ref 1.4–7)
NEUTROPHILS NFR BLD AUTO: 64 %
PLATELET # BLD AUTO: 299 X10E3/UL (ref 150–379)
POTASSIUM SERPL-SCNC: 4.3 MMOL/L (ref 3.5–5.2)
PROT SERPL-MCNC: 6.4 G/DL (ref 6–8.5)
RBC # BLD AUTO: 4.24 X10E6/UL (ref 3.77–5.28)
SODIUM SERPL-SCNC: 142 MMOL/L (ref 134–144)
TRIGL SERPL-MCNC: 91 MG/DL (ref 0–149)
TSH SERPL DL<=0.005 MIU/L-ACNC: 0.55 UIU/ML (ref 0.45–4.5)
VLDLC SERPL CALC-MCNC: 18 MG/DL (ref 5–40)
WBC # BLD AUTO: 10 X10E3/UL (ref 3.4–10.8)

## 2018-03-13 NOTE — PROGRESS NOTES
Please notify pt the followin. Vitamin D levels remain low, continue taking weekly high dose supplement as prescribed. 2. Thyroid function has normalized on current dose of levothyroxine. 3. Negative for diabetes. 4. Cholesterol remains a little elevated. Enc pt to work harder on diet and exercise to improve this.

## 2018-03-13 NOTE — PROGRESS NOTES
Attempted to contact pt, phone rang muitple times with no option for vm. Mobile number not in service. Will attempt to contact pt later.

## 2018-03-25 DIAGNOSIS — K21.9 GASTROESOPHAGEAL REFLUX DISEASE WITHOUT ESOPHAGITIS: ICD-10-CM

## 2018-03-25 RX ORDER — OMEPRAZOLE 20 MG/1
CAPSULE, DELAYED RELEASE ORAL
Qty: 60 CAP | Refills: 5 | Status: SHIPPED | OUTPATIENT
Start: 2018-03-25 | End: 2018-08-14 | Stop reason: SDUPTHER

## 2018-03-26 RX ORDER — LANOLIN ALCOHOL/MO/W.PET/CERES
325 CREAM (GRAM) TOPICAL 2 TIMES DAILY
Qty: 60 TAB | Refills: 5 | Status: SHIPPED | OUTPATIENT
Start: 2018-03-26 | End: 2019-04-16 | Stop reason: SDUPTHER

## 2018-03-26 NOTE — TELEPHONE ENCOUNTER
From: Daniel Vela  To: Abelino Stockton MD  Sent: 3/26/2018 9:40 AM EDT  Subject: Medication Renewal Request    Original authorizing provider: MD Raj Wyman Chi. would like a refill of the following medications:  ferrous sulfate 325 mg (65 mg iron) tablet Jess Acevedo MD]    Preferred pharmacy: [facility] [place] [facility]    Comment:

## 2018-04-10 DIAGNOSIS — E03.9 HYPOTHYROIDISM, UNSPECIFIED TYPE: ICD-10-CM

## 2018-04-10 RX ORDER — LEVOTHYROXINE SODIUM 50 UG/1
TABLET ORAL
Qty: 30 TAB | Refills: 2 | Status: SHIPPED | OUTPATIENT
Start: 2018-04-10 | End: 2018-04-24 | Stop reason: DRUGHIGH

## 2018-04-19 ENCOUNTER — DOCUMENTATION ONLY (OUTPATIENT)
Dept: FAMILY MEDICINE CLINIC | Age: 33
End: 2018-04-19

## 2018-04-20 ENCOUNTER — DOCUMENTATION ONLY (OUTPATIENT)
Dept: FAMILY MEDICINE CLINIC | Age: 33
End: 2018-04-20

## 2018-04-20 NOTE — PROGRESS NOTES
Received notification from OBGYN that patients TSH 0.068 and prolactin 24.8. Pt has an appt scheduled to discuss this on 4/24.

## 2018-04-24 ENCOUNTER — OFFICE VISIT (OUTPATIENT)
Dept: FAMILY MEDICINE CLINIC | Age: 33
End: 2018-04-24

## 2018-04-24 VITALS
SYSTOLIC BLOOD PRESSURE: 120 MMHG | OXYGEN SATURATION: 97 % | DIASTOLIC BLOOD PRESSURE: 71 MMHG | HEIGHT: 64 IN | RESPIRATION RATE: 18 BRPM | HEART RATE: 103 BPM | TEMPERATURE: 98.2 F | BODY MASS INDEX: 45.75 KG/M2 | WEIGHT: 268 LBS

## 2018-04-24 DIAGNOSIS — H92.03 EAR PAIN, BILATERAL: ICD-10-CM

## 2018-04-24 DIAGNOSIS — E03.9 HYPOTHYROIDISM, UNSPECIFIED TYPE: Primary | ICD-10-CM

## 2018-04-24 RX ORDER — LEVOTHYROXINE SODIUM 25 UG/1
25 TABLET ORAL
Qty: 30 TAB | Refills: 2 | Status: SHIPPED | OUTPATIENT
Start: 2018-04-24 | End: 2018-08-14 | Stop reason: SDUPTHER

## 2018-04-24 NOTE — PROGRESS NOTES
Chief Complaint   Patient presents with    Thyroid Problem    Labs    Ear Pain     Patient in office today for recheck on tsh. Pt states she saw obgyn was advised to f/u with provider due to abnormal tsh levels, pt has also noticed weight loss. Bilateral ear pain began Sunday, have not treated with otc. 1. Have you been to the ER, urgent care clinic since your last visit? Hospitalized since your last visit? No    2. Have you seen or consulted any other health care providers outside of the [address] [place] [address] since your last visit? Include any pap smears or colon screening.  No

## 2018-04-24 NOTE — PROGRESS NOTES
Chief Complaint   Patient presents with    Thyroid Problem    Labs    Ear Pain     Patient in office today for recheck on tsh. Pt states she saw obgyn was advised to f/u with provider due to abnormal tsh levels, pt has also noticed weight loss. Patients TSH 0.068   Has been taking 200 mcg pill with 50 mcg pill daily. Denies any change in how and when she is taking the levothyroxine. Denies any diet changes. Bilateral ear pain began Sunday, have not treated with otc. Has been struggling with allergies. Denies any fever. Denies any other concerns at this time. Chief Complaint   Patient presents with    Thyroid Problem    Labs    Ear Pain     she is a 29y.o. year old female who presents for evalution. Reviewed PmHx, RxHx, FmHx, SocHx, AllgHx and updated and dated in the chart. Review of Systems - negative except as listed above in the HPI    Objective:     Vitals:    04/24/18 1514   BP: 120/71   Pulse: (!) 103   Resp: 18   Temp: 98.2 °F (36.8 °C)   TempSrc: Oral   SpO2: 97%   Weight: 268 lb (121.6 kg)   Height: 5' 4\" (1.626 m)     Physical Examination: General appearance - alert, well appearing, and in no distress  Eyes - pupils equal and reactive, extraocular eye movements intact  Ears - bilateral TM's and external ear canals normal, ceruminosis noted  Nose - normal and patent, no erythema, discharge or polyps and normal nontender sinuses  Mouth - mucous membranes moist, pharynx normal without lesions  Neck - supple, no significant adenopathy  Chest - clear to auscultation, no wheezes, rales or rhonchi, symmetric air entry  Heart - normal rate, regular rhythm, normal S1, S2, no murmurs    Assessment/ Plan:   Diagnoses and all orders for this visit:    1. Hypothyroidism, unspecified type  -     levothyroxine (SYNTHROID) 25 mcg tablet; Take 1 Tab by mouth Daily (before breakfast). Decrease dose to 225 mcg daily and recheck in 4 weeks.    2. Ear pain, bilateral  No abnormality on exam other than wax. Pt has previously not tolerated ear wash due to small ear canals. Recommended she try OTC debrox drops PRN. Follow-up Disposition:  Return in about 1 month (around 5/24/2018) for recheck TSH. I have discussed the diagnosis with the patient and the intended plan as seen in the above orders. The patient has received an after-visit summary and questions were answered concerning future plans. Medication Side Effects and Warnings were discussed with patient: yes  Patient Labs were reviewed and or requested: yes  Patient Past Records were reviewed and or requested  yes  Patient / Caregiver Understanding of treatment plan was verbalized during office visit YES    RUSTY Alvarez    There are no Patient Instructions on file for this visit.

## 2018-04-24 NOTE — MR AVS SNAPSHOT
[address] [address] 
[phone] Patient: Damien Syed MRN:  :[id number] Visit Information Date & Time Provider Department Dept. Phone Encounter #  
 2018  3:15 PM Aditya Mann NP [address] [facility] [phone] [id number] Follow-up Instructions Return in about 1 month (around 2018) for recheck TSH. Your Appointments 2018 10:40 AM  
ACUTE CARE with Lucille Morton MD  
[address] ([address] [facility]) Appt Note: DR. Alice Avila [facility] [address]  
[phone]  
  
   
 [facility] [address] [address] Upcoming Health Maintenance Date Due  
 MEDICARE YEARLY EXAM 10/14/2018 PAP AKA CERVICAL CYTOLOGY 2019 DTaP/Tdap/Td series (4 - Td) 2026 Allergies as of 2018  Review Complete On: 2018 By: Aditya Mann NP Severity Noted Reaction Type Reactions Latex  2010    Hives Augmentin [Amoxicillin-pot Clavulanate]  2011    Nausea and Vomiting Codeine  2011    Nausea and Vomiting Morphine Sulfate  2011    Nausea and Vomiting Nuts [Tree Nut]  10/27/2016    Swelling Pcn [Penicillins]  2010    Hives Current Immunizations  Reviewed on 2016 Name Date Human Papillomavirus 2008, 2007, 10/26/2007 Influenza Vaccine 2017, 10/14/2014, 10/29/2013 Influenza Vaccine Odella Dragon) 10/2/2015 Influenza Vaccine (Quad) PF 10/27/2016 Influenza Vaccine Split 10/14/2011, 11/3/2010 Influenza Vaccine Whole 10/26/2007 TD Vaccine 2002 TDAP Vaccine 10/14/2011 Tdap 2016 ZZZ-RETIRED (DO NOT USE) Pneumococcal Vaccine (Unspecified Type) 2012  5:36 PM  
  
 Not reviewed this visit You Were Diagnosed With   
  
 Codes Comments Hypothyroidism, unspecified type    -  Primary ICD-10-CM: E03.9 ICD-9-CM: 244.9 Ear pain, bilateral     ICD-10-CM: H92.03 
ICD-9-CM: 388.70 Vitals BP Pulse Temp Resp Height(growth percentile) Weight(growth percentile) 120/71 (BP 1 Location: Right arm, BP Patient Position: Sitting) (!) 103 98.2 °F (36.8 °C) (Oral) 18 5' 4\" (1.626 m) 268 lb (121.6 kg) LMP SpO2 BMI OB Status Smoking Status 04/17/2018 97% 46 kg/m2 Having regular periods Never Smoker Vitals History BMI and BSA Data Body Mass Index Body Surface Area  
 46 kg/m 2 2.34 m 2 Preferred Pharmacy Pharmacy Name Phone [address], [address] [place] [phone] Your Updated Medication List  
  
   
This list is accurate as of 4/24/18  3:49 PM.  Always use your most recent med list.  
  
  
  
  
 albuterol 90 mcg/actuation inhaler Commonly known as:  PROAIR HFA Take 2 Puffs by inhalation every four (4) hours as needed for Wheezing. cetirizine 10 mg tablet Commonly known as:  ZYRTEC  
TAKE ONE TABLET BY MOUTH ONCE DAILY  
  
 clonazePAM 1 mg tablet Commonly known as:  Beverely Hack Take 1 Tab by mouth daily as needed. EPINEPHrine 0.3 mg/0.3 mL injection Commonly known as:  EPIPEN 2-AWA  
0.3 mL by IntraMUSCular route once as needed for up to 1 dose. ferrous sulfate 325 mg (65 mg iron) tablet Take 1 Tab by mouth two (2) times a day. fluticasone 50 mcg/actuation nasal spray Commonly known as:  Ramiro Rainelle 2 Sprays by Both Nostrils route daily. * levothyroxine 200 mcg tablet Commonly known as:  SYNTHROID  
TAKE ONE TABLET BY MOUTH ONCE DAILY BEFORE BREAKFAST * levothyroxine 25 mcg tablet Commonly known as:  SYNTHROID Take 1 Tab by mouth Daily (before breakfast). loratadine-pseudoephedrine 5-120 mg per tablet Commonly known as:  CLARITIN-D 12 HOUR Take 1 Tab by mouth two (2) times a day. montelukast 10 mg tablet Commonly known as:  SINGULAIR  
TAKE ONE TABLET BY MOUTH ONCE DAILY  
  
 omeprazole 20 mg capsule Commonly known as:  PRILOSEC  
TAKE ONE CAPSULE BY MOUTH TWICE DAILY polyethylene glycol 17 gram/dose powder Commonly known as:  MIRALAX  
  
 SUMAtriptan 100 mg tablet Commonly known as:  IMITREX Take 1 Tab by mouth once as needed for Migraine for up to 1 dose. venlafaxine- mg capsule Commonly known as:  EFFEXOR-XR Take 1 Cap by mouth daily. Maintenance dose. VITAMIN D2 50,000 unit capsule Generic drug:  ergocalciferol TAKE ONE CAPSULE BY MOUTH EVERY 7 DAYS * Notice: This list has 2 medication(s) that are the same as other medications prescribed for you. Read the directions carefully, and ask your doctor or other care provider to review them with you. Prescriptions Sent to Pharmacy Refills  
 levothyroxine (SYNTHROID) 25 mcg tablet 2 Sig: Take 1 Tab by mouth Daily (before breakfast). Class: Normal  
 Pharmacy: [facility] [address], [address] [place] [address] #: [phone] Route: Oral  
  
Follow-up Instructions Return in about 1 month (around 5/24/2018) for recheck TSH. Introducing [facility] & HEALTH SERVICES! Dear Arnulfo Lamar: Thank you for requesting a Abcam account. Our records indicate that you already have an active Abcam account. You can access your account anytime at [email or web address] Did you know that you can access your hospital and ER discharge instructions at any time in Abcam? You can also review all of your test results from your hospital stay or ER visit. Additional Information If you have questions, please visit the Frequently Asked Questions section of the Abcam website at [email or web address] Remember, Abcam is NOT to be used for urgent needs. For medical emergencies, dial 911. Now available from your iPhone and Android! Please provide this summary of care documentation to your next provider. Your primary care clinician is listed as Ferdinand Arizmendi. If you have any questions after today's visit, please call [phone].

## 2018-05-01 DIAGNOSIS — F41.1 GAD (GENERALIZED ANXIETY DISORDER): ICD-10-CM

## 2018-05-01 RX ORDER — VENLAFAXINE HYDROCHLORIDE 150 MG/1
CAPSULE, EXTENDED RELEASE ORAL
Qty: 30 CAP | Refills: 2 | Status: SHIPPED | OUTPATIENT
Start: 2018-05-01 | End: 2019-06-03 | Stop reason: SDUPTHER

## 2018-05-03 ENCOUNTER — TELEPHONE (OUTPATIENT)
Dept: FAMILY MEDICINE CLINIC | Age: 33
End: 2018-05-03

## 2018-05-03 DIAGNOSIS — E22.1 HYPERPROLACTINEMIA (HCC): Primary | ICD-10-CM

## 2018-05-03 RX ORDER — SULFAMETHOXAZOLE AND TRIMETHOPRIM 800; 160 MG/1; MG/1
1 TABLET ORAL 2 TIMES DAILY
Qty: 10 TAB | Refills: 0 | Status: SHIPPED | OUTPATIENT
Start: 2018-05-03 | End: 2018-05-08

## 2018-05-03 NOTE — TELEPHONE ENCOUNTER
----- Message from Jocelin Durham sent at 5/3/2018  4:03 PM EDT -----  Regarding: RICKIE Che/Telephone  Pt is requesting a referral to see Dr. Teresa Mora, at [facility], due to High Prolactin , ([phone], H)[phone].  Pt's best contact number [phone]

## 2018-05-07 ENCOUNTER — TELEPHONE (OUTPATIENT)
Dept: FAMILY MEDICINE CLINIC | Age: 33
End: 2018-05-07

## 2018-05-07 DIAGNOSIS — K59.00 CONSTIPATION, UNSPECIFIED CONSTIPATION TYPE: Primary | ICD-10-CM

## 2018-05-07 RX ORDER — POLYETHYLENE GLYCOL 3350 17 G/17G
17 POWDER, FOR SOLUTION ORAL 2 TIMES DAILY
Qty: 289 G | Refills: 2 | Status: SHIPPED | OUTPATIENT
Start: 2018-05-07 | End: 2019-07-16

## 2018-05-07 NOTE — TELEPHONE ENCOUNTER
Patient would like something called in for constipation. She took dulcolax laxative yesterday & it has not worked.  Please advise @[phone]

## 2018-05-24 ENCOUNTER — OFFICE VISIT (OUTPATIENT)
Dept: FAMILY MEDICINE CLINIC | Age: 33
End: 2018-05-24

## 2018-05-24 VITALS
WEIGHT: 259 LBS | HEART RATE: 105 BPM | OXYGEN SATURATION: 98 % | BODY MASS INDEX: 44.22 KG/M2 | DIASTOLIC BLOOD PRESSURE: 81 MMHG | TEMPERATURE: 98.1 F | HEIGHT: 64 IN | SYSTOLIC BLOOD PRESSURE: 115 MMHG | RESPIRATION RATE: 18 BRPM

## 2018-05-24 DIAGNOSIS — R11.10 VOMITING, INTRACTABILITY OF VOMITING NOT SPECIFIED, PRESENCE OF NAUSEA NOT SPECIFIED, UNSPECIFIED VOMITING TYPE: ICD-10-CM

## 2018-05-24 DIAGNOSIS — E03.9 HYPOTHYROIDISM, UNSPECIFIED TYPE: ICD-10-CM

## 2018-05-24 DIAGNOSIS — J02.0 STREP PHARYNGITIS: Primary | ICD-10-CM

## 2018-05-24 DIAGNOSIS — J02.9 SORE THROAT: ICD-10-CM

## 2018-05-24 LAB
S PYO AG THROAT QL: POSITIVE
VALID INTERNAL CONTROL?: YES

## 2018-05-24 RX ORDER — NORETHINDRONE ACETATE AND ETHINYL ESTRADIOL AND FERROUS FUMARATE 1MG-20(24)
KIT ORAL
COMMUNITY
Start: 2018-05-02 | End: 2019-04-16

## 2018-05-24 RX ORDER — ONDANSETRON 8 MG/1
8 TABLET, ORALLY DISINTEGRATING ORAL
Qty: 30 TAB | Refills: 0 | Status: SHIPPED | OUTPATIENT
Start: 2018-05-24 | End: 2019-07-16

## 2018-05-24 RX ORDER — AZITHROMYCIN 250 MG/1
TABLET, FILM COATED ORAL
Qty: 6 TAB | Refills: 0 | Status: SHIPPED | OUTPATIENT
Start: 2018-05-24 | End: 2018-05-29

## 2018-05-24 NOTE — PROGRESS NOTES
Chief Complaint   Patient presents with    Vomiting    Diarrhea     Patient in office today for sx that began Tuesday. Have not treated with otc. Pt states fever was noted, did not take temp. 1. Have you been to the ER, urgent care clinic since your last visit? Hospitalized since your last visit? No    2. Have you seen or consulted any other health care providers outside of the [address] [place] [address] since your last visit? Include any pap smears or colon screening.  No

## 2018-05-24 NOTE — LETTER
NOTIFICATION RETURN TO WORK 
 
5/24/2018 10:24 AM 
 
Ms. Abhijit Cardenas [address] [place] [address]  [phone] To Whom It May Concern: 
 
Abhijit Cardenas is currently under the care of [address]. She will return to work on: Tuesday May 29th, 2018. Please excuse her from the days she has missed due to illness. If there are questions or concerns please have the patient contact our office.  
 
 
 
Sincerely, 
 
 
Caroline Ayon NP

## 2018-05-24 NOTE — MR AVS SNAPSHOT
[address] [address] 
[phone] Patient: Amos Carranza MRN:  :[id number] Visit Information Date & Time Provider Department Dept. Phone Encounter #  
 2018  9:45 AM Collette Libel, NP [address] [facility] [phone] [id number] Follow-up Instructions Return if symptoms worsen or fail to improve. Your Appointments 2018 10:40 AM  
ACUTE CARE with Skylar Butcher MD  
[address] ([facility]) Appt Note: DR. Radha Tan PT  
 [address] [facility] [address]  
[phone]  
  
   
 [address] [facility] [address] Upcoming Health Maintenance Date Due Influenza Age 5 to Adult 2018 MEDICARE YEARLY EXAM 10/14/2018 PAP AKA CERVICAL CYTOLOGY 2019 DTaP/Tdap/Td series (4 - Td) 2026 Allergies as of 2018  Review Complete On: 2018 By: Collette Libel, NP Severity Noted Reaction Type Reactions Latex  2010    Hives Augmentin [Amoxicillin-pot Clavulanate]  2011    Nausea and Vomiting Codeine  2011    Nausea and Vomiting Morphine Sulfate  2011    Nausea and Vomiting Nuts [Tree Nut]  10/27/2016    Swelling Pcn [Penicillins]  2010    Hives Current Immunizations  Reviewed on 2016 Name Date Human Papillomavirus 2008, 2007, 10/26/2007 Influenza Vaccine 2017, 10/14/2014, 10/29/2013 Influenza Vaccine Miriam Hane) 10/2/2015 Influenza Vaccine (Quad) PF 10/27/2016 Influenza Vaccine Split 10/14/2011, 11/3/2010 Influenza Vaccine Whole 10/26/2007 TD Vaccine 2002 TDAP Vaccine 10/14/2011 Tdap 2016 ZZZ-RETIRED (DO NOT USE) Pneumococcal Vaccine (Unspecified Type) 2012  5:36 PM  
  
 Not reviewed this visit You Were Diagnosed With   
  
 Codes Comments Strep pharyngitis    -  Primary ICD-10-CM: J02.0 ICD-9-CM: 034.0 Sore throat     ICD-10-CM: J02.9 ICD-9-CM: 966 Vomiting, intractability of vomiting not specified, presence of nausea not specified, unspecified vomiting type     ICD-10-CM: R11.10 ICD-9-CM: 787.03 Hypothyroidism, unspecified type     ICD-10-CM: E03.9 ICD-9-CM: 716. 9 Vitals BP Pulse Temp Resp Height(growth percentile) Weight(growth percentile) 115/81 (BP 1 Location: Right arm, BP Patient Position: Sitting) (!) 105 98.1 °F (36.7 °C) (Oral) 18 5' 4\" (1.626 m) 259 lb (117.5 kg) LMP SpO2 BMI OB Status Smoking Status 05/15/2018 98% 44.46 kg/m2 Having regular periods Never Smoker Vitals History BMI and BSA Data Body Mass Index Body Surface Area 44.46 kg/m 2 2.3 m 2 Preferred Pharmacy Pharmacy Name Phone [address], [address] [place] [phone] Your Updated Medication List  
  
   
This list is accurate as of 5/24/18 10:21 AM.  Always use your most recent med list.  
  
  
  
  
 albuterol 90 mcg/actuation inhaler Commonly known as:  PROAIR HFA Take 2 Puffs by inhalation every four (4) hours as needed for Wheezing. azithromycin 250 mg tablet Commonly known as:  Cedric Whaley Take 2 tablets today, then take 1 tablet daily  
  
 cetirizine 10 mg tablet Commonly known as:  ZYRTEC  
TAKE ONE TABLET BY MOUTH ONCE DAILY  
  
 clonazePAM 1 mg tablet Commonly known as:  Edvin Ajay Take 1 Tab by mouth daily as needed. EPINEPHrine 0.3 mg/0.3 mL injection Commonly known as:  EPIPEN 2-AWA  
0.3 mL by IntraMUSCular route once as needed for up to 1 dose. ferrous sulfate 325 mg (65 mg iron) tablet Take 1 Tab by mouth two (2) times a day. fluticasone 50 mcg/actuation nasal spray Commonly known as:  Marcine Infield 2 Sprays by Both Nostrils route daily. JUNEL FE 24 1 mg-20 mcg (24)/75 mg (4) Tab Generic drug:  norethindrone-e estradiol-iron * levothyroxine 200 mcg tablet Commonly known as:  SYNTHROID  
TAKE ONE TABLET BY MOUTH ONCE DAILY BEFORE BREAKFAST * levothyroxine 25 mcg tablet Commonly known as:  SYNTHROID Take 1 Tab by mouth Daily (before breakfast). loratadine-pseudoephedrine 5-120 mg per tablet Commonly known as:  CLARITIN-D 12 HOUR Take 1 Tab by mouth two (2) times a day. montelukast 10 mg tablet Commonly known as:  SINGULAIR  
TAKE ONE TABLET BY MOUTH ONCE DAILY  
  
 omeprazole 20 mg capsule Commonly known as:  PRILOSEC  
TAKE ONE CAPSULE BY MOUTH TWICE DAILY  
  
 ondansetron 8 mg disintegrating tablet Commonly known as:  ZOFRAN ODT Take 1 Tab by mouth every eight (8) hours as needed for Nausea. polyethylene glycol 17 gram/dose powder Commonly known as:  Watkins Lies Take 17 g by mouth two (2) times a day. SUMAtriptan 100 mg tablet Commonly known as:  IMITREX Take 1 Tab by mouth once as needed for Migraine for up to 1 dose. venlafaxine- mg capsule Commonly known as:  EFFEXOR-XR  
TAKE ONE CAPSULE BY MOUTH ONCE DAILY MAINTENANCE  DOSE  
  
 VITAMIN D2 50,000 unit capsule Generic drug:  ergocalciferol TAKE ONE CAPSULE BY MOUTH EVERY 7 DAYS * Notice: This list has 2 medication(s) that are the same as other medications prescribed for you. Read the directions carefully, and ask your doctor or other care provider to review them with you. Prescriptions Sent to Pharmacy Refills  
 ondansetron (ZOFRAN ODT) 8 mg disintegrating tablet 0 Sig: Take 1 Tab by mouth every eight (8) hours as needed for Nausea. Class: Normal  
 Pharmacy: [facility] [address], [address] [place] Ph #: [phone] Route: Oral  
 azithromycin (ZITHROMAX) 250 mg tablet 0 Sig: Take 2 tablets today, then take 1 tablet daily  Class: Normal  
 Pharmacy: [address], [address] [facility] #: [phone] We Performed the Following AMB POC RAPID STREP A [83890 CPT(R)] T4, FREE [id number] CPT(R)] TSH 3RD GENERATION [21139 CPT(R)] Follow-up Instructions Return if symptoms worsen or fail to improve. Patient Instructions I have prescribed you the antibiotic Azithromycin. Take this medication as directed and complete the entire course, even if you're feeling better. If you miss a dose, take it as soon as possible. Take this medication on an empty stomach and if you're going to be out in the sun make sure you wear sunscreen to avoid developing a photosensitivity reaction. Common side effects of this medication include abdominal pain, nausea, and diarrhea. Notify your provider if symptoms do not improve one week after completing the course of this antibiotic. Strep Throat: Care Instructions Your Care Instructions Strep throat is a bacterial infection that causes sudden, severe sore throat and fever. Strep throat, which is caused by bacteria called streptococcus, is treated with antibiotics. Sometimes a strep test is necessary to tell if the sore throat is caused by strep bacteria. Treatment can help ease symptoms and may prevent future problems. Follow-up care is a key part of your treatment and safety. Be sure to make and go to all appointments, and call your doctor if you are having problems. It's also a good idea to know your test results and keep a list of the medicines you take. How can you care for yourself at home? · Take your antibiotics as directed. Do not stop taking them just because you feel better. You need to take the full course of antibiotics. · Strep throat can spread to others until 24 hours after you begin taking antibiotics. During this time, you should avoid contact with other people at work or home, especially infants and children.  Do not sneeze or cough on others, and wash your hands often. Keep your drinking glass and eating utensils separate from those of others, and wash these items well in hot, soapy water. · Gargle with warm salt water at least once each hour to help reduce swelling and make your throat feel better. Use 1 teaspoon of salt mixed in 8 fluid ounces of warm water. · Take an over-the-counter pain medication, such as acetaminophen (Tylenol), ibuprofen (Advil, Motrin), or naproxen (Aleve). Read and follow all instructions on the label. · Try an over-the-counter anesthetic throat spray or throat lozenges, which may help relieve throat pain. · Drink plenty of fluids. Fluids may help soothe an irritated throat. Hot fluids, such as tea or soup, may help your throat feel better. · Eat soft solids and drink plenty of clear liquids. Flavored ice pops, ice cream, scrambled eggs, sherbet, and gelatin dessert (such as Jell-O) may also soothe the throat. · Get lots of rest. 
· Do not smoke, and avoid secondhand smoke. If you need help quitting, talk to your doctor about stop-smoking programs and medicines. These can increase your chances of quitting for good. · Use a vaporizer or humidifier to add moisture to the air in your bedroom. Follow the directions for cleaning the machine. When should you call for help? Call your doctor now or seek immediate medical care if: 
? · You have a new or higher fever. ? · You have a fever with a stiff neck or severe headache. ? · You have new or worse trouble swallowing. ? · Your sore throat gets much worse on one side. ? · Your pain becomes much worse on one side of your throat. ? Watch closely for changes in your health, and be sure to contact your doctor if: 
? · You are not getting better after 2 days (48 hours). ? · You do not get better as expected. Where can you learn more? Go to http://sallie-bladimir.info/.  
Enter K625 in the search box to learn more about \"Strep Throat: Care Instructions. \" Current as of: May 12, 2017 Content Version: 11.4 © 6784-8006 Healthwise, 77 Pieces. Care instructions adapted under license by [employer] (which disclaims liability or warranty for this information). If you have questions about a medical condition or this instruction, always ask your healthcare professional. [address] any warranty or liability for your use of this information. Introducing [facility] & HEALTH SERVICES! Dear Moshe Trinidad: Thank you for requesting a Ubidyne account. Our records indicate that you already have an active Ubidyne account. You can access your account anytime at [email or web address] Did you know that you can access your hospital and ER discharge instructions at any time in Ubidyne? You can also review all of your test results from your hospital stay or ER visit. Additional Information If you have questions, please visit the Frequently Asked Questions section of the Ubidyne website at [email or web address] Remember, Ubidyne is NOT to be used for urgent needs. For medical emergencies, dial 911. Now available from your iPhone and Android! Please provide this summary of care documentation to your next provider. Your primary care clinician is listed as Eladia Chacon. If you have any questions after today's visit, please call [phone].

## 2018-05-24 NOTE — PATIENT INSTRUCTIONS
I have prescribed you the antibiotic Azithromycin. Take this medication as directed and complete the entire course, even if you're feeling better. If you miss a dose, take it as soon as possible. Take this medication on an empty stomach and if you're going to be out in the sun make sure you wear sunscreen to avoid developing a photosensitivity reaction. Common side effects of this medication include abdominal pain, nausea, and diarrhea. Notify your provider if symptoms do not improve one week after completing the course of this antibiotic. Strep Throat: Care Instructions  Your Care Instructions    Strep throat is a bacterial infection that causes sudden, severe sore throat and fever. Strep throat, which is caused by bacteria called streptococcus, is treated with antibiotics. Sometimes a strep test is necessary to tell if the sore throat is caused by strep bacteria. Treatment can help ease symptoms and may prevent future problems. Follow-up care is a key part of your treatment and safety. Be sure to make and go to all appointments, and call your doctor if you are having problems. It's also a good idea to know your test results and keep a list of the medicines you take. How can you care for yourself at home? · Take your antibiotics as directed. Do not stop taking them just because you feel better. You need to take the full course of antibiotics. · Strep throat can spread to others until 24 hours after you begin taking antibiotics. During this time, you should avoid contact with other people at work or home, especially infants and children. Do not sneeze or cough on others, and wash your hands often. Keep your drinking glass and eating utensils separate from those of others, and wash these items well in hot, soapy water. · Gargle with warm salt water at least once each hour to help reduce swelling and make your throat feel better. Use 1 teaspoon of salt mixed in 8 fluid ounces of warm water.   · Take an over-the-counter pain medication, such as acetaminophen (Tylenol), ibuprofen (Advil, Motrin), or naproxen (Aleve). Read and follow all instructions on the label. · Try an over-the-counter anesthetic throat spray or throat lozenges, which may help relieve throat pain. · Drink plenty of fluids. Fluids may help soothe an irritated throat. Hot fluids, such as tea or soup, may help your throat feel better. · Eat soft solids and drink plenty of clear liquids. Flavored ice pops, ice cream, scrambled eggs, sherbet, and gelatin dessert (such as Jell-O) may also soothe the throat. · Get lots of rest.  · Do not smoke, and avoid secondhand smoke. If you need help quitting, talk to your doctor about stop-smoking programs and medicines. These can increase your chances of quitting for good. · Use a vaporizer or humidifier to add moisture to the air in your bedroom. Follow the directions for cleaning the machine. When should you call for help? Call your doctor now or seek immediate medical care if:  ? · You have a new or higher fever. ? · You have a fever with a stiff neck or severe headache. ? · You have new or worse trouble swallowing. ? · Your sore throat gets much worse on one side. ? · Your pain becomes much worse on one side of your throat. ? Watch closely for changes in your health, and be sure to contact your doctor if:  ? · You are not getting better after 2 days (48 hours). ? · You do not get better as expected. Where can you learn more? Go to http://sallie-bladimir.info/. Enter K625 in the search box to learn more about \"Strep Throat: Care Instructions. \"  Current as of: May 12, 2017  Content Version: 11.4  © 3615-7619 Healthwise, Incorporated. Care instructions adapted under license by [employer] (which disclaims liability or warranty for this information).  If you have questions about a medical condition or this instruction, always ask your healthcare professional. [employer] disclaims any warranty or liability for your use of this information.

## 2018-05-24 NOTE — PROGRESS NOTES
Chief Complaint   Patient presents with    Vomiting    Diarrhea     Patient in office today for sx that began Tuesday. Have not treated with otc. Pt states fever was noted, did not take temp. Results for orders placed or performed in visit on 05/24/18   AMB POC RAPID STREP A   Result Value Ref Range    VALID INTERNAL CONTROL POC Yes     Group A Strep Ag Positive Negative       Pt had a hot dog on Tuesday night. Tuesday night vomited throughout the night. Last vomited this morning. Associated diarrhea that started around the same time. Associated sore throat. Has felt like she is running a fever. Denies any sick contacts. Denies any recent abx. Denies any OTC medications. Denies any abdominal pain. Denies any other concerns at this time. Chief Complaint   Patient presents with    Vomiting    Diarrhea     she is a 29y.o. year old female who presents for evalution. Reviewed PmHx, RxHx, FmHx, SocHx, AllgHx and updated and dated in the chart. Review of Systems - negative except as listed above in the HPI    Objective:     Vitals:    05/24/18 0954   BP: 115/81   Pulse: (!) 105   Resp: 18   Temp: 98.1 °F (36.7 °C)   TempSrc: Oral   SpO2: 98%   Weight: 259 lb (117.5 kg)   Height: 5' 4\" (1.626 m)     Physical Examination: General appearance - alert, well appearing, and in no distress  Eyes - pupils equal and reactive, extraocular eye movements intact, right eye normal  Ears - bilateral TM's and external ear canals normal  Nose - mucosal congestion, mucosal erythema, clear rhinorrhea and sinuses normal and nontender  Mouth - mucous membranes moist, pharynx erythematous but without lesions  Neck - supple, no significant adenopathy  Chest - clear to auscultation, no wheezes, rales or rhonchi, symmetric air entry  Heart - normal rate, regular rhythm, normal S1, S2, no murmurs  Abdomen - soft, nontender, nondistended    Assessment/ Plan:   Diagnoses and all orders for this visit:    1.  Strep pharyngitis / 2. Sore throat  -     azithromycin (ZITHROMAX) 250 mg tablet; Take 2 tablets today, then take 1 tablet daily  -     AMB POC RAPID STREP A  Start and complete full course of zithromax. Dwp ADRs/SEs of medication. Push fluids. Rest. Saline nasal spray for nasal congestion. OTC motrin/apap for fevers. RTC if sx persist or worsen. 3. Vomiting, intractability of vomiting not specified, presence of nausea not specified, unspecified vomiting type  -     AMB POC RAPID STREP A  -     ondansetron (ZOFRAN ODT) 8 mg disintegrating tablet; Take 1 Tab by mouth every eight (8) hours as needed for Nausea. Reviewed with patient BRAT diet and slowly advancing, pushing small frequent sips of fluids to maintain hydration, and avoiding OTC antidiarrheals so that if cause of diarrhea v/d is infectious then infection can run its course. Reinforced s/sx of acute abdominal pain and enc pt to seek immediate medical attention if those sx occur. Pt verbalized understanding of this. 4. Hypothyroidism, unspecified type  -     TSH 3RD GENERATION  -     T4, FREE  Will notify results and deviate plan based on findings. Follow-up Disposition:  Return if symptoms worsen or fail to improve. I have discussed the diagnosis with the patient and the intended plan as seen in the above orders. The patient has received an after-visit summary and questions were answered concerning future plans. Medication Side Effects and Warnings were discussed with patient: yes  Patient Labs were reviewed and or requested: yes  Patient Past Records were reviewed and or requested  yes  Patient / Caregiver Understanding of treatment plan was verbalized during office visit YES    RUSTY Regalado    Patient Instructions     I have prescribed you the antibiotic Azithromycin. Take this medication as directed and complete the entire course, even if you're feeling better. If you miss a dose, take it as soon as possible.  Take this medication on an empty stomach and if you're going to be out in the sun make sure you wear sunscreen to avoid developing a photosensitivity reaction. Common side effects of this medication include abdominal pain, nausea, and diarrhea. Notify your provider if symptoms do not improve one week after completing the course of this antibiotic. Strep Throat: Care Instructions  Your Care Instructions    Strep throat is a bacterial infection that causes sudden, severe sore throat and fever. Strep throat, which is caused by bacteria called streptococcus, is treated with antibiotics. Sometimes a strep test is necessary to tell if the sore throat is caused by strep bacteria. Treatment can help ease symptoms and may prevent future problems. Follow-up care is a key part of your treatment and safety. Be sure to make and go to all appointments, and call your doctor if you are having problems. It's also a good idea to know your test results and keep a list of the medicines you take. How can you care for yourself at home? · Take your antibiotics as directed. Do not stop taking them just because you feel better. You need to take the full course of antibiotics. · Strep throat can spread to others until 24 hours after you begin taking antibiotics. During this time, you should avoid contact with other people at work or home, especially infants and children. Do not sneeze or cough on others, and wash your hands often. Keep your drinking glass and eating utensils separate from those of others, and wash these items well in hot, soapy water. · Gargle with warm salt water at least once each hour to help reduce swelling and make your throat feel better. Use 1 teaspoon of salt mixed in 8 fluid ounces of warm water. · Take an over-the-counter pain medication, such as acetaminophen (Tylenol), ibuprofen (Advil, Motrin), or naproxen (Aleve). Read and follow all instructions on the label.   · Try an over-the-counter anesthetic throat spray or throat lozenges, which may help relieve throat pain. · Drink plenty of fluids. Fluids may help soothe an irritated throat. Hot fluids, such as tea or soup, may help your throat feel better. · Eat soft solids and drink plenty of clear liquids. Flavored ice pops, ice cream, scrambled eggs, sherbet, and gelatin dessert (such as Jell-O) may also soothe the throat. · Get lots of rest.  · Do not smoke, and avoid secondhand smoke. If you need help quitting, talk to your doctor about stop-smoking programs and medicines. These can increase your chances of quitting for good. · Use a vaporizer or humidifier to add moisture to the air in your bedroom. Follow the directions for cleaning the machine. When should you call for help? Call your doctor now or seek immediate medical care if:  ? · You have a new or higher fever. ? · You have a fever with a stiff neck or severe headache. ? · You have new or worse trouble swallowing. ? · Your sore throat gets much worse on one side. ? · Your pain becomes much worse on one side of your throat. ? Watch closely for changes in your health, and be sure to contact your doctor if:  ? · You are not getting better after 2 days (48 hours). ? · You do not get better as expected. Where can you learn more? Go to http://sallie-bladimir.info/. Enter K625 in the search box to learn more about \"Strep Throat: Care Instructions. \"  Current as of: May 12, 2017  Content Version: 11.4  © 5940-9349 [employer]. Care instructions adapted under license by [employer] (which disclaims liability or warranty for this information). If you have questions about a medical condition or this instruction, always ask your healthcare professional. [address] any warranty or liability for your use of this information.

## 2018-05-25 LAB
T4 FREE SERPL-MCNC: 1.56 NG/DL (ref 0.82–1.77)
TSH SERPL DL<=0.005 MIU/L-ACNC: 1.84 UIU/ML (ref 0.45–4.5)

## 2018-05-25 NOTE — PROGRESS NOTES
The following message was sent to pt via [employer] portal in reference to lab results:    Good morning Mya,     Attached are the results of your repeat thyroid labs. It looks like your thyroid function has normalized. I recommend you continue taking your current dose of levothyroxine daily. Lets recheck this in 3 months. I hope you're starting to feel better!  Have a great weekend :)  RUSTY Torres

## 2018-06-12 RX ORDER — EPINEPHRINE 0.3 MG/.3ML
INJECTION SUBCUTANEOUS
Qty: 2 SYRINGE | Refills: 2 | Status: SHIPPED | OUTPATIENT
Start: 2018-06-12 | End: 2019-01-04 | Stop reason: SDUPTHER

## 2018-06-20 RX ORDER — LEVOTHYROXINE SODIUM 200 UG/1
TABLET ORAL
Qty: 30 TAB | Refills: 5 | Status: SHIPPED | COMMUNITY
Start: 2018-06-20 | End: 2018-08-14 | Stop reason: SDUPTHER

## 2018-07-03 ENCOUNTER — OFFICE VISIT (OUTPATIENT)
Dept: FAMILY MEDICINE CLINIC | Age: 33
End: 2018-07-03

## 2018-07-03 VITALS
OXYGEN SATURATION: 98 % | SYSTOLIC BLOOD PRESSURE: 106 MMHG | RESPIRATION RATE: 18 BRPM | BODY MASS INDEX: 44.22 KG/M2 | TEMPERATURE: 97.8 F | HEART RATE: 113 BPM | WEIGHT: 259 LBS | HEIGHT: 64 IN | DIASTOLIC BLOOD PRESSURE: 71 MMHG

## 2018-07-03 DIAGNOSIS — J02.0 STREP THROAT: Primary | ICD-10-CM

## 2018-07-03 DIAGNOSIS — J02.9 SORE THROAT: ICD-10-CM

## 2018-07-03 LAB
S PYO AG THROAT QL: POSITIVE
VALID INTERNAL CONTROL?: YES

## 2018-07-03 RX ORDER — CEFDINIR 300 MG/1
300 CAPSULE ORAL 2 TIMES DAILY
Qty: 20 CAP | Refills: 0 | Status: SHIPPED | OUTPATIENT
Start: 2018-07-03 | End: 2018-07-13

## 2018-07-03 NOTE — PROGRESS NOTES
Chief Complaint   Patient presents with    Cough    Sore Throat    Nasal Congestion     Patient in office today sx that began Saturday. Have been treating with Benadryl with no relief noted. 1. Have you been to the ER, urgent care clinic since your last visit? Hospitalized since your last visit? No    2. Have you seen or consulted any other health care providers outside of the [address] [place] [address] since your last visit? Include any pap smears or colon screening.  No

## 2018-07-03 NOTE — PATIENT INSTRUCTIONS
Strep Throat: Care Instructions  Your Care Instructions    Strep throat is a bacterial infection that causes sudden, severe sore throat and fever. Strep throat, which is caused by bacteria called streptococcus, is treated with antibiotics. Sometimes a strep test is necessary to tell if the sore throat is caused by strep bacteria. Treatment can help ease symptoms and may prevent future problems. Follow-up care is a key part of your treatment and safety. Be sure to make and go to all appointments, and call your doctor if you are having problems. It's also a good idea to know your test results and keep a list of the medicines you take. How can you care for yourself at home? · Take your antibiotics as directed. Do not stop taking them just because you feel better. You need to take the full course of antibiotics. · Strep throat can spread to others until 24 hours after you begin taking antibiotics. During this time, you should avoid contact with other people at work or home, especially infants and children. Do not sneeze or cough on others, and wash your hands often. Keep your drinking glass and eating utensils separate from those of others, and wash these items well in hot, soapy water. · Gargle with warm salt water at least once each hour to help reduce swelling and make your throat feel better. Use 1 teaspoon of salt mixed in 8 fluid ounces of warm water. · Take an over-the-counter pain medication, such as acetaminophen (Tylenol), ibuprofen (Advil, Motrin), or naproxen (Aleve). Read and follow all instructions on the label. · Try an over-the-counter anesthetic throat spray or throat lozenges, which may help relieve throat pain. · Drink plenty of fluids. Fluids may help soothe an irritated throat. Hot fluids, such as tea or soup, may help your throat feel better. · Eat soft solids and drink plenty of clear liquids.  Flavored ice pops, ice cream, scrambled eggs, sherbet, and gelatin dessert (such as Jell-O) may also soothe the throat. · Get lots of rest.  · Do not smoke, and avoid secondhand smoke. If you need help quitting, talk to your doctor about stop-smoking programs and medicines. These can increase your chances of quitting for good. · Use a vaporizer or humidifier to add moisture to the air in your bedroom. Follow the directions for cleaning the machine. When should you call for help? Call your doctor now or seek immediate medical care if:  ? · You have a new or higher fever. ? · You have a fever with a stiff neck or severe headache. ? · You have new or worse trouble swallowing. ? · Your sore throat gets much worse on one side. ? · Your pain becomes much worse on one side of your throat. ? Watch closely for changes in your health, and be sure to contact your doctor if:  ? · You are not getting better after 2 days (48 hours). ? · You do not get better as expected. Where can you learn more? Go to http://sallie-bladimir.info/. Enter K625 in the search box to learn more about \"Strep Throat: Care Instructions. \"  Current as of: May 12, 2017  Content Version: 11.4  © 6055-8197 [employer]. Care instructions adapted under license by [employer] (which disclaims liability or warranty for this information). If you have questions about a medical condition or this instruction, always ask your healthcare professional. [address] any warranty or liability for your use of this information.

## 2018-07-03 NOTE — PROGRESS NOTES
Chief Complaint   Patient presents with    Cough    Sore Throat    Nasal Congestion     she is a 29y.o. year old female who presents for evalution. Runny nose, sore throat, slight cough for past 3 days. Throat very sore, just had strep throat - was feeling better after treatment. Course is constant. Has not tried any OTCs. Reviewed PmHx, RxHx, FmHx, SocHx, AllgHx and updated and dated in the chart. Review of Systems - negative except as listed above in the HPI    Objective:     Vitals:    07/03/18 1418   BP: 106/71   Pulse: (!) 113   Resp: 18   Temp: 97.8 °F (36.6 °C)   TempSrc: Oral   SpO2: 98%   Weight: 259 lb (117.5 kg)   Height: 5' 4\" (1.626 m)     Physical Examination: General appearance - alert, well appearing, and in no distress  Ears - bilateral TM's and external ear canals normal  Nose - normal and patent, no erythema, discharge or polyps  Mouth - mucous membranes moist, pharynx normal without lesions and erythematous  Neck - bilateral symmetric anterior adenopathy  Chest - clear to auscultation, no wheezes, rales or rhonchi, symmetric air entry  Heart - normal rate, regular rhythm, normal S1, S2, no murmurs, rubs, clicks or gallops    Assessment/ Plan:   Diagnoses and all orders for this visit:    1. Strep throat  -     cefdinir (OMNICEF) 300 mg capsule; Take 1 Cap by mouth two (2) times a day for 10 days. May use OTC throat lozenges for pain relief. Recommend salt water gargles and drinking cold fluids. If given antibiotic, recommend changing toothbrush in 3-5 days. 2. Sore throat  -     AMB POC RAPID STREP A  pos    Pt voiced understanding regarding plan of care. Follow-up Disposition:  Return if symptoms worsen or fail to improve. I have discussed the diagnosis with the patient and the intended plan as seen in the above orders. The patient has received an after-visit summary and questions were answered concerning future plans.      Medication Side Effects and Warnings were discussed with patient    Bony Bean, RICKIE

## 2018-07-03 NOTE — MR AVS SNAPSHOT
[address] [address] 
[phone] Patient: Daniel Bourgeois MRN:  [id number] Visit Information Date & Time Provider Department Dept. Phone Encounter #  
 7/3/2018  2:30 PM Diaz Cabrera NP 0[address] [facility] [phone] [id number] Follow-up Instructions Return if symptoms worsen or fail to improve. Your Appointments 2018 10:40 AM  
ACUTE CARE with Radha Winters MD  
[address] ([address]) Appt Note: DR. Selena Martinez PT  
 [facility] [address]  
[phone]  
  
   
 [facility] [address] Upcoming Health Maintenance Date Due Influenza Age 5 to Adult 2018 MEDICARE YEARLY EXAM 10/14/2018 PAP AKA CERVICAL CYTOLOGY 2019 DTaP/Tdap/Td series (4 - Td) 2026 Allergies as of 7/3/2018  Review Complete On: 7/3/2018 By: Rhonda Galaviz LPN Severity Noted Reaction Type Reactions Latex  2010    Hives Augmentin [Amoxicillin-pot Clavulanate]  2011    Nausea and Vomiting Codeine  2011    Nausea and Vomiting Morphine Sulfate  2011    Nausea and Vomiting Nuts [Tree Nut]  10/27/2016    Swelling Pcn [Penicillins]  2010    Hives Current Immunizations  Reviewed on 2016 Name Date Human Papillomavirus 2008, 2007, 10/26/2007 Influenza Vaccine 2017, 10/14/2014, 10/29/2013 Influenza Vaccine Fabio Washington) 10/2/2015 Influenza Vaccine (Quad) PF 10/27/2016 Influenza Vaccine Split 10/14/2011, 11/3/2010 Influenza Vaccine Whole 10/26/2007 TD Vaccine 2002 TDAP Vaccine 10/14/2011 Tdap 2016 ZZZ-RETIRED (DO NOT USE) Pneumococcal Vaccine (Unspecified Type) 2012  5:36 PM  
  
 Not reviewed this visit You Were Diagnosed With   
  
 Codes Comments Strep throat    -  Primary ICD-10-CM: J02.0 ICD-9-CM: 034.0 Sore throat     ICD-10-CM: J02.9 ICD-9-CM: 877 Vitals BP Pulse Temp Resp Height(growth percentile) Weight(growth percentile) 106/71 (BP 1 Location: Left arm, BP Patient Position: Sitting) (!) 113 97.8 °F (36.6 °C) (Oral) 18 5' 4\" (1.626 m) 259 lb (117.5 kg) LMP SpO2 BMI OB Status Smoking Status 06/16/2018 98% 44.46 kg/m2 Having regular periods Never Smoker Vitals History BMI and BSA Data Body Mass Index Body Surface Area 44.46 kg/m 2 2.3 m 2 Preferred Pharmacy Pharmacy Name Phone [address] [phone] Your Updated Medication List  
  
   
This list is accurate as of 7/3/18  2:39 PM.  Always use your most recent med list.  
  
  
  
  
 albuterol 90 mcg/actuation inhaler Commonly known as:  PROAIR HFA Take 2 Puffs by inhalation every four (4) hours as needed for Wheezing. cefdinir 300 mg capsule Commonly known as:  OMNICEF Take 1 Cap by mouth two (2) times a day for 10 days. cetirizine 10 mg tablet Commonly known as:  ZYRTEC  
TAKE ONE TABLET BY MOUTH ONCE DAILY  
  
 clonazePAM 1 mg tablet Commonly known as:  Seema Katos Take 1 Tab by mouth daily as needed. EPINEPHrine 0.3 mg/0.3 mL injection Commonly known as:  Tammi Lazo INJECT 0.3ML INTRAMUSCULARLY ONCE AS NEEDED FOR  UP  TO  1  DOSE  
  
 ferrous sulfate 325 mg (65 mg iron) tablet Take 1 Tab by mouth two (2) times a day. fluticasone 50 mcg/actuation nasal spray Commonly known as:  João Redo 2 Sprays by Both Nostrils route daily. JUNEL FE 24 1 mg-20 mcg (24)/75 mg (4) Tab Generic drug:  norethindrone-e estradiol-iron * levothyroxine 25 mcg tablet Commonly known as:  SYNTHROID Take 1 Tab by mouth Daily (before breakfast). * levothyroxine 200 mcg tablet Commonly known as:  SYNTHROID  
 TAKE ONE TABLET BY MOUTH ONCE DAILY BEFORE BREAKFAST  
  
 loratadine-pseudoephedrine 5-120 mg per tablet Commonly known as:  CLARITIN-D 12 HOUR Take 1 Tab by mouth two (2) times a day. montelukast 10 mg tablet Commonly known as:  SINGULAIR  
TAKE ONE TABLET BY MOUTH ONCE DAILY  
  
 omeprazole 20 mg capsule Commonly known as:  PRILOSEC  
TAKE ONE CAPSULE BY MOUTH TWICE DAILY  
  
 ondansetron 8 mg disintegrating tablet Commonly known as:  ZOFRAN ODT Take 1 Tab by mouth every eight (8) hours as needed for Nausea. polyethylene glycol 17 gram/dose powder Commonly known as:  Paz Floor Take 17 g by mouth two (2) times a day. SUMAtriptan 100 mg tablet Commonly known as:  IMITREX Take 1 Tab by mouth once as needed for Migraine for up to 1 dose. venlafaxine- mg capsule Commonly known as:  EFFEXOR-XR  
TAKE ONE CAPSULE BY MOUTH ONCE DAILY MAINTENANCE  DOSE  
  
 VITAMIN D2 50,000 unit capsule Generic drug:  ergocalciferol TAKE ONE CAPSULE BY MOUTH EVERY 7 DAYS * Notice: This list has 2 medication(s) that are the same as other medications prescribed for you. Read the directions carefully, and ask your doctor or other care provider to review them with you. Prescriptions Sent to Pharmacy Refills  
 cefdinir (OMNICEF) 300 mg capsule 0 Sig: Take 1 Cap by mouth two (2) times a day for 10 days. Class: Normal  
 [facility] [place] [address] [facility] #: [phone] Route: Oral  
  
We Performed the Following AMB POC RAPID STREP A [38713 CPT(R)] Follow-up Instructions Return if symptoms worsen or fail to improve. Patient Instructions Strep Throat: Care Instructions Your Care Instructions Strep throat is a bacterial infection that causes sudden, severe sore throat and fever.  Strep throat, which is caused by bacteria called streptococcus, is treated with antibiotics. Sometimes a strep test is necessary to tell if the sore throat is caused by strep bacteria. Treatment can help ease symptoms and may prevent future problems. Follow-up care is a key part of your treatment and safety. Be sure to make and go to all appointments, and call your doctor if you are having problems. It's also a good idea to know your test results and keep a list of the medicines you take. How can you care for yourself at home? · Take your antibiotics as directed. Do not stop taking them just because you feel better. You need to take the full course of antibiotics. · Strep throat can spread to others until 24 hours after you begin taking antibiotics. During this time, you should avoid contact with other people at work or home, especially infants and children. Do not sneeze or cough on others, and wash your hands often. Keep your drinking glass and eating utensils separate from those of others, and wash these items well in hot, soapy water. · Gargle with warm salt water at least once each hour to help reduce swelling and make your throat feel better. Use 1 teaspoon of salt mixed in 8 fluid ounces of warm water. · Take an over-the-counter pain medication, such as acetaminophen (Tylenol), ibuprofen (Advil, Motrin), or naproxen (Aleve). Read and follow all instructions on the label. · Try an over-the-counter anesthetic throat spray or throat lozenges, which may help relieve throat pain. · Drink plenty of fluids. Fluids may help soothe an irritated throat. Hot fluids, such as tea or soup, may help your throat feel better. · Eat soft solids and drink plenty of clear liquids. Flavored ice pops, ice cream, scrambled eggs, sherbet, and gelatin dessert (such as Jell-O) may also soothe the throat. · Get lots of rest. 
· Do not smoke, and avoid secondhand smoke. If you need help quitting, talk to your doctor about stop-smoking programs and medicines.  These can increase your chances of quitting for good. · Use a vaporizer or humidifier to add moisture to the air in your bedroom. Follow the directions for cleaning the machine. When should you call for help? Call your doctor now or seek immediate medical care if: 
? · You have a new or higher fever. ? · You have a fever with a stiff neck or severe headache. ? · You have new or worse trouble swallowing. ? · Your sore throat gets much worse on one side. ? · Your pain becomes much worse on one side of your throat. ? Watch closely for changes in your health, and be sure to contact your doctor if: 
? · You are not getting better after 2 days (48 hours). ? · You do not get better as expected. Where can you learn more? Go to http://sallie-bladimir.info/. Enter K625 in the search box to learn more about \"Strep Throat: Care Instructions. \" Current as of: May 12, 2017 Content Version: 11.4 © 3915-4608 [employer]. Care instructions adapted under license by [employer] (which disclaims liability or warranty for this information). If you have questions about a medical condition or this instruction, always ask your healthcare professional. [address] [address] any warranty or liability for your use of this information. Introducing [facility]! Dear Karolyn Ortiz: Thank you for requesting a [email or web address] account. Our records indicate that you already have an active [email or web address] account. You can access your account anytime at [email or web address] Did you know that you can access your hospital and ER discharge instructions at any time in [email or web address]? You can also review all of your test results from your hospital stay or ER visit. Additional Information If you have questions, please visit the Frequently Asked Questions section of the [email or web address] website at [email or web address] Remember, [employer] is NOT to be used for urgent needs. For medical emergencies, dial 911. Now available from your iPhone and Android! Please provide this summary of care documentation to your next provider. Your primary care clinician is listed as Radha Cook. If you have any questions after today's visit, please call [phone].

## 2018-07-03 NOTE — LETTER
7/3/2018 2:38 PM 
 
Ms. [place]o [place] [address] Please excuse Ms. Estanislado Meckel from work today due to illness. Sincerely, Claribel Mcgregor NP

## 2018-07-20 DIAGNOSIS — F41.0 PANIC ATTACKS: ICD-10-CM

## 2018-07-23 RX ORDER — CLONAZEPAM 1 MG/1
TABLET ORAL
Qty: 30 TAB | Refills: 2 | OUTPATIENT
Start: 2018-07-23 | End: 2019-01-04 | Stop reason: SDUPTHER

## 2018-08-07 ENCOUNTER — OFFICE VISIT (OUTPATIENT)
Dept: FAMILY MEDICINE CLINIC | Age: 33
End: 2018-08-07

## 2018-08-07 VITALS
RESPIRATION RATE: 20 BRPM | HEIGHT: 64 IN | BODY MASS INDEX: 44.39 KG/M2 | DIASTOLIC BLOOD PRESSURE: 82 MMHG | TEMPERATURE: 98 F | WEIGHT: 260 LBS | SYSTOLIC BLOOD PRESSURE: 139 MMHG | HEART RATE: 77 BPM | OXYGEN SATURATION: 98 %

## 2018-08-07 DIAGNOSIS — R79.9 ABNORMAL FINDING OF BLOOD CHEMISTRY: ICD-10-CM

## 2018-08-07 DIAGNOSIS — E03.9 HYPOTHYROIDISM, UNSPECIFIED TYPE: Primary | Chronic | ICD-10-CM
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
## 2018-08-07 RX ORDER — MUPIROCIN 20 MG/G
OINTMENT TOPICAL DAILY
Qty: 22 G | Refills: 0 | Status: SHIPPED | OUTPATIENT
Start: 2018-08-07 | End: 2019-10-17

## 2018-08-07 NOTE — MR AVS SNAPSHOT
[address] [address] 
[phone] Patient: Valorie Grant MRN:  [id number] Visit Information Date & Time Provider Department Dept. Phone Encounter #  
 2018  7:15 AM Danyelle Allen MD [facility] [phone] [id number] Upcoming Health Maintenance Date Due Influenza Age 5 to Adult 2018 PAP AKA CERVICAL CYTOLOGY 2019 DTaP/Tdap/Td series (4 - Td) 2026 Allergies as of 2018  Review Complete On: 2018 By: Danyelle Allen MD  
  
 Severity Noted Reaction Type Reactions Latex  2010    Hives Augmentin [Amoxicillin-pot Clavulanate]  2011    Nausea and Vomiting Codeine  2011    Nausea and Vomiting Morphine Sulfate  2011    Nausea and Vomiting Nuts [Tree Nut]  10/27/2016    Swelling Pcn [Penicillins]  2010    Hives Current Immunizations  Reviewed on 2016 Name Date Human Papillomavirus 2008, 2007, 10/26/2007 Influenza Vaccine 2017, 10/14/2014, 10/29/2013 Influenza Vaccine Kacie Miu) 10/2/2015 Influenza Vaccine (Quad) PF 10/27/2016 Influenza Vaccine Split 10/14/2011, 11/3/2010 Influenza Vaccine Whole 10/26/2007 TD Vaccine 2002 TDAP Vaccine 10/14/2011 Tdap 2016 ZZZ-RETIRED (DO NOT USE) Pneumococcal Vaccine (Unspecified Type) 2012  5:36 PM  
  
 Not reviewed this visit You Were Diagnosed With   
  
 Codes Comments Hypothyroidism, unspecified type    -  Primary ICD-10-CM: E03.9 ICD-9-CM: 244.9 Bug bite, initial encounter     ICD-10-CM: W57Jh Fernandez ICD-9-CM: 919.4 Abnormal finding of blood chemistry     ICD-10-CM: R79.9 ICD-9-CM: 790.6 Vitals BP Pulse Temp Resp Height(growth percentile) Weight(growth percentile)  139/82 (BP 1 Location: Left arm, BP Patient Position: Sitting) 77 98 °F (36.7 °C) (Oral) 20 5' 4\" (1.626 m) 260 lb (117.9 kg) SpO2 BMI OB Status Smoking Status 98% 44.63 kg/m2 Having regular periods Never Smoker Vitals History BMI and BSA Data Body Mass Index Body Surface Area  
 44.63 kg/m 2 2.31 m 2 Preferred Pharmacy Pharmacy Name Phone [address], [address] [place] [phone] Your Updated Medication List  
  
   
This list is accurate as of 8/7/18  7:51 AM.  Always use your most recent med list.  
  
  
  
  
 albuterol 90 mcg/actuation inhaler Commonly known as:  PROAIR HFA Take 2 Puffs by inhalation every four (4) hours as needed for Wheezing. cetirizine 10 mg tablet Commonly known as:  ZYRTEC  
TAKE ONE TABLET BY MOUTH ONCE DAILY  
  
 clonazePAM 1 mg tablet Commonly known as:  KlonoPIN  
TAKE ONE TABLET BY MOUTH ONCE DAILY AS NEEDED  
  
 EPINEPHrine 0.3 mg/0.3 mL injection Commonly known as:  Buenrostro Cane INJECT 0.3ML INTRAMUSCULARLY ONCE AS NEEDED FOR  UP  TO  1  DOSE  
  
 ferrous sulfate 325 mg (65 mg iron) tablet Take 1 Tab by mouth two (2) times a day. fluticasone 50 mcg/actuation nasal spray Commonly known as:  Eliezrae Caller 2 Sprays by Both Nostrils route daily. JUNEL FE 24 1 mg-20 mcg (24)/75 mg (4) Tab Generic drug:  norethindrone-e estradiol-iron * levothyroxine 25 mcg tablet Commonly known as:  SYNTHROID Take 1 Tab by mouth Daily (before breakfast). * levothyroxine 200 mcg tablet Commonly known as:  SYNTHROID  
TAKE ONE TABLET BY MOUTH ONCE DAILY BEFORE BREAKFAST  
  
 loratadine-pseudoephedrine 5-120 mg per tablet Commonly known as:  CLARITIN-D 12 HOUR Take 1 Tab by mouth two (2) times a day. montelukast 10 mg tablet Commonly known as:  SINGULAIR  
TAKE ONE TABLET BY MOUTH ONCE DAILY  
  
 mupirocin 2 % ointment Commonly known as:  [employer] Apply  to affected area daily. omeprazole 20 mg capsule Commonly known as:  PRILOSEC  
TAKE ONE CAPSULE BY MOUTH TWICE DAILY  
  
 ondansetron 8 mg disintegrating tablet Commonly known as:  ZOFRAN ODT Take 1 Tab by mouth every eight (8) hours as needed for Nausea. polyethylene glycol 17 gram/dose powder Commonly known as:  Stephanie Clos Take 17 g by mouth two (2) times a day. SUMAtriptan 100 mg tablet Commonly known as:  IMITREX Take 1 Tab by mouth once as needed for Migraine for up to 1 dose. venlafaxine- mg capsule Commonly known as:  EFFEXOR-XR  
TAKE ONE CAPSULE BY MOUTH ONCE DAILY MAINTENANCE  DOSE  
  
 VITAMIN D2 50,000 unit capsule Generic drug:  ergocalciferol TAKE ONE CAPSULE BY MOUTH EVERY 7 DAYS * Notice: This list has 2 medication(s) that are the same as other medications prescribed for you. Read the directions carefully, and ask your doctor or other care provider to review them with you. Prescriptions Sent to Pharmacy Refills  
 mupirocin (BACTROBAN) 2 % ointment 0 Sig: Apply  to affected area daily. Class: Normal  
 Pharmacy: [address], [address] [place] [address] #: [phone] Route: Topical  
  
We Performed the Following CBC WITH AUTOMATED DIFF [05314 CPT(R)] HEMOGLOBIN A1C WITH EAG [61594 CPT(R)] METABOLIC PANEL, COMPREHENSIVE [32899 CPT(R)] TSH 3RD GENERATION [27978 CPT(R)] Patient Instructions Body Mass Index: Care Instructions Your Care Instructions Body mass index (BMI) can help you see if your weight is raising your risk for health problems. It uses a formula to compare how much you weigh with how tall you are. · A BMI lower than 18.5 is considered underweight. · A BMI between 18.5 and 24.9 is considered healthy. · A BMI between 25 and 29.9 is considered overweight. A BMI of 30 or higher is considered obese.  
If your BMI is in the normal range, it means that you have a lower risk for weight-related health problems. If your BMI is in the overweight or obese range, you may be at increased risk for weight-related health problems, such as high blood pressure, heart disease, stroke, arthritis or joint pain, and diabetes. If your BMI is in the underweight range, you may be at increased risk for health problems such as fatigue, lower protection (immunity) against illness, muscle loss, bone loss, hair loss, and hormone problems. BMI is just one measure of your risk for weight-related health problems. You may be at higher risk for health problems if you are not active, you eat an unhealthy diet, or you drink too much alcohol or use tobacco products. Follow-up care is a key part of your treatment and safety. Be sure to make and go to all appointments, and call your doctor if you are having problems. It's also a good idea to know your test results and keep a list of the medicines you take. How can you care for yourself at home? · Practice healthy eating habits. This includes eating plenty of fruits, vegetables, whole grains, lean protein, and low-fat dairy. · If your doctor recommends it, get more exercise. Walking is a good choice. Bit by bit, increase the amount you walk every day. Try for at least 30 minutes on most days of the week. · Do not smoke. Smoking can increase your risk for health problems. If you need help quitting, talk to your doctor about stop-smoking programs and medicines. These can increase your chances of quitting for good. · Limit alcohol to 2 drinks a day for men and 1 drink a day for women. Too much alcohol can cause health problems. If you have a BMI higher than 25 · Your doctor may do other tests to check your risk for weight-related health problems. This may include measuring the distance around your waist. A waist measurement of more than 40 inches in men or 35 inches in women can increase the risk of weight-related health problems. · Talk with your doctor about steps you can take to stay healthy or improve your health. You may need to make lifestyle changes to lose weight and stay healthy, such as changing your diet and getting regular exercise. If you have a BMI lower than 18.5 · Your doctor may do other tests to check your risk for health problems. · Talk with your doctor about steps you can take to stay healthy or improve your health. You may need to make lifestyle changes to gain or maintain weight and stay healthy, such as getting more healthy foods in your diet and doing exercises to build muscle. Where can you learn more? Go to http://sallie-bladimir.info/. Enter S176 in the search box to learn more about \"Body Mass Index: Care Instructions. \" Current as of: October 13, 2016 Content Version: 11.4 © 2980-4836 [employer]. Care instructions adapted under license by [employer] (which disclaims liability or warranty for this information). If you have questions about a medical condition or this instruction, always ask your healthcare professional. [address] [address] any warranty or liability for your use of this information. Introducing [facility]! Dear Karolyn Ortiz: Thank you for requesting a [employer] account. Our records indicate that you already have an active [employer] account. You can access your account anytime at [email or web address] Did you know that you can access your hospital and ER discharge instructions at any time in [employer]? You can also review all of your test results from your hospital stay or ER visit. Additional Information If you have questions, please visit the Frequently Asked Questions section of the [employer] website at [email or web address] Remember, [employer] is NOT to be used for urgent needs. For medical emergencies, dial 911. Now available from your iPhone and Android! Please provide this summary of care documentation to your next provider. Your primary care clinician is listed as Beba Malone. If you have any questions after today's visit, please call [phone].

## 2018-08-07 NOTE — PATIENT INSTRUCTIONS
Body Mass Index: Care Instructions  Your Care Instructions    Body mass index (BMI) can help you see if your weight is raising your risk for health problems. It uses a formula to compare how much you weigh with how tall you are. · A BMI lower than 18.5 is considered underweight. · A BMI between 18.5 and 24.9 is considered healthy. · A BMI between 25 and 29.9 is considered overweight. A BMI of 30 or higher is considered obese. If your BMI is in the normal range, it means that you have a lower risk for weight-related health problems. If your BMI is in the overweight or obese range, you may be at increased risk for weight-related health problems, such as high blood pressure, heart disease, stroke, arthritis or joint pain, and diabetes. If your BMI is in the underweight range, you may be at increased risk for health problems such as fatigue, lower protection (immunity) against illness, muscle loss, bone loss, hair loss, and hormone problems. BMI is just one measure of your risk for weight-related health problems. You may be at higher risk for health problems if you are not active, you eat an unhealthy diet, or you drink too much alcohol or use tobacco products. Follow-up care is a key part of your treatment and safety. Be sure to make and go to all appointments, and call your doctor if you are having problems. It's also a good idea to know your test results and keep a list of the medicines you take. How can you care for yourself at home? · Practice healthy eating habits. This includes eating plenty of fruits, vegetables, whole grains, lean protein, and low-fat dairy. · If your doctor recommends it, get more exercise. Walking is a good choice. Bit by bit, increase the amount you walk every day. Try for at least 30 minutes on most days of the week. · Do not smoke. Smoking can increase your risk for health problems. If you need help quitting, talk to your doctor about stop-smoking programs and medicines. These can increase your chances of quitting for good. · Limit alcohol to 2 drinks a day for men and 1 drink a day for women. Too much alcohol can cause health problems. If you have a BMI higher than 25  · Your doctor may do other tests to check your risk for weight-related health problems. This may include measuring the distance around your waist. A waist measurement of more than 40 inches in men or 35 inches in women can increase the risk of weight-related health problems. · Talk with your doctor about steps you can take to stay healthy or improve your health. You may need to make lifestyle changes to lose weight and stay healthy, such as changing your diet and getting regular exercise. If you have a BMI lower than 18.5  · Your doctor may do other tests to check your risk for health problems. · Talk with your doctor about steps you can take to stay healthy or improve your health. You may need to make lifestyle changes to gain or maintain weight and stay healthy, such as getting more healthy foods in your diet and doing exercises to build muscle. Where can you learn more? Go to http://sallie-bladimir.info/. Enter S176 in the search box to learn more about \"Body Mass Index: Care Instructions. \"  Current as of: October 13, 2016  Content Version: 11.4  © 6502-0509 [employer]. Care instructions adapted under license by [employer] (which disclaims liability or warranty for this information). If you have questions about a medical condition or this instruction, always ask your healthcare professional. [address] any warranty or liability for your use of this information.

## 2018-08-07 NOTE — PROGRESS NOTES
Chief Complaint   Patient presents with    Labs     Pt is fasting    Anxiety     \"its getting worse\"     Pt seen in the office today for fasting labs and to discuss increasing anxiety. Pt reports that her aunt wants her sister to move out of the group home and in with her, pt feels anxious about caring for her. Endorses taking her medication as written    Subjective: (As above and below)     Chief Complaint   Patient presents with   Freddie Nam     Pt is fasting    Anxiety     \"its getting worse\"     she is a 29y.o. year old female who presents for evaluation. Reviewed PmHx, RxHx, FmHx, SocHx, AllgHx and updated in chart. Review of Systems - negative except as listed above    Objective:     Vitals:    08/07/18 0716   BP: 139/82   Pulse: 77   Resp: 20   Temp: 98 °F (36.7 °C)   TempSrc: Oral   SpO2: 98%   Weight: 260 lb (117.9 kg)   Height: 5' 4\" (1.626 m)     Physical Examination: General appearance - alert, well appearing, and in no distress  Mental status - normal mood, behavior, speech, dress, motor activity, and thought processes  Mouth - mucous membranes moist, pharynx normal without lesions  Chest - clear to auscultation, no wheezes, rales or rhonchi, symmetric air entry  Heart - normal rate, regular rhythm, normal S1, S2, no murmurs, rubs, clicks or gallops  Musculoskeletal - no joint tenderness, deformity or swelling  Skin - multiple scabbed lesions on legs    Assessment/ Plan:   1. Hypothyroidism, unspecified type  -check fasting labs  - METABOLIC PANEL, COMPREHENSIVE  - CBC WITH AUTOMATED DIFF  - TSH 3RD GENERATION    2. Bug bite, initial encounter  - mupirocin (BACTROBAN) 2 % ointment; Apply  to affected area daily. Dispense: 22 g; Refill: 0    3. Abnormal finding of blood chemistry   - HEMOGLOBIN A1C WITH EAG     Follow-up Disposition: As needed  I have discussed the diagnosis with the patient and the intended plan as seen in the above orders.   The patient has received an after-visit summary and questions were answered concerning future plans.      Medication Side Effects and Warnings were discussed with patient: yes  Patient Labs were reviewed: yes  Patient Past Records were reviewed:  yes    Ferny Rojo M.D.

## 2018-08-08 LAB
ALBUMIN SERPL-MCNC: 4.6 G/DL (ref 3.5–5.5)
ALBUMIN/GLOB SERPL: 2.1 {RATIO} (ref 1.2–2.2)
ALP SERPL-CCNC: 118 IU/L (ref 39–117)
ALT SERPL-CCNC: 29 IU/L (ref 0–32)
AST SERPL-CCNC: 19 IU/L (ref 0–40)
BASOPHILS # BLD AUTO: 0 X10E3/UL (ref 0–0.2)
BASOPHILS NFR BLD AUTO: 0 %
BILIRUB SERPL-MCNC: 0.6 MG/DL (ref 0–1.2)
BUN SERPL-MCNC: 7 MG/DL (ref 6–20)
BUN/CREAT SERPL: 9 (ref 9–23)
CALCIUM SERPL-MCNC: 9.4 MG/DL (ref 8.7–10.2)
CHLORIDE SERPL-SCNC: 105 MMOL/L (ref 96–106)
CO2 SERPL-SCNC: 22 MMOL/L (ref 20–29)
CREAT SERPL-MCNC: 0.74 MG/DL (ref 0.57–1)
EOSINOPHIL # BLD AUTO: 0.4 X10E3/UL (ref 0–0.4)
EOSINOPHIL NFR BLD AUTO: 4 %
ERYTHROCYTE [DISTWIDTH] IN BLOOD BY AUTOMATED COUNT: 13.9 % (ref 12.3–15.4)
EST. AVERAGE GLUCOSE BLD GHB EST-MCNC: 103 MG/DL
GLOBULIN SER CALC-MCNC: 2.2 G/DL (ref 1.5–4.5)
GLUCOSE SERPL-MCNC: 90 MG/DL (ref 65–99)
HBA1C MFR BLD: 5.2 % (ref 4.8–5.6)
HCT VFR BLD AUTO: 43.2 % (ref 34–46.6)
HGB BLD-MCNC: 13.8 G/DL (ref 11.1–15.9)
IMM GRANULOCYTES # BLD: 0 X10E3/UL (ref 0–0.1)
IMM GRANULOCYTES NFR BLD: 0 %
LYMPHOCYTES # BLD AUTO: 2.3 X10E3/UL (ref 0.7–3.1)
LYMPHOCYTES NFR BLD AUTO: 24 %
MCH RBC QN AUTO: 30.3 PG (ref 26.6–33)
MCHC RBC AUTO-ENTMCNC: 31.9 G/DL (ref 31.5–35.7)
MCV RBC AUTO: 95 FL (ref 79–97)
MONOCYTES # BLD AUTO: 0.5 X10E3/UL (ref 0.1–0.9)
MONOCYTES NFR BLD AUTO: 5 %
NEUTROPHILS # BLD AUTO: 6.5 X10E3/UL (ref 1.4–7)
NEUTROPHILS NFR BLD AUTO: 67 %
PLATELET # BLD AUTO: 330 X10E3/UL (ref 150–379)
POTASSIUM SERPL-SCNC: 5 MMOL/L (ref 3.5–5.2)
PROT SERPL-MCNC: 6.8 G/DL (ref 6–8.5)
RBC # BLD AUTO: 4.56 X10E6/UL (ref 3.77–5.28)
SODIUM SERPL-SCNC: 143 MMOL/L (ref 134–144)
TSH SERPL DL<=0.005 MIU/L-ACNC: 0.17 UIU/ML (ref 0.45–4.5)
WBC # BLD AUTO: 9.8 X10E3/UL (ref 3.4–10.8)

## 2018-08-09 ENCOUNTER — TELEPHONE (OUTPATIENT)
Dept: FAMILY MEDICINE CLINIC | Age: 33
End: 2018-08-09

## 2018-08-09 NOTE — PROGRESS NOTES
Thyroid is slightly overactive, please continue on current medication and recheck in 3 months  Please inform

## 2018-08-09 NOTE — TELEPHONE ENCOUNTER
----- Message from Tracie Melara sent at 8/9/2018  7:12 AM EDT -----  Regarding: Dr. Bhavik North   Pt called requesting test results. Pt best contact number is [phone].

## 2018-08-09 NOTE — PROGRESS NOTES
Pt informed of results and providers recommendations, all questions answered. Pt expressed understanding. No further questions or comments voiced.

## 2018-08-14 DIAGNOSIS — E03.9 HYPOTHYROIDISM, UNSPECIFIED TYPE: ICD-10-CM

## 2018-08-14 DIAGNOSIS — E55.9 VITAMIN D DEFICIENCY: ICD-10-CM

## 2018-08-14 DIAGNOSIS — K21.9 GASTROESOPHAGEAL REFLUX DISEASE WITHOUT ESOPHAGITIS: ICD-10-CM

## 2018-08-14 RX ORDER — LEVOTHYROXINE SODIUM 200 UG/1
200 TABLET ORAL
Qty: 90 TAB | Refills: 3 | Status: SHIPPED | OUTPATIENT
Start: 2018-08-14 | End: 2019-04-17

## 2018-08-14 RX ORDER — MONTELUKAST SODIUM 10 MG/1
TABLET ORAL
Qty: 30 TAB | Refills: 5 | Status: SHIPPED | OUTPATIENT
Start: 2018-08-14 | End: 2020-04-29

## 2018-08-14 RX ORDER — ALBUTEROL SULFATE 90 UG/1
2 AEROSOL, METERED RESPIRATORY (INHALATION)
Qty: 9 INHALER | Refills: 1 | Status: SHIPPED | OUTPATIENT
Start: 2018-08-14 | End: 2019-12-16 | Stop reason: SDUPTHER

## 2018-08-14 NOTE — TELEPHONE ENCOUNTER
From: Debbi Allison  To: Mel Mackey NP  Sent: 8/14/2018 8:52 AM EDT  Subject: Medication Renewal Request    Original authorizing provider: RICKIE Langleymichelle Marinoas would like a refill of the following medications:  montelukast (SINGULAIR) 10 mg tablet Mel Mackey NP]  albuterol (PROAIR HFA) 90 mcg/actuation inhaler Mel Mackey NP]    Preferred pharmacy: [address] [place] [address], [address]    Comment:      Medication renewals requested in this message routed to other providers:  levothyroxine (SYNTHROID) 200 mcg tablet Ann Marie Acevedo MD]  cetirizine (ZYRTEC) 10 mg tablet Ryland Barahona NP]  VITAMIN D2 50,000 unit capsule Ryland Barahona NP]  omeprazole (PRILOSEC) 20 mg capsule Ryland Barahona NP]  levothyroxine (SYNTHROID) 25 mcg tablet Ryland Barahona NP]

## 2018-08-14 NOTE — TELEPHONE ENCOUNTER
From: Leslie Brandon  To: Xenia Wiggins MD  Sent: 8/14/2018 8:52 AM EDT  Subject: Medication Renewal Request    Original authorizing provider: MD Marco Antonio Metz.  Margotyari Mani would like a refill of the following medications:  levothyroxine (SYNTHROID) 200 mcg tablet Yi Acevedo MD]    Preferred pharmacy: [facility] [address] [address], [address] [place] [address]    Comment:      Medication renewals requested in this message routed to other providers:  montelukast (SINGULAIR) 10 mg tablet Lynnette Cazares NP]  albuterol [facility] HFA) 90 mcg/actuation inhaler Lynnette Cazares NP]  cetirizine (ZYRTEC) 10 mg tablet Fabien Torres NP]  VITAMIN D2 50,000 unit capsule Fabien Torres NP]  omeprazole (PRILOSEC) 20 mg capsule Fabien Torres NP]  levothyroxine (SYNTHROID) 25 mcg tablet Fabien Torres NP]

## 2018-08-15 RX ORDER — OMEPRAZOLE 20 MG/1
20 CAPSULE, DELAYED RELEASE ORAL 2 TIMES DAILY
Qty: 180 CAP | Refills: 1 | Status: SHIPPED | OUTPATIENT
Start: 2018-08-15 | End: 2020-04-29

## 2018-08-15 RX ORDER — LEVOTHYROXINE SODIUM 25 UG/1
25 TABLET ORAL
Qty: 90 TAB | Refills: 0 | Status: SHIPPED | OUTPATIENT
Start: 2018-08-15 | End: 2019-04-16

## 2018-08-15 RX ORDER — CETIRIZINE HCL 10 MG
10 TABLET ORAL DAILY
Qty: 90 TAB | Refills: 3 | Status: SHIPPED | OUTPATIENT
Start: 2018-08-15 | End: 2019-01-04 | Stop reason: SDUPTHER

## 2018-08-15 RX ORDER — ERGOCALCIFEROL 1.25 MG/1
50000 CAPSULE ORAL
Qty: 12 CAP | Refills: 1 | Status: SHIPPED | OUTPATIENT
Start: 2018-08-15 | End: 2019-04-16 | Stop reason: SDUPTHER

## 2018-08-15 NOTE — TELEPHONE ENCOUNTER
From: Debbi Allison  To: Ryland Barahona NP  Sent: 8/14/2018 8:52 AM EDT  Subject: Medication Renewal Request    Original authorizing provider: RICKIE Hendricksmichelle Marinoas would like a refill of the following medications:  cetirizine (ZYRTEC) 10 mg tablet Ryland Barahona NP]  VITAMIN D2 50,000 unit capsule Ryland Barahona NP]  omeprazole (PRILOSEC) 20 mg capsule Ryland Barahona NP]  levothyroxine (SYNTHROID) 25 mcg tablet Ryland Barahona NP]    Preferred pharmacy: [address] [place] [address], [address] [place]    Comment:      Medication renewals requested in this message routed to other providers:  levothyroxine (SYNTHROID) 200 mcg tablet Ann Marie Acevedo MD]  montelukast (SINGULAIR) 10 mg tablet Mel Mackey NP]  albuterol (PROAIR HFA) 90 mcg/actuation inhaler Mel Mackey NP]

## 2018-09-21 ENCOUNTER — OFFICE VISIT (OUTPATIENT)
Dept: FAMILY MEDICINE CLINIC | Age: 33
End: 2018-09-21

## 2018-09-21 VITALS
RESPIRATION RATE: 18 BRPM | WEIGHT: 245 LBS | HEART RATE: 69 BPM | HEIGHT: 64 IN | DIASTOLIC BLOOD PRESSURE: 81 MMHG | BODY MASS INDEX: 41.83 KG/M2 | OXYGEN SATURATION: 97 % | TEMPERATURE: 98.7 F | SYSTOLIC BLOOD PRESSURE: 113 MMHG

## 2018-09-21 DIAGNOSIS — J02.9 SORE THROAT: Primary | ICD-10-CM

## 2018-09-21 LAB
S PYO AG THROAT QL: NEGATIVE
VALID INTERNAL CONTROL?: YES

## 2018-09-21 RX ORDER — AZITHROMYCIN 250 MG/1
TABLET, FILM COATED ORAL
Qty: 6 TAB | Refills: 0 | Status: SHIPPED | OUTPATIENT
Start: 2018-09-21 | End: 2018-10-18

## 2018-09-21 NOTE — PROGRESS NOTES
Chief Complaint   Patient presents with    Sore Throat     she is a 29y.o. year old female who presents for evalution. Pt has been having sore throat, congestion, ear pain x 5 days. Has not tried any OTCs. Course is constant. Reviewed PmHx, RxHx, FmHx, SocHx, AllgHx and updated and dated in the chart. Review of Systems - negative except as listed above in the HPI    Objective:     Vitals:    09/21/18 0718   BP: 113/81   Pulse: 69   Resp: 18   Temp: 98.7 °F (37.1 °C)   TempSrc: Oral   SpO2: 97%   Weight: 245 lb (111.1 kg)   Height: 5' 4\" (1.626 m)     Physical Examination: General appearance - alert, well appearing, and in no distress  Ears - bilateral TM's and external ear canals normal  Nose - normal nontender sinuses, mucosal congestion and mucosal erythema  Mouth - mucous membranes moist, pharynx normal without lesions and erythematous  Neck - bilateral symmetric anterior adenopathy  Chest - clear to auscultation, no wheezes, rales or rhonchi, symmetric air entry  Heart - normal rate, regular rhythm, normal S1, S2, no murmurs, rubs, clicks or gallops    Assessment/ Plan:   Diagnoses and all orders for this visit:    1. Sore throat  -     AMB POC RAPID STREP A  -     azithromycin (ZITHROMAX) 250 mg tablet; Take 2 tabs po today then 1 tab po x 4 days  May use OTC throat lozenges for pain relief. Recommend salt water gargles and drinking cold fluids. If given antibiotic, recommend changing toothbrush in 3-5 days. Pt voiced understanding regarding plan of care. Follow-up Disposition: Not on File    I have discussed the diagnosis with the patient and the intended plan as seen in the above orders. The patient has received an after-visit summary and questions were answered concerning future plans.      Medication Side Effects and Warnings were discussed with patient    Tequila Blum NP

## 2018-09-21 NOTE — PROGRESS NOTES
Pt here c/o sore throat and congestion x 4 days. Pt has been afebrile. Reports she has not taken any OTC medication.

## 2018-09-21 NOTE — MR AVS SNAPSHOT
[address] [address] 
[phone] Patient: Corrinne Schilder MRN:  [id number] Visit Information Date & Time Provider Department Dept. Phone Encounter #  
 2018  7:30 AM Sophie Castillo NP [address] [facility] [phone] [id number] Your Appointments 2018 10:40 AM  
ACUTE CARE with Angella Brown MD  
[facility]) Appt Note: abdulaziz pt :  
 [place] [address]  
[address] [place] [id number] Upcoming Health Maintenance Date Due Influenza Age 5 to Adult 2018 MEDICARE YEARLY EXAM 10/14/2018 PAP AKA CERVICAL CYTOLOGY 2019 DTaP/Tdap/Td series (4 - Td) 2026 Allergies as of 2018  Review Complete On: 2018 By: Nanette Mooney LPN Severity Noted Reaction Type Reactions Latex  2010    Hives Augmentin [Amoxicillin-pot Clavulanate]  2011    Nausea and Vomiting Codeine  2011    Nausea and Vomiting Morphine Sulfate  2011    Nausea and Vomiting Nuts [Tree Nut]  10/27/2016    Swelling Pcn [Penicillins]  2010    Hives Current Immunizations  Reviewed on 2016 Name Date Human Papillomavirus 2008, 2007, 10/26/2007 Influenza Vaccine 2017, 10/14/2014, 10/29/2013 Influenza Vaccine [place]) 10/2/2015 Influenza Vaccine (Quad) PF 10/27/2016 Influenza Vaccine Split 10/14/2011, 11/3/2010 Influenza Vaccine Whole 10/26/2007 TD Vaccine 2002 TDAP Vaccine 10/14/2011 Tdap 2016 ZZZ-RETIRED (DO NOT USE) Pneumococcal Vaccine (Unspecified Type) 2012  5:36 PM  
  
 Not reviewed this visit You Were Diagnosed With   
  
 Codes Comments Sore throat    -  Primary ICD-10-CM: J02.9 ICD-9-CM: 973 Vitals BP Pulse Temp Resp Height(growth percentile) Weight(growth percentile) 113/81 (BP 1 Location: Right arm, BP Patient Position: Sitting) 69 98.7 °F (37.1 °C) (Oral) 18 5' 4\" (1.626 m) 245 lb (111.1 kg) LMP SpO2 BMI OB Status Smoking Status 09/19/2018 (Exact Date) 97% 42.05 kg/m2 Having regular periods Never Smoker Vitals History BMI and BSA Data Body Mass Index Body Surface Area 42.05 kg/m 2 2.24 m 2 Preferred Pharmacy Pharmacy Name Phone [facility] [address] [phone] Your Updated Medication List  
  
   
This list is accurate as of 9/21/18  7:40 AM.  Always use your most recent med list.  
  
  
  
  
 albuterol 90 mcg/actuation inhaler Commonly known as:  PROAIR HFA Take 2 Puffs by inhalation every four (4) hours as needed for Wheezing. azithromycin 250 mg tablet Commonly known as:  Vineet Listen Take 2 tabs po today then 1 tab po x 4 days  
  
 cetirizine 10 mg tablet Commonly known as:  ZYRTEC Take 1 Tab by mouth daily. clonazePAM 1 mg tablet Commonly known as:  KlonoPIN  
TAKE ONE TABLET BY MOUTH ONCE DAILY AS NEEDED  
  
 EPINEPHrine 0.3 mg/0.3 mL injection Commonly known as:  Abbi Hill INJECT 0.3ML INTRAMUSCULARLY ONCE AS NEEDED FOR  UP  TO  1  DOSE  
  
 ergocalciferol 50,000 unit capsule Commonly known as:  VITAMIN D2 Take 1 Cap by mouth every seven (7) days. ferrous sulfate 325 mg (65 mg iron) tablet Take 1 Tab by mouth two (2) times a day. fluticasone 50 mcg/actuation nasal spray Commonly known as:  Chancy Touchet 2 Sprays by Both Nostrils route daily. JUNEL FE 24 1 mg-20 mcg (24)/75 mg (4) Tab Generic drug:  norethindrone-e estradiol-iron * levothyroxine 200 mcg tablet Commonly known as:  SYNTHROID Take 1 Tab by mouth Daily (before breakfast). * levothyroxine 25 mcg tablet Commonly known as:  SYNTHROID  
 Take 1 Tab by mouth Daily (before breakfast). Take with 200mcg dose for total daily dose of 225mcg  
  
 loratadine-pseudoephedrine 5-120 mg per tablet Commonly known as:  CLARITIN-D 12 HOUR Take 1 Tab by mouth two (2) times a day. montelukast 10 mg tablet Commonly known as:  SINGULAIR  
TAKE ONE TABLET BY MOUTH ONCE DAILY  
  
 mupirocin 2 % ointment Commonly known as:  [employer] Apply  to affected area daily. omeprazole 20 mg capsule Commonly known as:  PRILOSEC Take 1 Cap by mouth two (2) times a day. ondansetron 8 mg disintegrating tablet Commonly known as:  ZOFRAN ODT Take 1 Tab by mouth every eight (8) hours as needed for Nausea. polyethylene glycol 17 gram/dose powder Commonly known as:  Kreg Patron Take 17 g by mouth two (2) times a day. SUMAtriptan 100 mg tablet Commonly known as:  IMITREX Take 1 Tab by mouth once as needed for Migraine for up to 1 dose. venlafaxine- mg capsule Commonly known as:  EFFEXOR-XR  
TAKE ONE CAPSULE BY MOUTH ONCE DAILY MAINTENANCE  DOSE * Notice: This list has 2 medication(s) that are the same as other medications prescribed for you. Read the directions carefully, and ask your doctor or other care provider to review them with you. Prescriptions Sent to Pharmacy Refills  
 azithromycin (ZITHROMAX) 250 mg tablet 0 Sig: Take 2 tabs po today then 1 tab po x 4 days Class: Normal  
 [facility] [address] Ph #: [phone] We Performed the Following AMB POC RAPID STREP A [73045 CPT(R)] Patient Instructions Sore Throat: Care Instructions Your Care Instructions Infection by bacteria or a virus causes most sore throats. Cigarette smoke, dry air, air pollution, allergies, and yelling can also cause a sore throat. Sore throats can be painful and annoying.  Fortunately, most sore throats go away on their own. If you have a bacterial infection, your doctor may prescribe antibiotics. Follow-up care is a key part of your treatment and safety. Be sure to make and go to all appointments, and call your doctor if you are having problems. It's also a good idea to know your test results and keep a list of the medicines you take. How can you care for yourself at home? · If your doctor prescribed antibiotics, take them as directed. Do not stop taking them just because you feel better. You need to take the full course of antibiotics. · Gargle with warm salt water once an hour to help reduce swelling and relieve discomfort. Use 1 teaspoon of salt mixed in 1 cup of warm water. · Take an over-the-counter pain medicine, such as acetaminophen (Tylenol), ibuprofen (Advil, Motrin), or naproxen (Aleve). Read and follow all instructions on the label. · Be careful when taking over-the-counter cold or flu medicines and Tylenol at the same time. Many of these medicines have acetaminophen, which is Tylenol. Read the labels to make sure that you are not taking more than the recommended dose. Too much acetaminophen (Tylenol) can be harmful. · Drink plenty of fluids. Fluids may help soothe an irritated throat. Hot fluids, such as tea or soup, may help decrease throat pain. · Use over-the-counter throat lozenges to soothe pain. Regular cough drops or hard candy may also help. These should not be given to young children because of the risk of choking. · Do not smoke or allow others to smoke around you. If you need help quitting, talk to your doctor about stop-smoking programs and medicines. These can increase your chances of quitting for good. · Use a vaporizer or humidifier to add moisture to your bedroom. Follow the directions for cleaning the machine. When should you call for help? Call your doctor now or seek immediate medical care if: 
  · You have new or worse trouble swallowing.   · Your sore throat gets much worse on one side.  
 Watch closely for changes in your health, and be sure to contact your doctor if you do not get better as expected. Where can you learn more? Go to http://sallie-bladimir.info/. Enter [id number] [id number] in the search box to learn more about \"Sore Throat: Care Instructions. \" Current as of: May 12, 2017 Content Version: 11.7 © 7994-9323 [employer]. Care instructions adapted under license by [employer] (which disclaims liability or warranty for this information). If you have questions about a medical condition or this instruction, always ask your healthcare professional. [address] [address] any warranty or liability for your use of this information. Introducing [facility] & HEALTH SERVICES! Dear Terry Yao: Thank you for requesting a [employer] account. Our records indicate that you already have an active [employer] account. You can access your account anytime at [email or web address] [email or web address]. [employer] Did you know that you can access your hospital and ER discharge instructions at any time in [employer]? You can also review all of your test results from your hospital stay or ER visit. Additional Information If you have questions, please visit the Frequently Asked Questions section of the [employer] website at [email or web address]. [employer]/. Remember, [employer] is NOT to be used for urgent needs. For medical emergencies, dial 911. Now available from your iPhone and Android! Please provide this summary of care documentation to your next provider. Your primary care clinician is listed as Micheal Rowe. If you have any questions after today's visit, please call [phone].

## 2018-09-21 NOTE — PATIENT INSTRUCTIONS
Sore Throat: Care Instructions  Your Care Instructions    Infection by bacteria or a virus causes most sore throats. Cigarette smoke, dry air, air pollution, allergies, and yelling can also cause a sore throat. Sore throats can be painful and annoying. Fortunately, most sore throats go away on their own. If you have a bacterial infection, your doctor may prescribe antibiotics. Follow-up care is a key part of your treatment and safety. Be sure to make and go to all appointments, and call your doctor if you are having problems. It's also a good idea to know your test results and keep a list of the medicines you take. How can you care for yourself at home? · If your doctor prescribed antibiotics, take them as directed. Do not stop taking them just because you feel better. You need to take the full course of antibiotics. · Gargle with warm salt water once an hour to help reduce swelling and relieve discomfort. Use 1 teaspoon of salt mixed in 1 cup of warm water. · Take an over-the-counter pain medicine, such as acetaminophen (Tylenol), ibuprofen (Advil, Motrin), or naproxen (Aleve). Read and follow all instructions on the label. · Be careful when taking over-the-counter cold or flu medicines and Tylenol at the same time. Many of these medicines have acetaminophen, which is Tylenol. Read the labels to make sure that you are not taking more than the recommended dose. Too much acetaminophen (Tylenol) can be harmful. · Drink plenty of fluids. Fluids may help soothe an irritated throat. Hot fluids, such as tea or soup, may help decrease throat pain. · Use over-the-counter throat lozenges to soothe pain. Regular cough drops or hard candy may also help. These should not be given to young children because of the risk of choking. · Do not smoke or allow others to smoke around you. If you need help quitting, talk to your doctor about stop-smoking programs and medicines.  These can increase your chances of quitting for good. · Use a vaporizer or humidifier to add moisture to your bedroom. Follow the directions for cleaning the machine. When should you call for help? Call your doctor now or seek immediate medical care if:    · You have new or worse trouble swallowing.     · Your sore throat gets much worse on one side.    Watch closely for changes in your health, and be sure to contact your doctor if you do not get better as expected. Where can you learn more? Go to http://sallie-bladimir.info/. Enter [id number] [id number] in the search box to learn more about \"Sore Throat: Care Instructions. \"  Current as of: May 12, 2017  Content Version: 11.7  © 2394-9923 [employer]. Care instructions adapted under license by [employer] (which disclaims liability or warranty for this information). If you have questions about a medical condition or this instruction, always ask your healthcare professional. [address] any warranty or liability for your use of this information.

## 2018-10-18 ENCOUNTER — OFFICE VISIT (OUTPATIENT)
Dept: FAMILY MEDICINE CLINIC | Age: 33
End: 2018-10-18

## 2018-10-18 VITALS
OXYGEN SATURATION: 99 % | HEIGHT: 64 IN | SYSTOLIC BLOOD PRESSURE: 128 MMHG | BODY MASS INDEX: 40.29 KG/M2 | WEIGHT: 236 LBS | TEMPERATURE: 98.1 F | DIASTOLIC BLOOD PRESSURE: 85 MMHG | HEART RATE: 95 BPM | RESPIRATION RATE: 16 BRPM

## 2018-10-18 DIAGNOSIS — Z00.00 ROUTINE GENERAL MEDICAL EXAMINATION AT A HEALTH CARE FACILITY: Primary | ICD-10-CM

## 2018-10-18 DIAGNOSIS — E78.00 PURE HYPERCHOLESTEROLEMIA: ICD-10-CM

## 2018-10-18 DIAGNOSIS — Z23 ENCOUNTER FOR IMMUNIZATION: ICD-10-CM

## 2018-10-18 DIAGNOSIS — R63.4 WEIGHT LOSS: ICD-10-CM

## 2018-10-18 DIAGNOSIS — E55.9 VITAMIN D DEFICIENCY: ICD-10-CM

## 2018-10-18 DIAGNOSIS — E03.9 HYPOTHYROIDISM, UNSPECIFIED TYPE: ICD-10-CM

## 2018-10-18 NOTE — PATIENT INSTRUCTIONS

## 2018-10-18 NOTE — PROGRESS NOTES
This is the Subsequent Medicare Annual Wellness Exam, performed 12 months or more after the Initial AWV or the last Subsequent AWV    I have reviewed the patient's medical history in detail and updated the computerized patient record. History     Past Medical History:   Diagnosis Date    Allergies     Anemia 2008    Anxiety     Asthma     inhaler    Depression     Ear infection     Environmental allergies 4/1/2010    GERD (gastroesophageal reflux disease)     Hypothyroid 4/1/2010    Inflammatory arthritis     Lupus     Nausea & vomiting     Other ill-defined conditions(799.89) noted 7/25/2012    \"Intellectually  Disabled\" signs consents/self. Mom &  concur    Otitis media 4/1/2010    Strabismus 4/1/2010    Unspecified adverse effect of anesthesia 2/10/2011    slow to wake up, sleepy    Wears hearing aid       Past Surgical History:   Procedure Laterality Date    HX BREAST REDUCTION  2005    HX CHOLECYSTECTOMY  2014    HX KNEE ARTHROSCOPY Left     HX ORTHOPAEDIC      heel cord lengthing; right thumb surgery (pin placed)    HX OTHER SURGICAL      Ear surgery    HX TONSIL AND ADENOIDECTOMY  1990     Current Outpatient Medications   Medication Sig Dispense Refill    cetirizine (ZYRTEC) 10 mg tablet Take 1 Tab by mouth daily. 90 Tab 3    ergocalciferol (VITAMIN D2) 50,000 unit capsule Take 1 Cap by mouth every seven (7) days. 12 Cap 1    omeprazole (PRILOSEC) 20 mg capsule Take 1 Cap by mouth two (2) times a day. 180 Cap 1    levothyroxine (SYNTHROID) 25 mcg tablet Take 1 Tab by mouth Daily (before breakfast). Take with 200mcg dose for total daily dose of 225mcg 90 Tab 0    levothyroxine (SYNTHROID) 200 mcg tablet Take 1 Tab by mouth Daily (before breakfast). 90 Tab 3    montelukast (SINGULAIR) 10 mg tablet TAKE ONE TABLET BY MOUTH ONCE DAILY 30 Tab 5    albuterol (PROAIR HFA) 90 mcg/actuation inhaler Take 2 Puffs by inhalation every four (4) hours as needed for Wheezing.  9 Inhaler 1    mupirocin (BACTROBAN) 2 % ointment Apply  to affected area daily. 22 g 0    clonazePAM (KLONOPIN) 1 mg tablet TAKE ONE TABLET BY MOUTH ONCE DAILY AS NEEDED 30 Tab 2    EPINEPHrine (EPIPEN) 0.3 mg/0.3 mL injection INJECT 0.3ML INTRAMUSCULARLY ONCE AS NEEDED FOR  UP  TO  1  DOSE 2 Syringe 2    JUNEL FE 24 1 mg-20 mcg (24)/75 mg (4) tab       ondansetron (ZOFRAN ODT) 8 mg disintegrating tablet Take 1 Tab by mouth every eight (8) hours as needed for Nausea. 30 Tab 0    polyethylene glycol (MIRALAX) 17 gram/dose powder Take 17 g by mouth two (2) times a day. 289 g 2    venlafaxine-SR (EFFEXOR-XR) 150 mg capsule TAKE ONE CAPSULE BY MOUTH ONCE DAILY MAINTENANCE  DOSE 30 Cap 2    ferrous sulfate 325 mg (65 mg iron) tablet Take 1 Tab by mouth two (2) times a day. 60 Tab 5    SUMAtriptan (IMITREX) 100 mg tablet Take 1 Tab by mouth once as needed for Migraine for up to 1 dose. 6 Tab 2    loratadine-pseudoephedrine (CLARITIN-D 12 HOUR) 5-120 mg per tablet Take 1 Tab by mouth two (2) times a day. 10 Tab 0    fluticasone (FLONASE) 50 mcg/actuation nasal spray 2 Sprays by Both Nostrils route daily.  1 Bottle 2     Allergies   Allergen Reactions    Latex Hives    Augmentin [Amoxicillin-Pot Clavulanate] Nausea and Vomiting    Codeine Nausea and Vomiting    Morphine Sulfate Nausea and Vomiting    Nuts [Tree Nut] Swelling    Pcn [Penicillins] Hives     Family History   Problem Relation Age of Onset    Osteoporosis Other     Arthritis-osteo Other     Breast Cancer Other         maternal great aunt    Breast Cancer Other         Maternal great aunt    Stroke Maternal Aunt     Hypertension Sister      Social History     Tobacco Use    Smoking status: Never Smoker    Smokeless tobacco: Never Used   Substance Use Topics    Alcohol use: No     Patient Active Problem List   Diagnosis Code    Hypothyroid E03.9    Environmental allergies Z91.09    [place] (hard of hearing) H91.90    Strabismus H50.9    Anemia, iron deficiency D50.9    Asthma J45.909    Long-term use of Plaquenil Z79.899    Vitamin D deficiency E55.9    Polyuria R35.8    Periodic headache syndrome, not intractable G43. C0    Inflammatory polyarthritis ([facility] M06.4    Obesity, morbid ([facility] E66.01    Recurrent depression ([facility] F33.9       Depression Risk Factor Screening:     PHQ over the last two weeks 12/27/2017   PHQ Not Done Active Diagnosis of Depression or Bipolar Disorder   Little interest or pleasure in doing things -   Feeling down, depressed, irritable, or hopeless -   Total Score PHQ 2 -     Alcohol Risk Factor Screening: You do not drink alcohol or very rarely. Functional Ability and Level of Safety:   Hearing Loss  Hearing is good. Whisper Test done with normal results. Activities of Daily Living  The home contains: no safety equipment. Patient does total self care    Fall Risk  Fall Risk Assessment, last 12 mths 7/10/2017   Able to walk? Yes   Fall in past 12 months? No       Abuse Screen  Patient is not abused    Cognitive Screening   Evaluation of Cognitive Function:  Has your family/caregiver stated any concerns about your memory: no  Normal    Pt states has been feeling more tired recently, due to thyroid recheck. Tries to watch diet at home for cholesterol, no real exercise. Tries to limit portions, still eating some junk food. No chest pain, SOB, dizziness, or vision changes. Has been taking vitamin D supplement weekly. Has lost about 25 lbs in past 2 months. Pt states has been moving around more and watching portion sizes.   Eating 2-3 meals, sometimes skips breakfast.      Physical Examination: General appearance - alert, well appearing, and in no distress  Mental status - alert, oriented to person, place, and time, normal mood, behavior, speech, dress, motor activity, and thought processes  Ears - bilateral TM's and external ear canals normal  Nose - normal and patent, no erythema, discharge or polyps  Mouth - mucous membranes moist, pharynx normal without lesions  Neck - supple, no significant adenopathy  Chest - clear to auscultation, no wheezes, rales or rhonchi, symmetric air entry  Heart - normal rate, regular rhythm, normal S1, S2, no murmurs, rubs, clicks or gallops  Abdomen - soft, nontender, nondistended, no masses or organomegaly  Neurological - alert, oriented, normal speech, no focal findings or movement disorder noted, cranial nerves II through XII intact, motor and sensory grossly normal bilaterally  Extremities - peripheral pulses normal, no pedal edema, no clubbing or cyanosis    Patient Care Team   Patient Care Team:  Yumi , NP as PCP - General (Family Practice)  You Gtz LPN as Ambulatory Care Navigator (Family Practice)  Hiwot Hernandez MD as Physician (Obstetrics & Gynecology)    Assessment/Plan   Education and counseling provided:  Are appropriate based on today's review and evaluation  Influenza Vaccine  Screening Pap and pelvic (covered once every 2 years)  Cardiovascular screening blood test  Diabetes screening test    Diagnoses and all orders for this visit:    Routine general medical examination at a health care facility    Pure hypercholesterolemia  -     METABOLIC PANEL, COMPREHENSIVE  -     LIPID PANEL  Discussed need for low fat diet, rich in fruits and vegetables. Encouraged regular meals and daily exercise of at least 20 minutes. Reviewed sequela of high cholesterol on heart and brain health. Continue current medications. F/U 3 mo. Vitamin D deficiency  -     VITAMIN D, 25 HYDROXY    Hypothyroidism, unspecified type  -     TSH 3RD GENERATION    Encounter for immunization  -     INFLUENZA VIRUS VAC QUAD,SPLIT,PRESV FREE SYRINGE IM  -     ADMIN INFLUENZA VIRUS VAC    Weight loss  -     METABOLIC PANEL, COMPREHENSIVE  -     TSH 3RD GENERATION  -     CBC WITH AUTOMATED DIFF   Increase protein in diet, start monitoring weight weekly at home.   F/U prn    Health Maintenance Due   Topic Date Due    MEDICARE YEARLY EXAM  10/18/2018     Will decide on F/U after reviewing labs.    [place] NP

## 2018-10-18 NOTE — PROGRESS NOTES
1. Have you been to the ER, urgent care clinic since your last visit? Hospitalized since your last visit? No    2. Have you seen or consulted any other health care providers outside of the [address] [place] [address] since your last visit? Include any pap smears or colon screening.  No   Chief Complaint   Patient presents with    Complete Physical    Labs     Non Fasting    Injection     Flu

## 2018-10-18 NOTE — LETTER
NOTIFICATION RETURN TO WORK / SCHOOL 
 
10/18/2018 10:55 AM 
 
Ms. Greyson Serrano [address] [place] [address]  [id number] To Whom It May Concern: 
 
Greyson Serrano is currently under the care of [address]. She will return to work/school on: 10/19/2018 If there are questions or concerns please have the patient contact our office. Sincerely, Kristal Villalpando NP

## 2018-10-19 LAB
25(OH)D3+25(OH)D2 SERPL-MCNC: 29.6 NG/ML (ref 30–100)
ALBUMIN SERPL-MCNC: 4.4 G/DL (ref 3.5–5.5)
ALBUMIN/GLOB SERPL: 2 {RATIO} (ref 1.2–2.2)
ALP SERPL-CCNC: 121 IU/L (ref 39–117)
ALT SERPL-CCNC: 49 IU/L (ref 0–32)
AST SERPL-CCNC: 29 IU/L (ref 0–40)
BASOPHILS # BLD AUTO: 0 X10E3/UL (ref 0–0.2)
BASOPHILS NFR BLD AUTO: 0 %
BILIRUB SERPL-MCNC: 0.9 MG/DL (ref 0–1.2)
BUN SERPL-MCNC: 7 MG/DL (ref 6–20)
BUN/CREAT SERPL: 9 (ref 9–23)
CALCIUM SERPL-MCNC: 9.5 MG/DL (ref 8.7–10.2)
CHLORIDE SERPL-SCNC: 103 MMOL/L (ref 96–106)
CHOLEST SERPL-MCNC: 165 MG/DL (ref 100–199)
CO2 SERPL-SCNC: 22 MMOL/L (ref 20–29)
CREAT SERPL-MCNC: 0.81 MG/DL (ref 0.57–1)
EOSINOPHIL # BLD AUTO: 0.2 X10E3/UL (ref 0–0.4)
EOSINOPHIL NFR BLD AUTO: 2 %
ERYTHROCYTE [DISTWIDTH] IN BLOOD BY AUTOMATED COUNT: 13.3 % (ref 12.3–15.4)
GLOBULIN SER CALC-MCNC: 2.2 G/DL (ref 1.5–4.5)
GLUCOSE SERPL-MCNC: 89 MG/DL (ref 65–99)
HCT VFR BLD AUTO: 42.4 % (ref 34–46.6)
HDLC SERPL-MCNC: 41 MG/DL
HGB BLD-MCNC: 13.7 G/DL (ref 11.1–15.9)
IMM GRANULOCYTES # BLD: 0 X10E3/UL (ref 0–0.1)
IMM GRANULOCYTES NFR BLD: 0 %
INTERPRETATION, 910389: NORMAL
LDLC SERPL CALC-MCNC: 111 MG/DL (ref 0–99)
LYMPHOCYTES # BLD AUTO: 3.1 X10E3/UL (ref 0.7–3.1)
LYMPHOCYTES NFR BLD AUTO: 31 %
MCH RBC QN AUTO: 30.4 PG (ref 26.6–33)
MCHC RBC AUTO-ENTMCNC: 32.3 G/DL (ref 31.5–35.7)
MCV RBC AUTO: 94 FL (ref 79–97)
MONOCYTES # BLD AUTO: 0.6 X10E3/UL (ref 0.1–0.9)
MONOCYTES NFR BLD AUTO: 6 %
NEUTROPHILS # BLD AUTO: 6.2 X10E3/UL (ref 1.4–7)
NEUTROPHILS NFR BLD AUTO: 61 %
PLATELET # BLD AUTO: 313 X10E3/UL (ref 150–379)
POTASSIUM SERPL-SCNC: 4.4 MMOL/L (ref 3.5–5.2)
PROT SERPL-MCNC: 6.6 G/DL (ref 6–8.5)
RBC # BLD AUTO: 4.51 X10E6/UL (ref 3.77–5.28)
SODIUM SERPL-SCNC: 140 MMOL/L (ref 134–144)
TRIGL SERPL-MCNC: 67 MG/DL (ref 0–149)
TSH SERPL DL<=0.005 MIU/L-ACNC: 0.03 UIU/ML (ref 0.45–4.5)
VLDLC SERPL CALC-MCNC: 13 MG/DL (ref 5–40)
WBC # BLD AUTO: 10.1 X10E3/UL (ref 3.4–10.8)

## 2018-10-19 NOTE — PROGRESS NOTES
Slight elevated of liver enzymes, cont to monitor  Thyroid medication needs adjusting, decrease dose to 200mcg only and recheck 6 wks.    Vit D slightly low, will see if pt needs rx or will take OTC supplement

## 2018-11-26 ENCOUNTER — OFFICE VISIT (OUTPATIENT)
Dept: FAMILY MEDICINE CLINIC | Age: 33
End: 2018-11-26

## 2018-11-26 VITALS
HEART RATE: 57 BPM | SYSTOLIC BLOOD PRESSURE: 112 MMHG | DIASTOLIC BLOOD PRESSURE: 78 MMHG | TEMPERATURE: 99.1 F | WEIGHT: 233.06 LBS | RESPIRATION RATE: 18 BRPM | OXYGEN SATURATION: 100 % | HEIGHT: 64 IN | BODY MASS INDEX: 39.79 KG/M2

## 2018-11-26 DIAGNOSIS — E03.9 HYPOTHYROIDISM, UNSPECIFIED TYPE: Primary | ICD-10-CM

## 2018-11-26 DIAGNOSIS — R74.8 ELEVATED LIVER ENZYMES: ICD-10-CM

## 2018-11-26 NOTE — PROGRESS NOTES
1. Have you been to the ER, urgent care clinic since your last visit? Hospitalized since your last visit? No    2. Have you seen or consulted any other health care providers outside of the [address] [place] [address] since your last visit? Include any pap smears or colon screening.  No   Chief Complaint   Patient presents with   [facility]

## 2018-11-26 NOTE — PROGRESS NOTES
Chief Complaint   Patient presents with   [facility]     labs     she is a 34y.o. year old female who presents for evalution. Pt has been taking 200mcg dose of thyroid (was lowered at last appt), here today for recheck. Also here for liver enzymes recheck. Denies excessive alcohol use or tylenol use. Feeling well. Reviewed PmHx, RxHx, FmHx, SocHx, AllgHx and updated and dated in the chart. Review of Systems - negative except as listed above in the HPI    Objective:     Vitals:    11/26/18 1020   BP: 112/78   Pulse: (!) 57   Resp: 18   Temp: 99.1 °F (37.3 °C)   TempSrc: Oral   SpO2: 100%   Weight: 233 lb 1 oz (105.7 kg)   Height: 5' 4\" (1.626 m)     Physical Examination: General appearance - alert, well appearing, and in no distress  Chest - clear to auscultation, no wheezes, rales or rhonchi, symmetric air entry  Heart - normal rate, regular rhythm, normal S1, S2, no murmurs, rubs, clicks or gallops    Assessment/ Plan:   Diagnoses and all orders for this visit:    1. Hypothyroidism, unspecified type  -     TSH 3RD GENERATION  Will decide on F/U after reviewing labs. 2. Elevated liver enzymes  -     METABOLIC PANEL, COMPREHENSIVE  Will decide on F/U after reviewing labs. Pt voiced understanding regarding plan of care. Follow-up Disposition:  Return if symptoms worsen or fail to improve. I have discussed the diagnosis with the patient and the intended plan as seen in the above orders. The patient has received an after-visit summary and questions were answered concerning future plans.      Medication Side Effects and Warnings were discussed with patient    Juan Tang NP

## 2018-11-27 LAB
ALBUMIN SERPL-MCNC: 4.6 G/DL (ref 3.5–5.5)
ALBUMIN/GLOB SERPL: 2.3 {RATIO} (ref 1.2–2.2)
ALP SERPL-CCNC: 100 IU/L (ref 39–117)
ALT SERPL-CCNC: 20 IU/L (ref 0–32)
AST SERPL-CCNC: 15 IU/L (ref 0–40)
BILIRUB SERPL-MCNC: 1 MG/DL (ref 0–1.2)
BUN SERPL-MCNC: 11 MG/DL (ref 6–20)
BUN/CREAT SERPL: 14 (ref 9–23)
CALCIUM SERPL-MCNC: 9.3 MG/DL (ref 8.7–10.2)
CHLORIDE SERPL-SCNC: 104 MMOL/L (ref 96–106)
CO2 SERPL-SCNC: 22 MMOL/L (ref 20–29)
CREAT SERPL-MCNC: 0.78 MG/DL (ref 0.57–1)
GLOBULIN SER CALC-MCNC: 2 G/DL (ref 1.5–4.5)
GLUCOSE SERPL-MCNC: 58 MG/DL (ref 65–99)
POTASSIUM SERPL-SCNC: 4.4 MMOL/L (ref 3.5–5.2)
PROT SERPL-MCNC: 6.6 G/DL (ref 6–8.5)
SODIUM SERPL-SCNC: 141 MMOL/L (ref 134–144)
TSH SERPL DL<=0.005 MIU/L-ACNC: 0.39 UIU/ML (ref 0.45–4.5)

## 2018-11-28 NOTE — PROGRESS NOTES
Thyroid improved but still underactive, will monitor and recheck 3 mo, cont current dose of medication  Liver test back to normal

## 2019-01-04 DIAGNOSIS — F41.0 PANIC ATTACKS: ICD-10-CM

## 2019-01-04 RX ORDER — CETIRIZINE HCL 10 MG
10 TABLET ORAL DAILY
Qty: 90 TAB | Refills: 3 | Status: SHIPPED | OUTPATIENT
Start: 2019-01-04 | End: 2020-03-18 | Stop reason: SDUPTHER

## 2019-01-06 RX ORDER — EPINEPHRINE 0.3 MG/.3ML
0.3 INJECTION SUBCUTANEOUS
Qty: 2 SYRINGE | Refills: 0 | Status: SHIPPED | OUTPATIENT
Start: 2019-01-06 | End: 2021-04-21 | Stop reason: SDUPTHER

## 2019-01-07 RX ORDER — CLONAZEPAM 1 MG/1
1 TABLET ORAL
Qty: 30 TAB | Refills: 0 | OUTPATIENT
Start: 2019-01-07 | End: 2019-05-24 | Stop reason: SDUPTHER

## 2019-01-07 NOTE — TELEPHONE ENCOUNTER
Please call in rx for klonopin to pharmacy on file and notify pt when done. Appt required for next refill.

## 2019-01-09 ENCOUNTER — OFFICE VISIT (OUTPATIENT)
Dept: FAMILY MEDICINE CLINIC | Age: 34
End: 2019-01-09

## 2019-01-09 VITALS
WEIGHT: 236 LBS | HEART RATE: 78 BPM | BODY MASS INDEX: 40.29 KG/M2 | TEMPERATURE: 97.9 F | SYSTOLIC BLOOD PRESSURE: 113 MMHG | RESPIRATION RATE: 20 BRPM | OXYGEN SATURATION: 98 % | DIASTOLIC BLOOD PRESSURE: 73 MMHG | HEIGHT: 64 IN

## 2019-01-09 DIAGNOSIS — J01.00 ACUTE NON-RECURRENT MAXILLARY SINUSITIS: Primary | ICD-10-CM

## 2019-01-09 DIAGNOSIS — R51.9 SINUS HEADACHE: ICD-10-CM

## 2019-01-09 RX ORDER — FLUTICASONE PROPIONATE 50 MCG
2 SPRAY, SUSPENSION (ML) NASAL DAILY
Qty: 1 BOTTLE | Refills: 2 | Status: SHIPPED | OUTPATIENT
Start: 2019-01-09 | End: 2020-04-29 | Stop reason: SDUPTHER

## 2019-01-09 RX ORDER — DOXYCYCLINE 100 MG/1
100 CAPSULE ORAL 2 TIMES DAILY
Qty: 20 CAP | Refills: 0 | Status: SHIPPED | OUTPATIENT
Start: 2019-01-09 | End: 2019-01-19

## 2019-01-09 NOTE — PROGRESS NOTES
Chief Complaint   Patient presents with    Sinus Infection     x 3 days     1. Have you been to the ER, urgent care clinic since your last visit? Hospitalized since your last visit? No    2. Have you seen or consulted any other health care providers outside of the [address] [place] [address] since your last visit? Include any pap smears or colon screening.  No    Visit Vitals  /73 (BP 1 Location: Left arm, BP Patient Position: Sitting)   Pulse 78   Temp 97.9 °F (36.6 °C) (Oral)   Resp 20   Ht 5' 4\" (1.626 m)   Wt 236 lb (107 kg)   SpO2 98%   BMI 40.51 kg/m²

## 2019-01-09 NOTE — PROGRESS NOTES
Chief Complaint   Patient presents with    Sinus Infection     x 3 days       Subjective:   Yossi Schwartz is a 34 y.o. female who complains of congestion and generalized sinus pain for 5 days, gradually worsening since that time. She denies a history of shortness of breath and wheezing. Evaluation to date: none. Treatment to date: OTC products. Patient does not smoke cigarettes. Relevant PMH: No pertinent additional PMH. Patient Active Problem List   Diagnosis Code    Hypothyroid E03.9    Environmental allergies Z91.09    [place] (hard of hearing) H91.90    Strabismus H50.9    Anemia, iron deficiency D50.9    Asthma J45.909    Long-term use of Plaquenil Z79.899    Vitamin D deficiency E55.9    Polyuria R35.8    Periodic headache syndrome, not intractable G43. C0    Inflammatory polyarthritis ([address] M06.4    Obesity, morbid (HCC) E66.01    Recurrent depression ([facility] [address] F33.9        Review of Systems  Pertinent items are noted in HPI. Objective:     Visit Vitals  /73 (BP 1 Location: Left arm, BP Patient Position: Sitting)   Pulse 78   Temp 97.9 °F (36.6 °C) (Oral)   Resp 20   Ht 5' 4\" (1.626 m)   Wt 236 lb (107 kg)   LMP 12/28/2018 (Approximate)   SpO2 98%   BMI 40.51 kg/m²     General:  alert, cooperative, no distress   Eyes: conjunctivae/corneas clear. PERRL, EOM's intact. Fundi benign   Ears: normal TM's and external ear canals AU   Sinuses: tenderness over generalized maxillary   Mouth:  Lips, mucosa, and tongue normal. Teeth and gums normal   Neck: supple, symmetrical, trachea midline and no adenopathy. Heart: S1 and S2 normal, no murmurs noted. Lungs: clear to auscultation bilaterally     Assessment/Plan:   sinusitis  Suggested symptomatic OTC remedies. Antibiotics per orders. RTC prn. ICD-10-CM ICD-9-CM    1. Acute non-recurrent maxillary sinusitis J01.00 461.0 doxycycline (MONODOX) 100 mg capsule   2.  Sinus headache R51 784.0 fluticasone (FLONASE) 50 mcg/actuation nasal spray     Encounter Diagnoses   Name Primary?  Acute non-recurrent maxillary sinusitis Yes    Sinus headache      Orders Placed This Encounter    doxycycline (MONODOX) 100 mg capsule    fluticasone (FLONASE) 50 mcg/actuation nasal spray   .

## 2019-02-15 ENCOUNTER — OFFICE VISIT (OUTPATIENT)
Dept: FAMILY MEDICINE CLINIC | Age: 34
End: 2019-02-15

## 2019-02-15 VITALS
DIASTOLIC BLOOD PRESSURE: 69 MMHG | WEIGHT: 231 LBS | HEIGHT: 64 IN | HEART RATE: 87 BPM | OXYGEN SATURATION: 98 % | RESPIRATION RATE: 16 BRPM | TEMPERATURE: 98.3 F | BODY MASS INDEX: 39.44 KG/M2 | SYSTOLIC BLOOD PRESSURE: 111 MMHG

## 2019-02-15 DIAGNOSIS — R09.81 CONGESTION OF NASAL SINUS: ICD-10-CM

## 2019-02-15 DIAGNOSIS — J06.9 VIRAL URI WITH COUGH: Primary | ICD-10-CM

## 2019-02-15 DIAGNOSIS — J02.9 SORE THROAT: ICD-10-CM

## 2019-02-15 DIAGNOSIS — R05.9 COUGH: ICD-10-CM

## 2019-02-15 LAB
S PYO AG THROAT QL: NEGATIVE
VALID INTERNAL CONTROL?: YES

## 2019-02-15 RX ORDER — BENZONATATE 200 MG/1
200 CAPSULE ORAL
Qty: 30 CAP | Refills: 0 | Status: SHIPPED | OUTPATIENT
Start: 2019-02-15 | End: 2019-04-16

## 2019-02-15 RX ORDER — IBUPROFEN 600 MG/1
600 TABLET ORAL
Qty: 30 TAB | Refills: 1 | Status: SHIPPED | OUTPATIENT
Start: 2019-02-15 | End: 2020-04-29 | Stop reason: SDUPTHER

## 2019-02-15 NOTE — PATIENT INSTRUCTIONS
Viral Respiratory Infection: Care Instructions  Your Care Instructions    Viruses are very small organisms. They grow in number after they enter your body. There are many types that cause different illnesses, such as colds and the mumps. The symptoms of a viral respiratory infection often start quickly. They include a fever, sore throat, and runny nose. You may also just not feel well. Or you may not want to eat much. Most viral respiratory infections are not serious. They usually get better with time and self-care. Antibiotics are not used to treat a viral infection. That's because antibiotics will not help cure a viral illness. In some cases, antiviral medicine can help your body fight a serious viral infection. Follow-up care is a key part of your treatment and safety. Be sure to make and go to all appointments, and call your doctor if you are having problems. It's also a good idea to know your test results and keep a list of the medicines you take. How can you care for yourself at home? · Rest as much as possible until you feel better. · Be safe with medicines. Take your medicine exactly as prescribed. Call your doctor if you think you are having a problem with your medicine. You will get more details on the specific medicine your doctor prescribes. · Take an over-the-counter pain medicine, such as acetaminophen (Tylenol), ibuprofen (Advil, Motrin), or naproxen (Aleve), as needed for pain and fever. Read and follow all instructions on the label. Do not give aspirin to anyone younger than 20. It has been linked to Reye syndrome, a serious illness. · Drink plenty of fluids, enough so that your urine is light yellow or clear like water. Hot fluids, such as tea or soup, may help relieve congestion in your nose and throat. If you have kidney, heart, or liver disease and have to limit fluids, talk with your doctor before you increase the amount of fluids you drink.   · Try to clear mucus from your lungs by breathing deeply and coughing. · Gargle with warm salt water once an hour. This can help reduce swelling and throat pain. Use 1 teaspoon of salt mixed in 1 cup of warm water. · Do not smoke or allow others to smoke around you. If you need help quitting, talk to your doctor about stop-smoking programs and medicines. These can increase your chances of quitting for good. To avoid spreading the virus  · Cough or sneeze into a tissue. Then throw the tissue away. · If you don't have a tissue, use your hand to cover your cough or sneeze. Then clean your hand. You can also cough into your sleeve. · Wash your hands often. Use soap and warm water. Wash for 15 to 20 seconds each time. · If you don't have soap and water near you, you can clean your hands with alcohol wipes or gel. When should you call for help? Call your doctor now or seek immediate medical care if:    · You have a new or higher fever.     · Your fever lasts more than 48 hours.     · You have trouble breathing.     · You have a fever with a stiff neck or a severe headache.     · You are sensitive to light.     · You feel very sleepy or confused.    Watch closely for changes in your health, and be sure to contact your doctor if:    · You do not get better as expected. Where can you learn more? Go to http://sallie-bladimir.info/. Enter U845 in the search box to learn more about \"Viral Respiratory Infection: Care Instructions. \"  Current as of: September 5, 2018  Content Version: 11.9  © 9964-9001 [employer]. Care instructions adapted under license by [employer] (which disclaims liability or warranty for this information). If you have questions about a medical condition or this instruction, always ask your healthcare professional. [address] any warranty or liability for your use of this information.

## 2019-02-15 NOTE — PROGRESS NOTES
Hugo Harris is a 34 y.o. female   Chief Complaint   Patient presents with    Cough    Nasal Congestion    Headache    Sore Throat    Labs    pt with coughing and sneezing since Tuesday, no fever no chills. + sore throat, + ear pain b/l. Cough is mildly prod with mucous. Using vitamin C    she is a 34y.o. year old female who presents for evalution. Reviewed PmHx, RxHx, FmHx, SocHx, AllgHx and updated and dated in the chart. Review of Systems - negative except as listed above in the HPI    Objective:     Vitals:    02/15/19 1346   BP: 111/69   Pulse: 87   Resp: 16   Temp: 98.3 °F (36.8 °C)   TempSrc: Oral   SpO2: 98%   Weight: 231 lb (104.8 kg)   Height: 5' 4\" (1.626 m)       Current Outpatient Medications   Medication Sig    benzonatate (TESSALON) 200 mg capsule Take 1 Cap by mouth three (3) times daily as needed for Cough.  ibuprofen (MOTRIN) 600 mg tablet Take 1 Tab by mouth every six (6) hours as needed for Pain. Or body aches or fever    fluticasone (FLONASE) 50 mcg/actuation nasal spray 2 Sprays by Both Nostrils route daily.  clonazePAM (KLONOPIN) 1 mg tablet Take 1 Tab by mouth daily as needed.  cetirizine (ZYRTEC) 10 mg tablet Take 1 Tab by mouth daily.  ergocalciferol (VITAMIN D2) 50,000 unit capsule Take 1 Cap by mouth every seven (7) days.  levothyroxine (SYNTHROID) 200 mcg tablet Take 1 Tab by mouth Daily (before breakfast).  montelukast (SINGULAIR) 10 mg tablet TAKE ONE TABLET BY MOUTH ONCE DAILY    albuterol (PROAIR HFA) 90 mcg/actuation inhaler Take 2 Puffs by inhalation every four (4) hours as needed for Wheezing.  mupirocin (BACTROBAN) 2 % ointment Apply  to affected area daily.  ferrous sulfate 325 mg (65 mg iron) tablet Take 1 Tab by mouth two (2) times a day.  SUMAtriptan (IMITREX) 100 mg tablet Take 1 Tab by mouth once as needed for Migraine for up to 1 dose.  omeprazole (PRILOSEC) 20 mg capsule Take 1 Cap by mouth two (2) times a day.     levothyroxine (SYNTHROID) 25 mcg tablet Take 1 Tab by mouth Daily (before breakfast). Take with 200mcg dose for total daily dose of 225mcg (Patient not taking: Reported on 1/9/2019)    JUNEL FE 24 1 mg-20 mcg (24)/75 mg (4) tab     ondansetron (ZOFRAN ODT) 8 mg disintegrating tablet Take 1 Tab by mouth every eight (8) hours as needed for Nausea.  polyethylene glycol (MIRALAX) 17 gram/dose powder Take 17 g by mouth two (2) times a day.  venlafaxine-SR (EFFEXOR-XR) 150 mg capsule TAKE ONE CAPSULE BY MOUTH ONCE DAILY MAINTENANCE  DOSE     No current facility-administered medications for this visit. Physical Examination: General appearance - alert, well appearing, and in no distress  Eyes - pupils equal and reactive, extraocular eye movements intact  Ears - bilateral TM's and external ear canals normal  Nose - normal and patent, no erythema, discharge or polyps  Mouth - mucous membranes moist, pharynx normal without lesions  Neck - supple, no significant adenopathy  Chest - clear to auscultation, no wheezes, rales or rhonchi, symmetric air entry  Heart - normal rate, regular rhythm, normal S1, S2, no murmurs, rubs, clicks or gallops      Assessment/ Plan:   Diagnoses and all orders for this visit:    1. Viral URI with cough  -     benzonatate (TESSALON) 200 mg capsule; Take 1 Cap by mouth three (3) times daily as needed for Cough. -     ibuprofen (MOTRIN) 600 mg tablet; Take 1 Tab by mouth every six (6) hours as needed for Pain. Or body aches or fever    2. Cough  -     AMB POC RAPID STREP A    3. Congestion of nasal sinus  -     AMB POC RAPID STREP A    4. Sore throat  -     AMB POC RAPID STREP A    neg strep   Follow-up Disposition:  Return if symptoms worsen or fail to improve. I have discussed the diagnosis with the patient and the intended plan as seen in the above orders. The patient has received an after-visit summary and questions were answered concerning future plans.  Pt conveyed understanding of plan.     Medication Side Effects and Warnings were discussed with patient      Cruz Jarvis, DO

## 2019-02-15 NOTE — PROGRESS NOTES
Chief Complaint   Patient presents with    Cough    Nasal Congestion    Headache    Sore Throat    Labs     Patient presents in office today with c/o cough, congestion, headache and sore throat since Tuesday. Treating with emergency C with no relief. Would also like to have her thyroid checked. States she has been feeling lethargic. No other concerns. 1. Have you been to the ER, urgent care clinic since your last visit? Hospitalized since your last visit? No    2. Have you seen or consulted any other health care providers outside of the [address] [place] [address] since your last visit? Include any pap smears or colon screening.  No    Learning Assessment 1/23/2018   PRIMARY LEARNER Patient   HIGHEST LEVEL OF EDUCATION - PRIMARY LEARNER  GRADUATED HIGH SCHOOL OR GED   BARRIERS PRIMARY LEARNER NONE   CO-LEARNER CAREGIVER No   PRIMARY LANGUAGE ENGLISH   LEARNER PREFERENCE PRIMARY DEMONSTRATION   LEARNING SPECIAL TOPICS no   ANSWERED BY pt   RELATIONSHIP SELF

## 2019-04-15 ENCOUNTER — TELEPHONE (OUTPATIENT)
Dept: FAMILY MEDICINE CLINIC | Age: 34
End: 2019-04-15

## 2019-04-15 NOTE — TELEPHONE ENCOUNTER
----- Message from Ish Perez sent at 4/15/2019  9:46 AM EDT -----  Regarding: NP dennison/Telephone  Pt calling stating that she needs to come in as soon as possible to get a form filled out for verification for a support animal. She stated the  had given the form to RICKIE Heath. Pt was informed that RICKIE Heath is booked for the remainder of April for Established care slots. Please contact pt [phone].

## 2019-04-16 ENCOUNTER — OFFICE VISIT (OUTPATIENT)
Dept: FAMILY MEDICINE CLINIC | Age: 34
End: 2019-04-16

## 2019-04-16 VITALS
HEART RATE: 75 BPM | BODY MASS INDEX: 39.78 KG/M2 | RESPIRATION RATE: 18 BRPM | DIASTOLIC BLOOD PRESSURE: 72 MMHG | HEIGHT: 64 IN | TEMPERATURE: 98.3 F | WEIGHT: 233 LBS | OXYGEN SATURATION: 100 % | SYSTOLIC BLOOD PRESSURE: 107 MMHG

## 2019-04-16 DIAGNOSIS — F41.1 GAD (GENERALIZED ANXIETY DISORDER): Primary | ICD-10-CM

## 2019-04-16 DIAGNOSIS — E55.9 VITAMIN D DEFICIENCY: ICD-10-CM

## 2019-04-16 DIAGNOSIS — F33.9 RECURRENT DEPRESSION (HCC): ICD-10-CM

## 2019-04-16 DIAGNOSIS — F79 INTELLECTUAL DISABILITY: ICD-10-CM

## 2019-04-16 DIAGNOSIS — E03.9 ACQUIRED HYPOTHYROIDISM: Chronic | ICD-10-CM

## 2019-04-16 RX ORDER — LANOLIN ALCOHOL/MO/W.PET/CERES
325 CREAM (GRAM) TOPICAL 2 TIMES DAILY
Qty: 60 TAB | Refills: 5 | Status: SHIPPED | OUTPATIENT
Start: 2019-04-16

## 2019-04-16 RX ORDER — ERGOCALCIFEROL 1.25 MG/1
50000 CAPSULE ORAL
Qty: 12 CAP | Refills: 1 | Status: SHIPPED | OUTPATIENT
Start: 2019-04-16 | End: 2019-10-09 | Stop reason: SDUPTHER

## 2019-04-16 NOTE — PROGRESS NOTES
Chief Complaint   Patient presents with    Form Completion    Thyroid Problem    Labs     Patient in office today for recheck on tsh levels. Pt would like a refill of vit d and iron supplements. Pt would like to have form completed for therapy cat. Pt is trying to avoid paying fees to have cat at her new apartment. Cat really helps with her anxiety symptoms. Pt states that she senses before she is going to have a panic attack and will come console pt, \"gives me hugs and kisses. \"  Keeps her feeling calm. Has not needed the klonopin as often due to cat. She has had this cat for 7 years. Also currently taking effexor daily for her anxiety. Denies any other concerns at this time. Chief Complaint   Patient presents with    Form Completion    Thyroid Problem    Labs     she is a 34y.o. year old female who presents for evalution. Reviewed PmHx, RxHx, FmHx, SocHx, AllgHx and updated and dated in the chart. Review of Systems - negative except as listed above in the HPI    Objective:     Vitals:    04/16/19 0751   BP: 107/72   Pulse: 75   Resp: 18   Temp: 98.3 °F (36.8 °C)   TempSrc: Oral   SpO2: 100%   Weight: 233 lb (105.7 kg)   Height: 5' 4\" (1.626 m)     Physical Examination: General appearance - alert, well appearing, and in no distress  Mental status - normal mood, behavior  Eyes - pupils equal and reactive, extraocular eye movements intact  Ears - bilateral TM's and external ear canals normal  Nose - normal and patent, no erythema, discharge or polyps and normal nontender sinuses  Mouth - mucous membranes moist, pharynx normal without lesions  Neck - supple, no significant adenopathy  Chest - clear to auscultation, no wheezes, rales or rhonchi, symmetric air entry  Heart - normal rate, regular rhythm, normal S1, S2, no murmurs    Assessment/ Plan:   Diagnoses and all orders for this visit:    1. PRASAD (generalized anxiety disorder) / 2. Recurrent depression ([facility] [address] / 3.  Intellectual disability  I have been seeing Sandra Nieto for many years. She has struggled with chronic anxiety and depression. She lost her mother a few years ago. She is currently receiving Medicare due to her emotional and intellectual disabilities. Will complete reasonable accomodation / modification request verification on her behalf for submission. 4. Acquired hypothyroidism  -     TSH 3RD GENERATION  -     METABOLIC PANEL, COMPREHENSIVE  -     CBC WITH AUTOMATED DIFF  Will notify results and deviate plan based on findings. 5. Vitamin D deficiency  -     VITAMIN D, 25 HYDROXY  -     ergocalciferol (VITAMIN D2) 50,000 unit capsule; Take 1 Cap by mouth every seven (7) days. Will notify results and deviate plan based on findings. I have discussed the diagnosis with the patient and the intended plan as seen in the above orders. The patient has received an after-visit summary and questions were answered concerning future plans. Medication Side Effects and Warnings were discussed with patient: yes  Patient Labs were reviewed and or requested: yes  Patient Past Records were reviewed and or requested  yes  Patient / Caregiver Understanding of treatment plan was verbalized during office visit YES    RUSTY Lewis    There are no Patient Instructions on file for this visit.

## 2019-04-16 NOTE — PROGRESS NOTES
Chief Complaint   Patient presents with    Form Completion    Thyroid Problem    Labs     Patient in office today for recheck on tsh levels. Pt would like a refill of vit d and iron supplements. Pt would like to have form completed for therapy cat.

## 2019-04-17 LAB
25(OH)D3+25(OH)D2 SERPL-MCNC: 27.8 NG/ML (ref 30–100)
ALBUMIN SERPL-MCNC: 4.3 G/DL (ref 3.5–5.5)
ALBUMIN/GLOB SERPL: 1.9 {RATIO} (ref 1.2–2.2)
ALP SERPL-CCNC: 95 IU/L (ref 39–117)
ALT SERPL-CCNC: 20 IU/L (ref 0–32)
AST SERPL-CCNC: 15 IU/L (ref 0–40)
BASOPHILS # BLD AUTO: 0 X10E3/UL (ref 0–0.2)
BASOPHILS NFR BLD AUTO: 0 %
BILIRUB SERPL-MCNC: 0.8 MG/DL (ref 0–1.2)
BUN SERPL-MCNC: 9 MG/DL (ref 6–20)
BUN/CREAT SERPL: 12 (ref 9–23)
CALCIUM SERPL-MCNC: 9.2 MG/DL (ref 8.7–10.2)
CHLORIDE SERPL-SCNC: 103 MMOL/L (ref 96–106)
CO2 SERPL-SCNC: 23 MMOL/L (ref 20–29)
CREAT SERPL-MCNC: 0.74 MG/DL (ref 0.57–1)
EOSINOPHIL # BLD AUTO: 0.2 X10E3/UL (ref 0–0.4)
EOSINOPHIL NFR BLD AUTO: 3 %
ERYTHROCYTE [DISTWIDTH] IN BLOOD BY AUTOMATED COUNT: 12.9 % (ref 12.3–15.4)
GLOBULIN SER CALC-MCNC: 2.3 G/DL (ref 1.5–4.5)
GLUCOSE SERPL-MCNC: 80 MG/DL (ref 65–99)
HCT VFR BLD AUTO: 40.4 % (ref 34–46.6)
HGB BLD-MCNC: 13.3 G/DL (ref 11.1–15.9)
IMM GRANULOCYTES # BLD AUTO: 0 X10E3/UL (ref 0–0.1)
IMM GRANULOCYTES NFR BLD AUTO: 0 %
LYMPHOCYTES # BLD AUTO: 2.5 X10E3/UL (ref 0.7–3.1)
LYMPHOCYTES NFR BLD AUTO: 31 %
MCH RBC QN AUTO: 31.4 PG (ref 26.6–33)
MCHC RBC AUTO-ENTMCNC: 32.9 G/DL (ref 31.5–35.7)
MCV RBC AUTO: 95 FL (ref 79–97)
MONOCYTES # BLD AUTO: 0.4 X10E3/UL (ref 0.1–0.9)
MONOCYTES NFR BLD AUTO: 5 %
NEUTROPHILS # BLD AUTO: 4.9 X10E3/UL (ref 1.4–7)
NEUTROPHILS NFR BLD AUTO: 61 %
PLATELET # BLD AUTO: 299 X10E3/UL (ref 150–379)
POTASSIUM SERPL-SCNC: 4.2 MMOL/L (ref 3.5–5.2)
PROT SERPL-MCNC: 6.6 G/DL (ref 6–8.5)
RBC # BLD AUTO: 4.24 X10E6/UL (ref 3.77–5.28)
SODIUM SERPL-SCNC: 140 MMOL/L (ref 134–144)
TSH SERPL DL<=0.005 MIU/L-ACNC: 0.23 UIU/ML (ref 0.45–4.5)
WBC # BLD AUTO: 8.2 X10E3/UL (ref 3.4–10.8)

## 2019-04-17 RX ORDER — LEVOTHYROXINE SODIUM 175 UG/1
175 TABLET ORAL
Qty: 30 TAB | Refills: 2 | Status: SHIPPED | OUTPATIENT
Start: 2019-04-17 | End: 2019-07-22 | Stop reason: SDUPTHER

## 2019-04-17 NOTE — PROGRESS NOTES
The following message was sent to pt via [employer] portal in reference to lab results:    Good afternoon Mya,     Attached are the results of your most recent lab work. I have the following recommendations:    1. Your CBC which looks at your white blood cells, red blood cells, and hemoglobin came back looking normal. No sign of infection or anemia. 2. Your metabolic panel which looks at your blood glucose, liver function, and kidney function looks perfect. 3. Your thyroid is still a little too fast so I have decreased your dose of levothyroxine from 200 mcg to 175 mcg. That prescription has been sent to your pharmacy. I recommend we recheck your TSH in 4-6 weeks. 4. Your vitamin D levels are still a little low. Keep taking that weekly high dose vitamin D every 7 days as prescribed. Please let me know if you have any questions or concerns regarding these results.    RUSTY John

## 2019-05-24 ENCOUNTER — OFFICE VISIT (OUTPATIENT)
Dept: FAMILY MEDICINE CLINIC | Age: 34
End: 2019-05-24

## 2019-05-24 VITALS
BODY MASS INDEX: 39.09 KG/M2 | RESPIRATION RATE: 18 BRPM | TEMPERATURE: 98.2 F | HEART RATE: 88 BPM | WEIGHT: 229 LBS | SYSTOLIC BLOOD PRESSURE: 113 MMHG | DIASTOLIC BLOOD PRESSURE: 76 MMHG | OXYGEN SATURATION: 97 % | HEIGHT: 64 IN

## 2019-05-24 DIAGNOSIS — F41.0 PANIC ATTACKS: ICD-10-CM

## 2019-05-24 DIAGNOSIS — E03.9 ACQUIRED HYPOTHYROIDISM: Primary | Chronic | ICD-10-CM

## 2019-05-24 RX ORDER — CLONAZEPAM 1 MG/1
1 TABLET ORAL
Qty: 30 TAB | Refills: 1 | Status: SHIPPED | OUTPATIENT
Start: 2019-05-24 | End: 2020-01-31 | Stop reason: SDUPTHER

## 2019-05-24 NOTE — PROGRESS NOTES
Chief Complaint   Patient presents with    Thyroid Problem    Anxiety    Medication Evaluation    Labs     Patient in office today for f/u and recheck on tsh levels. Pt is requesting for refill on klonopin,medication does well. Pt states she had anxiety attack on Tuesday,lasted about 30mins. Pt was out of medication.

## 2019-05-24 NOTE — PROGRESS NOTES
Chief Complaint   Patient presents with    Thyroid Problem    Anxiety    Medication Evaluation    Labs     Patient in office today for f/u and recheck on tsh levels. Pt is requesting for refill on klonopin, medication does well. Pt states she had anxiety attack on Tuesday, lasted about 30mins. Pt was out of medication. Anxiety attack occurred on Tuesday due to problems with her neighbor. Maria De Jesus Turner keeps drinking her soda. Calmed down after talking to her boss and . Denies any other concerns at this time. Chief Complaint   Patient presents with    Thyroid Problem    Anxiety    Medication Evaluation    Labs     she is a 34y.o. year old female who presents for evalution. Reviewed PmHx, RxHx, FmHx, SocHx, AllgHx and updated and dated in the chart. Review of Systems - negative except as listed above in the HPI    Objective:     Vitals:    05/24/19 1006   BP: 113/76   Pulse: 88   Resp: 18   Temp: 98.2 °F (36.8 °C)   TempSrc: Oral   SpO2: 97%   Weight: 229 lb (103.9 kg)   Height: 5' 4\" (1.626 m)     Physical Examination: General appearance - alert, well appearing, and in no distress  Mental status - normal mood, behavior  Chest - clear to auscultation, no wheezes, rales or rhonchi, symmetric air entry  Heart - normal rate, regular rhythm, normal S1, S2, no murmurs    Assessment/ Plan:   Diagnoses and all orders for this visit:    1. Acquired hypothyroidism  -     TSH 3RD GENERATION  Will notify results and deviate plan based on findings. 2. Panic attacks  -     clonazePAM (KLONOPIN) 1 mg tablet; Take 1 Tab by mouth daily as needed (Anxiety). Refilled rx. Reinforced SEs/ADRs, only taking as prescribed, do not mix with etoh, and taking for acute sx of anxiety only. I have discussed the diagnosis with the patient and the intended plan as seen in the above orders. The patient has received an after-visit summary and questions were answered concerning future plans.      Medication Side Effects and Warnings were discussed with patient: yes  Patient Labs were reviewed and or requested: yes  Patient Past Records were reviewed and or requested  yes  Patient / Caregiver Understanding of treatment plan was verbalized during office visit YES    RUSTY Wilks    There are no Patient Instructions on file for this visit.

## 2019-05-25 LAB — TSH SERPL DL<=0.005 MIU/L-ACNC: 0.37 UIU/ML (ref 0.45–4.5)

## 2019-05-27 NOTE — PROGRESS NOTES
The following message was sent to pt via [employer] portal in reference to lab results:    Good afternoon Mya,     Attached are the results of your TSH recheck. It shows that you are now between 0.3-.30 which is good! I recommend you continue your current dose of levothyroxine daily as prescribed. Lets recheck this in 3 months. Please let me know if you have any questions or concerns regarding these results.      RUSTY Gutierrez

## 2019-06-03 DIAGNOSIS — G47.00 INSOMNIA, UNSPECIFIED TYPE: Primary | ICD-10-CM

## 2019-06-03 DIAGNOSIS — F41.1 GAD (GENERALIZED ANXIETY DISORDER): ICD-10-CM

## 2019-06-03 RX ORDER — VENLAFAXINE HYDROCHLORIDE 150 MG/1
CAPSULE, EXTENDED RELEASE ORAL
Qty: 30 CAP | Refills: 2 | Status: SHIPPED | OUTPATIENT
Start: 2019-06-03 | End: 2019-09-10 | Stop reason: SDUPTHER

## 2019-06-03 RX ORDER — TRAZODONE HYDROCHLORIDE 50 MG/1
50 TABLET ORAL
Qty: 30 TAB | Refills: 2 | Status: SHIPPED | OUTPATIENT
Start: 2019-06-03 | End: 2019-07-16 | Stop reason: SDUPTHER

## 2019-06-10 ENCOUNTER — TELEPHONE (OUTPATIENT)
Dept: FAMILY MEDICINE CLINIC | Age: 34
End: 2019-06-10

## 2019-06-10 NOTE — TELEPHONE ENCOUNTER
----- Message from Maritza Conde sent at 6/10/2019  7:46 AM EDT -----  Regarding: NASRA Che/telephone   Pt states that she has been taking \"Effexor\" and it is making her nauseous.  Best contact number is [phone]

## 2019-06-10 NOTE — TELEPHONE ENCOUNTER
Enc pt to try and push through it for another week. SE should improve as body builds a tolerance to new medication.

## 2019-07-16 ENCOUNTER — OFFICE VISIT (OUTPATIENT)
Dept: FAMILY MEDICINE CLINIC | Age: 34
End: 2019-07-16

## 2019-07-16 VITALS
OXYGEN SATURATION: 96 % | SYSTOLIC BLOOD PRESSURE: 106 MMHG | TEMPERATURE: 98.6 F | DIASTOLIC BLOOD PRESSURE: 64 MMHG | RESPIRATION RATE: 18 BRPM | HEART RATE: 86 BPM | BODY MASS INDEX: 39.78 KG/M2 | WEIGHT: 233 LBS | HEIGHT: 64 IN

## 2019-07-16 DIAGNOSIS — F41.8 SITUATIONAL ANXIETY: Primary | ICD-10-CM

## 2019-07-16 DIAGNOSIS — F41.1 GAD (GENERALIZED ANXIETY DISORDER): ICD-10-CM

## 2019-07-16 DIAGNOSIS — G47.00 INSOMNIA, UNSPECIFIED TYPE: ICD-10-CM

## 2019-07-16 DIAGNOSIS — F32.A DEPRESSIVE DISORDER: ICD-10-CM

## 2019-07-16 RX ORDER — TRAZODONE HYDROCHLORIDE 50 MG/1
50 TABLET ORAL
Qty: 30 TAB | Refills: 2 | Status: SHIPPED | OUTPATIENT
Start: 2019-07-16 | End: 2020-01-05

## 2019-07-16 RX ORDER — BUPROPION HYDROCHLORIDE 150 MG/1
150 TABLET ORAL
Qty: 30 TAB | Refills: 1 | Status: SHIPPED | OUTPATIENT
Start: 2019-07-16 | End: 2019-09-10 | Stop reason: SDUPTHER

## 2019-07-16 RX ORDER — BUSPIRONE HYDROCHLORIDE 10 MG/1
10 TABLET ORAL 2 TIMES DAILY
Qty: 60 TAB | Refills: 1 | Status: SHIPPED | OUTPATIENT
Start: 2019-07-16 | End: 2019-09-10 | Stop reason: SDUPTHER

## 2019-07-16 NOTE — PATIENT INSTRUCTIONS
[facility]  [facility] [address]  Phone [phone]    For therapy  Diony Colin LCSW  [facility]  [address] [facility], [address]  [phone]     Panic, Anxiety, and Depression [address], [address] [place] [address], [facility]  [phone]    [address] [place] [address] Adriana Weiss, [place] [address]  Phone: [phone]  Fax: [phone]    [address] [place] [address]  Phone: [phone]  Fax: [phone] [address]  Phone: [phone]  Fax: [phone]          Bupropion (By mouth)   Bupropion (gae-[id number]-nso-on)  Treats depression and aids in quitting smoking. Also prevents depression caused by seasonal affective disorder (SAD). Brand Name(s): Aplenzin, Forfivo XL, Wellbutrin, Wellbutrin SR, Wellbutrin XL, Zyban   There may be other brand names for this medicine. When This Medicine Should Not Be Used: This medicine is not right for everyone. Do not use it if you had an allergic reaction to bupropion, or if you have seizures, anorexia, or bulimia. How to Use This Medicine:   Tablet, Long Acting Tablet  · Take your medicine as directed. Your dose may need to be changed several times to find what works best for you. · You may need to take Wellbutrin® for up to 4 weeks before you feel better. You may need to take Zyban® for 1 to 2 weeks before the date that you plan to stop smoking. · Swallow the extended-release tablet whole. Do not crush, break, or chew it. · It is best to take Aplenzin® in the morning. · Do not take Wellbutrin® or Zyban® close to bedtime if you have trouble sleeping. · Take it with food if it upsets your stomach or if you have nausea. · If you take the extended-release tablet, part of the tablet may pass into your stools.  This is normal and is nothing to worry about. · This medicine should come with a Medication Guide. Ask your pharmacist for a copy if you do not have one. · Missed dose: Skip the missed dose and go back to your regular dosing schedule. Never take extra medicine to make up for a missed dose. · Store the medicine in a closed container at room temperature, away from heat, moisture, and direct light. Drugs and Foods to Avoid:   Ask your doctor or pharmacist before using any other medicine, including over-the-counter medicines, vitamins, and herbal products. · Do not use this medicine and an MAO inhibitor (MAOI) within 14 days of each other. Do not use Zyban® to quit smoking if you already take Aplenzin® or Wellbutrin® for depression, because they are the same medicine. · Tell your doctor if you take barbiturates, benzodiazepines, antiseizure medicine, or sedatives, or if you recently stopped taking them. · Some medicines can affect how bupropion works. Tell your doctor if you use any of the following:   ¨ Amantadine, carbamazepine, cimetidine, clopidogrel, cyclophosphamide, digoxin, efavirenz, levodopa, lopinavir, nelfinavir, nicotine patch, orphenadrine, phenobarbital, phenytoin, ritonavir, tamoxifen, theophylline, thiotepa, ticlopidine  ¨ Beta blocker medicine (including metoprolol)  ¨ A blood thinner (including warfarin)  ¨ Insulin or diabetes medicine  ¨ Medicine to treat depression (including desipramine, fluoxetine, imipramine, nortriptyline, paroxetine, sertraline, venlafaxine)  ¨ Medicine to treat heart rhythm problems (including flecainide, propafenone)  ¨ Medicine to treat mental illness (including haloperidol, risperidone, thioridazine)  ¨ Steroid medicine (including dexamethasone, hydrocortisone, methylprednisolone, prednisolone, prednisone)  · Do not drink alcohol while you are using this medicine. · Tell your doctor if you use anything else that makes you sleepy.  Some examples are allergy medicine, narcotic pain medicine, and alcohol. Warnings While Using This Medicine:   · Tell your doctor if you are pregnant or breastfeeding, or if you have kidney disease, liver disease, heart disease, diabetes, glaucoma, mental illness (including bipolar disorder), or high blood pressure. Tell your doctor if you have a history of drug addiction or if you drink alcohol. · For some children, teenagers, and young adults, this medicine may increase mental or emotional problems. This may lead to thoughts of suicide and violence. Talk with your doctor right away if you have any thoughts or behavior changes that concern you. Tell your doctor if you or anyone in your family has a history of bipolar disorder or suicide attempts. · This medicine may cause the following problems:  ¨ Increased risk of seizures  ¨ Changes in mood or behavior  ¨ High blood pressure  ¨ Serious skin reactions  · This medicine may make you dizzy or drowsy. Do not drive or do anything that could be dangerous until you know how this medicine affects you. · Zyban® is only part of a complete program to help you quit smoking. You may still want to smoke at times. Have a plan to cope with these situations. · Do not stop using this medicine suddenly. Your doctor will need to slowly decrease your dose before you stop it completely. · Tell any doctor or dentist who treats you that you are using this medicine. This medicine may affect certain medical test results. · Your doctor will check your progress and the effects of this medicine at regular visits. Keep all appointments. · Keep all medicine out of the reach of children. Never share your medicine with anyone.   Possible Side Effects While Using This Medicine:   Call your doctor right away if you notice any of these side effects:  · Allergic reaction: Itching or hives, swelling in your face or hands, swelling or tingling in your mouth or throat, chest tightness, trouble breathing  · Blistering, peeling, red skin rash  · Chest pain, trouble breathing, fast, slow, or uneven heartbeat  · Eye pain, vision changes, seeing halos around lights  · Muscle or joint pain, fever with rash  · Seeing or hearing things that are not there, feeling like people are against you  · Seizures  · Sudden increase in energy, racing thoughts, trouble sleeping  · Thoughts of hurting yourself, worsening depression, severe agitation or confusion  If you notice these less serious side effects, talk with your doctor:   · Dry mouth  · Headache, dizziness  · Nausea, vomiting, constipation, diarrhea, gas, stomach pain  · Weight gain or loss  If you notice other side effects that you think are caused by this medicine, tell your doctor. Call your doctor for medical advice about side effects. You may report side effects to FDA at 4-274-FDA-3231  © 2017 [address] Information is for End User's use only and may not be sold, redistributed or otherwise used for commercial purposes. The above information is an  only. It is not intended as medical advice for individual conditions or treatments. Talk to your doctor, nurse or pharmacist before following any medical regimen to see if it is safe and effective for you.

## 2019-07-16 NOTE — PROGRESS NOTES
Chief Complaint   Patient presents with    Anxiety    Depression    Medication Evaluation     Patient in office today for anxiety and depression that has worsened. Pt states she has been on Effexor for one month, never felt as if medication was helping sx. Pt states she started cutting herself 6/5,pt last saw therapist on Tuesday. Pt has noted additional stressors noted at home,caregiver for pt sister is asking for sister to start living with pt. Pt states cousin is also contributing to stress. Pt states she has been on klonopin for anxiety which has worked well. 1. Have you been to the ER, urgent care clinic since your last visit? Hospitalized since your last visit? No    2. Have you seen or consulted any other health care providers outside of the [address] [place] [address] since your last visit? Include any pap smears or colon screening.  No

## 2019-07-16 NOTE — PROGRESS NOTES
Chief Complaint   Patient presents with    Anxiety    Depression    Medication Evaluation     Patient in office today for anxiety and depression that has worsened. Pt states she has been on Effexor for one month, never felt as if medication was helping sx. Pt states she started cutting herself 6/5, pt last saw therapist on Tuesday. Seeing weekly. [employer]. Last cut herself about 1 week ago. Using a knife. Denies any SI. Still struggling with loss of her mother. Pt has noted additional stressors noted at home, caregiver for pt sister is asking for sister to start living with pt. Want her to visit with patient every other weekend. Gets a lot of help from her Zoroast[employer] family. Pt states cousin is also contributing to stress. Pt states she has been on klonopin for anxiety which has worked well. Having bad dreams at night. Feels anxious and depressed. Has previously been on zololft, paxil, effexor, celexa. Has been on effexor since January of 2018. Denies any other concerns at this time. Chief Complaint   Patient presents with    Anxiety    Depression    Medication Evaluation     she is a 34y.o. year old female who presents for evalution. Reviewed PmHx, RxHx, FmHx, SocHx, AllgHx and updated and dated in the chart. Review of Systems - negative except as listed above in the HPI    Objective:     Vitals:    07/16/19 0750   BP: 106/64   Pulse: 86   Resp: 18   Temp: 98.6 °F (37 °C)   TempSrc: Oral   SpO2: 96%   Weight: 233 lb (105.7 kg)   Height: 5' 4\" (1.626 m)     Physical Examination: General appearance - alert, well appearing, and in no distress  Mental status - depressed mood  Chest - clear to auscultation, no wheezes, rales or rhonchi, symmetric air entry  Heart - normal rate, regular rhythm, normal S1, S2, no murmurs    Assessment/ Plan:   Diagnoses and all orders for this visit:    1. Situational anxiety / 2.  PRASAD (generalized anxiety disorder)  -     busPIRone (BUSPAR) 10 mg tablet; Take 1 Tab by mouth two (2) times a day. Concerned that stopping effexor will worsen her anxiety sx. Therefore recommended a trial of buspar BID. Reviewed SEs/ADRs of medication. Continue klonopin sparingly as needed for acute anxiety sx, sparingly as needed. Also enc pt to est care with psychiatry for further eval and ongoing management. REinforced with pt that SI is a medical emergency and enc to call 911 should she have these feelings. Discouraged cutting and provided with other tips for stress relief. Continue working with therapist weekly. 3. Depressive disorder  -     buPROPion XL (WELLBUTRIN XL) 150 mg tablet; Take 1 Tab by mouth every morning. Add wellbutrin to effexor to address persistent depression sx.   4. Insomnia, unspecified type  -     traZODone (DESYREL) 50 mg tablet; Take 1 Tab by mouth nightly. Refilled rx. Follow-up and Dispositions    · Return in about 1 month (around 8/16/2019). I have discussed the diagnosis with the patient and the intended plan as seen in the above orders. The patient has received an after-visit summary and questions were answered concerning future plans. Medication Side Effects and Warnings were discussed with patient: yes  Patient Labs were reviewed and or requested: no  Patient Past Records were reviewed and or requested  yes  Patient / Caregiver Understanding of treatment plan was verbalized during office visit YES    RUSTY Walker Mc    Patient Instructions   [facility]  [facility] [address] [place] [address],[address]  [facility]  Phone [phone]    For therapy  Emir Henderson LCSW  [facility]  [address] [place] [address]   Kirsten June  [phone]     [facility] [address], [address] [place] [address], [facility]  [phone]    [address] [facility].  [address]  Phone: [phone]  Fax: [phone]    [address] [place] [address]  Phone: [phone]  Fax: [phone] [address]  Phone: [phone]  Fax: [phone]          Bupropion (By mouth)   Bupropion (xti-MUIY-eje-on)  Treats depression and aids in quitting smoking. Also prevents depression caused by seasonal affective disorder (SAD). Brand Name(s): Aplenzin, Forfivo XL, Wellbutrin, Wellbutrin SR, Wellbutrin XL, Zyban   There may be other brand names for this medicine. When This Medicine Should Not Be Used: This medicine is not right for everyone. Do not use it if you had an allergic reaction to bupropion, or if you have seizures, anorexia, or bulimia. How to Use This Medicine:   Tablet, Long Acting Tablet  · Take your medicine as directed. Your dose may need to be changed several times to find what works best for you. · You may need to take Wellbutrin® for up to 4 weeks before you feel better. You may need to take Zyban® for 1 to 2 weeks before the date that you plan to stop smoking. · Swallow the extended-release tablet whole. Do not crush, break, or chew it. · It is best to take Aplenzin® in the morning. · Do not take Wellbutrin® or Zyban® close to bedtime if you have trouble sleeping. · Take it with food if it upsets your stomach or if you have nausea. · If you take the extended-release tablet, part of the tablet may pass into your stools. This is normal and is nothing to worry about. · This medicine should come with a Medication Guide. Ask your pharmacist for a copy if you do not have one. · Missed dose: Skip the missed dose and go back to your regular dosing schedule. Never take extra medicine to make up for a missed dose. · Store the medicine in a closed container at room temperature, away from heat, moisture, and direct light.   Drugs and Foods to Avoid:   Ask your doctor or pharmacist before using any other medicine, including over-the-counter medicines, vitamins, and herbal products. · Do not use this medicine and an MAO inhibitor (MAOI) within 14 days of each other. Do not use Zyban® to quit smoking if you already take Aplenzin® or Wellbutrin® for depression, because they are the same medicine. · Tell your doctor if you take barbiturates, benzodiazepines, antiseizure medicine, or sedatives, or if you recently stopped taking them. · Some medicines can affect how bupropion works. Tell your doctor if you use any of the following:   ¨ Amantadine, carbamazepine, cimetidine, clopidogrel, cyclophosphamide, digoxin, efavirenz, levodopa, lopinavir, nelfinavir, nicotine patch, orphenadrine, phenobarbital, phenytoin, ritonavir, tamoxifen, theophylline, thiotepa, ticlopidine  ¨ Beta blocker medicine (including metoprolol)  ¨ A blood thinner (including warfarin)  ¨ Insulin or diabetes medicine  ¨ Medicine to treat depression (including desipramine, fluoxetine, imipramine, nortriptyline, paroxetine, sertraline, venlafaxine)  ¨ Medicine to treat heart rhythm problems (including flecainide, propafenone)  ¨ Medicine to treat mental illness (including haloperidol, risperidone, thioridazine)  ¨ Steroid medicine (including dexamethasone, hydrocortisone, methylprednisolone, prednisolone, prednisone)  · Do not drink alcohol while you are using this medicine. · Tell your doctor if you use anything else that makes you sleepy. Some examples are allergy medicine, narcotic pain medicine, and alcohol. Warnings While Using This Medicine:   · Tell your doctor if you are pregnant or breastfeeding, or if you have kidney disease, liver disease, heart disease, diabetes, glaucoma, mental illness (including bipolar disorder), or high blood pressure. Tell your doctor if you have a history of drug addiction or if you drink alcohol.   · For some children, teenagers, and young adults, this medicine may increase mental or emotional problems. This may lead to thoughts of suicide and violence. Talk with your doctor right away if you have any thoughts or behavior changes that concern you. Tell your doctor if you or anyone in your family has a history of bipolar disorder or suicide attempts. · This medicine may cause the following problems:  ¨ Increased risk of seizures  ¨ Changes in mood or behavior  ¨ High blood pressure  ¨ Serious skin reactions  · This medicine may make you dizzy or drowsy. Do not drive or do anything that could be dangerous until you know how this medicine affects you. · Zyban® is only part of a complete program to help you quit smoking. You may still want to smoke at times. Have a plan to cope with these situations. · Do not stop using this medicine suddenly. Your doctor will need to slowly decrease your dose before you stop it completely. · Tell any doctor or dentist who treats you that you are using this medicine. This medicine may affect certain medical test results. · Your doctor will check your progress and the effects of this medicine at regular visits. Keep all appointments. · Keep all medicine out of the reach of children. Never share your medicine with anyone.   Possible Side Effects While Using This Medicine:   Call your doctor right away if you notice any of these side effects:  · Allergic reaction: Itching or hives, swelling in your face or hands, swelling or tingling in your mouth or throat, chest tightness, trouble breathing  · Blistering, peeling, red skin rash  · Chest pain, trouble breathing, fast, slow, or uneven heartbeat  · Eye pain, vision changes, seeing halos around lights  · Muscle or joint pain, fever with rash  · Seeing or hearing things that are not there, feeling like people are against you  · Seizures  · Sudden increase in energy, racing thoughts, trouble sleeping  · Thoughts of hurting yourself, worsening depression, severe agitation or confusion  If you notice these less serious side effects, talk with your doctor:   · Dry mouth  · Headache, dizziness  · Nausea, vomiting, constipation, diarrhea, gas, stomach pain  · Weight gain or loss  If you notice other side effects that you think are caused by this medicine, tell your doctor. Call your doctor for medical advice about side effects. You may report side effects to FDA at 6-254-NBN-2997  © 2017 [facility] Information is for End User's use only and may not be sold, redistributed or otherwise used for commercial purposes. The above information is an  only. It is not intended as medical advice for individual conditions or treatments. Talk to your doctor, nurse or pharmacist before following any medical regimen to see if it is safe and effective for you.

## 2019-07-22 RX ORDER — LEVOTHYROXINE SODIUM 175 UG/1
175 TABLET ORAL
Qty: 30 TAB | Refills: 2 | Status: SHIPPED | OUTPATIENT
Start: 2019-07-22 | End: 2019-09-11 | Stop reason: ALTCHOICE

## 2019-09-10 ENCOUNTER — OFFICE VISIT (OUTPATIENT)
Dept: FAMILY MEDICINE CLINIC | Age: 34
End: 2019-09-10

## 2019-09-10 VITALS
HEART RATE: 92 BPM | RESPIRATION RATE: 20 BRPM | WEIGHT: 236.8 LBS | HEIGHT: 64 IN | BODY MASS INDEX: 40.43 KG/M2 | SYSTOLIC BLOOD PRESSURE: 123 MMHG | TEMPERATURE: 98.2 F | DIASTOLIC BLOOD PRESSURE: 82 MMHG | OXYGEN SATURATION: 98 %

## 2019-09-10 DIAGNOSIS — E03.9 ACQUIRED HYPOTHYROIDISM: Primary | ICD-10-CM

## 2019-09-10 DIAGNOSIS — Z23 ENCOUNTER FOR IMMUNIZATION: ICD-10-CM

## 2019-09-10 RX ORDER — SUMATRIPTAN 100 MG/1
100 TABLET, FILM COATED ORAL
COMMUNITY
End: 2020-11-06

## 2019-09-10 RX ORDER — EPINEPHRINE 0.3 MG/.3ML
0.3 INJECTION SUBCUTANEOUS
Qty: 0.6 ML | Refills: 3 | Status: SHIPPED | OUTPATIENT
Start: 2019-09-10 | End: 2019-09-10

## 2019-09-10 NOTE — PROGRESS NOTES
HISTORY OF PRESENT ILLNESS  Donya Choi is a 34 y.o. female. HPI  Thyroid Review:  Patient is seen for followup of hypothyroidism. Thyroid ROS: denies fatigue, weight changes, heat/cold intolerance, bowel/skin changes or CVS symptoms. Flu Shot:  Patient is here today for flu shot. Denies any previous adr/se to the flu shot, no egg allergy and no recent illness or fever. Patient Active Problem List    Diagnosis Date Noted    Recurrent depression ([facility] 12/27/2017    Obesity, morbid ([facility] 11/30/2017    [facility] 10/27/2016    Periodic headache syndrome, not intractable 05/22/2015    Polyuria 01/16/2012    Long-term use of Plaquenil 11/25/2011    Vitamin D deficiency 11/25/2011    Asthma 11/03/2011    Anemia, iron deficiency 04/06/2011    Hypothyroid 04/01/2010    Environmental allergies 04/01/2010    [place]ot (hard of hearing) 04/01/2010    Strabismus 04/01/2010     Current Outpatient Medications   Medication Sig Dispense Refill    SUMAtriptan (IMITREX) 100 mg tablet Take 100 mg by mouth once as needed for Migraine. Patient unsure of dose but believes it is 100 mg      EPINEPHrine (EPIPEN) 0.3 mg/0.3 mL injection 0.3 mL by IntraMUSCular route once as needed for Allergic Response for up to 1 dose. 0.6 mL 3    levothyroxine (SYNTHROID) 175 mcg tablet Take 1 Tab by mouth Daily (before breakfast). 30 Tab 2    buPROPion XL (WELLBUTRIN XL) 150 mg tablet Take 1 Tab by mouth every morning. 30 Tab 1    busPIRone (BUSPAR) 10 mg tablet Take 1 Tab by mouth two (2) times a day. 60 Tab 1    traZODone (DESYREL) 50 mg tablet Take 1 Tab by mouth nightly. 30 Tab 2    venlafaxine-SR (EFFEXOR-XR) 150 mg capsule TAKE ONE CAPSULE BY MOUTH ONCE DAILY MAINTENANCE  DOSE 30 Cap 2    clonazePAM (KLONOPIN) 1 mg tablet Take 1 Tab by mouth daily as needed (Anxiety). 30 Tab 1    ergocalciferol (VITAMIN D2) 50,000 unit capsule Take 1 Cap by mouth every seven (7) days.  12 Cap 1    ferrous sulfate 325 mg (65 mg iron) tablet Take 1 Tab by mouth two (2) times a day. 60 Tab 5    ibuprofen (MOTRIN) 600 mg tablet Take 1 Tab by mouth every six (6) hours as needed for Pain. Or body aches or fever 30 Tab 1    cetirizine (ZYRTEC) 10 mg tablet Take 1 Tab by mouth daily. 90 Tab 3    omeprazole (PRILOSEC) 20 mg capsule Take 1 Cap by mouth two (2) times a day. 180 Cap 1    montelukast (SINGULAIR) 10 mg tablet TAKE ONE TABLET BY MOUTH ONCE DAILY 30 Tab 5    albuterol (PROAIR HFA) 90 mcg/actuation inhaler Take 2 Puffs by inhalation every four (4) hours as needed for Wheezing. 9 Inhaler 1    fluticasone (FLONASE) 50 mcg/actuation nasal spray 2 Sprays by Both Nostrils route daily. (Patient not taking: Reported on 9/10/2019) 1 Bottle 2    mupirocin (BACTROBAN) 2 % ointment Apply  to affected area daily. (Patient not taking: Reported on 9/10/2019) 22 g 0     Family History   Problem Relation Age of Onset    Osteoporosis Other     Arthritis-osteo Other     Breast Cancer Other         maternal great aunt    Breast Cancer Other         Maternal great aunt    Stroke Maternal Aunt     Hypertension Sister      Social History     Tobacco Use    Smoking status: Never Smoker    Smokeless tobacco: Never Used   Substance Use Topics    Alcohol use: No        ROS  A comprehensive review of system was obtained and negative except findings in the HPI    Visit Vitals  /82 (BP 1 Location: Right arm, BP Patient Position: Sitting)   Pulse 92   Temp 98.2 °F (36.8 °C) (Oral)   Resp 20   Ht 5' 4\" (1.626 m)   Wt 236 lb 12.8 oz (107.4 kg)   LMP 08/30/2019 (Approximate)   SpO2 98%   BMI 40.65 kg/m²     Physical Exam   Constitutional: She is oriented to person, place, and time. She appears well-developed and well-nourished. Neck: No JVD present. Cardiovascular: Normal rate, regular rhythm and intact distal pulses. Exam reveals no gallop and no friction rub. No murmur heard.   Pulmonary/Chest: Effort normal and breath sounds normal. No respiratory distress. She has no wheezes. Musculoskeletal: She exhibits no edema. Neurological: She is alert and oriented to person, place, and time. Skin: Skin is warm. Nursing note and vitals reviewed. ASSESSMENT and PLAN  Encounter Diagnoses   Name Primary?  Acquired hypothyroidism Yes    Encounter for immunization      Orders Placed This Encounter    Influenza virus vaccine (QUADRIVALENT PRES FREE SYRINGE) IM (59860)    TSH 3RD GENERATION    SUMAtriptan (IMITREX) 100 mg tablet    EPINEPHrine (EPIPEN) 0.3 mg/0.3 mL injection     Refills updated  Lab updated for tsh  Dev plan with results    Patient given flu shot today. Reviewed adr/se of the injection including site reaction and low grade flu sx. Follow up prn. Leanna Rubio MSN, ANP  9/10/2019      I have discussed the diagnosis with the patient and the intended plan as seen in the above orders. The patient has received an after-visit summary and questions were answered concerning future plans. Patient conveyed understanding of the plan at the time of the visit.     Leanna Rubio MSN, ANP  9/10/2019

## 2019-09-10 NOTE — PROGRESS NOTES
Lizzy Castellon is a 34 y.o. female    Chief Complaint   Patient presents with    Labs     thyroid    Medication Refill     Epipen     1. Have you been to the ER, urgent care clinic since your last visit? Hospitalized since your last visit? No    2. Have you seen or consulted any other health care providers outside of the [address] [place] [address] since your last visit? Include any pap smears or colon screening. No    Visit Vitals  /82 (BP 1 Location: Right arm, BP Patient Position: Sitting)   Pulse 92   Temp 98.2 °F (36.8 °C) (Oral)   Resp 20   Ht 5' 4\" (1.626 m)   Wt 236 lb 12.8 oz (107.4 kg)   SpO2 98%   BMI 40.65 kg/m²     Immunization History   Administered Date(s) Administered    (RETIRED) Pneumococcal Vaccine (Unspecified Type) 11/04/2012    Human Papillomavirus 10/26/2007, 12/26/2007, 04/14/2008    Influenza Vaccine 10/29/2013, 10/14/2014, 09/16/2017    Influenza Vaccine (Quad) 10/02/2015    Influenza Vaccine (Quad) PF 10/27/2016, 10/18/2018, 09/10/2019    Influenza Vaccine Split 11/03/2010, 10/14/2011    Influenza Vaccine Whole 10/26/2007    TD Vaccine 11/05/2002    TDAP Vaccine 10/14/2011    Tdap 11/23/2016     After obtaining consent, and per orders of Haylie Monteiro, injection of influenza vaccine was given in the (R) del. by Vik Correia. Patient instructed to remain in clinic for 20 minutes afterwards, and to report any adverse reaction to me immediately.

## 2019-09-11 LAB — TSH SERPL DL<=0.005 MIU/L-ACNC: 5.59 UIU/ML (ref 0.45–4.5)

## 2019-09-11 RX ORDER — LEVOTHYROXINE SODIUM 200 UG/1
200 TABLET ORAL
Qty: 30 TAB | Refills: 5 | Status: SHIPPED | OUTPATIENT
Start: 2019-09-11 | End: 2020-03-16

## 2019-09-11 NOTE — PROGRESS NOTES
Hey there, I need to increase your thyroid med again since it is still too slow. I will send the 200mcg dose to the pharmacy to . Recheck lab in 3 months.  Taylor Falling

## 2019-10-09 DIAGNOSIS — E55.9 VITAMIN D DEFICIENCY: ICD-10-CM

## 2019-10-09 RX ORDER — ERGOCALCIFEROL 1.25 MG/1
CAPSULE ORAL
Qty: 12 CAP | Refills: 1 | Status: SHIPPED | OUTPATIENT
Start: 2019-10-09 | End: 2020-03-27

## 2019-10-17 ENCOUNTER — OFFICE VISIT (OUTPATIENT)
Dept: FAMILY MEDICINE CLINIC | Age: 34
End: 2019-10-17

## 2019-10-17 VITALS
OXYGEN SATURATION: 98 % | BODY MASS INDEX: 42 KG/M2 | HEIGHT: 64 IN | DIASTOLIC BLOOD PRESSURE: 85 MMHG | RESPIRATION RATE: 18 BRPM | HEART RATE: 93 BPM | WEIGHT: 246 LBS | SYSTOLIC BLOOD PRESSURE: 123 MMHG | TEMPERATURE: 98.1 F

## 2019-10-17 DIAGNOSIS — E03.9 HYPOTHYROIDISM, UNSPECIFIED TYPE: Primary | Chronic | ICD-10-CM

## 2019-10-17 DIAGNOSIS — R60.0 BILATERAL LOWER EXTREMITY EDEMA: ICD-10-CM

## 2019-10-17 RX ORDER — FUROSEMIDE 20 MG/1
TABLET ORAL DAILY
COMMUNITY
End: 2019-10-17 | Stop reason: SDUPTHER

## 2019-10-17 RX ORDER — FUROSEMIDE 20 MG/1
20 TABLET ORAL DAILY
Qty: 30 TAB | Refills: 2 | Status: SHIPPED | OUTPATIENT
Start: 2019-10-17 | End: 2020-01-23 | Stop reason: SDUPTHER

## 2019-10-17 NOTE — PROGRESS NOTES
Chief Complaint   Patient presents with   [address] [place] [address] ED Follow-up     Patient in office today for ed f/u. Pt was seen at [facility] on Sunday due to swelling in feet and hands. Pt was prescribed lasix and advised to f/u with pcp  Pt has noted improvement in swelling with medication. 1. Have you been to the ER, urgent care clinic since your last visit? Hospitalized since your last visit? No    2. Have you seen or consulted any other health care providers outside of the [address] [place] [address] since your last visit? Include any pap smears or colon screening.  No

## 2019-10-17 NOTE — PROGRESS NOTES
Chief Complaint   Patient presents with   [facility] Follow-up     Patient in office today for ed f/u. Pt was seen at [facility] on Sunday due to swelling in feet and hands. Pt was prescribed lasix and advised to f/u with pcp  Pt has noted improvement in swelling with medication. Reports increased urination with starting the lasix. Reports improvement in swelling but swelling has not resolved. Denies any cp, sob, and dyspnea. Denies any lightheaded or dizziness. Denies excessive salt intake. Denies having ekg or chest xray at ER. Last month dose of levothyroxine had to be increased. Needs rechecked today. Chief Complaint   Patient presents with    ED Follow-up     she is a 30y.o. year old female who presents for evalution. Reviewed PmHx, RxHx, FmHx, SocHx, AllgHx and updated and dated in the chart. Review of Systems - negative except as listed above in the HPI    Objective:     Vitals:    10/17/19 1453   BP: 123/85   Pulse: 93   Resp: 18   Temp: 98.1 °F (36.7 °C)   TempSrc: Oral   SpO2: 98%   Weight: 246 lb (111.6 kg)   Height: 5' 4\" (1.626 m)     Physical Examination: General appearance - alert, well appearing, and in no distress  Eyes - pupils equal and reactive, extraocular eye movements intact  Ears - bilateral TM's and external ear canals normal  Nose - normal and patent, no erythema, discharge or polyps  Mouth - mucous membranes moist, pharynx normal without lesions  Neck - supple, no significant adenopathy, thyroid exam: thyroid is normal in size without nodules or tenderness  Chest - clear to auscultation, no wheezes, rales or rhonchi, symmetric air entry  Heart - normal rate, regular rhythm, normal S1, S2, no murmurs  Extremities - peripheral pulses normal, no pedal edema, no clubbing or cyanosis    Assessment/ Plan:   Diagnoses and all orders for this visit:    1. Hypothyroidism, unspecified type  -     TSH 3RD GENERATION  Will notify results and deviate plan based on findings. 2. Bilateral lower extremity edema  -     NT-PRO BNP  -     METABOLIC PANEL, COMPREHENSIVE  -     CBC WITH AUTOMATED DIFF  -     furosemide (LASIX) 20 mg tablet; Take 1 Tab by mouth daily. Will notify results and deviate plan based on findings. Continue lasix daily. Reviewed other supportive measures to help improve swelling. The patient is asked to modify diet and lifestyle to facilitate weight loss and therefore avoid health risks that are associated with obesity. Follow-up and Dispositions    · Return if symptoms worsen or fail to improve. I have discussed the diagnosis with the patient and the intended plan as seen in the above orders. The patient has received an after-visit summary and questions were answered concerning future plans. Medication Side Effects and Warnings were discussed with patient: yes  Patient Labs were reviewed and or requested: yes  Patient Past Records were reviewed and or requested  yes  Patient / Caregiver Understanding of treatment plan was verbalized during office visit YES    RUSTY Mosley    Patient Instructions          Leg and Ankle Edema: Care Instructions  Your Care Instructions  Swelling in the legs, ankles, and feet is called edema. It is common after you sit or stand for a while. Long plane flights or car rides often cause swelling in the legs and feet. You may also have swelling if you have to stand for long periods of time at your job. Problems with the veins in the legs (varicose veins) and changes in hormones can also cause swelling. Sometimes the swelling in the ankles and feet is caused by a more serious problem, such as heart failure, infection, blood clots, or liver or kidney disease. Follow-up care is a key part of your treatment and safety. Be sure to make and go to all appointments, and call your doctor if you are having problems. It's also a good idea to know your test results and keep a list of the medicines you take.   How can you care for yourself at home? · If your doctor gave you medicine, take it as prescribed. Call your doctor if you think you are having a problem with your medicine. · Whenever you are resting, raise your legs up. Try to keep the swollen area higher than the level of your heart. · Take breaks from standing or sitting in one position. ? Walk around to increase the blood flow in your lower legs. ? Move your feet and ankles often while you stand, or tighten and relax your leg muscles. · Wear support stockings. Put them on in the morning, before swelling gets worse. · Eat a balanced diet. Lose weight if you need to. · Limit the amount of salt (sodium) in your diet. Salt holds fluid in the body and may increase swelling. When should you call for help? Call 911 anytime you think you may need emergency care. For example, call if:    · You have symptoms of a blood clot in your lung (called a pulmonary embolism). These may include:  ? Sudden chest pain. ? Trouble breathing. ? Coughing up blood.    Call your doctor now or seek immediate medical care if:    · You have signs of a blood clot, such as:  ? Pain in your calf, back of the knee, thigh, or groin. ? Redness and swelling in your leg or groin.     · You have symptoms of infection, such as:  ? Increased pain, swelling, warmth, or redness. ? Red streaks or pus. ? A fever.    Watch closely for changes in your health, and be sure to contact your doctor if:    · Your swelling is getting worse.     · You have new or worsening pain in your legs.     · You do not get better as expected. Where can you learn more? Go to http://sallie-bladimir.info/. Enter G045 in the search box to learn more about \"Leg and Ankle Edema: Care Instructions. \"  Current as of: June 26, 2019  Content Version: 12.2  © 2817-6374 [employer].  Care instructions adapted under license by [employer] (which disclaims liability or warranty for this information). If you have questions about a medical condition or this instruction, always ask your healthcare professional. [place] [address] any warranty or liability for your use of this information.

## 2019-10-17 NOTE — PATIENT INSTRUCTIONS

## 2019-10-18 LAB
ALBUMIN SERPL-MCNC: 4.8 G/DL (ref 3.5–5.5)
ALBUMIN/GLOB SERPL: 2.3 {RATIO} (ref 1.2–2.2)
ALP SERPL-CCNC: 95 IU/L (ref 39–117)
ALT SERPL-CCNC: 36 IU/L (ref 0–32)
AST SERPL-CCNC: 22 IU/L (ref 0–40)
BASOPHILS # BLD AUTO: 0.1 X10E3/UL (ref 0–0.2)
BASOPHILS NFR BLD AUTO: 1 %
BILIRUB SERPL-MCNC: 0.5 MG/DL (ref 0–1.2)
BUN SERPL-MCNC: 10 MG/DL (ref 6–20)
BUN/CREAT SERPL: 12 (ref 9–23)
CALCIUM SERPL-MCNC: 9.6 MG/DL (ref 8.7–10.2)
CHLORIDE SERPL-SCNC: 101 MMOL/L (ref 96–106)
CO2 SERPL-SCNC: 20 MMOL/L (ref 20–29)
CREAT SERPL-MCNC: 0.85 MG/DL (ref 0.57–1)
EOSINOPHIL # BLD AUTO: 0.2 X10E3/UL (ref 0–0.4)
EOSINOPHIL NFR BLD AUTO: 2 %
ERYTHROCYTE [DISTWIDTH] IN BLOOD BY AUTOMATED COUNT: 12.3 % (ref 12.3–15.4)
GLOBULIN SER CALC-MCNC: 2.1 G/DL (ref 1.5–4.5)
GLUCOSE SERPL-MCNC: 93 MG/DL (ref 65–99)
HCT VFR BLD AUTO: 44.3 % (ref 34–46.6)
HGB BLD-MCNC: 15 G/DL (ref 11.1–15.9)
IMM GRANULOCYTES # BLD AUTO: 0 X10E3/UL (ref 0–0.1)
IMM GRANULOCYTES NFR BLD AUTO: 0 %
LYMPHOCYTES # BLD AUTO: 3.4 X10E3/UL (ref 0.7–3.1)
LYMPHOCYTES NFR BLD AUTO: 37 %
MCH RBC QN AUTO: 32.1 PG (ref 26.6–33)
MCHC RBC AUTO-ENTMCNC: 33.9 G/DL (ref 31.5–35.7)
MCV RBC AUTO: 95 FL (ref 79–97)
MONOCYTES # BLD AUTO: 0.5 X10E3/UL (ref 0.1–0.9)
MONOCYTES NFR BLD AUTO: 6 %
NEUTROPHILS # BLD AUTO: 5.1 X10E3/UL (ref 1.4–7)
NEUTROPHILS NFR BLD AUTO: 54 %
NT-PROBNP SERPL-MCNC: <5 PG/ML (ref 0–130)
PLATELET # BLD AUTO: 313 X10E3/UL (ref 150–450)
POTASSIUM SERPL-SCNC: 4.4 MMOL/L (ref 3.5–5.2)
PROT SERPL-MCNC: 6.9 G/DL (ref 6–8.5)
RBC # BLD AUTO: 4.68 X10E6/UL (ref 3.77–5.28)
SODIUM SERPL-SCNC: 138 MMOL/L (ref 134–144)
TSH SERPL DL<=0.005 MIU/L-ACNC: 1.13 UIU/ML (ref 0.45–4.5)
WBC # BLD AUTO: 9.4 X10E3/UL (ref 3.4–10.8)

## 2019-10-18 NOTE — PROGRESS NOTES
The following message was sent to pt via [facility] portal in reference to lab results:    Good morning Mya,     Attached are the results of your most recent lab work. I have the following recommendations:    1. Your CBC which looks at your white blood cells, red blood cells, and hemoglobin came back looking normal. No sign of infection or anemia. 2. Your metabolic panel which looks at your blood glucose, liver function, and kidney function looks almost perfect. Your ALT which is a measure of liver function is SLIGHTLY high. I would like to recheck this in 1 month. Make sure you are follow a low fat diet because sometimes being overweight and having a poor diet can increase the build up of fatty plaque on the liver. 3. Your TSH shows that your thyroid function is stable on your current dose of levothyroxine so keep taking that every day as prescribed. 4. Your pro BNP test is normal suggesting your increased swelling is not due to heart failure which is great news! Please let me know if you have any questions or concerns regarding these results. Lets recheck these labs in 1 month.  Please do not hesitate to call me or schedule an appointment to be seen if you need anything else in the meantime :)    RUSTY Mosley

## 2019-10-21 ENCOUNTER — OFFICE VISIT (OUTPATIENT)
Dept: FAMILY MEDICINE CLINIC | Age: 34
End: 2019-10-21

## 2019-10-21 VITALS
OXYGEN SATURATION: 97 % | HEIGHT: 64 IN | SYSTOLIC BLOOD PRESSURE: 116 MMHG | WEIGHT: 240 LBS | RESPIRATION RATE: 14 BRPM | DIASTOLIC BLOOD PRESSURE: 74 MMHG | BODY MASS INDEX: 40.97 KG/M2 | TEMPERATURE: 97.9 F | HEART RATE: 98 BPM

## 2019-10-21 DIAGNOSIS — Z00.00 ROUTINE GENERAL MEDICAL EXAMINATION AT A HEALTH CARE FACILITY: Primary | ICD-10-CM

## 2019-10-21 DIAGNOSIS — E78.00 HYPERCHOLESTEREMIA: ICD-10-CM

## 2019-10-21 NOTE — PATIENT INSTRUCTIONS
Medicare Wellness Visit, Female     The best way to live healthy is to have a lifestyle where you eat a well-balanced diet, exercise regularly, limit alcohol use, and quit all forms of tobacco/nicotine, if applicable. Regular preventive services are another way to keep healthy. Preventive services (vaccines, screening tests, monitoring & exams) can help personalize your care plan, which helps you manage your own care. Screening tests can find health problems at the earliest stages, when they are easiest to treat. [facility] follows the current, evidence-based guidelines published by the [facility] ([facility]) when recommending preventive services for our patients. Because we follow these guidelines, sometimes recommendations change over time as research supports it. (For example, mammograms used to be recommended annually. Even though Medicare will still pay for an annual mammogram, the newer guidelines recommend a mammogram every two years for women of average risk.)  Of course, you and your doctor may decide to screen more often for some diseases, based on your risk and your health status. Preventive services for you include:  - Medicare offers their members a free annual wellness visit, which is time for you and your primary care provider to discuss and plan for your preventive service needs. Take advantage of this benefit every year!  -All adults over the age of 72 should receive the recommended pneumonia vaccines. Current USPSTF guidelines recommend a series of two vaccines for the best pneumonia protection.   -All adults should have a flu vaccine yearly and a tetanus vaccine every 10 years. All adults age 61 and older should receive a shingles vaccine once in their lifetime.    -A bone mass density test is recommended when a woman turns 65 to screen for osteoporosis. This test is only recommended one time, as a screening.  Some providers will use this same test as a disease monitoring tool if you already have osteoporosis. -All adults age 38-68 who are overweight should have a diabetes screening test once every three years.   -Other screening tests and preventive services for persons with diabetes include: an eye exam to screen for diabetic retinopathy, a kidney function test, a foot exam, and stricter control over your cholesterol.   -Cardiovascular screening for adults with routine risk involves an electrocardiogram (ECG) at intervals determined by your doctor.   -Colorectal cancer screenings should be done for adults age 54-65 with no increased risk factors for colorectal cancer. There are a number of acceptable methods of screening for this type of cancer. Each test has its own benefits and drawbacks. Discuss with your doctor what is most appropriate for you during your annual wellness visit. The different tests include: colonoscopy (considered the best screening method), a fecal occult blood test, a fecal DNA test, and sigmoidoscopy. -Breast cancer screenings are recommended every other year for women of normal risk, age 54-69.  -Cervical cancer screenings for women over age 72 are only recommended with certain risk factors.   -All adults born between [facility] should be screened once for Hepatitis C.      Here is a list of your current Health Maintenance items (your personalized list of preventive services) with a due date:  Health Maintenance Due   Topic Date Due    Pneumococcal Vaccine (1 of 1 - PPSV23) 12/30/2012    Annual Well Visit  10/19/2019    Pap Test  11/07/2019

## 2019-10-21 NOTE — PROGRESS NOTES
Chief Complaint   Patient presents with    Complete Physical    Labs     Pt in office today for cpe/labs    1. Have you been to the ER, urgent care clinic since your last visit? Hospitalized since your last visit? Chip swollen hands and feet    2. Have you seen or consulted any other health care providers outside of the [address] [place] [address] since your last visit? Include any pap smears or colon screening.  No     Pt has no other concerns

## 2019-10-21 NOTE — PROGRESS NOTES
This is the Subsequent Medicare Annual Wellness Exam, performed 12 months or more after the Initial AWV or the last Subsequent AWV    I have reviewed the patient's medical history in detail and updated the computerized patient record. History     Past Medical History:   Diagnosis Date    Allergies     Anemia 2008    Anxiety     Asthma     inhaler    Depression     Ear infection     Environmental allergies 4/1/2010    GERD (gastroesophageal reflux disease)     Hypothyroid 4/1/2010    Inflammatory arthritis     Lupus (HCC)     Nausea & vomiting     Other ill-defined conditions(799.89) noted 7/25/2012    \"Intellectually  Disabled\" signs consents/self. Mom &  concur    Otitis media 4/1/2010    Strabismus 4/1/2010    Unspecified adverse effect of anesthesia 2/10/2011    slow to wake up, sleepy    Wears hearing aid       Past Surgical History:   Procedure Laterality Date    HX BREAST REDUCTION  2005    HX CHOLECYSTECTOMY  2014    HX KNEE ARTHROSCOPY Left     HX ORTHOPAEDIC      heel cord lengthing; right thumb surgery (pin placed)    HX OTHER SURGICAL      Ear surgery    HX TONSIL AND ADENOIDECTOMY  1990     Current Outpatient Medications   Medication Sig Dispense Refill    furosemide (LASIX) 20 mg tablet Take 1 Tab by mouth daily. 30 Tab 2    VITAMIN D2 50,000 unit capsule TAKE 1 CAPSULE BY MOUTH ONCE A WEEK 12 Cap 1    busPIRone (BUSPAR) 10 mg tablet TAKE 1 TABLET BY MOUTH TWICE DAILY 180 Tab 1    buPROPion XL (WELLBUTRIN XL) 150 mg tablet TAKE 1 TABLET BY MOUTH ONCE DAILY IN THE MORNING 90 Tab 1    levothyroxine (SYNTHROID) 200 mcg tablet Take 1 Tab by mouth Daily (before breakfast). 30 Tab 5    SUMAtriptan (IMITREX) 100 mg tablet Take 100 mg by mouth once as needed for Migraine. Patient unsure of dose but believes it is 100 mg      traZODone (DESYREL) 50 mg tablet Take 1 Tab by mouth nightly.  30 Tab 2    clonazePAM (KLONOPIN) 1 mg tablet Take 1 Tab by mouth daily as needed (Anxiety). 30 Tab 1    ferrous sulfate 325 mg (65 mg iron) tablet Take 1 Tab by mouth two (2) times a day. 60 Tab 5    ibuprofen (MOTRIN) 600 mg tablet Take 1 Tab by mouth every six (6) hours as needed for Pain. Or body aches or fever 30 Tab 1    fluticasone (FLONASE) 50 mcg/actuation nasal spray 2 Sprays by Both Nostrils route daily. 1 Bottle 2    cetirizine (ZYRTEC) 10 mg tablet Take 1 Tab by mouth daily. 90 Tab 3    omeprazole (PRILOSEC) 20 mg capsule Take 1 Cap by mouth two (2) times a day. 180 Cap 1    montelukast (SINGULAIR) 10 mg tablet TAKE ONE TABLET BY MOUTH ONCE DAILY 30 Tab 5    albuterol (PROAIR HFA) 90 mcg/actuation inhaler Take 2 Puffs by inhalation every four (4) hours as needed for Wheezing. 9 Inhaler 1     Allergies   Allergen Reactions    Latex Hives    Augmentin [Amoxicillin-Pot Clavulanate] Nausea and Vomiting    Codeine Nausea and Vomiting    Morphine Sulfate Nausea and Vomiting    Nuts [Tree Nut] Swelling    Pcn [Penicillins] Hives     Family History   Problem Relation Age of Onset    Osteoporosis Other     Arthritis-osteo Other     Breast Cancer Other         maternal great aunt    Breast Cancer Other         Maternal great aunt    Stroke Maternal Aunt     Hypertension Sister      Social History     Tobacco Use    Smoking status: Never Smoker    Smokeless tobacco: Never Used   Substance Use Topics    Alcohol use: No     Patient Active Problem List   Diagnosis Code    Hypothyroid E03.9    Environmental allergies Z91.09    [place] (hard of hearing) H91.90    Strabismus H50.9    Anemia, iron deficiency D50.9    Asthma J45.909    Long-term use of Plaquenil Z79.899    Vitamin D deficiency E55.9    Polyuria R35.8    Periodic headache syndrome, not intractable G43. C0    Inflammatory polyarthritis ([address] M06.4    Obesity, morbid ([address] E66.01    Recurrent depression ([address] F33.9       Depression Risk Factor Screening:     3 most recent PHQ Screens 10/21/2019   PHQ Not Done Active Diagnosis of Depression or Bipolar Disorder   Little interest or pleasure in doing things -   Feeling down, depressed, irritable, or hopeless -   Total Score PHQ 2 -     Alcohol Risk Factor Screening: You do not drink alcohol or very rarely. Functional Ability and Level of Safety:   Hearing Loss  Hearing is good. Activities of Daily Living  The home contains: no safety equipment. Patient needs help with:  transportation    Fall Risk  Fall Risk Assessment, last 12 mths 7/10/2017   Able to walk? Yes   Fall in past 12 months? No       Abuse Screen  Patient is not abused    Cognitive Screening   Evaluation of Cognitive Function:  Has your family/caregiver stated any concerns about your memory: no  Normal    Patient Care Team   Patient Care Team:  Thom Justin NP as PCP - General (Family Practice)  Sun Rice LPN as Ambulatory Care Navigator (Family Practice)  Rob Ayala MD as Physician (Obstetrics & Gynecology)    Visit Vitals  /74 (BP 1 Location: Left arm, BP Patient Position: Sitting)   Pulse 98   Temp 97.9 °F (36.6 °C) (Oral)   Resp 14   Ht 5' 4\" (1.626 m)   Wt 240 lb (108.9 kg)   LMP 09/28/2019   SpO2 97%   BMI 41.20 kg/m²     Physical Examination:   GENERAL ASSESSMENT: well developed and well nourished  CHEST: normal air exchange, no rales, no rhonchi, no wheezes, respiratory effort normal with no retractions  HEART: regular rate and rhythm, normal S1/S2, no murmurs    Assessment/Plan   Education and counseling provided:  Are appropriate based on today's review and evaluation    Diagnoses and all orders for this visit:    1. Routine general medical examination at a health care facility  -     LIPID PANEL    2. Hypercholesteremia  -     LIPID PANEL        I have discussed the diagnosis with the patient and the intended plan as seen in the above orders. The patient has received an after-visit summary and questions were answered concerning future plans.  Patient conveyed understanding of the plan at the time of the visit.     Najma Lovelace, MSN, ANP  10/21/2019

## 2019-10-22 LAB
CHOLEST SERPL-MCNC: 185 MG/DL (ref 100–199)
HDLC SERPL-MCNC: 44 MG/DL
INTERPRETATION, 910389: NORMAL
LDLC SERPL CALC-MCNC: 130 MG/DL (ref 0–99)
TRIGL SERPL-MCNC: 56 MG/DL (ref 0–149)
VLDLC SERPL CALC-MCNC: 11 MG/DL (ref 5–40)

## 2019-11-18 ENCOUNTER — HOSPITAL ENCOUNTER (OUTPATIENT)
Dept: LAB | Age: 34
Discharge: HOME | End: 2019-11-18

## 2019-11-18 ENCOUNTER — OFFICE VISIT (OUTPATIENT)
Dept: FAMILY MEDICINE CLINIC | Age: 34
End: 2019-11-18

## 2019-11-18 VITALS
DIASTOLIC BLOOD PRESSURE: 73 MMHG | SYSTOLIC BLOOD PRESSURE: 111 MMHG | TEMPERATURE: 98.1 F | HEIGHT: 64 IN | WEIGHT: 240 LBS | OXYGEN SATURATION: 95 % | HEART RATE: 76 BPM | BODY MASS INDEX: 40.97 KG/M2 | RESPIRATION RATE: 18 BRPM

## 2019-11-18 DIAGNOSIS — R79.89 ELEVATED LIVER FUNCTION TESTS: Primary | ICD-10-CM

## 2019-11-18 DIAGNOSIS — R79.89 ELEVATED LIVER FUNCTION TESTS: ICD-10-CM

## 2019-11-18 LAB
ALBUMIN SERPL-MCNC: 4.5 G/DL (ref 3.5–5)
ALBUMIN/GLOB SERPL: 1.7 {RATIO} (ref 1.1–2.2)
ALP SERPL-CCNC: 101 U/L (ref 45–117)
ALT SERPL-CCNC: 29 U/L (ref 12–78)
ANION GAP SERPL CALC-SCNC: 7 MMOL/L (ref 5–15)
AST SERPL-CCNC: 13 U/L (ref 15–37)
BILIRUB SERPL-MCNC: 1 MG/DL (ref 0.2–1)
BUN SERPL-MCNC: 16 MG/DL (ref 6–20)
BUN/CREAT SERPL: 20 (ref 12–20)
CALCIUM SERPL-MCNC: 9.4 MG/DL (ref 8.5–10.1)
CHLORIDE SERPL-SCNC: 104 MMOL/L (ref 97–108)
CO2 SERPL-SCNC: 27 MMOL/L (ref 21–32)
CREAT SERPL-MCNC: 0.82 MG/DL (ref 0.55–1.02)
GLOBULIN SER CALC-MCNC: 2.7 G/DL (ref 2–4)
GLUCOSE SERPL-MCNC: 89 MG/DL (ref 65–100)
POTASSIUM SERPL-SCNC: 3.8 MMOL/L (ref 3.5–5.1)
PROT SERPL-MCNC: 7.2 G/DL (ref 6.4–8.2)
SODIUM SERPL-SCNC: 138 MMOL/L (ref 136–145)

## 2019-11-18 NOTE — PROGRESS NOTES
Chief Complaint   Patient presents with   [facility]     Patient in office today for one f/u to recheck liver function. Denies any n/v/c/d. Denies any abd pain. Denies any other concerns at this time. Chief Complaint   Patient presents with   [facility]     she is a 30y.o. year old female who presents for evalution. Reviewed PmHx, RxHx, FmHx, SocHx, AllgHx and updated and dated in the chart. Review of Systems - negative except as listed above in the HPI    Objective:     Vitals:    11/18/19 1340   BP: 111/73   Pulse: 76   Resp: 18   Temp: 98.1 °F (36.7 °C)   TempSrc: Oral   SpO2: 95%   Weight: 240 lb (108.9 kg)   Height: 5' 4\" (1.626 m)     Physical Examination: General appearance - alert, well appearing, and in no distress  Chest - clear to auscultation, no wheezes, rales or rhonchi, symmetric air entry  Heart - normal rate, regular rhythm, normal S1, S2, no murmurs  Abdomen - soft, nontender, nondistended, no masses or organomegaly    Assessment/ Plan:   Diagnoses and all orders for this visit:    1. Elevated liver function tests  -     METABOLIC PANEL, COMPREHENSIVE; Future  Will notify results and deviate plan based on findings. I have discussed the diagnosis with the patient and the intended plan as seen in the above orders. The patient has received an after-visit summary and questions were answered concerning future plans. Medication Side Effects and Warnings were discussed with patient: no  Patient Labs were reviewed and or requested: yes  Patient Past Records were reviewed and or requested  yes  Patient / Caregiver Understanding of treatment plan was verbalized during office visit YES    Minoo Fine, NASRA-C    There are no Patient Instructions on file for this visit.

## 2019-11-18 NOTE — PROGRESS NOTES
Chief Complaint   Patient presents with   [facility]     Patient in office today for one f/u to recheck liver function. Have no concerns. 1. Have you been to the ER, urgent care clinic since your last visit? Hospitalized since your last visit? No    2. Have you seen or consulted any other health care providers outside of the [address] [place] [address] since your last visit? Include any pap smears or colon screening.  No

## 2019-11-19 NOTE — PROGRESS NOTES
The following message was sent to pt via [employer] portal in reference to lab results:    Good morning Mya,     Attached are the results of your repeat labs from yesterday. It shows that your liver function has normalized. Please let me know if you have any questions or concerns regarding these results.      Marlen Dutton, STANC

## 2019-12-16 ENCOUNTER — OFFICE VISIT (OUTPATIENT)
Dept: FAMILY MEDICINE CLINIC | Age: 34
End: 2019-12-16

## 2019-12-16 VITALS
WEIGHT: 236 LBS | DIASTOLIC BLOOD PRESSURE: 79 MMHG | SYSTOLIC BLOOD PRESSURE: 117 MMHG | RESPIRATION RATE: 18 BRPM | BODY MASS INDEX: 40.29 KG/M2 | TEMPERATURE: 98.8 F | HEART RATE: 98 BPM | OXYGEN SATURATION: 96 % | HEIGHT: 64 IN

## 2019-12-16 DIAGNOSIS — J40 BRONCHITIS: Primary | ICD-10-CM

## 2019-12-16 RX ORDER — ALBUTEROL SULFATE 90 UG/1
2 AEROSOL, METERED RESPIRATORY (INHALATION)
Qty: 9 INHALER | Refills: 1 | Status: SHIPPED | OUTPATIENT
Start: 2019-12-16 | End: 2021-04-21 | Stop reason: SDUPTHER

## 2019-12-16 RX ORDER — AZITHROMYCIN 250 MG/1
TABLET, FILM COATED ORAL
Qty: 6 TAB | Refills: 0 | Status: SHIPPED | OUTPATIENT
Start: 2019-12-16 | End: 2019-12-21

## 2019-12-16 NOTE — PROGRESS NOTES
Chief Complaint   Patient presents with    Cough    Ear Pain    Headache     Patient presents during walk in clinic with sx that began on Friday. Cough is productive,secretions are clear. Have been treating with delsym with no improvement noted. 1. Have you been to the ER, urgent care clinic since your last visit? Hospitalized since your last visit? No    2. Have you seen or consulted any other health care providers outside of the [address] [place] [address] since your last visit? Include any pap smears or colon screening.  No

## 2019-12-16 NOTE — LETTER
NOTIFICATION RETURN TO WORK 
 
12/16/2019 7:36 AM 
 
Ms. Navneet Bauer [place] [address] To Whom It May Concern: 
 
Navneet Bauer is currently under the care of [address] She will return to work on: Wednesday December 18th, 2019. If there are questions or concerns please have the patient contact our office.  
 
 
 
Sincerely, 
 
 
Janell Mayorga NP

## 2019-12-16 NOTE — PROGRESS NOTES
Chief Complaint   Patient presents with    Cough    Ear Pain    Headache     Patient presents during walk in clinic with sx that began on Friday. Cough is productive,secretions are clear. Have been treating with delsym with no improvement noted. Was sick around thanksgiving time and went to [facility]. Was prescribed steroids, abx, and nasal spray. Was prescribed doxycycline for 10 days. Completed entire course as prescribed. Was told she had pneumonia after having chest xray. Friday started to cough again. Associated wheezing and dyspnea at times. Cough is productive. Thick and tastes bad. Sick contacts include her sister. Denies any fever. Has been taking tylenol and delsym without relief. Denies any other concerns at this time. Chief Complaint   Patient presents with    Cough    Ear Pain    Headache     she is a 30y.o. year old female who presents for evalution. Reviewed PmHx, RxHx, FmHx, SocHx, AllgHx and updated and dated in the chart.     Review of Systems - negative except as listed above in the HPI    Objective:     Vitals:    12/16/19 0722   BP: 117/79   Pulse: 98   Resp: 18   Temp: 98.8 °F (37.1 °C)   TempSrc: Oral   SpO2: 96%   Weight: 236 lb (107 kg)   Height: 5' 4\" (1.626 m)     Physical Examination: General appearance - alert, well appearing, and in no distress  Eyes - pupils equal and reactive, extraocular eye movements intact  Ears - bilateral TM's and external ear canals normal  Nose - mucosal congestion, mucosal erythema, clear rhinorrhea and sinuses normal and nontender  Mouth - mucous membranes moist, pharynx normal without lesions  Neck - supple, no significant adenopathy  Chest - no tachypnea, retractions or cyanosis, wheezing noted on expiration in all lung fields with no other adventitious lung sounds, productive sounding deep chest cough  Heart - normal rate, regular rhythm, normal S1, S2, no murmurs    Assessment/ Plan:   Diagnoses and all orders for this visit: 1. Bronchitis  -     azithromycin (ZITHROMAX) 250 mg tablet; Take 2 tablets today, then take 1 tablet daily  -     albuterol (PROAIR HFA) 90 mcg/actuation inhaler; Take 2 Puffs by inhalation every four (4) hours as needed for Wheezing. Start and complete full course of zithromax. PRN albuterol to help open airway and relieve wheezing. Dwp ADRs/SEs of medication. Push fluids. Rest. Saline nasal spray for nasal congestion. OTC motrin/apap for fevers. RTC if sx persist or worsen. Follow-up and Dispositions    · Return if symptoms worsen or fail to improve. I have discussed the diagnosis with the patient and the intended plan as seen in the above orders. The patient has received an after-visit summary and questions were answered concerning future plans. Medication Side Effects and Warnings were discussed with patient: yes  Patient Labs were reviewed and or requested: no  Patient Past Records were reviewed and or requested  yes  Patient / Caregiver Understanding of treatment plan was verbalized during office visit YES    Rayray Hilario, RUSTY    Patient Instructions   I have prescribed you the antibiotic Azithromycin. Take this medication as directed and complete the entire course, even if you're feeling better. If you miss a dose, take it as soon as possible. Take this medication on an empty stomach and if you're going to be out in the sun make sure you wear sunscreen to avoid developing a photosensitivity reaction. Common side effects of this medication include abdominal pain, nausea, and diarrhea. Notify your provider if symptoms do not improve one week after completing the course of this antibiotic.

## 2019-12-18 DIAGNOSIS — R05.9 COUGH: Primary | ICD-10-CM

## 2019-12-18 RX ORDER — CODEINE PHOSPHATE AND GUAIFENESIN 10; 100 MG/5ML; MG/5ML
5 SOLUTION ORAL
Qty: 180 ML | Refills: 0 | Status: SHIPPED | OUTPATIENT
Start: 2019-12-18 | End: 2019-12-25

## 2020-01-23 DIAGNOSIS — R60.0 BILATERAL LOWER EXTREMITY EDEMA: ICD-10-CM

## 2020-01-23 RX ORDER — FUROSEMIDE 20 MG/1
20 TABLET ORAL DAILY
Qty: 30 TAB | Refills: 2 | Status: SHIPPED | OUTPATIENT
Start: 2020-01-23 | End: 2020-03-18 | Stop reason: SDUPTHER

## 2020-01-31 ENCOUNTER — OFFICE VISIT (OUTPATIENT)
Dept: FAMILY MEDICINE CLINIC | Age: 35
End: 2020-01-31

## 2020-01-31 VITALS
OXYGEN SATURATION: 98 % | RESPIRATION RATE: 18 BRPM | SYSTOLIC BLOOD PRESSURE: 124 MMHG | HEIGHT: 64 IN | TEMPERATURE: 97.8 F | WEIGHT: 237.1 LBS | HEART RATE: 78 BPM | BODY MASS INDEX: 40.48 KG/M2 | DIASTOLIC BLOOD PRESSURE: 80 MMHG

## 2020-01-31 DIAGNOSIS — F41.0 PANIC ATTACKS: ICD-10-CM

## 2020-01-31 DIAGNOSIS — F32.A DEPRESSIVE DISORDER: ICD-10-CM

## 2020-01-31 DIAGNOSIS — F41.1 GAD (GENERALIZED ANXIETY DISORDER): ICD-10-CM

## 2020-01-31 DIAGNOSIS — F41.8 SITUATIONAL ANXIETY: ICD-10-CM

## 2020-01-31 RX ORDER — CLONAZEPAM 1 MG/1
1 TABLET ORAL
Qty: 30 TAB | Refills: 0 | Status: SHIPPED | OUTPATIENT
Start: 2020-01-31 | End: 2020-03-18 | Stop reason: SDUPTHER

## 2020-01-31 RX ORDER — BUSPIRONE HYDROCHLORIDE 10 MG/1
TABLET ORAL
Qty: 180 TAB | Refills: 1 | Status: SHIPPED | OUTPATIENT
Start: 2020-01-31 | End: 2020-08-13 | Stop reason: SDUPTHER

## 2020-01-31 RX ORDER — BUPROPION HYDROCHLORIDE 150 MG/1
TABLET ORAL
Qty: 90 TAB | Refills: 1 | Status: SHIPPED | OUTPATIENT
Start: 2020-01-31 | End: 2021-03-05

## 2020-01-31 NOTE — PROGRESS NOTES
Chief Complaint   Patient presents with    Medication Refill     patient is here for a refill on her anxiety medication     Rancho Marvin is a 30 y.o. female who presents for evaluation. Here to discuss anxiety, states she started cutting herself again. States she has struggled with cutting herself in the past, it was \"sometime last year. \"  States triggers are sister and her behaviors. Another trigger is when her \"routine is changed. \"  States sister is in group home and when she gets calls about her acting up it is a trigger. Cutting herself has happened \"once or twice\" since last Tuesday. States she still has clonazepam from last visit about anxiety in May, still has half a bottle left she thinks. Has not tried taking it when triggered. Also on Wellbutrin and buspar BID, taking both consistently. States she is in the process of looking for a new therapist.  Has been without a therapist since Jan 6th. [facility] her insurance. Has been going there since July. Thinks that having weekly counseling helped prevent cutting. Denies SI and HI. Reviewed PmHx, RxHx, FmHx, SocHx, AllgHx and updated and dated in the chart.     Patient Active Problem List    Diagnosis    Recurrent depression ([address]    Obesity, morbid ([address]    Inflammatory polyarthritis ([address]    Periodic headache syndrome, not intractable    Polyuria    Long-term use of Plaquenil    Vitamin D deficiency    Asthma    Anemia, iron deficiency    Hypothyroid    Environmental allergies    [place] (hard of hearing)    Strabismus       Review of Systems - negative except as listed above in the HPI    Objective:     Vitals:    01/31/20 0753   BP: 124/80   Pulse: 78   Resp: 18   Temp: 97.8 °F (36.6 °C)   TempSrc: Oral   SpO2: 98%   Weight: 237 lb 1.6 oz (107.5 kg)   Height: 5' 4\" (1.626 m)     Physical Examination: General appearance - alert, well appearing, and in no distress  Mental status - alert, oriented to person, place, and time  Chest - clear to auscultation, no wheezes, rales or rhonchi, symmetric air entry  Heart - normal rate, regular rhythm, normal S1, S2, no murmurs, rubs, clicks or gallops    Assessment/ Plan:   Diagnoses and all orders for this visit:    1. Panic attacks  -     clonazePAM (KLONOPIN) 1 mg tablet; Take 1 Tab by mouth daily as needed (Anxiety). - Refilled X 1, pt using appropriately only for panic. 2. Situational anxiety  3. PARSAD (generalized anxiety disorder)  -     busPIRone (BUSPAR) 10 mg tablet; TAKE 1 TABLET BY MOUTH TWICE DAILY  - Refilled    4. Depressive disorder  -     buPROPion XL (WELLBUTRIN XL) 150 mg tablet; TAKE 1 TABLET BY MOUTH ONCE DAILY IN THE MORNING  - Refilled  - Advised to start seeing in practice LCSW for 1:1 counseling in interim until she can find new therapist, discussed she can continue to see \[facility]\"" for therapy. Pt is agreeable to this and plans to set up appt today before she leaves. Follow-up and Dispositions    · Return if symptoms worsen or fail to improve. I have discussed the diagnosis with the patient and the intended plan as seen in the above orders. The patient understands and agrees with the plan. The patient has received an after-visit summary and questions were answered concerning future plans. Medication Side Effects and Warnings were discussed with patient  Patient Labs were reviewed and or requested:  Patient Past Records were reviewed and or requested    Danica Rosas NP  [address] [facility]    There are no Patient Instructions on file for this visit.

## 2020-01-31 NOTE — LETTER
NOTIFICATION RETURN TO WORK / SCHOOL 
 
1/31/2020 8:15 AM 
 
Ms. Levi Ward [facility] [address] [address] [place] [address] To Whom It May Concern: 
 
Levi Ward is currently under the care of [address]. Please excuse Ms Rose Marie Larose from being late to work today, she had an appointment with me. If there are questions or concerns please have the patient contact our office. Sincerely, Genevive Section, NP

## 2020-01-31 NOTE — PROGRESS NOTES
1. Have you been to the ER, urgent care clinic since your last visit? Hospitalized since your last visit? No    2. Have you seen or consulted any other health care providers outside of the [address] [place] [address] since your last visit? Include any pap smears or colon screening.  No   Chief Complaint   Patient presents with    Medication Refill     patient is here for a refill on her anxiety medication     Vitals:    01/31/20 0753   BP: 124/80   Pulse: 78   Resp: 18   Temp: 97.8 °F (36.6 °C)   TempSrc: Oral   SpO2: 98%   Weight: 237 lb 1.6 oz (107.5 kg)   Height: 5' 4\" (1.626 m)   PainSc:   0 - No pain   LMP: 01/03/2020

## 2020-02-03 ENCOUNTER — OFFICE VISIT (OUTPATIENT)
Dept: FAMILY MEDICINE CLINIC | Age: 35
End: 2020-02-03

## 2020-02-03 DIAGNOSIS — F41.1 GAD (GENERALIZED ANXIETY DISORDER): Primary | ICD-10-CM

## 2020-02-03 DIAGNOSIS — F33.1 MODERATE EPISODE OF RECURRENT MAJOR DEPRESSIVE DISORDER (HCC): ICD-10-CM

## 2020-02-03 DIAGNOSIS — F79 INTELLECTUAL DISABILITY: ICD-10-CM

## 2020-02-03 NOTE — PROGRESS NOTES
Assessment    Name:  Levi Ward                   :    1985   Date:    20    PRESENTING PROBLEM:     Pt referred for depression and anxiety, and self-harm. She reports feeling down all the time, having no energy, trouble concentrating, feeling like a failure, feeling anxious all the time and worrying all the time, easily annoyed. PRECIPITANTS:    Pt is ID, had been seeing a therapist with the B for a year then transitioned to the community. She saw a therapist at [facility] for a year but they no longer take her insurance so she had to stop seeing the therapist end of December. Pt has been cutting since ending with therapist, also experiencing some conflict with her Latter day family, and some conflict at work. She also is startting with a new , although pt sees this as positive as she didn't like her former . Suicidal Ideation:  denies  Homicidal Ideation:  denies  Self-Harm: pt has been cutting her arm with a knife, off-and-on for the past year. She last cut last night. She said she cuts when she feels anxious or depressed. Discussed alternative methods, pt has tried putting ice cubes on her arm but that didn't help. [facility] suggested using marker to mejia arm or color arm, or snapping rubber band on wrist.  Also discussed calling a support person or possibly suicide hotline. Pt denies wanting to die when she cuts, she just wants to feel better. She admitted to sometimes cutting \"too much,\" in which case she puts a band-aid on it. [employer] members and  are aware she cuts and monitor it, which she doesn't like. Pt stated that she would like to stop cutting, but also said it makes her feel good. Substance Use:  denies    TRAUMA HISTORY:   Pt denies any history of abuse or traumatic events. Her mother  a few years ago which has been hard for pt. PSYCH HISTORY:  Outpatient: Pt has seen an outpatient counselor for the past 2 years.   She is prescribed prozac by the psychiatrist at [place] [id number], and her other meds (wellbutrin, klonopin) by PCP. Family history: Pt's older sister is also ID, but less functional.  Sister lives in a group home and has history of becoming violent at times. SOCIAL HISTORY:  Pt was raised by mother with sister 6years older. Pt describes mother as \"kind,\" loved her, misses her. Pt said hard dates for her are her mother's birthday which is in June, and the anniversary of mother's death, which is October. Pt has been identified with an Intellectual Disability but is very high functioning, so she does not qualify for a lot of services. She has an ID CM, Dom Simpson, who meets with her every 3 months or sooner if requested. She accompanied pt for beginning of session. CM helps coordinate services for pt and helps pt navigate social relationship issues. Pt attends a [employer] and calls the  her Jennifer Barahona" has a lot of Sikhism support but sometimes Sikhism members try to get too involved in her business. They pick her up and take her to Sikhism, she goes on Sundays and Wednesday for Bible Study. Pt works M-Thurs, 8 hours each day, at [employer].  She has a  that helps her deal with issues on the job. Pt likes her job ok but has some issues with 1 or 2 workers there, feels like they pick on her. Work provides transportation for her. Pt does not drive but uses [employer] and buys vouchers for rides. She receives disability and has a waiver to subsidize her apartment. Pt lives in a 2 bedroom apartment by herself and likes it. Pt is independent with ADLs, her meds, making appointments. She does not have a legal guardian. MEDICAL ISSUES:    Pt did not identify any medical issues, but record indicates she has a few, such as asthma and wears a hearing aide. CURRENT MEDS:    Prior to Admission medications    Medication Sig Start Date End Date Taking?  Authorizing Provider   clonazePAM (KLONOPIN) 1 mg tablet Take 1 Tab by mouth daily as needed (Anxiety). 1/31/20   Ger Hills NP   busPIRone (BUSPAR) 10 mg tablet TAKE 1 TABLET BY MOUTH TWICE DAILY 1/31/20   Rhjenifer Amandeep A NP   buPROPion XL (WELLBUTRIN XL) 150 mg tablet TAKE 1 TABLET BY MOUTH ONCE DAILY IN THE MORNING 1/31/20   Heron Amandeep A, NP   furosemide (LASIX) 20 mg tablet Take 1 Tab by mouth daily. 1/23/20   Yash Palafox NP   traZODone (DESYREL) 50 mg tablet TAKE 1 TABLET BY MOUTH NIGHTLY 1/5/20   Yash Palafox NP   albuterol [facility] HFA) 90 mcg/actuation inhaler Take 2 Puffs by inhalation every four (4) hours as needed for Wheezing. 12/16/19   Yash Palafox NP   VITAMIN D2 50,000 unit capsule TAKE 1 CAPSULE BY MOUTH ONCE A WEEK 10/9/19   Yash Palafox NP   levothyroxine (SYNTHROID) 200 mcg tablet Take 1 Tab by mouth Daily (before breakfast). 9/11/19   Juan Renae NP   SUMAtriptan (IMITREX) 100 mg tablet Take 100 mg by mouth once as needed for Migraine. Patient unsure of dose but believes it is 100 mg    Provider, Historical   ferrous sulfate 325 mg (65 mg iron) tablet Take 1 Tab by mouth two (2) times a day. 4/16/19   Yash Palafox NP   ibuprofen (MOTRIN) 600 mg tablet Take 1 Tab by mouth every six (6) hours as needed for Pain. Or body aches or fever 2/15/19   Blanca De Jesus,    fluticasone (FLONASE) 50 mcg/actuation nasal spray 2 Sprays by Both Nostrils route daily. 1/9/19   Alla Acevedo MD   cetirizine (ZYRTEC) 10 mg tablet Take 1 Tab by mouth daily. 1/4/19   Daniel Garcia NP   omeprazole (PRILOSEC) 20 mg capsule Take 1 Cap by mouth two (2) times a day.  8/15/18   Daniel Garcia NP   montelukast (SINGULAIR) 10 mg tablet TAKE ONE TABLET BY MOUTH ONCE DAILY 8/14/18   Daniel Olive, NP        MINI-MENTAL STATUS EXAM:    Orientation  person, place, time/date and situation   Behavior  cooperative   Eye Contact  appropriate   Appearance:   age appropriate, casually dressed and overweight   Motor Behavior:   within normal limits   Speech:   normal rate and volume   Thought Process:  within normal limits   Thought Content  free of delusions and free of hallucinations   Mood:   anxious, depressed   Affect:   anxious, euphoric, labile and sad   Memory recent   adequate   Memory remote:   adequate   Concentration:   adequate   Insight:   limited   Motivation:  fair   Judgment:   limited     STRENGTHS:  pt is independent, has a lot of community support, financially stable, stable housing                            LIMITATIONS:  lack of family support, poor coping skils                         SCREENINGS:     PHQ9:  PHQ 9 Score: 20 = severe depression                                   AUDIT score:  AUDIT Score: 0      PTSD score:  PTSD Primary Care Total Score : 0      PRASAD 7:  BSHSI AMB PRASAD-7 SCORE: 17 = severe anxiety                      ASSESSMENT AND RECOMMENDATIONS:  Pt presents as anxious but cooperative. She was accompanied by her CSB CM for beginning part of session, for CM to give some background info, and then CM left. Pt able to answer for herself, is high functioning and lives independently with some community supports. Pt appears superficially cheerful with nervous laugh, but switched easily to tears when talking about something that bothered her. She has been cutting her arm for the past year to deal with anxiety and depression, and likes how it makes her feel. She said she wants to stop it but needs an alternative behavior that will work. We discussed some methods to try, but unclear if pt will try these when she leaves. Gave pt picture to color and bring back, as coloring is something she likes to do and [facility] encouraged her to try that when she feels upset. Pt needs an on-going therapist in the community that works with ID and accepts her insurance. [facility] will look but will be difficult to find.   She could potentially return to [facility] for counseling, but that would only be for a year and then they would transition her out again. The goal is to find a counselor that she could continue with for a long time. Used problem-solving to help pt with her 3 identified acute stressors. Pt said a Voodoo member is talking about her and she fears trying to turn people against her. Discussed ways to manage this, but unclear what pt has already tried. [facility] suggested talking with her \"uncle\" for his help. Discussed problems at work, pt will work with her new  to try and resolve that issue. Discussed problems with her sister - sister calls her numerous times during the day, and pt has gotten in trouble at work for sister calling so often. Sister also calls sometimes and threatens to kill herself. [address] [place] [address] suggested that pt and CM speak with sister's group home, to restrict her phone calls to pt until after 5pm.  Pt said she has shared sister's suicidal threats with group home staff, and they have assured her that she is well monitored. [facility] suggested pt turn her phone off, but pt wants to listen to music while she works. Suggested she just not answer phone, but pt said she will keep calling. Pt is ok with talking with sister, but doesn't want to hear the suicidal threats. When mother was alive sister used to come and spend weekends with them and sister has asked if she could do that with pt, but pt does not want this and CM and group home agree this would not be safe.       DIAGNOSIS:  Axis I: Generalized Anxiety Disorder   Current Moderate episode of major depression, recurrent  Axis II: Intellectual Disability, Mild  Axis III:   Past Medical History:   Diagnosis Date    Allergies     Anemia 2008    Anxiety     Asthma     inhaler    Depression     Ear infection     Environmental allergies 4/1/2010    GERD (gastroesophageal reflux disease)     Hypothyroid 4/1/2010    Inflammatory arthritis     Lupus (HCC)     Nausea & vomiting     Other ill-defined conditions(799.89) noted 7/25/2012 \"Intellectually  Disabled\" signs consents/self. Mom &  concur    Otitis media 4/1/2010    Strabismus 4/1/2010    Unspecified adverse effect of anesthesia 2/10/2011    slow to wake up, sleepy    Wears hearing aid      Axis IV: Problems with primary support group, Occupational problems and Other psychosocial or environmental problems  Axis V:  51-60 moderate symptoms    PLAN:    Follow-up and Dispositions    · Return in about 1 week (around 2/10/2020) for Follow Up, Case Management. This patient was referred to the [address] [place] [address] by Marcia Smith NP, for help with management of self-harm and anxiety. Met with pt. for initial session of 60 minutes to establish contact, to assess symptoms and mental status, identify health behavior goals, and develop plan of care based on readiness to change. [facility] will coordinate with PCP for plan of care.     GOALS:  Joe Driscoll will report increase in energy  Joe Driscoll will report cessation of self-harm  Joe Driscoll will identify soothing self-care activities to use when she feels like cutting      INTERVENTIONS:  Provided supportive therapy and encouragement  Reviewed coping strategies  Introduced anxiety management techniques  Reviewed lifestyle modifications including management of stressors, pleasurable activities/self-care, social support, medication compliance    Homework given: self-care activities, color picture      Fang Dorsey LCSW

## 2020-02-05 ENCOUNTER — TELEPHONE (OUTPATIENT)
Dept: FAMILY MEDICINE CLINIC | Age: 35
End: 2020-02-05

## 2020-02-05 ENCOUNTER — DOCUMENTATION ONLY (OUTPATIENT)
Dept: FAMILY MEDICINE CLINIC | Age: 35
End: 2020-02-05

## 2020-02-05 NOTE — PROGRESS NOTES
Sommer De La Rosa the info for the self-harm textline: Text HOME to 081494 from anywhere in the [place], anytime, about any type of crisis. -you text them, and someone will text you back and you can have a conversation with them all using texts    If you want to call and talk to someone, you can call the suicide hotline: 0-641.124.2293  -someone will answer and talk to you    Dont forget to re-schedule your appointment for this Friday! The # is [phone]. Let me know if theres anything else I can do to help.     Adolph Canas

## 2020-02-05 NOTE — TELEPHONE ENCOUNTER
[employer] [facility] called several places to find a counselor for pt who accepts [employer], but no success. [place] in [place] and was given number for [facility] clinic with counselors. Called clinic and pt already has appt set up for this Friday. Called pt to verify this, she said she needs to cancel this Friday because she has to work. [facility] tried to reschedule appt but pt needs to call. Told pt this and offered self-harm textline and hotline info. Emailed info to pt.

## 2020-02-10 ENCOUNTER — OFFICE VISIT (OUTPATIENT)
Dept: FAMILY MEDICINE CLINIC | Age: 35
End: 2020-02-10

## 2020-02-10 DIAGNOSIS — F41.1 GAD (GENERALIZED ANXIETY DISORDER): Primary | ICD-10-CM

## 2020-02-10 DIAGNOSIS — F33.1 MODERATE EPISODE OF RECURRENT MAJOR DEPRESSIVE DISORDER (HCC): ICD-10-CM

## 2020-02-10 DIAGNOSIS — F79 INTELLECTUAL DISABILITY: ICD-10-CM

## 2020-02-10 NOTE — PROGRESS NOTES
Behavioral Health Progress Note    Date :  02/10/20   Name: Kaleigh Whittaker   Session Length: 48    MENTAL STATUS:  Shan Espinoza presents as smiling, said she is \"happy,\" had a good week. She is happy because all is well with her Episcopalian friends - the woman who was giving her trouble is just not speaking to her and pt is not speaking to the woman, and pt is fine with this. She has not cut herself at all this past week. Pt said she did receive the email with the self-harm textline but has not needed it. She canceled her appointment with [facility] clinic but did not reschedule yet. RISK ASSESSMENT:   Patient denies Suicidal Ideation/Homicidal Ideation/Self-Injury Behavior. ASSESSMENT AND INTERVENTION:    [facility] looked up [facility] clinics and showed pt the closest one to her is [facility] [facility], pt will call. [facility] asked about work, pt said that's been going well. She spoke with her sister on the phone on Sunday and that was ok but sister wants her to visit and pt doesn't want to visit. Pt is afraid of how sister will react when she leaves, although their last visit on New Year's Day went well. Visits have to be supervised and made through the [facility]. Reviewed pt's coping skills, she has a \"toolbox\" at home with things to do when she gets upset:  Coloring, a video game, computer. [facility] asked pt about breathing exercises, she has never tried any but said her new  was going to teach her some. [facility] showed pt video of the 4-7-8 breath and we practiced it. Pt said she liked it, and [facility] encouraged her to do it once a day. Pt likes her new . Said her boss told her that she needed \"structure and professionalism\" at work, pt is not sure what that means. She admitted that she likes to do things on her own in a certain way, and if she gets interrupted she gets upset and loses her place and has a hard time starting again. Pt asked if she were OCD, said her former therapist told her she was.   Pt admits that she has certain routines, like having to pet her cat at the beginning of each day, and if she calls her deacons she has to call them in a certain order. She said at home everything has a place and if it gets out of place it bothers her and she has to fix it. Also when she's upset she likes to clean. [facility] educated pt on OCD and how people can have compulsions that help decrease their anxiety, pt understood. Pt is having a [employer] party this Saturday, is excited and planning for it. PLAN:    Pt will return next week, as it helps prevent her self-harm when she has weekly appointments. She will re-schedule with Babatunde. She will practice breathing exercises.

## 2020-02-14 ENCOUNTER — OFFICE VISIT (OUTPATIENT)
Dept: FAMILY MEDICINE CLINIC | Age: 35
End: 2020-02-14

## 2020-02-14 VITALS
SYSTOLIC BLOOD PRESSURE: 113 MMHG | HEIGHT: 64 IN | HEART RATE: 77 BPM | RESPIRATION RATE: 16 BRPM | DIASTOLIC BLOOD PRESSURE: 71 MMHG | WEIGHT: 240 LBS | OXYGEN SATURATION: 97 % | BODY MASS INDEX: 40.97 KG/M2 | TEMPERATURE: 98.2 F

## 2020-02-14 DIAGNOSIS — J06.9 VIRAL URI WITH COUGH: Primary | ICD-10-CM

## 2020-02-14 RX ORDER — BENZONATATE 200 MG/1
200 CAPSULE ORAL
Qty: 30 CAP | Refills: 0 | Status: SHIPPED | OUTPATIENT
Start: 2020-02-14 | End: 2020-11-06

## 2020-02-14 NOTE — PATIENT INSTRUCTIONS
Viral Respiratory Infection: Care Instructions  Your Care Instructions    Viruses are very small organisms. They grow in number after they enter your body. There are many types that cause different illnesses, such as colds and the mumps. The symptoms of a viral respiratory infection often start quickly. They include a fever, sore throat, and runny nose. You may also just not feel well. Or you may not want to eat much. Most viral respiratory infections are not serious. They usually get better with time and self-care. Antibiotics are not used to treat a viral infection. That's because antibiotics will not help cure a viral illness. In some cases, antiviral medicine can help your body fight a serious viral infection. Follow-up care is a key part of your treatment and safety. Be sure to make and go to all appointments, and call your doctor if you are having problems. It's also a good idea to know your test results and keep a list of the medicines you take. How can you care for yourself at home? · Rest as much as possible until you feel better. · Be safe with medicines. Take your medicine exactly as prescribed. Call your doctor if you think you are having a problem with your medicine. You will get more details on the specific medicine your doctor prescribes. · Take an over-the-counter pain medicine, such as acetaminophen (Tylenol), ibuprofen (Advil, Motrin), or naproxen (Aleve), as needed for pain and fever. Read and follow all instructions on the label. Do not give aspirin to anyone younger than 20. It has been linked to Reye syndrome, a serious illness. · Drink plenty of fluids, enough so that your urine is light yellow or clear like water. Hot fluids, such as tea or soup, may help relieve congestion in your nose and throat. If you have kidney, heart, or liver disease and have to limit fluids, talk with your doctor before you increase the amount of fluids you drink.   · Try to clear mucus from your lungs by breathing deeply and coughing. · Gargle with warm salt water once an hour. This can help reduce swelling and throat pain. Use 1 teaspoon of salt mixed in 1 cup of warm water. · Do not smoke or allow others to smoke around you. If you need help quitting, talk to your doctor about stop-smoking programs and medicines. These can increase your chances of quitting for good. To avoid spreading the virus  · Cough or sneeze into a tissue. Then throw the tissue away. · If you don't have a tissue, use your hand to cover your cough or sneeze. Then clean your hand. You can also cough into your sleeve. · Wash your hands often. Use soap and warm water. Wash for 15 to 20 seconds each time. · If you don't have soap and water near you, you can clean your hands with alcohol wipes or gel. When should you call for help? Call your doctor now or seek immediate medical care if:    · You have a new or higher fever.     · Your fever lasts more than 48 hours.     · You have trouble breathing.     · You have a fever with a stiff neck or a severe headache.     · You are sensitive to light.     · You feel very sleepy or confused.    Watch closely for changes in your health, and be sure to contact your doctor if:    · You do not get better as expected. Where can you learn more? Go to http://sallie-bladimir.info/. Enter C720 in the search box to learn more about \"Viral Respiratory Infection: Care Instructions. \"  Current as of: June 9, 2019  Content Version: 12.2  © 9642-4471 [employer]. Care instructions adapted under license by [employer] (which disclaims liability or warranty for this information). If you have questions about a medical condition or this instruction, always ask your healthcare professional. [address] any warranty or liability for your use of this information.            Body Mass Index: Care Instructions  Your Care Instructions    Body mass index (BMI) can help you see if your weight is raising your risk for health problems. It uses a formula to compare how much you weigh with how tall you are. · A BMI lower than 18.5 is considered underweight. · A BMI between 18.5 and 24.9 is considered healthy. · A BMI between 25 and 29.9 is considered overweight. A BMI of 30 or higher is considered obese. If your BMI is in the normal range, it means that you have a lower risk for weight-related health problems. If your BMI is in the overweight or obese range, you may be at increased risk for weight-related health problems, such as high blood pressure, heart disease, stroke, arthritis or joint pain, and diabetes. If your BMI is in the underweight range, you may be at increased risk for health problems such as fatigue, lower protection (immunity) against illness, muscle loss, bone loss, hair loss, and hormone problems. BMI is just one measure of your risk for weight-related health problems. You may be at higher risk for health problems if you are not active, you eat an unhealthy diet, or you drink too much alcohol or use tobacco products. Follow-up care is a key part of your treatment and safety. Be sure to make and go to all appointments, and call your doctor if you are having problems. It's also a good idea to know your test results and keep a list of the medicines you take. How can you care for yourself at home? · Practice healthy eating habits. This includes eating plenty of fruits, vegetables, whole grains, lean protein, and low-fat dairy. · If your doctor recommends it, get more exercise. Walking is a good choice. Bit by bit, increase the amount you walk every day. Try for at least 30 minutes on most days of the week. · Do not smoke. Smoking can increase your risk for health problems. If you need help quitting, talk to your doctor about stop-smoking programs and medicines. These can increase your chances of quitting for good.   · Limit alcohol to 2 drinks a day for men and 1 drink a day for women. Too much alcohol can cause health problems. If you have a BMI higher than 25  · Your doctor may do other tests to check your risk for weight-related health problems. This may include measuring the distance around your waist. A waist measurement of more than 40 inches in men or 35 inches in women can increase the risk of weight-related health problems. · Talk with your doctor about steps you can take to stay healthy or improve your health. You may need to make lifestyle changes to lose weight and stay healthy, such as changing your diet and getting regular exercise. If you have a BMI lower than 18.5  · Your doctor may do other tests to check your risk for health problems. · Talk with your doctor about steps you can take to stay healthy or improve your health. You may need to make lifestyle changes to gain or maintain weight and stay healthy, such as getting more healthy foods in your diet and doing exercises to build muscle. Where can you learn more? Go to http://sallie-bladimir.info/. Enter S176 in the search box to learn more about \"Body Mass Index: Care Instructions. \"  Current as of: October 13, 2016  Content Version: 11.4  © 4019-1680 [employer]. Care instructions adapted under license by [employer] (which disclaims liability or warranty for this information). If you have questions about a medical condition or this instruction, always ask your healthcare professional. [address] any warranty or liability for your use of this information.

## 2020-02-14 NOTE — PROGRESS NOTES
Nicole Mann is a 30 y.o. female   Chief Complaint   Patient presents with    Ear Pain    Cough    pt with L ear pain which started Tuesday along with sore throat and stuffy nose. No fever no chilss. Pt was using mucinex but stopped just because. .. Denies NVD. + coughing prod with mucous. she is a 30y.o. year old female who presents for evalution. Reviewed PmHx, RxHx, FmHx, SocHx, AllgHx and updated and dated in the chart. Review of Systems - negative except as listed above in the HPI    Objective:     Vitals:    02/14/20 0818   BP: 113/71   Pulse: 77   Resp: 16   Temp: 98.2 °F (36.8 °C)   TempSrc: Oral   SpO2: 97%   Weight: 240 lb (108.9 kg)   Height: 5' 4\" (1.626 m)       Current Outpatient Medications   Medication Sig    benzonatate (TESSALON) 200 mg capsule Take 1 Cap by mouth three (3) times daily as needed for Cough.  clonazePAM (KLONOPIN) 1 mg tablet Take 1 Tab by mouth daily as needed (Anxiety).  busPIRone (BUSPAR) 10 mg tablet TAKE 1 TABLET BY MOUTH TWICE DAILY    buPROPion XL (WELLBUTRIN XL) 150 mg tablet TAKE 1 TABLET BY MOUTH ONCE DAILY IN THE MORNING    furosemide (LASIX) 20 mg tablet Take 1 Tab by mouth daily.  traZODone (DESYREL) 50 mg tablet TAKE 1 TABLET BY MOUTH NIGHTLY    albuterol (PROAIR HFA) 90 mcg/actuation inhaler Take 2 Puffs by inhalation every four (4) hours as needed for Wheezing.  VITAMIN D2 50,000 unit capsule TAKE 1 CAPSULE BY MOUTH ONCE A WEEK    levothyroxine (SYNTHROID) 200 mcg tablet Take 1 Tab by mouth Daily (before breakfast).  SUMAtriptan (IMITREX) 100 mg tablet Take 100 mg by mouth once as needed for Migraine. Patient unsure of dose but believes it is 100 mg    ferrous sulfate 325 mg (65 mg iron) tablet Take 1 Tab by mouth two (2) times a day.  ibuprofen (MOTRIN) 600 mg tablet Take 1 Tab by mouth every six (6) hours as needed for Pain.  Or body aches or fever    fluticasone (FLONASE) 50 mcg/actuation nasal spray 2 Sprays by Both Nostrils route daily.  cetirizine (ZYRTEC) 10 mg tablet Take 1 Tab by mouth daily.  montelukast (SINGULAIR) 10 mg tablet TAKE ONE TABLET BY MOUTH ONCE DAILY    omeprazole (PRILOSEC) 20 mg capsule Take 1 Cap by mouth two (2) times a day. No current facility-administered medications for this visit. Physical Examination: General appearance - alert, well appearing, and in no distress  Eyes - pupils equal and reactive, extraocular eye movements intact  Ears - bilateral TM's and external ear canals normal  Nose - mucosal congestion  Mouth - mucous membranes moist, pharynx normal without lesions  Neck - supple, no significant adenopathy  Chest - clear to auscultation, no wheezes, rales or rhonchi, symmetric air entry  Heart - normal rate, regular rhythm, normal S1, S2, no murmurs, rubs, clicks or gallops      Assessment/ Plan:   Diagnoses and all orders for this visit:    1. Viral URI with cough  -     benzonatate (TESSALON) 200 mg capsule; Take 1 Cap by mouth three (3) times daily as needed for Cough. Follow-up and Dispositions    · Return if symptoms worsen or fail to improve. I have discussed the diagnosis with the patient and the intended plan as seen in the above orders. The patient has received an after-visit summary and questions were answered concerning future plans. Pt conveyed understanding of plan. Medication Side Effects and Warnings were discussed with patient      Vinny Shen DO     Discussed the patient's BMI with her. The BMI follow up plan is as follows:     dietary management education, guidance, and counseling  encourage exercise  monitor weight  prescribed dietary intake    An After Visit Summary was printed and given to the patient.

## 2020-02-14 NOTE — PROGRESS NOTES
Chief Complaint   Patient presents with    Ear Pain    Cough     Patient presents in office today with c/o cough, congestion, left ear pain and headache that started Tuesday. Treating with mucinex with some relief. No other concerns. 1. Have you been to the ER, urgent care clinic since your last visit? Hospitalized since your last visit? No    2. Have you seen or consulted any other health care providers outside of the [address] [place] [address] since your last visit? Include any pap smears or colon screening.  No    Learning Assessment 1/23/2018   PRIMARY LEARNER Patient   HIGHEST LEVEL OF EDUCATION - PRIMARY LEARNER  GRADUATED HIGH SCHOOL OR GED   BARRIERS PRIMARY LEARNER NONE   CO-LEARNER CAREGIVER No   PRIMARY LANGUAGE ENGLISH   LEARNER PREFERENCE PRIMARY DEMONSTRATION   LEARNING SPECIAL TOPICS no   ANSWERED BY pt   RELATIONSHIP SELF

## 2020-03-02 ENCOUNTER — HOSPITAL ENCOUNTER (OUTPATIENT)
Dept: GENERAL RADIOLOGY | Age: 35
Discharge: HOME | End: 2020-03-02
Payer: MEDICARE

## 2020-03-02 ENCOUNTER — OFFICE VISIT (OUTPATIENT)
Dept: FAMILY MEDICINE CLINIC | Age: 35
End: 2020-03-02

## 2020-03-02 VITALS
OXYGEN SATURATION: 95 % | WEIGHT: 243.5 LBS | BODY MASS INDEX: 41.57 KG/M2 | SYSTOLIC BLOOD PRESSURE: 120 MMHG | RESPIRATION RATE: 18 BRPM | HEART RATE: 69 BPM | HEIGHT: 64 IN | TEMPERATURE: 98.1 F | DIASTOLIC BLOOD PRESSURE: 79 MMHG

## 2020-03-02 DIAGNOSIS — M79.605 ACUTE LEG PAIN, LEFT: Primary | ICD-10-CM

## 2020-03-02 DIAGNOSIS — M79.604 ACUTE LEG PAIN, RIGHT: ICD-10-CM

## 2020-03-02 DIAGNOSIS — M79.605 ACUTE LEG PAIN, LEFT: ICD-10-CM

## 2020-03-02 DIAGNOSIS — T14.8XXA BRUISING: ICD-10-CM

## 2020-03-02 PROCEDURE — 73552 X-RAY EXAM OF FEMUR 2/>: CPT

## 2020-03-02 RX ORDER — DICLOFENAC SODIUM 75 MG/1
75 TABLET, DELAYED RELEASE ORAL 2 TIMES DAILY
Qty: 28 TAB | Refills: 0 | Status: SHIPPED | OUTPATIENT
Start: 2020-03-02 | End: 2020-03-16

## 2020-03-02 RX ORDER — ARNICA 200 MG/ML
LIQUID TOPICAL
Qty: 120 ML | Refills: 0 | Status: SHIPPED | OUTPATIENT
Start: 2020-03-02 | End: 2020-11-06

## 2020-03-02 NOTE — LETTER
NOTIFICATION RETURN TO WORK / SCHOOL 
 
3/2/2020 8:22 AM 
 
Ms. Sona Robertson [place] [id number] To Whom It May Concern: 
 
Sona Robertson is currently under the care of [address]. She will return to work/school on: 3/5/2020 If there are questions or concerns please have the patient contact our office. Sincerely, Jaylon Byers NP

## 2020-03-02 NOTE — PROGRESS NOTES
Pt called and ID X 2. Notified of bilateral x-ray results negative for fracture.   Results released via [employer]

## 2020-03-02 NOTE — PROGRESS NOTES
Gracy Santillan is a 30 y.o. female , id x 2(name and ). Reviewed record, history, and  medications. Chief Complaint   Patient presents with   [facility] [address] Fall     patient is here because she fell off her friends bel last thursday       Vitals:    20 0740   BP: 120/79   Pulse: 69   Resp: 18   Temp: 98.1 °F (36.7 °C)   TempSrc: Oral   SpO2: 95%   Weight: 243 lb 8 oz (110.5 kg)   Height: 5' 4\" (1.626 m)   PainSc:   4   PainLoc: Leg   LMP: 2020       Coordination of Care Questionnaire:   1) Have you been to an emergency room, urgent care, or hospitalized since your last visit?   no       2. Have seen or consulted any other health care provider since your last visit? NO      3 most recent PHQ Screens 2/3/2020   PHQ Not Done -   Little interest or pleasure in doing things More than half the days   Feeling down, depressed, irritable, or hopeless Nearly every day   Total Score PHQ 2 5   Trouble falling or staying asleep, or sleeping too much More than half the days   Feeling tired or having little energy Nearly every day   Poor appetite, weight loss, or overeating Not at all   Feeling bad about yourself - or that you are a failure or have let yourself or your family down Nearly every day   Trouble concentrating on things such as school, work, reading, or watching TV Nearly every day   Moving or speaking so slowly that other people could have noticed; or the opposite being so fidgety that others notice More than half the days   Thoughts of being better off dead, or hurting yourself in some way More than half the days   PHQ 9 Score 20       Patient is accompanied by self I have received verbal consent from Gracy Santillan to discuss any/all medical information while they are present in the room.

## 2020-03-02 NOTE — PROGRESS NOTES
Hi Ms Ab Keita do not have any fractures of your legs. Take the antiinflammatory and the bruising/pain should improve within 2 weeks.     Vidya Sanchez NP

## 2020-03-02 NOTE — PROGRESS NOTES
Chief Complaint   Patient presents with   [facility] Fall     patient is here because she fell off her friends bel last thursday     Jessica Osborne is a 30 y.o. female who presents for evaluation. Last Thursday was treadmill in fitness center and fell  Does not remember fall, states she woke up on floor  Now w/ significant bruising to bilateral inner thighs that wrap around to posterior thighs  States no one witnessed fall  Has more pain on left leg than right. Left leg hurts more when standing on it  States she was able to stand up after fall   Does not believe she hit her head, was on her butt with legs spread when she woke up   Not on blood thinner or taking NSAIDs    Reviewed PmHx, RxHx, FmHx, SocHx, AllgHx and updated and dated in the chart.     Patient Active Problem List    Diagnosis    Recurrent depression ([address]    Obesity, morbid ([address]    Inflammatory polyarthritis ([address]    Periodic headache syndrome, not intractable    Polyuria    Long-term use of Plaquenil    Vitamin D deficiency    Asthma    Anemia, iron deficiency    Hypothyroid    Environmental allergies    [employer] (hard of hearing)    Strabismus       Review of Systems - negative except as listed above in the HPI    Objective:     Vitals:    03/02/20 0740   BP: 120/79   Pulse: 69   Resp: 18   Temp: 98.1 °F (36.7 °C)   TempSrc: Oral   SpO2: 95%   Weight: 243 lb 8 oz (110.5 kg)   Height: 5' 4\" (1.626 m)     Physical Examination: General appearance - alert, well appearing, and in no distress  Mental status - alert, oriented to person, place, and time  Chest - clear to auscultation, no wheezes, rales or rhonchi, symmetric air entry  Heart - normal rate, regular rhythm, normal S1, S2, no murmurs, rubs, clicks or gallops  Musculoskeletal - no joint tenderness, deformity or swelling  Extremities - peripheral pulses normal, no pedal edema, no clubbing or cyanosis  Skin - bilatera thighs with large purple bruises extending from upper inner thighs to bilateral knees, L>R, left side with bruising extending to posterior thigh from buttock to above popliteal space, bruising tender to touch     Assessment/ Plan:   Diagnoses and all orders for this visit:    1. Acute leg pain, left  2. Acute leg pain, right  -     XR FEMUR LT 2 V; Future        -     XR FEMUR RT 2 VS; Future  -     diclofenac EC (VOLTAREN) 75 mg EC tablet; Take 1 Tab by mouth two (2) times a day for 14 days.  - R/o fracture given pain with weight bearing and unclear mechanism of injury   - New Rx, advised on use and SE/ADRs    3. Bruising  -     arnica 20 % tinc; Apply thin layer to affected areas w/ bruising TID  - New Rx, advised on use and SE/ADRs         Follow-up and Dispositions    · Return if symptoms worsen or fail to improve. I have discussed the diagnosis with the patient and the intended plan as seen in the above orders. The patient understands and agrees with the plan. The patient has received an after-visit summary and questions were answered concerning future plans. Medication Side Effects and Warnings were discussed with patient  Patient Labs were reviewed and or requested:  Patient Past Records were reviewed and or requested    Kenton Fenton NP  [address] [facility]    There are no Patient Instructions on file for this visit.

## 2020-03-16 RX ORDER — LEVOTHYROXINE SODIUM 200 UG/1
TABLET ORAL
Qty: 30 TAB | Refills: 0 | Status: SHIPPED | OUTPATIENT
Start: 2020-03-16 | End: 2020-04-28

## 2020-03-18 DIAGNOSIS — F41.0 PANIC ATTACKS: ICD-10-CM

## 2020-03-18 DIAGNOSIS — R60.0 BILATERAL LOWER EXTREMITY EDEMA: ICD-10-CM

## 2020-03-18 RX ORDER — FUROSEMIDE 20 MG/1
20 TABLET ORAL DAILY
Qty: 30 TAB | Refills: 2 | Status: SHIPPED | OUTPATIENT
Start: 2020-03-18 | End: 2020-09-03

## 2020-03-18 RX ORDER — CETIRIZINE HCL 10 MG
10 TABLET ORAL DAILY
Qty: 90 TAB | Refills: 3 | Status: SHIPPED | OUTPATIENT
Start: 2020-03-18

## 2020-03-18 RX ORDER — CLONAZEPAM 1 MG/1
1 TABLET ORAL
Qty: 30 TAB | Refills: 0 | Status: SHIPPED | OUTPATIENT
Start: 2020-03-18 | End: 2020-06-14

## 2020-03-27 DIAGNOSIS — E55.9 VITAMIN D DEFICIENCY: ICD-10-CM

## 2020-03-27 RX ORDER — ERGOCALCIFEROL 1.25 MG/1
CAPSULE ORAL
Qty: 12 CAP | Refills: 0 | Status: SHIPPED | OUTPATIENT
Start: 2020-03-27 | End: 2020-05-26

## 2020-04-28 RX ORDER — LEVOTHYROXINE SODIUM 200 UG/1
TABLET ORAL
Qty: 30 TAB | Refills: 0 | Status: SHIPPED | OUTPATIENT
Start: 2020-04-28 | End: 2020-05-26

## 2020-04-29 ENCOUNTER — VIRTUAL VISIT (OUTPATIENT)
Dept: FAMILY MEDICINE CLINIC | Age: 35
End: 2020-04-29

## 2020-04-29 DIAGNOSIS — R51.9 SINUS HEADACHE: ICD-10-CM

## 2020-04-29 DIAGNOSIS — J30.2 SEASONAL ALLERGIC RHINITIS, UNSPECIFIED TRIGGER: Primary | ICD-10-CM

## 2020-04-29 DIAGNOSIS — J45.20 MILD INTERMITTENT ASTHMA WITHOUT COMPLICATION: ICD-10-CM

## 2020-04-29 DIAGNOSIS — H69.83 DYSFUNCTION OF BOTH EUSTACHIAN TUBES: ICD-10-CM

## 2020-04-29 RX ORDER — IBUPROFEN 600 MG/1
600 TABLET ORAL
Qty: 30 TAB | Refills: 0 | Status: SHIPPED | OUTPATIENT
Start: 2020-04-29 | End: 2020-05-22 | Stop reason: SDUPTHER

## 2020-04-29 RX ORDER — FLUTICASONE PROPIONATE 50 MCG
2 SPRAY, SUSPENSION (ML) NASAL DAILY
Qty: 1 BOTTLE | Refills: 5 | Status: SHIPPED | OUTPATIENT
Start: 2020-04-29

## 2020-04-29 RX ORDER — PSEUDOEPHEDRINE HYDROCHLORIDE 60 MG/1
60 TABLET ORAL
Qty: 20 TAB | Refills: 0 | Status: SHIPPED | OUTPATIENT
Start: 2020-04-29 | End: 2020-05-04

## 2020-04-29 NOTE — PROGRESS NOTES
Jose Sanders is a 30 y.o. female who was seen by synchronous (real-time) audio-video technology on 4/29/2020. Consent: Jose Sanders, who was seen by synchronous (real-time) audio-video technology, and/or her healthcare decision maker, is aware that this patient-initiated, Telehealth encounter on 4/29/2020 is a billable service, with coverage as determined by her insurance carrier. She is aware that she may receive a bill and has provided verbal consent to proceed: Yes. Assessment & Plan:   Diagnoses and all orders for this visit:    1. Seasonal allergic rhinitis, unspecified trigger  Continue antihistamine  Resume nasal steroid spray  Add decongestant for a few days  -     fluticasone propionate (FLONASE) 50 mcg/actuation nasal spray; 2 Sprays by Both Nostrils route daily. -     pseudoephedrine (SUDAFED) 60 mg tablet; Take 1 Tab by mouth every six (6) hours as needed for Congestion for up to 5 days. 2. Dysfunction of both eustachian tubes  -     fluticasone propionate (FLONASE) 50 mcg/actuation nasal spray; 2 Sprays by Both Nostrils route daily. 3. Mild intermittent asthma without complication  Stable  Albuterol prn    4. Sinus headache  -     fluticasone propionate (FLONASE) 50 mcg/actuation nasal spray; 2 Sprays by Both Nostrils route daily. -     pseudoephedrine (SUDAFED) 60 mg tablet; Take 1 Tab by mouth every six (6) hours as needed for Congestion for up to 5 days. -     ibuprofen (MOTRIN) 600 mg tablet; Take 1 Tab by mouth every six (6) hours as needed for Pain. Follow-up and Dispositions    Return in about 3 months (around 7/29/2020), or if symptoms worsen or fail to improve. 712  Subjective:   Jose Sanders is a 30 y.o. female who was seen for Headache (X Last Thursday. ); Ear Pain; and Asthma    HPI:  C/o frontal headache, nasal congestion with clear nasal drainage, occasional dry cough, and bilateral ear pressure for the past 6 days.  Has been taking zyrtec, but out of flonase. Has tried no OTC medications for her symptoms. Denies recent wheezing or shortness of breath. No fever or chills. No ear drainage. No changes in hearing. Reports asthma symptom pattern stable, infrequent use of rescue, no night time symptoms. Prior to Admission medications    Medication Sig Start Date End Date Taking? Authorizing Provider   fluticasone propionate (FLONASE) 50 mcg/actuation nasal spray 2 Sprays by Both Nostrils route daily. 4/29/20  Yes Emir Schroeder NP   pseudoephedrine (SUDAFED) 60 mg tablet Take 1 Tab by mouth every six (6) hours as needed for Congestion for up to 5 days. 4/29/20 5/4/20 Yes Emir Schroeder NP   ibuprofen (MOTRIN) 600 mg tablet Take 1 Tab by mouth every six (6) hours as needed for Pain. 4/29/20  Yes Emir Schroeder NP   Euthyrox 200 mcg tablet TAKE 1 TABLET BY MOUTH ONCE DAILY BEFORE BREAKFAST 4/28/20  Yes Layla Garcia NP   Vitamin D2 1,250 mcg (50,000 unit) capsule Take 1 capsule by mouth once a week 3/27/20  Yes Mimi Duncan NP   cetirizine (ZYRTEC) 10 mg tablet Take 1 Tab by mouth daily. 3/18/20  Yes Layla Garcia NP   clonazePAM (KlonoPIN) 1 mg tablet Take 1 Tab by mouth daily as needed (Anxiety). 3/18/20  Yes Layla Garcia NP   furosemide (LASIX) 20 mg tablet Take 1 Tab by mouth daily. 3/18/20  Yes Layla Garcia NP   arnica 20 % tinc Apply thin layer to affected areas w/ bruising TID 3/2/20  Yes Dell NO NP   benzonatate (TESSALON) 200 mg capsule Take 1 Cap by mouth three (3) times daily as needed for Cough.  2/14/20  Yes Blanca De Jesus DO   busPIRone (BUSPAR) 10 mg tablet TAKE 1 TABLET BY MOUTH TWICE DAILY 1/31/20  Yes Dell NO NP   buPROPion XL (WELLBUTRIN XL) 150 mg tablet TAKE 1 TABLET BY MOUTH ONCE DAILY IN THE MORNING 1/31/20  Yes Jacinto Thao NP   traZODone (DESYREL) 50 mg tablet TAKE 1 TABLET BY MOUTH NIGHTLY 1/5/20  Yes Mimi Duncan NP   albuterol [facility] HFA) 90 mcg/actuation inhaler Take 2 Puffs by inhalation every four (4) hours as needed for Wheezing. 12/16/19  Yes Jazlyn Garcia NP   SUMAtriptan (IMITREX) 100 mg tablet Take 100 mg by mouth once as needed for Migraine. Patient unsure of dose but believes it is 100 mg   Yes Provider, Historical   ferrous sulfate 325 mg (65 mg iron) tablet Take 1 Tab by mouth two (2) times a day. 4/16/19  Yes Jazlyn Garcia NP   ibuprofen (MOTRIN) 600 mg tablet Take 1 Tab by mouth every six (6) hours as needed for Pain. Or body aches or fever 2/15/19 4/29/20  Blanca De Jesus,    fluticasone (FLONASE) 50 mcg/actuation nasal spray 2 Sprays by Both Nostrils route daily. 1/9/19 4/29/20  Sailaja Acevedo MD   omeprazole (PRILOSEC) 20 mg capsule Take 1 Cap by mouth two (2) times a day. 8/15/18 4/29/20  Marvin Linda NP   montelukast (SINGULAIR) 10 mg tablet TAKE ONE TABLET BY MOUTH ONCE DAILY 8/14/18 4/29/20  Marvin Linda NP     Allergies   Allergen Reactions    Latex Hives    Augmentin [Amoxicillin-Pot Clavulanate] Nausea and Vomiting    Codeine Nausea and Vomiting    Morphine Sulfate Nausea and Vomiting    Nuts Dm Yuri Nut] Swelling    Pcn [Penicillins] Hives       Patient Active Problem List   Diagnosis Code    Hypothyroid E03.9    Environmental allergies Z91.09    Alutiiq (hard of hearing) H91.90    Strabismus H50.9    Anemia, iron deficiency D50.9    Asthma J45.909    Long-term use of Plaquenil Z79.899    Vitamin D deficiency E55.9    Polyuria R35.8    Periodic headache syndrome, not intractable G43. C0    Inflammatory polyarthritis (HCC) M06.4    Obesity, morbid (HCC) E66.01    Recurrent depression (HCC) F33.9     Allergies   Allergen Reactions    Latex Hives    Augmentin [Amoxicillin-Pot Clavulanate] Nausea and Vomiting    Codeine Nausea and Vomiting    Morphine Sulfate Nausea and Vomiting    Nuts [Tree Nut] Swelling    Pcn [Penicillins] Hives     Past Medical History:   Diagnosis Date    Allergies     Anemia 2008  Anxiety     Asthma     inhaler    Depression     Ear infection     Environmental allergies 4/1/2010    GERD (gastroesophageal reflux disease)     Hypothyroid 4/1/2010    Inflammatory arthritis     Lupus (HCC)     Nausea & vomiting     Other ill-defined conditions(719.89) noted 7/25/2012    \"Intellectually  Disabled\" signs consents/self. Mom &  concur    Otitis media 4/1/2010    Strabismus 4/1/2010    Unspecified adverse effect of anesthesia 2/10/2011    slow to wake up, sleepy    Wears hearing aid      Past Surgical History:   Procedure Laterality Date    HX BREAST REDUCTION  2005    HX CHOLECYSTECTOMY  2014    HX KNEE ARTHROSCOPY Left     HX ORTHOPAEDIC      heel cord lengthing; right thumb surgery (pin placed)    HX OTHER SURGICAL      Ear surgery    HX TONSIL AND ADENOIDECTOMY  1990     Family History   Problem Relation Age of Onset    Osteoporosis Other     Arthritis-osteo Other     Breast Cancer Other         maternal great aunt    Breast Cancer Other         Maternal great aunt    Stroke Maternal Aunt     Hypertension Sister      Social History     Tobacco Use    Smoking status: Never Smoker    Smokeless tobacco: Never Used   Substance Use Topics    Alcohol use: No       Review of Systems   Constitutional: Negative for chills, fever and malaise/fatigue. HENT: Positive for congestion, ear pain, sinus pain and sore throat. Negative for ear discharge, hearing loss and tinnitus. Eyes: Negative for blurred vision, discharge and redness. Respiratory: Positive for cough. Negative for hemoptysis, sputum production, shortness of breath, wheezing and stridor. Cardiovascular: Negative for chest pain and palpitations. Gastrointestinal: Negative. Genitourinary: Negative. Musculoskeletal: Negative. Skin: Negative for rash. Neurological: Positive for headaches. Negative for dizziness. Endo/Heme/Allergies: Positive for environmental allergies. Psychiatric/Behavioral: Negative. Objective: There were no vitals taken for this visit. General: alert, cooperative, no distress   Mental  status: normal mood, behavior, speech, dress, motor activity, and thought processes, able to follow commands   HENT: NCAT   Neck: no visualized mass   Resp: no respiratory distress   Neuro: no gross deficits   Skin: no discoloration or lesions of concern on visible areas   Psychiatric: normal affect, consistent with stated mood, no evidence of hallucinations       We discussed the expected course, resolution and complications of the diagnosis(es) in detail. Medication risks, benefits, costs, interactions, and alternatives were discussed as indicated. I advised her to contact the office if her condition worsens, changes or fails to improve as anticipated. She expressed understanding with the diagnosis(es) and plan. Finesse North is a 30 y.o. female who was evaluated by a video visit encounter for concerns as above. Patient identification was verified prior to start of the visit. A caregiver was present when appropriate. Due to this being a TeleHealth encounter ([facility]), evaluation of the following organ systems was limited: Vitals/Constitutional/EENT/Resp/CV/GI//MS/Neuro/Skin/Heme-Lymph-Imm. Pursuant to the emergency declaration under the [facility], 1135 waiver authority and the [employer] Act, this Virtual  Visit was conducted, with patient's (and/or legal guardian's) consent, to reduce the patient's risk of exposure to COVID-19 and provide necessary medical care. Services were provided through a video synchronous discussion virtually to substitute for in-person clinic visit. Patient and provider were located at their individual homes.       Morales Barber NP

## 2020-04-29 NOTE — PATIENT INSTRUCTIONS
Managing Your Allergies: Care Instructions Your Care Instructions Managing your allergies is an important part of staying healthy. Your doctor will help you find out what may be causing the allergies. Common causes of allergy symptoms are house dust and dust mites, animal dander, mold, and pollen. As soon as you know what triggers your symptoms, try to reduce your exposure to your triggers. This can help prevent allergy symptoms, asthma, and other health problems. Ask your doctor about allergy medicine or immunotherapy. These treatments may help reduce or prevent allergy symptoms. Follow-up care is a key part of your treatment and safety. Be sure to make and go to all appointments, and call your doctor if you are having problems. It's also a good idea to know your test results and keep a list of the medicines you take. How can you care for yourself at home? · If you think that dust or dust mites are causing your allergies: 
? Wash sheets, pillowcases, and other bedding every week in hot water. ? Use airtight, dust-proof covers for pillows, duvets, and mattresses. Avoid plastic covers, because they tend to tear quickly and do not \"breathe. \" Wash according to the instructions. ? Remove extra blankets and pillows that you don't need. ? Use blankets that are machine-washable. ? Don't use home humidifiers. They can help mites live longer. · Use air-conditioning. Change or clean all filters every month. Keep windows closed. Use high-efficiency air filters. Don't use window or attic fans, which draw dust into the air. · If you're allergic to pet dander, keep pets outside or, at the very least, out of your bedroom. Old carpet and cloth-covered furniture can hold a lot of animal dander. You may need to replace them. · Look for signs of cockroaches. Use cockroach baits to get rid of them. Then clean your home well.  
· If you're allergic to mold, don't keep indoor plants, because molds can grow in soil. Get rid of furniture, rugs, and drapes that smell musty. Check for mold in the bathroom. · If you're allergic to pollen, stay inside when pollen counts are high. · Don't smoke or let anyone else smoke in your house. Don't use fireplaces or wood-burning stoves. Avoid paint fumes, perfumes, and other strong odors. When should you call for help? Give an epinephrine shot if: 
  · You think you are having a severe allergic reaction.  
 After giving an epinephrine shot call  911, even if you feel better. 
 Call 911 if: 
  · You have symptoms of a severe allergic reaction. These may include: 
? Sudden raised, red areas (hives) all over your body. ? Swelling of the throat, mouth, lips, or tongue. ? Trouble breathing. ? Passing out (losing consciousness). Or you may feel very lightheaded or suddenly feel weak, confused, or restless.  
  · You have been given an epinephrine shot, even if you feel better.  
 Call your doctor now or seek immediate medical care if: 
  · You have symptoms of an allergic reaction, such as: ? A rash or hives (raised, red areas on the skin). ? Itching. ? Swelling. ? Belly pain, nausea, or vomiting.  
 Watch closely for changes in your health, and be sure to contact your doctor if: 
  · Your allergies get worse.  
  · You need help controlling your allergies.  
  · You have questions about allergy testing.  
  · You do not get better as expected. Where can you learn more? Go to http://sallie-bladimir.info/ Enter L249 in the search box to learn more about \"Managing Your Allergies: Care Instructions. \" Current as of: October 6, 2019Content Version: 12.4 © 4906-3853 Healthwise, Incorporated. Care instructions adapted under license by [employer] (which disclaims liability or warranty for this information).  If you have questions about a medical condition or this instruction, always ask your healthcare professional. [address] any warranty or liability for your use of this information. Controlling Your Asthma: Care Instructions Your Care Instructions Asthma is a long-term condition that affects your breathing. It causes the airways that lead to the lungs to swell. During an asthma attack, the airways swell and narrow. This makes it hard to breathe. You may wheeze or cough. If you have a bad attack, you may need emergency care. There are two things to do to treat asthma. · Control asthma over the long term. · Treat attacks when they occur. You and your doctor can make an asthma action plan. It tells you what medicines you need to take every day to control asthma symptoms and what to do if you have an asthma attack. Your asthma action plan can help prevent and treat attacks. When you keep your asthma under control, you can prevent severe attacks and lasting damage to your airways. You need to treat your asthma even when you are not having symptoms. Although asthma is a lifelong disease, treatment can help control it and help you stay healthy. Follow-up care is a key part of your treatment and safety. Be sure to make and go to all appointments, and call your doctor if you are having problems. It's also a good idea to know your test results and keep a list of the medicines you take. How can you care for yourself at home? To control asthma over the long term Medicines Controller medicines reduce swelling in your lungs. They also prevent asthma attacks. Take your controller medicine exactly as prescribed. Talk to your doctor if you have any problems with your medicine. · Inhaled corticosteroid is a common and effective controller medicine. Using it the right way can prevent or reduce most side effects. · Take your controller medicine every day, not just when you have symptoms. It helps prevent problems before they occur. · Your doctor may prescribe another medicine that you use along with the corticosteroid. This is often a long-acting bronchodilator. Do not take this medicine by itself. Using a long-acting bronchodilator by itself can increase your risk of a severe or fatal asthma attack. · Do not take inhaled corticosteroids or long-acting bronchodilators to stop an asthma attack that has already started. They don't work fast enough to help. · Talk to your doctor before you use other medicines. Some medicines, such as aspirin, can cause asthma attacks in some people. Education · Learn what triggers an asthma attack. Avoid these triggers when you can. Common triggers include colds, smoke, air pollution, dust, pollen, mold, pets, cockroaches, stress, and cold air. · Check yourself for asthma symptoms to know which step to follow in your action plan. Watch for things like being short of breath, having chest tightness, coughing, and wheezing. Also notice if symptoms wake you up at night or if you get tired quickly when you exercise. · If you have a peak flow meter, use it to check how well you are breathing. It can help you know when an asthma attack is going to occur. Then you can take medicine to prevent the asthma attack or make it less severe. · Do not smoke or allow others to smoke around you. Avoid smoky places. Smoking makes asthma worse. If you need help quitting, talk to your doctor about stop-smoking programs and medicines. These can increase your chances of quitting for good. · Avoid colds and the flu. Get a pneumococcal vaccine shot. If you have had one before, ask your doctor whether you need a second dose. Get a flu vaccine every year, as soon as it's available. If you must be around people with colds or the flu, wash your hands often. To treat attacks when they occur Use your asthma action plan when you have an attack. Your quick-relief medicine will stop an asthma attack.  It relaxes the muscles that get tight around the airways. If your doctor prescribed corticosteroid pills to use during an attack, take them as directed. They may take hours to work, but they may shorten the attack and help you breathe better. · Albuterol is an effective quick-relief inhaler. · Take your quick-relief medicine exactly as prescribed. · Always bring your asthma medicine with you when you travel. · You may need to use quick-relief medicine before you exercise. · Call your doctor if you think you are having a problem with your medicine. When should you call for help? Call 911 anytime you think you may need emergency care. For example, call if: 
  · You are having severe trouble breathing.  
 Call your doctor now or seek immediate medical care if: 
  · Your symptoms do not get better after you have followed your asthma action plan.  
  · You cough up yellow, dark brown, or bloody mucus (sputum).  
 Watch closely for changes in your health, and be sure to contact your doctor if: 
  · Your coughing and wheezing get worse.  
  · You need to use your quick-relief medicine on more than 2 days a week (unless it is just for exercise).  
  · You need help figuring out what is triggering your asthma attacks. Where can you learn more? Go to http://sallie-bladimir.info/ Enter K987 in the search box to learn more about \"Controlling Your Asthma: Care Instructions. \" Current as of: June 9, 2019Content Version: 12.4 © 4975-4816 Healthwise, Incorporated. Care instructions adapted under license by [employer] (which disclaims liability or warranty for this information). If you have questions about a medical condition or this instruction, always ask your healthcare professional. [address] any warranty or liability for your use of this information.

## 2020-05-22 ENCOUNTER — VIRTUAL VISIT (OUTPATIENT)
Dept: FAMILY MEDICINE CLINIC | Age: 35
End: 2020-05-22

## 2020-05-22 DIAGNOSIS — J30.2 SEASONAL ALLERGIC RHINITIS, UNSPECIFIED TRIGGER: ICD-10-CM

## 2020-05-22 DIAGNOSIS — J01.10 ACUTE NON-RECURRENT FRONTAL SINUSITIS: Primary | ICD-10-CM

## 2020-05-22 RX ORDER — DOXYCYCLINE 100 MG/1
100 CAPSULE ORAL 2 TIMES DAILY
Qty: 20 CAP | Refills: 0 | Status: SHIPPED | OUTPATIENT
Start: 2020-05-22 | End: 2020-06-01

## 2020-05-22 RX ORDER — IBUPROFEN 600 MG/1
600 TABLET ORAL
Qty: 30 TAB | Refills: 0 | Status: SHIPPED | OUTPATIENT
Start: 2020-05-22 | End: 2020-12-21

## 2020-05-22 RX ORDER — GUAIFENESIN 600 MG/1
600 TABLET, EXTENDED RELEASE ORAL 2 TIMES DAILY
Qty: 10 TAB | Refills: 0 | Status: SHIPPED | OUTPATIENT
Start: 2020-05-22 | End: 2020-05-27

## 2020-05-22 NOTE — PROGRESS NOTES
Americo Vincent is a 30 y.o. female who was seen by synchronous (real-time) audio-video technology on 5/22/2020. Consent: Americo Vincent, who was seen by synchronous (real-time) audio-video technology, and/or her healthcare decision maker, is aware that this patient-initiated, Telehealth encounter on 5/22/2020 is a billable service, with coverage as determined by her insurance carrier. She is aware that she may receive a bill and has provided verbal consent to proceed: Yes. Assessment & Plan:   Diagnoses and all orders for this visit:    1. Acute non-recurrent frontal sinusitis  Add rx  Hold iron supplement while taking doxycyline  -     doxycycline (VIBRAMYCIN) 100 mg capsule; Take 1 Cap by mouth two (2) times a day for 10 days.  -     guaiFENesin ER (MUCINEX) 600 mg ER tablet; Take 1 Tab by mouth two (2) times a day for 5 days. -     ibuprofen (MOTRIN) 600 mg tablet; Take 1 Tab by mouth every six (6) hours as needed for Pain. 2. Seasonal allergic rhinitis, unspecified trigger  Continue cetirizine, flonase        Follow-up and Dispositions    Return if symptoms worsen or fail to improve. 712  Subjective:   Americo Vincent is a 30 y.o. female who was seen for Sore Throat (X Wednesday. Pt stated that she took Tylenol for symptoms.) and Cough    HPI:  C/o continued sinus pain/pressure, frontal headache, sore throat, and nonproductive cough. Notes symptoms have not improved with more consistent treatment of allergy symptoms prescribed at visit a month ago. No fever or chills. No wheezing or shortness of breath. Feels congested, but no purulent nasal discharge. Prior to Admission medications    Medication Sig Start Date End Date Taking? Authorizing Provider   doxycycline (VIBRAMYCIN) 100 mg capsule Take 1 Cap by mouth two (2) times a day for 10 days. 5/22/20 6/1/20 Yes Emir Schroeder, NP   guaiFENesin ER (MUCINEX) 600 mg ER tablet Take 1 Tab by mouth two (2) times a day for 5 days.  5/22/20 5/27/20 Yes Bessy Schroeder NP   ibuprofen (MOTRIN) 600 mg tablet Take 1 Tab by mouth every six (6) hours as needed for Pain. 5/22/20  Yes Emir Schroeder NP   fluticasone propionate (FLONASE) 50 mcg/actuation nasal spray 2 Sprays by Both Nostrils route daily. 4/29/20  Yes Emir Schroeder NP   Euthyrox 200 mcg tablet TAKE 1 TABLET BY MOUTH ONCE DAILY BEFORE BREAKFAST 4/28/20  Yes Margot Guerrier NP   Vitamin D2 1,250 mcg (50,000 unit) capsule Take 1 capsule by mouth once a week 3/27/20  Yes Jailyn Bullock NP   cetirizine (ZYRTEC) 10 mg tablet Take 1 Tab by mouth daily. 3/18/20  Yes Margot Guerrier NP   clonazePAM (KlonoPIN) 1 mg tablet Take 1 Tab by mouth daily as needed (Anxiety). 3/18/20  Yes Margot Guerrier NP   furosemide (LASIX) 20 mg tablet Take 1 Tab by mouth daily. 3/18/20  Yes Margot Guerrier NP   arnica 20 % tinc Apply thin layer to affected areas w/ bruising TID 3/2/20  Yes Talia NO NP   benzonatate (TESSALON) 200 mg capsule Take 1 Cap by mouth three (3) times daily as needed for Cough. 2/14/20  Yes Blanca De Jesus DO   busPIRone (BUSPAR) 10 mg tablet TAKE 1 TABLET BY MOUTH TWICE DAILY 1/31/20  Yes Talia NO NP   buPROPion XL (WELLBUTRIN XL) 150 mg tablet TAKE 1 TABLET BY MOUTH ONCE DAILY IN THE MORNING 1/31/20  Yes Mckayla Padilla NP   traZODone (DESYREL) 50 mg tablet TAKE 1 TABLET BY MOUTH NIGHTLY 1/5/20  Yes Jailyn Bullock NP   albuterol (PROAIR HFA) 90 mcg/actuation inhaler Take 2 Puffs by inhalation every four (4) hours as needed for Wheezing. 12/16/19  Yes Jailyn Bullock NP   SUMAtriptan (IMITREX) 100 mg tablet Take 100 mg by mouth once as needed for Migraine. Patient unsure of dose but believes it is 100 mg   Yes Provider, Historical   ferrous sulfate 325 mg (65 mg iron) tablet Take 1 Tab by mouth two (2) times a day. 4/16/19  Yes Jailyn Bullock, RICKIE   ibuprofen (MOTRIN) 600 mg tablet Take 1 Tab by mouth every six (6) hours as needed for Pain. 4/29/20 5/22/20  Karl Vicente NP     Allergies   Allergen Reactions    Latex Hives    Augmentin [Amoxicillin-Pot Clavulanate] Nausea and Vomiting    Codeine Nausea and Vomiting    Morphine Sulfate Nausea and Vomiting    Nuts Bridget Osorio Nut] Swelling    Pcn [Penicillins] Hives       Patient Active Problem List   Diagnosis Code    Hypothyroid E03.9    Environmental allergies Z91.09    [place] (hard of hearing) H91.90    Strabismus H50.9    Anemia, iron deficiency D50.9    Asthma J45.909    Long-term use of Plaquenil Z79.899    Vitamin D deficiency E55.9    Polyuria R35.8    Periodic headache syndrome, not intractable G43. C0    Inflammatory polyarthritis (HCC) M06.4    Obesity, morbid (HCC) E66.01    Recurrent depression (HCC) F33.9     Allergies   Allergen Reactions    Latex Hives    Augmentin [Amoxicillin-Pot Clavulanate] Nausea and Vomiting    Codeine Nausea and Vomiting    Morphine Sulfate Nausea and Vomiting    Nuts [Tree Nut] Swelling    Pcn [Penicillins] Hives     Past Medical History:   Diagnosis Date    Allergies     Anemia 2008    Anxiety     Asthma     inhaler    Depression     Ear infection     Environmental allergies 4/1/2010    GERD (gastroesophageal reflux disease)     Hypothyroid 4/1/2010    Inflammatory arthritis     Lupus (HCC)     Nausea & vomiting     Other ill-defined conditions(799.89) noted 7/25/2012    \"Intellectually  Disabled\" signs consents/self.  Mom &  concur    Otitis media 4/1/2010    Strabismus 4/1/2010    Unspecified adverse effect of anesthesia 2/10/2011    slow to wake up, sleepy    Wears hearing aid      Past Surgical History:   Procedure Laterality Date    HX BREAST REDUCTION  2005    HX CHOLECYSTECTOMY  2014    HX KNEE ARTHROSCOPY Left     HX ORTHOPAEDIC      heel cord lengthing; right thumb surgery (pin placed)    HX OTHER SURGICAL      Ear surgery    HX TONSIL AND ADENOIDECTOMY  1990     Family History   Problem Relation Age of Onset    Osteoporosis Other     Arthritis-osteo Other     Breast Cancer Other         maternal great aunt    Breast Cancer Other         Maternal great aunt    Stroke Maternal Aunt     Hypertension Sister      Social History     Tobacco Use    Smoking status: Never Smoker    Smokeless tobacco: Never Used   Substance Use Topics    Alcohol use: No       Review of Systems   Constitutional: Negative for chills, diaphoresis, fever, malaise/fatigue and weight loss. HENT: Positive for congestion, sinus pain and sore throat. Negative for ear discharge, ear pain, hearing loss, nosebleeds and tinnitus. Eyes: Negative for blurred vision, pain, discharge and redness. Respiratory: Positive for cough. Negative for hemoptysis, sputum production, shortness of breath, wheezing and stridor. Cardiovascular: Negative. Gastrointestinal: Negative. Genitourinary: Negative. Musculoskeletal: Negative. Skin: Negative. Neurological: Positive for headaches. Endo/Heme/Allergies: Negative. Psychiatric/Behavioral: Negative. Objective: There were no vitals taken for this visit. General: alert, cooperative, no distress   Mental  status: normal mood, behavior, speech, dress, motor activity, and thought processes, able to follow commands   HENT: NCAT   Neck: no visualized mass   Resp: no respiratory distress   Neuro: no gross deficits   Skin: no discoloration or lesions of concern on visible areas   Psychiatric: normal affect, consistent with stated mood, no evidence of hallucinations     We discussed the expected course, resolution and complications of the diagnosis(es) in detail. Medication risks, benefits, costs, interactions, and alternatives were discussed as indicated. I advised her to contact the office if her condition worsens, changes or fails to improve as anticipated. She expressed understanding with the diagnosis(es) and plan.      Anahi Richardson is a 30 y.o. female who was evaluated by a video visit encounter for concerns as above. Patient identification was verified prior to start of the visit. A caregiver was present when appropriate. Due to this being a TeleHealth encounter (During [facility]-57 public health emergency), evaluation of the following organ systems was limited: Vitals/Constitutional/EENT/Resp/CV/GI//MS/Neuro/Skin/Heme-Lymph-Imm. Pursuant to the emergency declaration under the [address] [place] [address], [facility] waiver authority and the [employer] and [employer] Act, this Virtual  Visit was conducted, with patient's (and/or legal guardian's) consent, to reduce the patient's risk of exposure to COVID-19 and provide necessary medical care. Services were provided through a video synchronous discussion virtually to substitute for in-person clinic visit. Patient and provider were located at their individual homes.       Patricia Whitaker NP  05/22/20

## 2020-05-26 DIAGNOSIS — E55.9 VITAMIN D DEFICIENCY: ICD-10-CM

## 2020-05-26 RX ORDER — ERGOCALCIFEROL 1.25 MG/1
CAPSULE ORAL
Qty: 12 CAP | Refills: 0 | Status: SHIPPED | OUTPATIENT
Start: 2020-05-26 | End: 2020-12-21

## 2020-05-26 RX ORDER — LEVOTHYROXINE SODIUM 200 UG/1
TABLET ORAL
Qty: 30 TAB | Refills: 0 | Status: SHIPPED | OUTPATIENT
Start: 2020-05-26 | End: 2020-06-25

## 2020-06-14 DIAGNOSIS — F41.0 PANIC ATTACKS: ICD-10-CM

## 2020-06-14 RX ORDER — CLONAZEPAM 1 MG/1
TABLET ORAL
Qty: 30 TAB | Refills: 0 | Status: SHIPPED | OUTPATIENT
Start: 2020-06-14

## 2020-06-25 RX ORDER — LEVOTHYROXINE SODIUM 200 UG/1
TABLET ORAL
Qty: 30 TAB | Refills: 0 | Status: SHIPPED | OUTPATIENT
Start: 2020-06-25 | End: 2020-07-25 | Stop reason: SDUPTHER

## 2020-07-14 ENCOUNTER — VIRTUAL VISIT (OUTPATIENT)
Dept: FAMILY MEDICINE CLINIC | Age: 35
End: 2020-07-14

## 2020-07-14 DIAGNOSIS — J01.10 ACUTE NON-RECURRENT FRONTAL SINUSITIS: Primary | ICD-10-CM

## 2020-07-14 RX ORDER — AZITHROMYCIN 250 MG/1
TABLET, FILM COATED ORAL
Qty: 6 TAB | Refills: 0 | Status: SHIPPED | OUTPATIENT
Start: 2020-07-14 | End: 2020-11-06

## 2020-07-14 NOTE — PROGRESS NOTES
Having some frontal ha's and due for labs  Takes sudafed, flonase, and zyrtec    Consent: Belen Jang, who was seen by synchronous (real-time) audio-video technology, and/or her healthcare decision maker, is aware that this patient-initiated, Telehealth encounter on 7/14/2020 is a billable service, with coverage as determined by her insurance carrier. She is aware that she may receive a bill and has provided verbal consent to proceed: YES-Consent obtained within past 12 months        712  Subjective:   Belen Jang is a 30 y.o. female who was seen for No chief complaint on file. Prior to Admission medications    Medication Sig Start Date End Date Taking? Authorizing Provider   azithromycin (ZITHROMAX) 250 mg tablet Take two tablets today then one tablet daily 7/14/20  Yes Crissy Dacosta MD   Euthyrox 200 mcg tablet TAKE 1 TABLET BY MOUTH ONCE DAILY BEFORE BREAKFAST 6/25/20   Christi Champagne NP   clonazePAM (KlonoPIN) 1 mg tablet TAKE 1 TABLET BY MOUTH ONCE DAILY AS NEEDED FOR ANXIETY 6/14/20   Christi Champagne NP   Vitamin D2 1,250 mcg (50,000 unit) capsule Take 1 capsule by mouth once a week 5/26/20   Kris Hernández NP   ibuprofen (MOTRIN) 600 mg tablet Take 1 Tab by mouth every six (6) hours as needed for Pain. 5/22/20   Amanda Schroeder NP   fluticasone propionate (FLONASE) 50 mcg/actuation nasal spray 2 Sprays by Both Nostrils route daily. 4/29/20   Rinda Fragmin, RICKIE   cetirizine (ZYRTEC) 10 mg tablet Take 1 Tab by mouth daily. 3/18/20   Christi Champagne NP   furosemide (LASIX) 20 mg tablet Take 1 Tab by mouth daily. 3/18/20   Christi Champagne NP   arnica 20 % tinc Apply thin layer to affected areas w/ bruising TID 3/2/20   Joanne PinRICKIE callejas   benzonatate (TESSALON) 200 mg capsule Take 1 Cap by mouth three (3) times daily as needed for Cough.  2/14/20   Blanca De Jesus DO   busPIRone (BUSPAR) 10 mg tablet TAKE 1 TABLET BY MOUTH TWICE DAILY 1/31/20   Joanne Fritz, NP buPROPion XL (WELLBUTRIN XL) 150 mg tablet TAKE 1 TABLET BY MOUTH ONCE DAILY IN THE MORNING 1/31/20   Terry Robert NP   traZODone (DESYREL) 50 mg tablet TAKE 1 TABLET BY MOUTH NIGHTLY 1/5/20   Wilkie Najjar, NP   albuterol (PROAIR HFA) 90 mcg/actuation inhaler Take 2 Puffs by inhalation every four (4) hours as needed for Wheezing. 12/16/19   Wilkie Najjar, NP   SUMAtriptan (IMITREX) 100 mg tablet Take 100 mg by mouth once as needed for Migraine. Patient unsure of dose but believes it is 100 mg    Provider, Historical   ferrous sulfate 325 mg (65 mg iron) tablet Take 1 Tab by mouth two (2) times a day. 4/16/19   Wilkie Najjar, NP     Allergies   Allergen Reactions    Latex Hives    Augmentin [Amoxicillin-Pot Clavulanate] Nausea and Vomiting    Codeine Nausea and Vomiting    Morphine Sulfate Nausea and Vomiting    Nuts Isa Felice Nut] Swelling    Pcn [Penicillins] Hives     Patient Active Problem List    Diagnosis    Recurrent depression ([address]    Obesity, morbid ([address]    Inflammatory polyarthritis ([address]    Periodic headache syndrome, not intractable    Polyuria    Long-term use of Plaquenil    Vitamin D deficiency    Asthma    Anemia, iron deficiency    Hypothyroid    Environmental allergies    [place] (hard of hearing)    Strabismus     Patient Active Problem List   Diagnosis Code    Hypothyroid E03.9    Environmental allergies Z91.09    [place] (hard of hearing) H91.90    Strabismus H50.9    Anemia, iron deficiency D50.9    Asthma J45.909    Long-term use of Plaquenil Z79.899    Vitamin D deficiency E55.9    Polyuria R35.8    Periodic headache syndrome, not intractable G43. C0    Inflammatory polyarthritis ([address] M06.4    Obesity, morbid ([address] E66.01    Recurrent depression ([address] F33.9       ROS    Objective:   Vital Signs: (As obtained by patient/caregiver at home)  There were no vitals taken for this visit.      [INSTRUCTIONS:  \"[x]\" Indicates a positive item  \"[]\" Indicates a negative item  -- DELETE ALL ITEMS NOT EXAMINED]    Constitutional: [x] Appears well-developed and well-nourished [x] No apparent distress      [] Abnormal -     Mental status: [x] Alert and awake  [x] Oriented to person/place/time [x] Able to follow commands    [] Abnormal -     Eyes:   EOM    [x]  Normal    [] Abnormal -   Sclera  [x]  Normal    [] Abnormal -          Discharge [x]  None visible   [] Abnormal -     HENT: [x] Normocephalic, atraumatic  [] Abnormal -   [x] Mouth/Throat: Mucous membranes are moist    External Ears [x] Normal  [] Abnormal -    Neck: [x] No visualized mass [] Abnormal -     Pulmonary/Chest: [x] Respiratory effort normal   [x] No visualized signs of difficulty breathing or respiratory distress        [] Abnormal -        Neurological:        [x] No Facial Asymmetry (Cranial nerve 7 motor function) (limited exam due to video visit)          [x] No gaze palsy        [] Abnormal -          Skin:        [x] No significant exanthematous lesions or discoloration noted on facial skin         [] Abnormal -            Psychiatric:       [x] Normal Affect [] Abnormal -        [x] No Hallucinations    Other pertinent observable physical exam findings:-              Assessment & Plan:   Diagnoses and all orders for this visit:    1. Acute non-recurrent frontal sinusitis  -     azithromycin (ZITHROMAX) 250 mg tablet; Take two tablets today then one tablet daily                    We discussed the expected course, resolution and complications of the diagnosis(es) in detail. Medication risks, benefits, costs, interactions, and alternatives were discussed as indicated. I advised her to contact the office if her condition worsens, changes or fails to improve as anticipated. She expressed understanding with the diagnosis(es) and plan. Jasmine Culp is a 30 y.o. female being evaluated by a video visit encounter for concerns as above. A caregiver was present when appropriate.  Due to this being a TeleHealth encounter (During [facility]-08 public health emergency), evaluation of the following organ systems was limited: Vitals/Constitutional/EENT/Resp/CV/GI//MS/Neuro/Skin/Heme-Lymph-Imm. Pursuant to the emergency declaration under the [address] [place] [address], [facility] waiver authority and the [employer] and [employer] Act, this Virtual  Visit was conducted, with patient's (and/or legal guardian's) consent, to reduce the patient's risk of exposure to COVID-19 and provide necessary medical care. Services were provided through a video synchronous discussion virtually to substitute for in-person clinic visit. Patient and provider were located at their individual homes.         Mary To MD

## 2020-07-25 RX ORDER — LEVOTHYROXINE SODIUM 200 UG/1
TABLET ORAL
Qty: 30 TAB | Refills: 0 | Status: SHIPPED | OUTPATIENT
Start: 2020-07-25 | End: 2020-07-26 | Stop reason: SDUPTHER

## 2020-07-26 RX ORDER — LEVOTHYROXINE SODIUM 200 UG/1
TABLET ORAL
Qty: 30 TAB | Refills: 0 | Status: SHIPPED | OUTPATIENT
Start: 2020-07-26 | End: 2020-09-27

## 2020-08-13 ENCOUNTER — VIRTUAL VISIT (OUTPATIENT)
Dept: FAMILY MEDICINE CLINIC | Age: 35
End: 2020-08-13
Payer: MEDICARE

## 2020-08-13 DIAGNOSIS — F41.8 SITUATIONAL ANXIETY: ICD-10-CM

## 2020-08-13 DIAGNOSIS — F41.1 GAD (GENERALIZED ANXIETY DISORDER): Primary | ICD-10-CM
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
## 2020-08-13 PROCEDURE — 99213 OFFICE O/P EST LOW 20 MIN: CPT | Performed by: NURSE PRACTITIONER

## 2020-08-13 RX ORDER — HYDROCORTISONE 1 %
CREAM (GRAM) TOPICAL 2 TIMES DAILY
Qty: 30 G | Refills: 0 | Status: SHIPPED | OUTPATIENT
Start: 2020-08-13 | End: 2020-12-21

## 2020-08-13 RX ORDER — FLUOXETINE HYDROCHLORIDE 40 MG/1
40 CAPSULE ORAL DAILY
COMMUNITY
Start: 2020-08-10 | End: 2020-11-06

## 2020-08-13 RX ORDER — BUPROPION HYDROCHLORIDE 300 MG/1
300 TABLET ORAL DAILY
COMMUNITY
Start: 2020-08-10

## 2020-08-13 RX ORDER — BUSPIRONE HYDROCHLORIDE 15 MG/1
15 TABLET ORAL 3 TIMES DAILY
Qty: 270 TAB | Refills: 0 | Status: SHIPPED | OUTPATIENT
Start: 2020-08-13

## 2020-08-13 RX ORDER — TRAZODONE HYDROCHLORIDE 100 MG/1
100 TABLET ORAL
COMMUNITY
Start: 2020-07-22 | End: 2021-04-08

## 2020-08-13 NOTE — PROGRESS NOTES
Consent: Ilan Marx, who was seen by synchronous (real-time) audio-video technology, and/or her healthcare decision maker, is aware that this patient-initiated, Telehealth encounter on 8/13/2020 is a billable service, with coverage as determined by her insurance carrier. She is aware that she may receive a bill and has provided verbal consent to proceed: Yes. 352      Subjective:   Ilan Marx is a 30 y.o. female who was seen for Insect Bite (patient has a virtual appointment today for a possible bug bite) and Medication Evaluation (patient would also like a increase of her clonapin)  Pt c/o possible bug bite on right middle finger  Also complain of bug bite on toe  Small red bump, states it is itchy, first noticed on Monday of this week    Pt also requests refill of clonazepam, current dose is 1mg daily PRN, states she is still using PRN panic attacks  Discussed w/ patient if she is having increased anxiety it is better to change daily meds   Pt states her psychiatrist is working on that, recently added prozac to her Wellbutrin and buspar   She believes on wellbutrin 450mg, prozac 40, and buspar 10 BID. Prior to Admission medications    Medication Sig Start Date End Date Taking? Authorizing Provider   hydrocortisone (CORTAID) 1 % topical cream Apply  to affected area two (2) times a day. use thin layer 8/13/20  Yes Edelmira NO NP   busPIRone (BUSPAR) 15 mg tablet Take 1 Tab by mouth three (3) times daily.  8/13/20  Yes Edelmira NO NP   Euthyrox 200 mcg tablet TAKE 1 TABLET BY MOUTH ONCE DAILY BEFORE BREAKFAST 7/26/20  Yes Chaz Moreira NP   azithromycin (ZITHROMAX) 250 mg tablet Take two tablets today then one tablet daily 7/14/20  Yes Birgit Santizo MD   clonazePAM (KlonoPIN) 1 mg tablet TAKE 1 TABLET BY MOUTH ONCE DAILY AS NEEDED FOR ANXIETY 6/14/20  Yes Chaz Moreira NP   Vitamin D2 1,250 mcg (50,000 unit) capsule Take 1 capsule by mouth once a week 5/26/20  Yes Julian Mathur Arielle Hunter NP   ibuprofen (MOTRIN) 600 mg tablet Take 1 Tab by mouth every six (6) hours as needed for Pain. 5/22/20  Yes Emir Schroeder, NP   fluticasone propionate (FLONASE) 50 mcg/actuation nasal spray 2 Sprays by Both Nostrils route daily. 4/29/20  Yes Emir Schroeder Q, NP   cetirizine (ZYRTEC) 10 mg tablet Take 1 Tab by mouth daily. 3/18/20  Yes Anatoliy Poet, NP   furosemide (LASIX) 20 mg tablet Take 1 Tab by mouth daily. 3/18/20  Yes Anatoliy Poet, NP   arnica 20 % tinc Apply thin layer to affected areas w/ bruising TID 3/2/20  Yes Adarsh NO NP   benzonatate (TESSALON) 200 mg capsule Take 1 Cap by mouth three (3) times daily as needed for Cough. 2/14/20  Yes Blanca De Jesus,    buPROPion XL (WELLBUTRIN XL) 150 mg tablet TAKE 1 TABLET BY MOUTH ONCE DAILY IN THE MORNING 1/31/20  Yes Adarsh NO NP   albuterol (PROAIR HFA) 90 mcg/actuation inhaler Take 2 Puffs by inhalation every four (4) hours as needed for Wheezing. 12/16/19  Yes Mack Macdonald NP   SUMAtriptan (IMITREX) 100 mg tablet Take 100 mg by mouth once as needed for Migraine. Patient unsure of dose but believes it is 100 mg   Yes Provider, Historical   ferrous sulfate 325 mg (65 mg iron) tablet Take 1 Tab by mouth two (2) times a day. 4/16/19  Yes Mack Macdonald NP   buPROPion XL (WELLBUTRIN XL) 300 mg XL tablet Take 300 mg by mouth daily. 8/10/20   Provider, Historical   traZODone (DESYREL) 100 mg tablet Take 100 mg by mouth nightly as needed. 7/22/20   Provider, Historical   FLUoxetine (PROzac) 40 mg capsule Take 40 mg by mouth daily.  8/10/20   Provider, Historical   busPIRone (BUSPAR) 10 mg tablet TAKE 1 TABLET BY MOUTH TWICE DAILY 1/31/20 8/13/20  Elmira Hurt NP   traZODone (DESYREL) 50 mg tablet TAKE 1 TABLET BY MOUTH NIGHTLY 1/5/20 8/13/20  Red Everts, NP     Allergies   Allergen Reactions    Latex Hives    Augmentin [Amoxicillin-Pot Clavulanate] Nausea and Vomiting    Codeine Nausea and Vomiting    Morphine Sulfate Nausea and Vomiting    Nuts [Tree Nut] Swelling    Pcn [Penicillins] Hives       Patient Active Problem List    Diagnosis Date Noted    Recurrent depression ([facility] 12/27/2017    Obesity, morbid ([facility] 11/30/2017    Inflammatory polyarthritis ([facility] 10/27/2016    Periodic headache syndrome, not intractable 05/22/2015    Polyuria 01/16/2012    Long-term use of Plaquenil 11/25/2011    Vitamin D deficiency 11/25/2011    Asthma 11/03/2011    Anemia, iron deficiency 04/06/2011    Hypothyroid 04/01/2010    Environmental allergies 04/01/2010    [place] (hard of hearing) 04/01/2010    Strabismus 04/01/2010       ROS - negative except as listed above in the HPI      Objective:   Vital Signs: (As obtained by patient/caregiver at home)  There were no vitals taken for this visit. Physical Exam:   General: alert, cooperative, no distress   Mental  status: normal mood, behavior, speech, dress, motor activity, and thought processes, able to follow commands   HEENT: normocephalic, atraumatic    Eyes:  extraocular movements intact, sclera normal, no visible discharge   Ears:  external ears normal   Mouth/Throat:  mucous memrates appear moist    Neck: no visualized mass   Resp: respiratory effort is normal, breathing appears non-labored, no visualized signs of respiratory distress   Neuro: no gross deficits   Skin: no discoloration or lesions of concern on visible areas   Psychiatric: normal affect, consistent with stated mood, no evidence of hallucinations      Additional exam findings:      Assessment & Plan:   Diagnoses and all orders for this visit:    1. PRASAD (generalized anxiety disorder)  2. Situational anxiety  -     busPIRone (BUSPAR) 15 mg tablet;  Take 1 Tab by mouth three (3) times daily.  - Increased buspar, declined to increase clonazepam. Discussed w/ patient need to change daily meds for better anxiety control.   - Pt is followed by psychiatrist, advised any future change in meds should come from her psychiatrist. Next appt w/ psych is in September.   - Pt is also on prozac and wellbutrin from psych. Pt unsure of dosages but per review of outside med rec, it appears she is on Wellbutrin 450 and prozac 40.     3. Mosquito bite, initial encounter  -     hydrocortisone (CORTAID) 1 % topical cream; Apply  to affected area two (2) times a day. use thin layer  - Reassurance itching will improve w/ time                I spent at least 15 minutes with this established patient, and >50% of the time was spent counseling and/or coordinating care regarding anxiety and bug bites      We discussed the expected course, resolution and complications of the diagnosis(es) in detail. Medication risks, benefits, costs, interactions, and alternatives were discussed as indicated. I advised her to contact the office if her condition worsens, changes or fails to improve as anticipated. She expressed understanding with the diagnosis(es) and plan. Colt Allen is a 30 y.o. female being evaluated by a video visit encounter for concerns as above. A caregiver was present when appropriate. Due to this being a TeleHealth encounter (During [facility]-61 public health emergency), evaluation of the following organ systems was limited: Vitals/Constitutional/EENT/Resp/CV/GI//MS/Neuro/Skin/Heme-Lymph-Imm. Pursuant to the emergency declaration under the [facility] waiver authority and the [employer] Act, this Virtual  Visit was conducted, with patient's (and/or legal guardian's) consent, to reduce the patient's risk of exposure to COVID-19 and provide necessary medical care. Services were provided through a video synchronous discussion virtually to substitute for in-person clinic visit. Patient and provider were located at their individual homes.         Eldon Reddy NP

## 2020-08-18 ENCOUNTER — TELEPHONE (OUTPATIENT)
Dept: FAMILY MEDICINE CLINIC | Age: 35
End: 2020-08-18

## 2020-08-18 NOTE — TELEPHONE ENCOUNTER
Patient's sister has COVID and patient was with her July 31st. Patient wants to know if she should have COVID testing done.  Patient's phone: [phone]

## 2020-08-18 NOTE — TELEPHONE ENCOUNTER
Spoke with pt encouraged pt of she has knowingly been around covid-positive pt that she should quarantine for 2weeks. Pt wanted to know if she should get tested. Informed pt that that choice is hers, but that is she doesn't get tested that she needs to quarantine for 2weeks.  Pt verbalized understanding

## 2020-09-01 DIAGNOSIS — R60.0 BILATERAL LOWER EXTREMITY EDEMA: ICD-10-CM

## 2020-09-03 RX ORDER — FUROSEMIDE 20 MG/1
TABLET ORAL
Qty: 30 TAB | Refills: 0 | Status: SHIPPED | OUTPATIENT
Start: 2020-09-03 | End: 2020-12-21

## 2020-09-27 RX ORDER — LEVOTHYROXINE SODIUM 200 UG/1
TABLET ORAL
Qty: 30 TAB | Refills: 0 | Status: SHIPPED | OUTPATIENT
Start: 2020-09-27 | End: 2020-11-12 | Stop reason: SDUPTHER

## 2020-10-05 ENCOUNTER — OFFICE VISIT (OUTPATIENT)
Dept: FAMILY MEDICINE CLINIC | Age: 35
End: 2020-10-05

## 2020-10-05 VITALS
BODY MASS INDEX: 43.54 KG/M2 | SYSTOLIC BLOOD PRESSURE: 100 MMHG | RESPIRATION RATE: 16 BRPM | TEMPERATURE: 98.3 F | DIASTOLIC BLOOD PRESSURE: 69 MMHG | HEART RATE: 77 BPM | WEIGHT: 255 LBS | OXYGEN SATURATION: 97 % | HEIGHT: 64 IN

## 2020-10-05 NOTE — PROGRESS NOTES
Chief Complaint   Patient presents with    Complete Physical    Labs     Pt in office today for cpe  -labs    1. Have you been to the ER, urgent care clinic since your last visit? Hospitalized since your last visit? No    2. Have you seen or consulted any other health care providers outside of the [address] [place] [address] since your last visit? Include any pap smears or colon screening.  No     Pt has no other concerns

## 2020-11-06 ENCOUNTER — OFFICE VISIT (OUTPATIENT)
Dept: FAMILY MEDICINE CLINIC | Age: 35
End: 2020-11-06
Payer: MEDICARE

## 2020-11-06 VITALS
OXYGEN SATURATION: 98 % | HEIGHT: 64 IN | TEMPERATURE: 98.3 F | SYSTOLIC BLOOD PRESSURE: 104 MMHG | RESPIRATION RATE: 18 BRPM | WEIGHT: 248 LBS | BODY MASS INDEX: 42.34 KG/M2 | DIASTOLIC BLOOD PRESSURE: 66 MMHG | HEART RATE: 75 BPM

## 2020-11-06 DIAGNOSIS — E66.01 OBESITY, MORBID (HCC): ICD-10-CM

## 2020-11-06 DIAGNOSIS — D50.9 IRON DEFICIENCY ANEMIA, UNSPECIFIED IRON DEFICIENCY ANEMIA TYPE: ICD-10-CM

## 2020-11-06 DIAGNOSIS — F33.9 RECURRENT DEPRESSION (HCC): ICD-10-CM

## 2020-11-06 DIAGNOSIS — E03.9 HYPOTHYROIDISM, UNSPECIFIED TYPE: Chronic | ICD-10-CM

## 2020-11-06 DIAGNOSIS — Z23 ENCOUNTER FOR IMMUNIZATION: ICD-10-CM

## 2020-11-06 DIAGNOSIS — Z00.00 MEDICARE ANNUAL WELLNESS VISIT, SUBSEQUENT: Primary | ICD-10-CM

## 2020-11-06 DIAGNOSIS — E55.9 VITAMIN D DEFICIENCY: ICD-10-CM

## 2020-11-06 DIAGNOSIS — E78.2 MIXED HYPERLIPIDEMIA: ICD-10-CM

## 2020-11-06 DIAGNOSIS — J45.20 MILD INTERMITTENT ASTHMA WITHOUT COMPLICATION: ICD-10-CM

## 2020-11-06 PROCEDURE — G0439 PPPS, SUBSEQ VISIT: HCPCS | Performed by: NURSE PRACTITIONER

## 2020-11-06 PROCEDURE — G9717 DOC PT DX DEP/BP F/U NT REQ: HCPCS | Performed by: NURSE PRACTITIONER

## 2020-11-06 PROCEDURE — G8417 CALC BMI ABV UP PARAM F/U: HCPCS | Performed by: NURSE PRACTITIONER

## 2020-11-06 PROCEDURE — G0009 ADMIN PNEUMOCOCCAL VACCINE: HCPCS | Performed by: NURSE PRACTITIONER

## 2020-11-06 PROCEDURE — G8427 DOCREV CUR MEDS BY ELIG CLIN: HCPCS | Performed by: NURSE PRACTITIONER

## 2020-11-06 PROCEDURE — 99214 OFFICE O/P EST MOD 30 MIN: CPT | Performed by: NURSE PRACTITIONER

## 2020-11-06 PROCEDURE — 90732 PPSV23 VACC 2 YRS+ SUBQ/IM: CPT | Performed by: NURSE PRACTITIONER

## 2020-11-06 NOTE — PATIENT INSTRUCTIONS
Medicare Wellness Visit, Female The best way to live healthy is to have a lifestyle where you eat a well-balanced diet, exercise regularly, limit alcohol use, and quit all forms of tobacco/nicotine, if applicable. Regular preventive services are another way to keep healthy. Preventive services (vaccines, screening tests, monitoring & exams) can help personalize your care plan, which helps you manage your own care. Screening tests can find health problems at the earliest stages, when they are easiest to treat. [facility] follows the current, evidence-based guidelines published by the [facility] ([facility]) when recommending preventive services for our patients. Because we follow these guidelines, sometimes recommendations change over time as research supports it. (For example, mammograms used to be recommended annually. Even though Medicare will still pay for an annual mammogram, the newer guidelines recommend a mammogram every two years for women of average risk). Of course, you and your doctor may decide to screen more often for some diseases, based on your risk and your co-morbidities (chronic disease you are already diagnosed with). Preventive services for you include: - Medicare offers their members a free annual wellness visit, which is time for you and your primary care provider to discuss and plan for your preventive service needs. Take advantage of this benefit every year! 
-All adults over the age of 72 should receive the recommended pneumonia vaccines. Current USPSTF guidelines recommend a series of two vaccines for the best pneumonia protection.  
-All adults should have a flu vaccine yearly and a tetanus vaccine every 10 years.  
-All adults age 48 and older should receive the shingles vaccines (series of two vaccines). -All adults age 38-68 who are overweight should have a diabetes screening test once every three years. -All adults born between [birth year] and 1963 should be screened once for Hepatitis C. 
-Other screening tests and preventive services for persons with diabetes include: an eye exam to screen for diabetic retinopathy, a kidney function test, a foot exam, and stricter control over your cholesterol.  
-Cardiovascular screening for adults with routine risk involves an electrocardiogram (ECG) at intervals determined by your doctor.  
-Colorectal cancer screenings should be done for adults age 54-65 with no increased risk factors for colorectal cancer. There are a number of acceptable methods of screening for this type of cancer. Each test has its own benefits and drawbacks. Discuss with your doctor what is most appropriate for you during your annual wellness visit. The different tests include: colonoscopy (considered the best screening method), a fecal occult blood test, a fecal DNA test, and sigmoidoscopy. 
 
-A bone mass density test is recommended when a woman turns 65 to screen for osteoporosis. This test is only recommended one time, as a screening. Some providers will use this same test as a disease monitoring tool if you already have osteoporosis. -Breast cancer screenings are recommended every other year for women of normal risk, age 54-69. 
-Cervical cancer screenings for women over age 72 are only recommended with certain risk factors. Here is a list of your current Health Maintenance items (your personalized list of preventive services) with a due date: 
Health Maintenance Due Topic Date Due  Pneumococcal Vaccine (1 of 1 - PPSV23) 12/30/2012  Pap Test  11/07/2019  Yearly Flu Vaccine (1) 09/01/2020 [employer] Annual Well Visit  10/21/2020

## 2020-11-06 NOTE — PROGRESS NOTES
Chief Complaint   Patient presents with   Naz Felipe     Patient in office today for cpe and labs-pt is not fasting. Pt have c/o of rash that was noted on Tuesday. Rash is located around lips. Pt denies new foods or lip products. Have not treated with otc. Polk her lips a lot. Has not tried putting anything on it yet. Pt is doing well. Pt fell at work on Monday. Pt injured herself falling out of van. Tobias Yuriy on the left knee. Went to [facility] [place] and was given a long knee brace and crutches. Had a normal xray. Went to see the workers comp doctor who gave pt a better brace. Has a follow up appt on Thursday next week. Pt had flu shot at work. Denies any other concerns at this time. This is the Subsequent Medicare Annual Wellness Exam, performed 12 months or more after the Initial AWV or the last Subsequent AWV    I have reviewed the patient's medical history in detail and updated the computerized patient record. History     Patient Active Problem List   Diagnosis Code    Hypothyroid E03.9    Environmental allergies Z91.09    [place] (hard of hearing) H91.90    Strabismus H50.9    Anemia, iron deficiency D50.9    Asthma J45.909    Long-term use of Plaquenil Z79.899    Vitamin D deficiency E55.9    Polyuria R35.8    Periodic headache syndrome, not intractable G43. C0    Inflammatory polyarthritis ([facility] [address] [address] M06.4    Obesity, morbid (HCC) E66.01    Recurrent depression ([facility] [address] F33.9     Past Medical History:   Diagnosis Date    Allergies     Anemia 2008    Anxiety     Asthma     inhaler    Depression     Ear infection     Environmental allergies 4/1/2010    GERD (gastroesophageal reflux disease)     Hypothyroid 4/1/2010    Inflammatory arthritis     Lupus (HCC)     Nausea & vomiting     Other ill-defined conditions(799.89) noted 7/25/2012    \"Intellectually  Disabled\" signs consents/self.  Mom &  concur    Otitis media 4/1/2010    Strabismus 4/1/2010    Unspecified adverse effect of anesthesia 2/10/2011    slow to wake up, sleepy    Wears hearing aid       Past Surgical History:   Procedure Laterality Date    HX BREAST REDUCTION  2005    HX CHOLECYSTECTOMY  2014    HX KNEE ARTHROSCOPY Left     HX ORTHOPAEDIC      heel cord lengthing; right thumb surgery (pin placed)    HX OTHER SURGICAL      Ear surgery    HX TONSIL AND ADENOIDECTOMY  1990     Current Outpatient Medications   Medication Sig Dispense Refill    Euthyrox 200 mcg tablet TAKE 1 TABLET BY MOUTH ONCE DAILY BEFORE BREAKFAST 30 Tab 0    furosemide (LASIX) 20 mg tablet Take 1 tablet by mouth once daily 30 Tab 0    hydrocortisone (CORTAID) 1 % topical cream Apply  to affected area two (2) times a day. use thin layer 30 g 0    busPIRone (BUSPAR) 15 mg tablet Take 1 Tab by mouth three (3) times daily. 270 Tab 0    buPROPion XL (WELLBUTRIN XL) 300 mg XL tablet Take 300 mg by mouth daily.  traZODone (DESYREL) 100 mg tablet Take 100 mg by mouth nightly as needed.  clonazePAM (KlonoPIN) 1 mg tablet TAKE 1 TABLET BY MOUTH ONCE DAILY AS NEEDED FOR ANXIETY 30 Tab 0    Vitamin D2 1,250 mcg (50,000 unit) capsule Take 1 capsule by mouth once a week 12 Cap 0    ibuprofen (MOTRIN) 600 mg tablet Take 1 Tab by mouth every six (6) hours as needed for Pain. 30 Tab 0    fluticasone propionate (FLONASE) 50 mcg/actuation nasal spray 2 Sprays by Both Nostrils route daily. 1 Bottle 5    cetirizine (ZYRTEC) 10 mg tablet Take 1 Tab by mouth daily. 90 Tab 3    buPROPion XL (WELLBUTRIN XL) 150 mg tablet TAKE 1 TABLET BY MOUTH ONCE DAILY IN THE MORNING 90 Tab 1    albuterol (PROAIR HFA) 90 mcg/actuation inhaler Take 2 Puffs by inhalation every four (4) hours as needed for Wheezing. 9 Inhaler 1    ferrous sulfate 325 mg (65 mg iron) tablet Take 1 Tab by mouth two (2) times a day.  60 Tab 5     Allergies   Allergen Reactions    Latex Hives    Augmentin [Amoxicillin-Pot Clavulanate] Nausea and Vomiting    Codeine Nausea and Vomiting    Morphine Sulfate Nausea and Vomiting    Nuts [Tree Nut] Swelling    Pcn [Penicillins] Hives       Family History   Problem Relation Age of Onset    Osteoporosis Other     Arthritis-osteo Other     Breast Cancer Other         maternal great aunt    Breast Cancer Other         Maternal great aunt    Stroke Maternal Aunt     Hypertension Sister      Social History     Tobacco Use    Smoking status: Never Smoker    Smokeless tobacco: Never Used   Substance Use Topics    Alcohol use: No       Depression Risk Factor Screening:     3 most recent PHQ Screens 10/5/2020   PHQ Not Done Active Diagnosis of Depression or Bipolar Disorder   Little interest or pleasure in doing things -   Feeling down, depressed, irritable, or hopeless -   Total Score PHQ 2 -   Trouble falling or staying asleep, or sleeping too much -   Feeling tired or having little energy -   Poor appetite, weight loss, or overeating -   Feeling bad about yourself - or that you are a failure or have let yourself or your family down -   Trouble concentrating on things such as school, work, reading, or watching TV -   Moving or speaking so slowly that other people could have noticed; or the opposite being so fidgety that others notice -   Thoughts of being better off dead, or hurting yourself in some way -   PHQ 9 Score -       Alcohol Risk Screen   Do you average more than 1 drink per night or more than 7 drinks a week:  No    On any one occasion in the past three months have you have had more than 3 drinks containing alcohol:  No        Functional Ability and Level of Safety:   Hearing: Hearing is good. Activities of Daily Living: The home contains: no safety equipment. Patient does total self care     Ambulation: with no difficulty     Fall Risk:  Fall Risk Assessment, last 12 mths 7/10/2017   Able to walk? Yes   Fall in past 12 months?  No     Abuse Screen:  Patient is not abused       Cognitive Screening   Has your family/caregiver stated any concerns about your memory: no     Pt is 37years old and receives disability due to mental health. Patient Care Team   Patient Care Team:  Kayleigh Barron NP as PCP - General (Family Medicine)  Kayleigh Barron NP as PCP - [facility]  Henrietta Sullivan LPN as [facility] (Family Medicine)  Roderick Reilly MD as Physician (Obstetrics & Gynecology)     Objective:     Vitals:    11/06/20 1130   BP: 104/66   Pulse: 75   Resp: 18   Temp: 98.3 °F (36.8 °C)   TempSrc: Oral   SpO2: 98%   Weight: 248 lb (112.5 kg)   Height: 5' 4\" (1.626 m)     General appearance - alert, well appearing, and in no distress  Mental status - normal mood, behavior, speech, dress, motor activity, and thought processes  Eyes - pupils equal and reactive, extraocular eye movements intact  Ears - bilateral TM's and external ear canals normal  Nose - normal and patent, no erythema, discharge or polyps and normal nontender sinuses  Mouth - mucous membranes moist, pharynx normal without lesions  Neck - supple, no significant adenopathy  Chest - clear to auscultation, no wheezes, rales or rhonchi, symmetric air entry  Heart - normal rate, regular rhythm, normal S1, S2, no murmurs  Musculoskeletal - left knee in brace  Extremities - peripheral pulses normal, no ankle edema, no clubbing or cyanosis  Skin - normal coloration and turgor, no rashes, no suspicious skin lesions noted      Assessment/Plan   Education and counseling provided:  Are appropriate based on today's review and evaluation    Diagnoses and all orders for this visit:    1. Medicare annual wellness visit, subsequent    2. Encounter for immunization  -     PNEUMOCOCCAL POLYSACCHARIDE VACCINE, 23-VALENT, ADULT OR IMMUNOSUPPRESSED PT DOSE,  Given. 3. Obesity, morbid ([address]  The patient is asked to modify diet and lifestyle to facilitate weight loss and therefore avoid health risks that are associated with obesity.    4. Vitamin D deficiency  -     VITAMIN D, 25 HYDROXY; Future  Will notify results and deviate plan based on findings. 5. Hypothyroidism, unspecified type  -     TSH 3RD GENERATION; Future  Will notify results and deviate plan based on findings. 6. Iron deficiency anemia, unspecified iron deficiency anemia type  -     CBC WITH AUTOMATED DIFF; Future  -     IRON PROFILE; Future  -     FERRITIN; Future  Will notify results and deviate plan based on findings. 7. Mild intermittent asthma without complication  Stable. 8. Recurrent depression ([facility] [address]  Stable. 9. Mixed hyperlipidemia  -     LIPID PANEL; Future  -     METABOLIC PANEL, COMPREHENSIVE; Future  Will notify results and deviate plan based on findings.      Health Maintenance Due   Topic Date Due    Pneumococcal 0-64 years (1 of 1 - PPSV23) 12/30/2012    PAP AKA CERVICAL CYTOLOGY  11/07/2019    Flu Vaccine (1) 09/01/2020    Medicare Yearly Exam  10/21/2020     NASRA Pimentel-C

## 2020-11-06 NOTE — PROGRESS NOTES
Chief Complaint   Patient presents with   Naz Felipe     Patient in office today for cpe and labs-pt is not fasting. Pt have c/o of rash that was noted on Tuesday. Rash is located around lips. Pt denies new foods or lip products. Have not treated with otc. 1. Have you been to the ER, urgent care clinic since your last visit? Hospitalized since your last visit? No    2. Have you seen or consulted any other health care providers outside of the [address] [place] [address] since your last visit? Include any pap smears or colon screening.  No

## 2020-11-12 LAB
25(OH)D3+25(OH)D2 SERPL-MCNC: 31.9 NG/ML (ref 30–100)
ALBUMIN SERPL-MCNC: 4.2 G/DL (ref 3.8–4.8)
ALBUMIN/GLOB SERPL: 1.8 {RATIO} (ref 1.2–2.2)
ALP SERPL-CCNC: 108 IU/L (ref 39–117)
ALT SERPL-CCNC: 23 IU/L (ref 0–32)
AST SERPL-CCNC: 18 IU/L (ref 0–40)
BASOPHILS # BLD AUTO: 0 X10E3/UL (ref 0–0.2)
BASOPHILS NFR BLD AUTO: 1 %
BILIRUB SERPL-MCNC: 0.6 MG/DL (ref 0–1.2)
BUN SERPL-MCNC: 12 MG/DL (ref 6–20)
BUN/CREAT SERPL: 13 (ref 9–23)
CALCIUM SERPL-MCNC: 9.1 MG/DL (ref 8.7–10.2)
CHLORIDE SERPL-SCNC: 104 MMOL/L (ref 96–106)
CHOLEST SERPL-MCNC: 182 MG/DL (ref 100–199)
CO2 SERPL-SCNC: 24 MMOL/L (ref 20–29)
CREAT SERPL-MCNC: 0.95 MG/DL (ref 0.57–1)
EOSINOPHIL # BLD AUTO: 0.3 X10E3/UL (ref 0–0.4)
EOSINOPHIL NFR BLD AUTO: 4 %
ERYTHROCYTE [DISTWIDTH] IN BLOOD BY AUTOMATED COUNT: 13 % (ref 11.7–15.4)
FERRITIN SERPL-MCNC: 303 NG/ML (ref 15–150)
GLOBULIN SER CALC-MCNC: 2.3 G/DL (ref 1.5–4.5)
GLUCOSE SERPL-MCNC: 83 MG/DL (ref 65–99)
HCT VFR BLD AUTO: 38.6 % (ref 34–46.6)
HDLC SERPL-MCNC: 36 MG/DL
HGB BLD-MCNC: 12.7 G/DL (ref 11.1–15.9)
IMM GRANULOCYTES # BLD AUTO: 0 X10E3/UL (ref 0–0.1)
IMM GRANULOCYTES NFR BLD AUTO: 0 %
INTERPRETATION, 910389: NORMAL
IRON SATN MFR SERPL: 23 % (ref 15–55)
IRON SERPL-MCNC: 59 UG/DL (ref 27–159)
LDLC SERPL CALC-MCNC: 132 MG/DL (ref 0–99)
LYMPHOCYTES # BLD AUTO: 2.5 X10E3/UL (ref 0.7–3.1)
LYMPHOCYTES NFR BLD AUTO: 31 %
MCH RBC QN AUTO: 31.1 PG (ref 26.6–33)
MCHC RBC AUTO-ENTMCNC: 32.9 G/DL (ref 31.5–35.7)
MCV RBC AUTO: 94 FL (ref 79–97)
MONOCYTES # BLD AUTO: 0.5 X10E3/UL (ref 0.1–0.9)
MONOCYTES NFR BLD AUTO: 7 %
NEUTROPHILS # BLD AUTO: 4.6 X10E3/UL (ref 1.4–7)
NEUTROPHILS NFR BLD AUTO: 57 %
PLATELET # BLD AUTO: 262 X10E3/UL (ref 150–450)
POTASSIUM SERPL-SCNC: 4 MMOL/L (ref 3.5–5.2)
PROT SERPL-MCNC: 6.5 G/DL (ref 6–8.5)
RBC # BLD AUTO: 4.09 X10E6/UL (ref 3.77–5.28)
SODIUM SERPL-SCNC: 142 MMOL/L (ref 134–144)
TIBC SERPL-MCNC: 261 UG/DL (ref 250–450)
TRIGL SERPL-MCNC: 76 MG/DL (ref 0–149)
TSH SERPL DL<=0.005 MIU/L-ACNC: 18.9 UIU/ML (ref 0.45–4.5)
UIBC SERPL-MCNC: 202 UG/DL (ref 131–425)
VLDLC SERPL CALC-MCNC: 14 MG/DL (ref 5–40)
WBC # BLD AUTO: 8.1 X10E3/UL (ref 3.4–10.8)

## 2020-11-12 RX ORDER — LEVOTHYROXINE SODIUM 200 UG/1
TABLET ORAL
Qty: 90 TAB | Refills: 1 | Status: SHIPPED | OUTPATIENT
Start: 2020-11-12 | End: 2021-04-23

## 2020-11-12 NOTE — PROGRESS NOTES
The following message was sent to pt via [facility] portal in reference to lab results:    Good morning Mya,     Attached are the results of your most recent lab work. I have the following recommendations:    1. Your CBC which looks at your white blood cells, red blood cells, and hemoglobin came back looking normal. No sign of infection or anemia. 2. Your metabolic panel which looks at your blood glucose, liver function, and kidney function looks perfect. 3. Your cholesterol is elevated. I think this is partially due to your thyroid function being so slow. Getting you on the proper medication should improve this. If it continues to run high, we may need to talk about starting a cholesterol pill. 4. Which brings me to your TSH. Your thyroid function is VERY slow. have you been taking your medication every morning on an empty stomach as prescribed. Did you miss any days leading up to your blood being drawn? I think its time you see an endocrinologist for further evaluation of this because it's unusual to require a dose higher than 200 mcg every day. Let me know what you think. 5. Your iron levels are stable. You can cut back to taking the iron pill once per day from twice per day. 6. Your vitamin D level came back normal showing that you are not Vitamin D deficient and do not require additional supplementation. Please let me know what you think!   RUSTY Gordillo

## 2020-11-23 ENCOUNTER — DOCUMENTATION ONLY (OUTPATIENT)
Dept: FAMILY MEDICINE CLINIC | Age: 35
End: 2020-11-23

## 2020-12-04 ENCOUNTER — DOCUMENTATION ONLY (OUTPATIENT)
Dept: FAMILY MEDICINE CLINIC | Age: 35
End: 2020-12-04

## 2020-12-21 ENCOUNTER — OFFICE VISIT (OUTPATIENT)
Dept: FAMILY MEDICINE CLINIC | Age: 35
End: 2020-12-21
Payer: MEDICARE

## 2020-12-21 VITALS
RESPIRATION RATE: 18 BRPM | SYSTOLIC BLOOD PRESSURE: 112 MMHG | HEART RATE: 83 BPM | TEMPERATURE: 97.6 F | DIASTOLIC BLOOD PRESSURE: 76 MMHG | OXYGEN SATURATION: 97 % | WEIGHT: 260 LBS | HEIGHT: 64 IN | BODY MASS INDEX: 44.39 KG/M2

## 2020-12-21 DIAGNOSIS — E03.9 HYPOTHYROIDISM, UNSPECIFIED TYPE: Primary | ICD-10-CM

## 2020-12-21 PROCEDURE — G8427 DOCREV CUR MEDS BY ELIG CLIN: HCPCS | Performed by: NURSE PRACTITIONER

## 2020-12-21 PROCEDURE — 99212 OFFICE O/P EST SF 10 MIN: CPT | Performed by: NURSE PRACTITIONER

## 2020-12-21 PROCEDURE — G8417 CALC BMI ABV UP PARAM F/U: HCPCS | Performed by: NURSE PRACTITIONER

## 2020-12-21 PROCEDURE — G9717 DOC PT DX DEP/BP F/U NT REQ: HCPCS | Performed by: NURSE PRACTITIONER

## 2020-12-21 NOTE — PROGRESS NOTES
Chief Complaint   Patient presents with    Thyroid Problem    Labs     Patient in office today to recheck tsh levels. Pt have noted weight gain. Have no concerns. 1. Have you been to the ER, urgent care clinic since your last visit? Hospitalized since your last visit? No    2. Have you seen or consulted any other health care providers outside of the [address] [place] [address] since your last visit? Include any pap smears or colon screening.  No

## 2020-12-21 NOTE — PROGRESS NOTES
Chief Complaint   Patient presents with    Thyroid Problem    Labs     Patient in office today to recheck tsh levels. Pt have noted weight gain. Pt has gained 12 lbs since last OV. Denies any changes in diet or routine. Walking a lot at work. Taking the medication every morning on an empty stomach without any other medications. Denies any other concerns at this time. Chief Complaint   Patient presents with    Thyroid Problem    Labs     she is a 37y.o. year old female who presents for evalution. Reviewed PmHx, RxHx, FmHx, SocHx, AllgHx and updated and dated in the chart. Review of Systems - negative except as listed above in the HPI    Objective:     Vitals:    12/21/20 1425   BP: 112/76   Pulse: 83   Resp: 18   Temp: 97.6 °F (36.4 °C)   TempSrc: Oral   SpO2: 97%   Weight: 260 lb (117.9 kg)   Height: 5' 4\" (1.626 m)     Physical Examination: General appearance - alert, well appearing, and in no distress  Neck - supple, no significant adenopathy  Chest - clear to auscultation, no wheezes, rales or rhonchi, symmetric air entry  Heart - normal rate, regular rhythm, normal S1, S2, no murmurs    Assessment/ Plan:   Diagnoses and all orders for this visit:    1. Hypothyroidism, unspecified type  -     TSH 3RD GENERATION; Future  Will notify results and deviate plan based on findings. I have discussed the diagnosis with the patient and the intended plan as seen in the above orders. The patient has received an after-visit summary and questions were answered concerning future plans. Medication Side Effects and Warnings were discussed with patient: yes  Patient Labs were reviewed and or requested: yes  Patient Past Records were reviewed and or requested  yes  Patient / Caregiver Understanding of treatment plan was verbalized during office visit YES    RUSTY Webster    There are no Patient Instructions on file for this visit.

## 2020-12-22 LAB — TSH SERPL DL<=0.05 MIU/L-ACNC: 1.91 UIU/ML (ref 0.36–3.74)

## 2020-12-22 NOTE — PROGRESS NOTES
Please notify pt that her thyroid function is at goal on current dose of levothyroxine. Continue taking daily as prescribed. Repeat labs in 3 months. Enc pt to work on diet and lifestyle to help facilitate weight loss.

## 2021-01-12 LAB — SARS-COV-2, NAA: NEGATIVE

## 2021-02-15 ENCOUNTER — VIRTUAL VISIT (OUTPATIENT)
Dept: FAMILY MEDICINE CLINIC | Age: 36
End: 2021-02-15
Payer: MEDICARE

## 2021-02-15 ENCOUNTER — NURSE TRIAGE (OUTPATIENT)
Dept: OTHER | Facility: CLINIC | Age: 36
End: 2021-02-15

## 2021-02-15 ENCOUNTER — TELEPHONE (OUTPATIENT)
Dept: FAMILY MEDICINE CLINIC | Age: 36
End: 2021-02-15

## 2021-02-15 ENCOUNTER — TELEPHONE (OUTPATIENT)
Dept: OTHER | Facility: CLINIC | Age: 36
End: 2021-02-15

## 2021-02-15 DIAGNOSIS — J06.9 VIRAL UPPER RESPIRATORY ILLNESS: Primary | ICD-10-CM

## 2021-02-15 PROCEDURE — G9717 DOC PT DX DEP/BP F/U NT REQ: HCPCS | Performed by: NURSE PRACTITIONER

## 2021-02-15 PROCEDURE — G8427 DOCREV CUR MEDS BY ELIG CLIN: HCPCS | Performed by: NURSE PRACTITIONER

## 2021-02-15 PROCEDURE — 99213 OFFICE O/P EST LOW 20 MIN: CPT | Performed by: NURSE PRACTITIONER

## 2021-02-15 NOTE — LETTER
NOTIFICATION RETURN TO WORK / SCHOOL 
 
2/15/2021 1:38 PM 
 
Ms. Claudette Basurto [address] [place] [address] To Whom It May Concern: 
 
Claudette Basurto is currently under the care of [address] She was seen today and noted to have symptoms concerning for COVID-19 infection. She has been advised to isolate at home until results of her testing are received. Test results are generally received within two to seven day after collection. A return to work date will be determined once results are received. If there are questions or concerns please have the patient contact our office.  
 
 
 
Sincerely, 
 
 
Lonny Hong NP

## 2021-02-15 NOTE — PROGRESS NOTES
Roswell Kocher is a 37 y.o. female who was seen by synchronous (real-time) audio-video technology on 2/15/2021 for Headache, Nasal Congestion, Ear Pain, and Sore Throat        Assessment & Plan:   Diagnoses and all orders for this visit:    1. Viral upper respiratory illness  Symptoms concerning for covid19 infection  Recommend testing  Suggested [facility], [facility], as possibilities- she will check web to make appt and check procedures for preferred site  Requested she advise office of results when rec'd  Isolate at home away from family until results rec'd  Delsym prn for cough, tylenol for fever or headache  Rest  Fluids        Follow-up and Dispositions    · Return if symptoms worsen or fail to improve. I have discussed the diagnosis with the patient and the intended plan as seen in the above orders, and questions were answered concerning future plans. Patient conveyed understanding of the plan at the time of the visit. 712  Subjective:     HPI:    Presents for evaluation of cough. C/o cough, nonproductive, low grade fever, sore throat, headache, fatigue x 24 hours. One episode of vomiting last night, now eating and drinking well. No shortness of breath or wheezing. No chills. No body aches. No abdominal pain or diarrhea. No change in sense of taste or smell. Had rapid test only today at [facility], states this was negative. PCR was not sent. Was not seen by provider there. No known exposure to covid-19. Prior to Admission medications    Medication Sig Start Date End Date Taking? Authorizing Provider   levothyroxine (Euthyrox) 200 mcg tablet TAKE 1 TABLET BY MOUTH ONCE DAILY BEFORE BREAKFAST 11/12/20  Yes Jazlyn Garcia NP   busPIRone (BUSPAR) 15 mg tablet Take 1 Tab by mouth three (3) times daily. 8/13/20  Yes Ines NO NP   buPROPion XL (WELLBUTRIN XL) 300 mg XL tablet Take 300 mg by mouth daily.  8/10/20  Yes Provider, Historical   traZODone (DESYREL) 100 mg tablet Take 100 mg by mouth nightly as needed. 7/22/20  Yes Provider, Historical   clonazePAM (KlonoPIN) 1 mg tablet TAKE 1 TABLET BY MOUTH ONCE DAILY AS NEEDED FOR ANXIETY 6/14/20  Yes Kervin Bejarano NP   fluticasone propionate (FLONASE) 50 mcg/actuation nasal spray 2 Sprays by Both Nostrils route daily. 4/29/20  Yes Emir Schroeder NP   cetirizine (ZYRTEC) 10 mg tablet Take 1 Tab by mouth daily. 3/18/20  Yes Kervin Bejarano NP   albuterol (PROAIR HFA) 90 mcg/actuation inhaler Take 2 Puffs by inhalation every four (4) hours as needed for Wheezing. 12/16/19  Yes Terese Marcaon NP   ferrous sulfate 325 mg (65 mg iron) tablet Take 1 Tab by mouth two (2) times a day. 4/16/19  Yes Terese Marcano NP   buPROPion XL (WELLBUTRIN XL) 150 mg tablet TAKE 1 TABLET BY MOUTH ONCE DAILY IN THE MORNING 1/31/20   Unique Kohli NP     Patient Active Problem List   Diagnosis Code    Hypothyroid E03.9    Environmental allergies Z91.09    [place] (hard of hearing) H91.90    Strabismus H50.9    Anemia, iron deficiency D50.9    Asthma J45.909    Long-term use of Plaquenil Z79.899    Vitamin D deficiency E55.9    Polyuria R35.8    Periodic headache syndrome, not intractable G43. C0    Inflammatory polyarthritis ([facility]) M06.4    Obesity, morbid ([facility]) E66.01    Recurrent depression ([facility]) F33.9     Allergies   Allergen Reactions    Latex Hives    Augmentin [Amoxicillin-Pot Clavulanate] Nausea and Vomiting    Codeine Nausea and Vomiting    Morphine Sulfate Nausea and Vomiting    Nuts [Tree Nut] Swelling    Pcn [Penicillins] Hives     Past Medical History:   Diagnosis Date    Allergies     Anemia 2008    Anxiety     Asthma     inhaler    Depression     Ear infection     Environmental allergies 4/1/2010    GERD (gastroesophageal reflux disease)     Hypothyroid 4/1/2010    Inflammatory arthritis     Lupus (HCC)     Nausea & vomiting     Other ill-defined conditions(799.89) noted 7/25/2012    \"Intellectually Disabled\" signs consents/self. Mom &  concur    Otitis media 4/1/2010    Strabismus 4/1/2010    Unspecified adverse effect of anesthesia 2/10/2011    slow to wake up, sleepy    Wears hearing aid      Past Surgical History:   Procedure Laterality Date    HX BREAST REDUCTION  2005    HX CHOLECYSTECTOMY  2014    HX KNEE ARTHROSCOPY Left     HX ORTHOPAEDIC      heel cord lengthing; right thumb surgery (pin placed)    HX OTHER SURGICAL      Ear surgery    HX TONSIL AND ADENOIDECTOMY  1990     Family History   Problem Relation Age of Onset    Osteoporosis Other     Arthritis-osteo Other     Breast Cancer Other         maternal great aunt    Breast Cancer Other         Maternal great aunt    Stroke Maternal Aunt     Hypertension Sister      Social History     Tobacco Use    Smoking status: Never Smoker    Smokeless tobacco: Never Used   Substance Use Topics    Alcohol use: No       Review of Systems   Constitutional: Positive for malaise/fatigue. Negative for chills, fever and weight loss. HENT: Positive for congestion and sore throat. Negative for ear pain. Eyes: Negative for blurred vision, pain and discharge. Respiratory: Positive for cough. Negative for hemoptysis, sputum production, shortness of breath and wheezing. Cardiovascular: Negative for chest pain and palpitations. Gastrointestinal: Positive for vomiting. Negative for abdominal pain, blood in stool, diarrhea and nausea. Genitourinary: Negative. Musculoskeletal: Negative for myalgias. Skin: Negative. Neurological: Positive for headaches. Endo/Heme/Allergies: Negative. Psychiatric/Behavioral: Negative. Objective:   No flowsheet data found.    General: alert, cooperative, no distress   Mental  status: normal mood, behavior, speech, dress, motor activity, and thought processes, able to follow commands   HENT: NCAT   Neck: no visualized mass   Resp: no respiratory distress   Neuro: no gross deficits Skin: no discoloration or lesions of concern on visible areas   Psychiatric: normal affect, consistent with stated mood, no evidence of hallucinations     Additional exam findings:   none    We discussed the expected course, resolution and complications of the diagnosis(es) in detail. Medication risks, benefits, costs, interactions, and alternatives were discussed as indicated. I advised her to contact the office if her condition worsens, changes or fails to improve as anticipated. She expressed understanding with the diagnosis(es) and plan. Cesario Zimmerman, who was evaluated through a patient-initiated, synchronous (real-time) audio-video encounter, and/or her healthcare decision maker, is aware that it is a billable service, with coverage as determined by her insurance carrier. She provided verbal consent to proceed: Yes, and patient identification was verified. It was conducted pursuant to the emergency declaration under the [address] [place] [address], [address] [place] [address] authority and the [employer] and [employer] General Act. A caregiver was present when appropriate. Ability to conduct physical exam was limited. I was at home. The patient was at home.       Ilene Mulligan NP  02/15/21

## 2021-02-15 NOTE — TELEPHONE ENCOUNTER
----- Message from Anamaria Pearson sent at 2/15/2021  9:15 AM EST -----  Regarding: /binh  Pt is requesting an appointment today for vomiting,sinus and a headache. She did speak with the nurse triage. Pts number is [phone].

## 2021-02-15 NOTE — PATIENT INSTRUCTIONS
Coronavirus (FULEF-62): Care Instructions Overview The coronavirus disease (COVID-19) is caused by a virus. Symptoms may include a fever, a cough, and shortness of breath. It mainly spreads person-to-person through droplets from coughing and sneezing. The virus also can spread when people are in close contact with someone who is infected. Most people have mild symptoms and can take care of themselves at home. If their symptoms get worse, they may need care in a hospital. Treatment may include medicines to reduce symptoms, plus breathing support such as oxygen therapy or a ventilator. It's important to not spread the virus to others. If you have COVID-19, wear a face cover anytime you are around other people. You need to isolate yourself while you are sick. Leave your home only if you need to get medical care or testing. Follow-up care is a key part of your treatment and safety. Be sure to make and go to all appointments, and call your doctor if you are having problems. It's also a good idea to know your test results and keep a list of the medicines you take. How can you care for yourself at home? · Get extra rest. It can help you feel better. · Drink plenty of fluids. This helps replace fluids lost from fever. Fluids also help ease a scratchy throat. Water, soup, fruit juice, and hot tea with lemon are good choices. · Take acetaminophen (such as Tylenol) to reduce a fever. It may also help with muscle aches. Read and follow all instructions on the label. · Use petroleum jelly on sore skin. This can help if the skin around your nose and lips becomes sore from rubbing a lot with tissues. Tips for self-isolation · Limit contact with people in your home. If possible, stay in a separate bedroom and use a separate bathroom. · Wear a cloth face cover when you are around other people. It can help stop the spread of the virus when you cough or sneeze. · If you have to leave home, avoid crowds and try to stay at least 6 feet away from other people. · Avoid contact with pets and other animals. · Cover your mouth and nose with a tissue when you cough or sneeze. Then throw it in the trash right away. · Wash your hands often, especially after you cough or sneeze. Use soap and water, and scrub for at least 20 seconds. If soap and water aren't available, use an alcohol-based hand . · Don't share personal household items. These include bedding, towels, cups and glasses, and eating utensils. · [address] in the warmest water allowed for the fabric type, and dry it completely. It's okay to wash other people's laundry with yours. · Clean and disinfect your home every day. Use household  and disinfectant wipes or sprays. Take special care to clean things that you grab with your hands. These include doorknobs, remote controls, phones, and handles on your refrigerator and microwave. And don't forget countertops, tabletops, bathrooms, and computer keyboards. When you can end self-isolation · If you know or suspect that you have COVID-19, stay in self-isolation until: 
? You haven't had a fever for 24 hours while not taking medicines to lower the fever, and 
? Your symptoms have improved, and 
? It's been at least 10 days since your symptoms started. · Talk to your doctor about whether you also need testing, especially if you have a weakened immune system. When should you call for help? Call 911 anytime you think you may need emergency care. For example, call if you have life-threatening symptoms, such as: 
  · You have severe trouble breathing. (You can't talk at all.)  
  · You have constant chest pain or pressure.  
  · You are severely dizzy or lightheaded.  
  · You are confused or can't think clearly.  
  · Your face and lips have a blue color.  
  · You pass out (lose consciousness) or are very hard to wake up. Call your doctor now or seek immediate medical care if: 
  · You have moderate trouble breathing. (You can't speak a full sentence.)  
  · You are coughing up blood (more than about 1 teaspoon).  
  · You have signs of low blood pressure. These include feeling lightheaded; being too weak to stand; and having cold, pale, clammy skin. Watch closely for changes in your health, and be sure to contact your doctor if: 
  · Your symptoms get worse.  
  · You are not getting better as expected. Call before you go to the doctor's office. Follow their instructions. And wear a cloth face cover. Current as of: December 18, 2020               Content Version: 12.7 © 2006-2021 [employer]wise, [employer]. Care instructions adapted under license by [employer] (which disclaims liability or warranty for this information). If you have questions about a medical condition or this instruction, always ask your healthcare professional. [address] any warranty or liability for your use of this information.

## 2021-02-17 LAB — SARS-COV-2, NAA: NOT DETECTED

## 2021-03-05 ENCOUNTER — OFFICE VISIT (OUTPATIENT)
Dept: FAMILY MEDICINE CLINIC | Age: 36
End: 2021-03-05
Payer: MEDICARE

## 2021-03-05 VITALS
SYSTOLIC BLOOD PRESSURE: 114 MMHG | TEMPERATURE: 97.2 F | HEART RATE: 73 BPM | WEIGHT: 256.2 LBS | OXYGEN SATURATION: 97 % | DIASTOLIC BLOOD PRESSURE: 81 MMHG | RESPIRATION RATE: 18 BRPM | HEIGHT: 64 IN | BODY MASS INDEX: 43.74 KG/M2

## 2021-03-05 DIAGNOSIS — E03.9 HYPOTHYROIDISM, UNSPECIFIED TYPE: Primary | Chronic | ICD-10-CM

## 2021-03-05 PROCEDURE — 99213 OFFICE O/P EST LOW 20 MIN: CPT | Performed by: NURSE PRACTITIONER

## 2021-03-05 PROCEDURE — G9717 DOC PT DX DEP/BP F/U NT REQ: HCPCS | Performed by: NURSE PRACTITIONER

## 2021-03-05 PROCEDURE — G8417 CALC BMI ABV UP PARAM F/U: HCPCS | Performed by: NURSE PRACTITIONER

## 2021-03-05 PROCEDURE — G8427 DOCREV CUR MEDS BY ELIG CLIN: HCPCS | Performed by: NURSE PRACTITIONER

## 2021-03-05 RX ORDER — FLUOXETINE HYDROCHLORIDE 40 MG/1
CAPSULE ORAL
COMMUNITY
Start: 2021-01-27

## 2021-03-05 RX ORDER — ONDANSETRON 4 MG/1
TABLET, ORALLY DISINTEGRATING ORAL
COMMUNITY
Start: 2021-02-15

## 2021-03-05 RX ORDER — NORGESTIMATE AND ETHINYL ESTRADIOL 0.25-0.035
KIT ORAL
COMMUNITY
Start: 2020-12-30 | End: 2021-04-08

## 2021-03-05 RX ORDER — FUROSEMIDE 20 MG/1
TABLET ORAL
COMMUNITY
Start: 2021-02-25 | End: 2021-04-07 | Stop reason: SDUPTHER

## 2021-03-05 NOTE — PROGRESS NOTES
Chief Complaint   Patient presents with    Labs     Thyroid Check     Pt is in office today for a Thyroid Check. Pt has no other concerns. 1. Have you been to the ER, urgent care clinic since your last visit? Hospitalized since your last visit? No    2. Have you seen or consulted any other health care providers outside of the [address] [place] [address] since your last visit? Include any pap smears or colon screening.  No

## 2021-03-05 NOTE — PROGRESS NOTES
Chief Complaint   Patient presents with    Labs     Thyroid Check     Pt is in office today for a Thyroid Check. Taking her medication as prescribed. Chief Complaint   Patient presents with    Labs     Thyroid Check     she is a 37y.o. year old female who presents for evalution. Reviewed PmHx, RxHx, FmHx, SocHx, AllgHx and updated and dated in the chart. Review of Systems - negative except as listed above in the HPI    Objective:     Vitals:    03/05/21 1057   BP: 114/81   Pulse: 73   Resp: 18   Temp: 97.2 °F (36.2 °C)   TempSrc: Oral   SpO2: 97%   Weight: 256 lb 3.2 oz (116.2 kg)   Height: 5' 4\" (1.626 m)     Physical Examination: General appearance - alert, well appearing, and in no distress  Chest - clear to auscultation, no wheezes, rales or rhonchi, symmetric air entry  Heart - normal rate, regular rhythm, normal S1, S2, no murmurs    Assessment/ Plan:   Diagnoses and all orders for this visit:    1. Hypothyroidism, unspecified type  -     TSH 3RD GENERATION; Future  Will notify results and deviate plan based on findings. I have discussed the diagnosis with the patient and the intended plan as seen in the above orders. The patient has received an after-visit summary and questions were answered concerning future plans. Medication Side Effects and Warnings were discussed with patient: yes  Patient Labs were reviewed and or requested: yes  Patient Past Records were reviewed and or requested  yes  Patient / Caregiver Understanding of treatment plan was verbalized during office visit YES    RUSTY Hernandez    There are no Patient Instructions on file for this visit.

## 2021-03-06 LAB — TSH SERPL DL<=0.005 MIU/L-ACNC: 2.7 UIU/ML (ref 0.45–4.5)

## 2021-03-08 NOTE — PROGRESS NOTES
The following message was sent to pt via [employer] portal in reference to lab results:  Good morning Mya,   Great news, your thyroid function is stable on your current dose of levothyroxine. Lets repeat these labs in 6 months. Please let me know if you have any questions or concerns regarding these results.    RUSTY Glass

## 2021-04-07 RX ORDER — FUROSEMIDE 20 MG/1
20 TABLET ORAL DAILY
Qty: 30 TAB | Refills: 2 | Status: SHIPPED | OUTPATIENT
Start: 2021-04-07 | End: 2021-06-25 | Stop reason: SDUPTHER

## 2021-04-08 ENCOUNTER — OFFICE VISIT (OUTPATIENT)
Dept: FAMILY MEDICINE CLINIC | Age: 36
End: 2021-04-08
Payer: MEDICARE

## 2021-04-08 VITALS
TEMPERATURE: 97.1 F | BODY MASS INDEX: 43.19 KG/M2 | RESPIRATION RATE: 18 BRPM | SYSTOLIC BLOOD PRESSURE: 110 MMHG | HEIGHT: 64 IN | DIASTOLIC BLOOD PRESSURE: 70 MMHG | OXYGEN SATURATION: 98 % | HEART RATE: 79 BPM | WEIGHT: 253 LBS

## 2021-04-08 DIAGNOSIS — Z02.5 ENCOUNTER FOR EXAMINATION TO PARTICIPATE IN SPECIAL OLYMPICS: Primary | ICD-10-CM

## 2021-04-08 DIAGNOSIS — B96.89 SUPERFICIAL BACTERIAL INFECTION OF SKIN: ICD-10-CM

## 2021-04-08 DIAGNOSIS — L08.9 SUPERFICIAL BACTERIAL INFECTION OF SKIN: ICD-10-CM

## 2021-04-08 PROCEDURE — G9717 DOC PT DX DEP/BP F/U NT REQ: HCPCS | Performed by: NURSE PRACTITIONER

## 2021-04-08 PROCEDURE — 99214 OFFICE O/P EST MOD 30 MIN: CPT | Performed by: NURSE PRACTITIONER

## 2021-04-08 PROCEDURE — G8427 DOCREV CUR MEDS BY ELIG CLIN: HCPCS | Performed by: NURSE PRACTITIONER

## 2021-04-08 PROCEDURE — G8417 CALC BMI ABV UP PARAM F/U: HCPCS | Performed by: NURSE PRACTITIONER

## 2021-04-08 RX ORDER — SULFAMETHOXAZOLE AND TRIMETHOPRIM 800; 160 MG/1; MG/1
1 TABLET ORAL 2 TIMES DAILY
Qty: 20 TAB | Refills: 0 | Status: SHIPPED | OUTPATIENT
Start: 2021-04-08 | End: 2021-04-18

## 2021-04-08 NOTE — PROGRESS NOTES
Chief Complaint   Patient presents with    Form Completion    Complete Physical    Rash     Bilateral on side of breasts       Patient presents in office today for a CPE and form completion. 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 Pt states she has had a rash on both sides of her breasts for a week or so now. Pt states they itch. Pt has not done any ointments. Denies any history of MRSA. Denies any recent abx. Denies any new soaps, lotions, detergents or bras. Will drain a clear fluid at times. Has not applied anything OTC. Denies any other concerns at this time. Chief Complaint   Patient presents with    Form Completion    Complete Physical    Rash     Bilateral on side of breasts     she is a 37y.o. year old female who presents for evalution. Reviewed PmHx, RxHx, FmHx, SocHx, AllgHx and updated and dated in the chart.     Review of Systems - negative except as listed above in the HPI    Objective:     Vitals:    04/08/21 1445 04/08/21 1454   BP: 112/60 110/70   Pulse: 79    Resp: 18    Temp: 97.1 °F (36.2 °C)    TempSrc: Oral    SpO2: 98%    Weight: 253 lb (114.8 kg)    Height: 5' 4\" (1.626 m)      Physical Examination: General appearance - alert, well appearing, and in no distress  Mental status - normal mood, behavior  Eyes - pupils equal and reactive, extraocular eye movements intact  Ears - bilateral TM's and external ear canals normal  Nose - normal and patent, no erythema, discharge or polyps and normal nontender sinuses  Mouth - mucous membranes moist, pharynx normal without lesions  Neck - supple, no significant adenopathy, carotids upstroke normal bilaterally, no bruits, thyroid exam: thyroid is normal in size without nodules or tenderness  Chest - clear to auscultation, no wheezes, rales or rhonchi, symmetric air entry  Heart - normal rate, regular rhythm, normal S1, S2, no murmurs  Abdomen - soft, nontender, nondistended, no masses or organomegaly  bowel sounds normal  Neurological - DTR's normal and symmetric, motor and sensory grossly normal bilaterally, normal muscle tone, no tremors, strength 5/5  Musculoskeletal - no joint tenderness, deformity or swelling  Extremities - peripheral pulses normal, no pedal edema, no clubbing or cyanosis  Skin - papules and pustules present on bilateral breasts with some lesions scabbed from picking    Assessment/ Plan:   Diagnoses and all orders for this visit:    1. Encounter for examination to participate in special [employer]  Form completed for submission. Cleared without restrictions to participate. 2. Superficial bacterial infection of skin  -     trimethoprim-sulfamethoxazole (BACTRIM DS, SEPTRA DS) 160-800 mg per tablet; Take 1 Tab by mouth two (2) times a day for 10 days. Complete as directed. Reviewed SEs/ADRs of medication. Enc to clean area well with soap and water. OTC abx ointment. Follow up if sx persist or worsen. I have discussed the diagnosis with the patient and the intended plan as seen in the above orders. The patient has received an after-visit summary and questions were answered concerning future plans. Medication Side Effects and Warnings were discussed with patient: yes  Patient Labs were reviewed and or requested: no  Patient Past Records were reviewed and or requested  yes  Patient / Caregiver Understanding of treatment plan was verbalized during office visit YES    RUSTY Martínez    There are no Patient Instructions on file for this visit.

## 2021-04-08 NOTE — PROGRESS NOTES
Chief Complaint   Patient presents with    Form Completion    Complete Physical    Rash     Bilateral on side of breasts       Patient presents in office today for a CPE and form completion     Pt states she has had a rash on both sides of her breasts for a week or so now. Pt states they itch. Pt has not done any ointments. 1. Have you been to the ER, urgent care clinic since your last visit? Hospitalized since your last visit? No    2. Have you seen or consulted any other health care providers outside of the [address] [place] [address] since your last visit? Include any pap smears or colon screening.  No

## 2021-04-21 DIAGNOSIS — J40 BRONCHITIS: ICD-10-CM

## 2021-04-21 RX ORDER — ALBUTEROL SULFATE 90 UG/1
2 AEROSOL, METERED RESPIRATORY (INHALATION)
Qty: 9 INHALER | Refills: 1 | Status: SHIPPED | OUTPATIENT
Start: 2021-04-21 | End: 2022-09-20 | Stop reason: SDUPTHER

## 2021-04-21 RX ORDER — EPINEPHRINE 0.3 MG/.3ML
0.3 INJECTION SUBCUTANEOUS
Qty: 2 SYRINGE | Refills: 1 | Status: SHIPPED | OUTPATIENT
Start: 2021-04-21 | End: 2021-04-21

## 2021-06-14 ENCOUNTER — TELEPHONE (OUTPATIENT)
Dept: FAMILY MEDICINE CLINIC | Age: 36
End: 2021-06-14

## 2021-06-14 NOTE — TELEPHONE ENCOUNTER
Patient has a VV Wednesday for blisters on her feet. In the meantime, she would like to know if she can take some benadryl.  Please call patient at: [phone]

## 2021-06-16 ENCOUNTER — VIRTUAL VISIT (OUTPATIENT)
Dept: FAMILY MEDICINE CLINIC | Age: 36
End: 2021-06-16
Payer: MEDICARE

## 2021-06-16 DIAGNOSIS — S90.821A BLISTER (NONTHERMAL), RIGHT FOOT, INITIAL ENCOUNTER: Primary | ICD-10-CM

## 2021-06-16 PROCEDURE — 99213 OFFICE O/P EST LOW 20 MIN: CPT | Performed by: NURSE PRACTITIONER

## 2021-06-16 PROCEDURE — G8427 DOCREV CUR MEDS BY ELIG CLIN: HCPCS | Performed by: NURSE PRACTITIONER

## 2021-06-16 PROCEDURE — G9717 DOC PT DX DEP/BP F/U NT REQ: HCPCS | Performed by: NURSE PRACTITIONER

## 2021-06-16 RX ORDER — MUPIROCIN 20 MG/G
OINTMENT TOPICAL 2 TIMES DAILY
Qty: 30 G | Refills: 0 | Status: SHIPPED | OUTPATIENT
Start: 2021-06-16

## 2021-06-16 RX ORDER — NORGESTIMATE AND ETHINYL ESTRADIOL 0.25-0.035
KIT ORAL
COMMUNITY
Start: 2021-05-07

## 2021-06-16 RX ORDER — TRAZODONE HYDROCHLORIDE 100 MG/1
TABLET ORAL
COMMUNITY
Start: 2021-06-05

## 2021-06-16 NOTE — PROGRESS NOTES
Fletcher Chandra (: 1985) is a 37 y.o. female, established patient, here for evaluation of the following chief complaint(s):   Blister     SUBJECTIVE/OBJECTIVE:  HPI  She has a blister of the right foot along the side  Red and tender to touch  Has been there 1 week, using neosporin without improvement  Actively wearing crocks for shoes and no socks  Denies that the shoes are rubbing to cause the blister    Review of Systems   A comprehensive review of system was obtained and negative except findings in the HPI    No flowsheet data found.     Physical Exam    [INSTRUCTIONS:  \"[x]\" Indicates a positive item  \"[]\" Indicates a negative item  -- DELETE ALL ITEMS NOT EXAMINED]    Constitutional: [x] Appears well-developed and well-nourished [x] No apparent distress      [] Abnormal -     Mental status: [x] Alert and awake  [x] Oriented to person/place/time [x] Able to follow commands    [] Abnormal -     Eyes:   EOM    [x]  Normal    [] Abnormal -   Sclera  [x]  Normal    [] Abnormal -          Discharge [x]  None visible   [] Abnormal -     HENT: [x] Normocephalic, atraumatic  [] Abnormal -   [x] Mouth/Throat: Mucous membranes are moist    External Ears [x] Normal  [] Abnormal -    Neck: [x] No visualized mass [] Abnormal -     Pulmonary/Chest: [x] Respiratory effort normal   [x] No visualized signs of difficulty breathing or respiratory distress        [] Abnormal -      Musculoskeletal:   [x] Normal gait with no signs of ataxia         [x] Normal range of motion of neck        [] Abnormal -     Neurological:        [x] No Facial Asymmetry (Cranial nerve 7 motor function) (limited exam due to video visit)          [x] No gaze palsy        [] Abnormal -          Skin:        [x] No significant exanthematous lesions or discoloration noted on facial skin         [] Abnormal -            Psychiatric:       [x] Normal Affect [] Abnormal -        [x] No Hallucinations    Other pertinent observable physical exam findings:- none    Diagnoses and all orders for this visit:    1. Blister (nonthermal), right foot, initial encounter    Other orders  -     mupirocin (BACTROBAN) 2 % ointment; Apply  to affected area two (2) times a day. Given bactroban oint to use bid but needs to call office next week if not improving  She needs to have socks on for now and stressed that it may be her shoes causing the problem        On this date 06/16/2021 I have spent 15 minutes reviewing previous notes, test results and face to face (virtual) with the patient discussing the diagnosis and importance of compliance with the treatment plan as well as documenting on the day of the visit. Haydeebrittani Burroughs, was evaluated through a synchronous (real-time) audio-video encounter. The patient (or guardian if applicable) is aware that this is a billable service. Verbal consent to proceed has been obtained within the past 12 months. The visit was conducted pursuant to the emergency declaration under the [address] [place] [address] authority and the [employer] General Act. Patient identification was verified, and a caregiver was present when appropriate. The patient was located in a state where the provider was credentialed to provide care. An electronic signature was used to authenticate this note.   -- Jazmyn Smart NP

## 2021-06-16 NOTE — PROGRESS NOTES
Chief Complaint   Patient presents with    Blister     Pt being seen for vv regarding blisters on her feet  -pt reports they have been there for 1 week and states they itch    1. Have you been to the ER, urgent care clinic since your last visit? Hospitalized since your last visit? No    2. Have you seen or consulted any other health care providers outside of the [address] [place] [address] since your last visit? Include any pap smears or colon screening.  No     Pt has no other concerns

## 2021-06-26 RX ORDER — FUROSEMIDE 20 MG/1
20 TABLET ORAL DAILY
Qty: 30 TABLET | Refills: 2 | Status: SHIPPED | OUTPATIENT
Start: 2021-06-26 | End: 2021-10-14

## 2021-08-09 RX ORDER — SENNOSIDES 8.6 MG/1
1 TABLET ORAL DAILY
Qty: 30 TABLET | Refills: 2 | Status: SHIPPED | OUTPATIENT
Start: 2021-08-09

## 2021-08-30 ENCOUNTER — OFFICE VISIT (OUTPATIENT)
Dept: FAMILY MEDICINE CLINIC | Age: 36
End: 2021-08-30
Payer: MEDICARE

## 2021-08-30 VITALS
SYSTOLIC BLOOD PRESSURE: 111 MMHG | HEIGHT: 64 IN | DIASTOLIC BLOOD PRESSURE: 73 MMHG | HEART RATE: 78 BPM | WEIGHT: 246.8 LBS | OXYGEN SATURATION: 97 % | BODY MASS INDEX: 42.14 KG/M2 | RESPIRATION RATE: 16 BRPM | TEMPERATURE: 98.6 F

## 2021-08-30 DIAGNOSIS — D50.9 IRON DEFICIENCY ANEMIA, UNSPECIFIED IRON DEFICIENCY ANEMIA TYPE: ICD-10-CM

## 2021-08-30 DIAGNOSIS — E03.9 HYPOTHYROIDISM, UNSPECIFIED TYPE: Primary | ICD-10-CM

## 2021-08-30 DIAGNOSIS — I10 HYPERTENSION, ESSENTIAL: ICD-10-CM

## 2021-08-30 PROCEDURE — 99214 OFFICE O/P EST MOD 30 MIN: CPT | Performed by: NURSE PRACTITIONER

## 2021-08-30 PROCEDURE — G8417 CALC BMI ABV UP PARAM F/U: HCPCS | Performed by: NURSE PRACTITIONER

## 2021-08-30 PROCEDURE — G9717 DOC PT DX DEP/BP F/U NT REQ: HCPCS | Performed by: NURSE PRACTITIONER

## 2021-08-30 PROCEDURE — G8427 DOCREV CUR MEDS BY ELIG CLIN: HCPCS | Performed by: NURSE PRACTITIONER

## 2021-08-30 NOTE — PROGRESS NOTES
Lexis Ricks is a 37 y.o. female    Chief Complaint   Patient presents with    Follow-up    Labs     TSH     1. Have you been to the ER, urgent care clinic since your last visit? Hospitalized since your last visit? No    2. Have you seen or consulted any other health care providers outside of the [address] [place] [address] since your last visit? Include any pap smears or colon screening.  No

## 2021-08-30 NOTE — PROGRESS NOTES
HISTORY OF PRESENT ILLNESS  Grecia Armendariz is a 37 y.o. female. HPI  Patient is due for her follow up labs  Needs thyroid, iron and cholesterol check  No complaints, feeling good  Well exam due in Nov    ROS  A comprehensive review of system was obtained and negative except findings in the HPI    Visit Vitals  /73 (BP 1 Location: Left upper arm, BP Patient Position: Sitting, BP Cuff Size: Adult)   Pulse 78   Temp 98.6 °F (37 °C) (Oral)   Resp 16   Ht 5' 4\" (1.626 m)   Wt 246 lb 12.8 oz (111.9 kg)   SpO2 97%   BMI 42.36 kg/m²     Physical Exam  Vitals and nursing note reviewed. Constitutional:       Appearance: She is well-developed. Comments:      Neck:      Vascular: No JVD. Cardiovascular:      Rate and Rhythm: Normal rate and regular rhythm. Heart sounds: No murmur heard. No friction rub. No gallop. Pulmonary:      Effort: Pulmonary effort is normal. No respiratory distress. Breath sounds: Normal breath sounds. No wheezing. Skin:     General: Skin is warm. Neurological:      Mental Status: She is alert and oriented to person, place, and time. ASSESSMENT and PLAN  Encounter Diagnoses   Name Primary?  Hypothyroidism, unspecified type Yes    Iron deficiency anemia, unspecified iron deficiency anemia type     Hypertension, essential      Orders Placed This Encounter    CBC WITH AUTOMATED DIFF    METABOLIC PANEL, COMPREHENSIVE    TSH 3RD GENERATION     Labs updated today  Dev plan with results  Follow-up and Dispositions    · Return for well exam after 11/6/21. I have discussed the diagnosis with the patient and the intended plan as seen in the above orders. The patient has received an after-visit summary and questions were answered concerning future plans. Patient conveyed understanding of the plan at the time of the visit.     Lacy Vera, MSN, ANP  8/30/2021

## 2021-08-31 ENCOUNTER — TELEPHONE (OUTPATIENT)
Dept: FAMILY MEDICINE CLINIC | Age: 36
End: 2021-08-31

## 2021-08-31 LAB
ALBUMIN SERPL-MCNC: 4 G/DL (ref 3.8–4.8)
ALBUMIN/GLOB SERPL: 2 {RATIO} (ref 1.2–2.2)
ALP SERPL-CCNC: 97 IU/L (ref 48–121)
ALT SERPL-CCNC: 33 IU/L (ref 0–32)
AST SERPL-CCNC: 22 IU/L (ref 0–40)
BASOPHILS # BLD AUTO: 0 X10E3/UL (ref 0–0.2)
BASOPHILS NFR BLD AUTO: 1 %
BILIRUB SERPL-MCNC: 0.8 MG/DL (ref 0–1.2)
BUN SERPL-MCNC: 8 MG/DL (ref 6–20)
BUN/CREAT SERPL: 9 (ref 9–23)
CALCIUM SERPL-MCNC: 9.1 MG/DL (ref 8.7–10.2)
CHLORIDE SERPL-SCNC: 106 MMOL/L (ref 96–106)
CO2 SERPL-SCNC: 24 MMOL/L (ref 20–29)
CREAT SERPL-MCNC: 0.87 MG/DL (ref 0.57–1)
EOSINOPHIL # BLD AUTO: 0.1 X10E3/UL (ref 0–0.4)
EOSINOPHIL NFR BLD AUTO: 2 %
ERYTHROCYTE [DISTWIDTH] IN BLOOD BY AUTOMATED COUNT: 14.1 % (ref 11.7–15.4)
GLOBULIN SER CALC-MCNC: 2 G/DL (ref 1.5–4.5)
GLUCOSE SERPL-MCNC: 85 MG/DL (ref 65–99)
HCT VFR BLD AUTO: 38.5 % (ref 34–46.6)
HGB BLD-MCNC: 12.4 G/DL (ref 11.1–15.9)
IMM GRANULOCYTES # BLD AUTO: 0 X10E3/UL (ref 0–0.1)
IMM GRANULOCYTES NFR BLD AUTO: 0 %
LYMPHOCYTES # BLD AUTO: 2.1 X10E3/UL (ref 0.7–3.1)
LYMPHOCYTES NFR BLD AUTO: 34 %
MCH RBC QN AUTO: 33 PG (ref 26.6–33)
MCHC RBC AUTO-ENTMCNC: 32.2 G/DL (ref 31.5–35.7)
MCV RBC AUTO: 102 FL (ref 79–97)
MONOCYTES # BLD AUTO: 0.3 X10E3/UL (ref 0.1–0.9)
MONOCYTES NFR BLD AUTO: 6 %
NEUTROPHILS # BLD AUTO: 3.5 X10E3/UL (ref 1.4–7)
NEUTROPHILS NFR BLD AUTO: 57 %
PLATELET # BLD AUTO: 265 X10E3/UL (ref 150–450)
POTASSIUM SERPL-SCNC: 3.8 MMOL/L (ref 3.5–5.2)
PROT SERPL-MCNC: 6 G/DL (ref 6–8.5)
RBC # BLD AUTO: 3.76 X10E6/UL (ref 3.77–5.28)
SODIUM SERPL-SCNC: 140 MMOL/L (ref 134–144)
TSH SERPL DL<=0.005 MIU/L-ACNC: 0.71 UIU/ML (ref 0.45–4.5)
WBC # BLD AUTO: 6.1 X10E3/UL (ref 3.4–10.8)

## 2021-10-14 RX ORDER — FUROSEMIDE 20 MG/1
TABLET ORAL
Qty: 30 TABLET | Refills: 0 | Status: SHIPPED | OUTPATIENT
Start: 2021-10-14 | End: 2021-11-14

## 2021-11-02 RX ORDER — LEVOTHYROXINE SODIUM 200 UG/1
TABLET ORAL
Qty: 90 TABLET | Refills: 0 | Status: SHIPPED | OUTPATIENT
Start: 2021-11-02 | End: 2021-12-01

## 2021-11-02 NOTE — TELEPHONE ENCOUNTER
Pt is calling to check on form to be filled out so that she can bring her cat to appts for support.   Phone [phone] no lesions,  no deformities,  no traumatic injuries,  no significant scars are present,  chest wall non-tender,  no masses present, breathing is unlabored without accessory muscle use, normal breath sounds

## 2021-11-14 RX ORDER — FUROSEMIDE 20 MG/1
TABLET ORAL
Qty: 30 TABLET | Refills: 0 | Status: SHIPPED | OUTPATIENT
Start: 2021-11-14 | End: 2022-01-09

## 2021-11-17 RX ORDER — EPINEPHRINE 0.3 MG/.3ML
0.3 INJECTION SUBCUTANEOUS
Qty: 1 EACH | Refills: 1 | Status: SHIPPED | OUTPATIENT
Start: 2021-11-17 | End: 2021-11-17

## 2021-11-29 ENCOUNTER — OFFICE VISIT (OUTPATIENT)
Dept: FAMILY MEDICINE CLINIC | Age: 36
End: 2021-11-29
Payer: COMMERCIAL

## 2021-11-29 VITALS
RESPIRATION RATE: 16 BRPM | BODY MASS INDEX: 41.48 KG/M2 | WEIGHT: 243 LBS | OXYGEN SATURATION: 97 % | TEMPERATURE: 98.1 F | SYSTOLIC BLOOD PRESSURE: 106 MMHG | HEART RATE: 72 BPM | HEIGHT: 64 IN | DIASTOLIC BLOOD PRESSURE: 70 MMHG

## 2021-11-29 DIAGNOSIS — E78.2 MIXED HYPERLIPIDEMIA: ICD-10-CM

## 2021-11-29 DIAGNOSIS — Z23 NEEDS FLU SHOT: ICD-10-CM

## 2021-11-29 DIAGNOSIS — D50.9 IRON DEFICIENCY ANEMIA, UNSPECIFIED IRON DEFICIENCY ANEMIA TYPE: ICD-10-CM

## 2021-11-29 DIAGNOSIS — Z00.00 WELL ADULT EXAM: Primary | ICD-10-CM

## 2021-11-29 DIAGNOSIS — Z11.1 SCREENING-PULMONARY TB: ICD-10-CM

## 2021-11-29 DIAGNOSIS — E03.9 HYPOTHYROIDISM, UNSPECIFIED TYPE: ICD-10-CM

## 2021-11-29 DIAGNOSIS — R73.01 IMPAIRED FASTING BLOOD SUGAR: ICD-10-CM

## 2021-11-29 PROCEDURE — G8427 DOCREV CUR MEDS BY ELIG CLIN: HCPCS | Performed by: NURSE PRACTITIONER

## 2021-11-29 PROCEDURE — 90686 IIV4 VACC NO PRSV 0.5 ML IM: CPT | Performed by: NURSE PRACTITIONER

## 2021-11-29 PROCEDURE — G8417 CALC BMI ABV UP PARAM F/U: HCPCS | Performed by: NURSE PRACTITIONER

## 2021-11-29 PROCEDURE — 99214 OFFICE O/P EST MOD 30 MIN: CPT | Performed by: NURSE PRACTITIONER

## 2021-11-29 PROCEDURE — G0008 ADMIN INFLUENZA VIRUS VAC: HCPCS | Performed by: NURSE PRACTITIONER

## 2021-11-29 PROCEDURE — G9717 DOC PT DX DEP/BP F/U NT REQ: HCPCS | Performed by: NURSE PRACTITIONER

## 2021-11-29 PROCEDURE — G0439 PPPS, SUBSEQ VISIT: HCPCS | Performed by: NURSE PRACTITIONER

## 2021-11-29 PROCEDURE — 86580 TB INTRADERMAL TEST: CPT | Performed by: NURSE PRACTITIONER

## 2021-11-29 NOTE — PATIENT INSTRUCTIONS
Vaccine Information Statement    Influenza (Flu) Vaccine (Inactivated or Recombinant): What You Need to Know    Many vaccine information statements are available in Telugu and other languages. See www.immunize.org/vis. Hojas de información sobre vacunas están disponibles en español y en muchos otros idiomas. Visite www.immunize.org/vis. 1. Why get vaccinated? Influenza vaccine can prevent influenza (flu). Flu is a contagious disease that spreads around the [facility] every year, usually between October and May. Anyone can get the flu, but it is more dangerous for some people. Infants and young children, people 72 years and older, pregnant people, and people with certain health conditions or a weakened immune system are at greatest risk of flu complications. Pneumonia, bronchitis, sinus infections, and ear infections are examples of flu-related complications. If you have a medical condition, such as heart disease, cancer, or diabetes, flu can make it worse. Flu can cause fever and chills, sore throat, muscle aches, fatigue, cough, headache, and runny or stuffy nose. Some people may have vomiting and diarrhea, though this is more common in children than adults. In an average year, thousands of people in the [facility] die from flu, and many more are hospitalized. Flu vaccine prevents millions of illnesses and flu-related visits to the doctor each year. 2. Influenza vaccines     CDC recommends everyone 6 months and older get vaccinated every flu season. Children 6 months through 6years of age may need 2 doses during a single flu season. Everyone else needs only 1 dose each flu season. It takes about 2 weeks for protection to develop after vaccination. There are many flu viruses, and they are always changing. Each year a new flu vaccine is made to protect against the influenza viruses believed to be likely to cause disease in the upcoming flu season.  Even when the vaccine doesnt exactly match these viruses, it may still provide some protection. Influenza vaccine does not cause flu. Influenza vaccine may be given at the same time as other vaccines. 3. Talk with your health care provider    Tell your vaccination provider if the person getting the vaccine:   Has had an allergic reaction after a previous dose of influenza vaccine, or has any severe, life-threatening allergies    Has ever had Guillain-Barré Syndrome (also called GBS)    In some cases, your health care provider may decide to postpone influenza vaccination until a future visit. Influenza vaccine can be administered at any time during pregnancy. People who are or will be pregnant during influenza season should receive inactivated influenza vaccine. People with minor illnesses, such as a cold, may be vaccinated. People who are moderately or severely ill should usually wait until they recover before getting influenza vaccine. Your health care provider can give you more information. 4. Risks of a vaccine reaction     Soreness, redness, and swelling where the shot is given, fever, muscle aches, and headache can happen after influenza vaccination.  There may be a very small increased risk of Guillain-Barré Syndrome (GBS) after inactivated influenza vaccine (the flu shot). Carlos Tyler children who get the flu shot along with pneumococcal vaccine (PCV13) and/or DTaP vaccine at the same time might be slightly more likely to have a seizure caused by fever. Tell your health care provider if a child who is getting flu vaccine has ever had a seizure. People sometimes faint after medical procedures, including vaccination. Tell your provider if you feel dizzy or have vision changes or ringing in the ears. As with any medicine, there is a very remote chance of a vaccine causing a severe allergic reaction, other serious injury, or death. 5. What if there is a serious problem?     An allergic reaction could occur after the vaccinated person leaves the clinic. If you see signs of a severe allergic reaction (hives, swelling of the face and throat, difficulty breathing, a fast heartbeat, dizziness, or weakness), call 9-1-1 and get the person to the nearest hospital.    For other signs that concern you, call your health care provider. Adverse reactions should be reported to the Vaccine Adverse Event Reporting System (VAERS). Your health care provider will usually file this report, or you can do it yourself. Visit the VAERS website at www.vaers. [facility].gov or call 8-836.117.6342. VAERS is only for reporting reactions, and VAERS staff members do not give medical advice. 6. The National Vaccine Injury Compensation Program    The [facility] Vaccine Injury Compensation Program (VICP) is a federal program that was created to compensate people who may have been injured by certain vaccines. Claims regarding alleged injury or death due to vaccination have a time limit for filing, which may be as short as two years. Visit the VICP website at www.[facility].gov/vaccinecompensation or call 5-435.900.6570 to learn about the program and about filing a claim. 7. How can I learn more?  Ask your health care provider.  Call your local or state health department.  Visit the website of the Food and Drug Administration (FDA) for vaccine package inserts and additional information at www.fda.gov/vaccines-blood-biologics/vaccines.  Contact the Centers for Disease Control and Prevention (CDC):  - Call 2-252.793.5572 (7-852-EOT-INFO) or  - Visit CDCs influenza website at www.cdc.gov/flu. Vaccine Information Statement   Inactivated Influenza Vaccine   8/6/2021  42 GREG Nicholas [id number]   Department of Health and Human Services  Centers for Disease Control and Prevention    Office Use Only      Medicare Wellness Visit, Female     The best way to live healthy is to have a lifestyle where you eat a well-balanced diet, exercise regularly, limit alcohol use, and quit all forms of tobacco/nicotine, if applicable. Regular preventive services are another way to keep healthy. Preventive services (vaccines, screening tests, monitoring & exams) can help personalize your care plan, which helps you manage your own care. Screening tests can find health problems at the earliest stages, when they are easiest to treat. [facility] follows the current, evidence-based guidelines published by the [facility] ([facility]) when recommending preventive services for our patients. Because we follow these guidelines, sometimes recommendations change over time as research supports it. (For example, mammograms used to be recommended annually. Even though Medicare will still pay for an annual mammogram, the newer guidelines recommend a mammogram every two years for women of average risk). Of course, you and your doctor may decide to screen more often for some diseases, based on your risk and your co-morbidities (chronic disease you are already diagnosed with). Preventive services for you include:  - Medicare offers their members a free annual wellness visit, which is time for you and your primary care provider to discuss and plan for your preventive service needs. Take advantage of this benefit every year!  -All adults over the age of 72 should receive the recommended pneumonia vaccines. Current USPSTF guidelines recommend a series of two vaccines for the best pneumonia protection.   -All adults should have a flu vaccine yearly and a tetanus vaccine every 10 years.   -All adults age 48 and older should receive the shingles vaccines (series of two vaccines).       -All adults age 38-68 who are overweight should have a diabetes screening test once every three years.   -All adults born between [birth year] and 1963 should be screened once for Hepatitis C.  -Other screening tests and preventive services for persons with diabetes include: an eye exam to screen for diabetic retinopathy, a kidney function test, a foot exam, and stricter control over your cholesterol.   -Cardiovascular screening for adults with routine risk involves an electrocardiogram (ECG) at intervals determined by your doctor.   -Colorectal cancer screenings should be done for adults age 54-65 with no increased risk factors for colorectal cancer. There are a number of acceptable methods of screening for this type of cancer. Each test has its own benefits and drawbacks. Discuss with your doctor what is most appropriate for you during your annual wellness visit. The different tests include: colonoscopy (considered the best screening method), a fecal occult blood test, a fecal DNA test, and sigmoidoscopy.    -A bone mass density test is recommended when a woman turns 65 to screen for osteoporosis. This test is only recommended one time, as a screening. Some providers will use this same test as a disease monitoring tool if you already have osteoporosis. -Breast cancer screenings are recommended every other year for women of normal risk, age 54-69.  -Cervical cancer screenings for women over age 72 are only recommended with certain risk factors.      Here is a list of your current Health Maintenance items (your personalized list of preventive services) with a due date:  Health Maintenance Due   Topic Date Due    Yearly Flu Vaccine (1) 09/01/2021    Cervical cancer screen  11/07/2021

## 2021-11-29 NOTE — PROGRESS NOTES
This is the Subsequent Medicare Annual Wellness Exam, performed 12 months or more after the Initial AWV or the last Subsequent AWV    I have reviewed the patient's medical history in detail and updated the computerized patient record. Patient is due for fasting labs  Resident of [facility] and needs tb test as well  No complaints  Does not check her own bs  No refills needed  Assessment/Plan   Education and counseling provided:  Are appropriate based on today's review and evaluation    1. Well adult exam  -     CBC WITH AUTOMATED DIFF; Future  -     METABOLIC PANEL, COMPREHENSIVE; Future  -     LIPID PANEL; Future  -     TSH 3RD GENERATION; Future  2. Hypothyroidism, unspecified type - stable   Labs ordered  -     TSH 3RD GENERATION; Future  3. Mixed hyperlipidemia - stable   Labs updated  -     LIPID PANEL; Future  4. Iron deficiency anemia, unspecified iron deficiency anemia type - stable   Labs updated  -     CBC WITH AUTOMATED DIFF; Future  5. Impaired fasting blood sugar - stable   Labs updated  -     HEMOGLOBIN A1C WITH EAG; Future  6. Screening-pulmonary TB - return 2 days for eval  -     AMB POC TUBERCULOSIS, INTRADERMAL (SKIN TEST)  7.  Needs flu shot  -     INFLUENZA VIRUS VAC QUAD,SPLIT,PRESV FREE SYRINGE IM       Depression Risk Factor Screening     3 most recent PHQ Screens 6/16/2021   PHQ Not Done Active Diagnosis of Depression or Bipolar Disorder   Little interest or pleasure in doing things -   Feeling down, depressed, irritable, or hopeless -   Total Score PHQ 2 -   Trouble falling or staying asleep, or sleeping too much -   Feeling tired or having little energy -   Poor appetite, weight loss, or overeating -   Feeling bad about yourself - or that you are a failure or have let yourself or your family down -   Trouble concentrating on things such as school, work, reading, or watching TV -   Moving or speaking so slowly that other people could have noticed; or the opposite being so fidgety that others notice -   Thoughts of being better off dead, or hurting yourself in some way -   PHQ 9 Score -       Alcohol Risk Screen    Do you average more than 1 drink per night or more than 7 drinks a week:  No    On any one occasion in the past three months have you have had more than 3 drinks containing alcohol:  No        Functional Ability and Level of Safety    Hearing: Hearing is good. Activities of Daily Living: The home contains: no safety equipment. Patient does total self care      Ambulation: with no difficulty     Fall Risk:  Fall Risk Assessment, last 12 mths 2/15/2021   Able to walk? Yes   Fall in past 12 months? 0   Do you feel unsteady? 0   Are you worried about falling 0      Abuse Screen:  Patient is not abused       Cognitive Screening    Has your family/caregiver stated any concerns about your memory: no     Cognitive Screening: Normal - Mini Cog Test    Health Maintenance Due     Health Maintenance Due   Topic Date Due    Flu Vaccine (1) 09/01/2021    Cervical cancer screen  11/07/2021       Patient Care Team   Patient Care Team:  Margot Guerrier NP as PCP - General (Family Medicine)  Margot Guerrier NP as PCP - [facility] Empaneled Provider  Deandre Gifford LPN as Ambulatory Care Manager (Family Medicine)  Elva Qiu MD as Physician (Obstetrics & Gynecology)    History     Patient Active Problem List   Diagnosis Code    Hypothyroid E03.9    Environmental allergies Z91.09    [place] (hard of hearing) H91.90    Strabismus H50.9    Anemia, iron deficiency D50.9    Asthma J45.909    Long-term use of Plaquenil Z79.899    Vitamin D deficiency E55.9    Polyuria R35.89    Periodic headache syndrome, not intractable G43. C0    Inflammatory polyarthritis ([address] M06.4    Obesity, morbid (HCC) E66.01    Recurrent depression ([address] F33.9     Past Medical History:   Diagnosis Date    Allergies     Anemia 2008    Anxiety     Asthma     inhaler    Depression     Ear infection     Environmental allergies 4/1/2010    GERD (gastroesophageal reflux disease)     Hypothyroid 4/1/2010    Inflammatory arthritis     Lupus (HCC)     Nausea & vomiting     Other ill-defined conditions(799.89) noted 7/25/2012    \"Intellectually  Disabled\" signs consents/self. Mom &  concur    Otitis media 4/1/2010    Strabismus 4/1/2010    Unspecified adverse effect of anesthesia 2/10/2011    slow to wake up, sleepy    Wears hearing aid       Past Surgical History:   Procedure Laterality Date    HX BREAST REDUCTION  2005    HX CHOLECYSTECTOMY  2014    HX KNEE ARTHROSCOPY Left     HX ORTHOPAEDIC      heel cord lengthing; right thumb surgery (pin placed)    HX OTHER SURGICAL      Ear surgery    HX TONSIL AND ADENOIDECTOMY  1990     Current Outpatient Medications   Medication Sig Dispense Refill    furosemide (LASIX) 20 mg tablet Take 1 tablet by mouth once daily 30 Tablet 0    levothyroxine (SYNTHROID) 200 mcg tablet TAKE 1 TABLET BY MOUTH ONCE DAILY BEFORE BREAKFAST 90 Tablet 0    senna (Senna) 8.6 mg tablet Take 1 Tablet by mouth daily. 30 Tablet 2    Sprintec, 28, 0.25-35 mg-mcg tab       traZODone (DESYREL) 100 mg tablet TAKE 1 & 1 2 (ONE & ONE HALF) TABLETS BY MOUTH AT BEDTIME AS NEEDED      mupirocin (BACTROBAN) 2 % ointment Apply  to affected area two (2) times a day. 30 g 0    albuterol (ProAir HFA) 90 mcg/actuation inhaler Take 2 Puffs by inhalation every four (4) hours as needed for Wheezing. 9 Inhaler 1    ondansetron (ZOFRAN ODT) 4 mg disintegrating tablet DISSOLVE 1 TABLET IN MOUTH EVERY 8 HOURS AS NEEDED FOR NAUSEA AND VOMITING      busPIRone (BUSPAR) 15 mg tablet Take 1 Tab by mouth three (3) times daily. 270 Tab 0    buPROPion XL (WELLBUTRIN XL) 300 mg XL tablet Take 300 mg by mouth daily.       clonazePAM (KlonoPIN) 1 mg tablet TAKE 1 TABLET BY MOUTH ONCE DAILY AS NEEDED FOR ANXIETY 30 Tab 0    fluticasone propionate (FLONASE) 50 mcg/actuation nasal spray 2 Sprays by Both Nostrils route daily. 1 Bottle 5    cetirizine (ZYRTEC) 10 mg tablet Take 1 Tab by mouth daily. 90 Tab 3    ferrous sulfate 325 mg (65 mg iron) tablet Take 1 Tab by mouth two (2) times a day.  60 Tab 5    FLUoxetine (PROzac) 40 mg capsule  (Patient not taking: Reported on 6/16/2021)       Allergies   Allergen Reactions    Latex Hives    Augmentin [Amoxicillin-Pot Clavulanate] Nausea and Vomiting    Codeine Nausea and Vomiting    Morphine Sulfate Nausea and Vomiting    Nuts [Tree Nut] Swelling    Pcn [Penicillins] Hives       Family History   Problem Relation Age of Onset    Osteoporosis Other     Arthritis-osteo Other     Breast Cancer Other         maternal great aunt    Breast Cancer Other         Maternal great aunt    Stroke Maternal Aunt     Hypertension Sister      Social History     Tobacco Use    Smoking status: Never Smoker    Smokeless tobacco: Never Used   Substance Use Topics    Alcohol use: No         Austin Malone NP

## 2021-11-29 NOTE — PROGRESS NOTES
Chief Complaint   Patient presents with    Complete Physical    Labs     Pt being seen for physical and labs  Pt states she needs flu shot, pneumonia injection and tb test    1. Have you been to the ER, urgent care clinic since your last visit? Hospitalized since your last visit? No    2. Have you seen or consulted any other health care providers outside of the [address] [place] [address] since your last visit? Include any pap smears or colon screening. No     After obtaining consent, and per orders of Dr. Dar Gamble, injection of flu given by Stefan Roberson. Patient instructed to remain in clinic for 20 minutes afterwards, and to report any adverse reaction to me immediately.       Pt has no other concerns

## 2021-11-30 LAB
ALBUMIN SERPL-MCNC: 4.8 G/DL (ref 3.8–4.8)
ALBUMIN/GLOB SERPL: 2.8 {RATIO} (ref 1.2–2.2)
ALP SERPL-CCNC: 123 IU/L (ref 44–121)
ALT SERPL-CCNC: 27 IU/L (ref 0–32)
AST SERPL-CCNC: 19 IU/L (ref 0–40)
BASOPHILS # BLD AUTO: 0.1 X10E3/UL (ref 0–0.2)
BASOPHILS NFR BLD AUTO: 1 %
BILIRUB SERPL-MCNC: 0.8 MG/DL (ref 0–1.2)
BUN SERPL-MCNC: 11 MG/DL (ref 6–20)
BUN/CREAT SERPL: 11 (ref 9–23)
CALCIUM SERPL-MCNC: 9.6 MG/DL (ref 8.7–10.2)
CHLORIDE SERPL-SCNC: 100 MMOL/L (ref 96–106)
CHOLEST SERPL-MCNC: 220 MG/DL (ref 100–199)
CO2 SERPL-SCNC: 22 MMOL/L (ref 20–29)
CREAT SERPL-MCNC: 1.03 MG/DL (ref 0.57–1)
EOSINOPHIL # BLD AUTO: 0.3 X10E3/UL (ref 0–0.4)
EOSINOPHIL NFR BLD AUTO: 3 %
ERYTHROCYTE [DISTWIDTH] IN BLOOD BY AUTOMATED COUNT: 11.9 % (ref 11.7–15.4)
EST. AVERAGE GLUCOSE BLD GHB EST-MCNC: 100 MG/DL
GLOBULIN SER CALC-MCNC: 1.7 G/DL (ref 1.5–4.5)
GLUCOSE SERPL-MCNC: 95 MG/DL (ref 65–99)
HBA1C MFR BLD: 5.1 % (ref 4.8–5.6)
HCT VFR BLD AUTO: 45.4 % (ref 34–46.6)
HDLC SERPL-MCNC: 38 MG/DL
HGB BLD-MCNC: 14.8 G/DL (ref 11.1–15.9)
IMM GRANULOCYTES # BLD AUTO: 0 X10E3/UL (ref 0–0.1)
IMM GRANULOCYTES NFR BLD AUTO: 0 %
IMP & REVIEW OF LAB RESULTS: NORMAL
LDLC SERPL CALC-MCNC: 168 MG/DL (ref 0–99)
LYMPHOCYTES # BLD AUTO: 2.7 X10E3/UL (ref 0.7–3.1)
LYMPHOCYTES NFR BLD AUTO: 30 %
MCH RBC QN AUTO: 32.4 PG (ref 26.6–33)
MCHC RBC AUTO-ENTMCNC: 32.6 G/DL (ref 31.5–35.7)
MCV RBC AUTO: 99 FL (ref 79–97)
MONOCYTES # BLD AUTO: 0.4 X10E3/UL (ref 0.1–0.9)
MONOCYTES NFR BLD AUTO: 4 %
NEUTROPHILS # BLD AUTO: 5.8 X10E3/UL (ref 1.4–7)
NEUTROPHILS NFR BLD AUTO: 62 %
PLATELET # BLD AUTO: 290 X10E3/UL (ref 150–450)
POTASSIUM SERPL-SCNC: 4.5 MMOL/L (ref 3.5–5.2)
PROT SERPL-MCNC: 6.5 G/DL (ref 6–8.5)
RBC # BLD AUTO: 4.57 X10E6/UL (ref 3.77–5.28)
SODIUM SERPL-SCNC: 138 MMOL/L (ref 134–144)
TRIGL SERPL-MCNC: 78 MG/DL (ref 0–149)
TSH SERPL DL<=0.005 MIU/L-ACNC: 9.08 UIU/ML (ref 0.45–4.5)
VLDLC SERPL CALC-MCNC: 14 MG/DL (ref 5–40)
WBC # BLD AUTO: 9.2 X10E3/UL (ref 3.4–10.8)

## 2021-12-01 ENCOUNTER — TELEPHONE (OUTPATIENT)
Dept: FAMILY MEDICINE CLINIC | Age: 36
End: 2021-12-01

## 2021-12-01 LAB
MM INDURATION POC: 0 MM (ref 0–5)
PPD POC: NEGATIVE NEGATIVE

## 2021-12-01 RX ORDER — LEVOTHYROXINE SODIUM 125 UG/1
250 TABLET ORAL
Qty: 60 TABLET | Refills: 3 | Status: SHIPPED | OUTPATIENT
Start: 2021-12-01 | End: 2022-04-19

## 2021-12-01 RX ORDER — ROSUVASTATIN CALCIUM 5 MG/1
5 TABLET, COATED ORAL
Qty: 30 TABLET | Refills: 5 | Status: SHIPPED | OUTPATIENT
Start: 2021-12-01 | End: 2022-07-11 | Stop reason: SDUPTHER

## 2021-12-01 NOTE — PROGRESS NOTES
The only thing that came back abnormal on your labs is the cholesterol went very high and your thyroid is very slow like you are not taking your medicaton. Are you still on the levothyroxine? Ever been on a cholesterol med?  [address] [place] [address]

## 2021-12-01 NOTE — TELEPHONE ENCOUNTER
I have sent in Crestor for cholesterol. I also sent in Synthroid, she is going to have to take 2 of the 125mcg pills to make 250mcg total per day. Recheck 6 weeks fasting.  Lee Mukherjee

## 2021-12-01 NOTE — TELEPHONE ENCOUNTER
Spoke with pt, she is aware of labs and verbalized understanding.  She states that she is willing to start a cholesterol medication and have changes made to her thyroid medication as she is taking it

## 2021-12-01 NOTE — TELEPHONE ENCOUNTER
----- Message from Jassheila Germain sent at 12/1/2021 10:53 AM EST -----  Subject: Results Request    QUESTIONS  Which lab or imaging result is the patient calling about? bloodwork   Which provider ordered the test? Herbert Wolfe   At what location was the test performed? Date the test was performed? 2021-11-29  Additional Information for Provider? pt would like to have the results of   her bloodwork test, please advise thank you   ---------------------------------------------------------------------------  --------------  CALL BACK INFO  What is the best way for the office to contact you? OK to leave message on   voicemail  Preferred Call Back Phone Number?  [phone]

## 2022-01-09 RX ORDER — FUROSEMIDE 20 MG/1
TABLET ORAL
Qty: 30 TABLET | Refills: 0 | Status: SHIPPED | OUTPATIENT
Start: 2022-01-09 | End: 2022-02-22

## 2022-01-27 ENCOUNTER — OFFICE VISIT (OUTPATIENT)
Dept: FAMILY MEDICINE CLINIC | Age: 37
End: 2022-01-27
Payer: COMMERCIAL

## 2022-01-27 VITALS
OXYGEN SATURATION: 97 % | HEART RATE: 72 BPM | HEIGHT: 64 IN | SYSTOLIC BLOOD PRESSURE: 108 MMHG | DIASTOLIC BLOOD PRESSURE: 73 MMHG | RESPIRATION RATE: 20 BRPM | WEIGHT: 245 LBS | BODY MASS INDEX: 41.83 KG/M2 | TEMPERATURE: 98.3 F

## 2022-01-27 DIAGNOSIS — E78.2 MIXED HYPERLIPIDEMIA: ICD-10-CM

## 2022-01-27 DIAGNOSIS — J01.00 ACUTE MAXILLARY SINUSITIS, RECURRENCE NOT SPECIFIED: ICD-10-CM

## 2022-01-27 DIAGNOSIS — E03.9 HYPOTHYROIDISM, UNSPECIFIED TYPE: Primary | ICD-10-CM

## 2022-01-27 PROCEDURE — G8417 CALC BMI ABV UP PARAM F/U: HCPCS | Performed by: NURSE PRACTITIONER

## 2022-01-27 PROCEDURE — G8427 DOCREV CUR MEDS BY ELIG CLIN: HCPCS | Performed by: NURSE PRACTITIONER

## 2022-01-27 PROCEDURE — 99214 OFFICE O/P EST MOD 30 MIN: CPT | Performed by: NURSE PRACTITIONER

## 2022-01-27 PROCEDURE — G9717 DOC PT DX DEP/BP F/U NT REQ: HCPCS | Performed by: NURSE PRACTITIONER

## 2022-01-27 RX ORDER — CEFDINIR 300 MG/1
300 CAPSULE ORAL 2 TIMES DAILY
Qty: 20 CAPSULE | Refills: 0 | Status: SHIPPED | OUTPATIENT
Start: 2022-01-27 | End: 2022-02-06

## 2022-01-27 NOTE — PROGRESS NOTES
Chief Complaint   Patient presents with    Follow-up    Labs     Pt being seen for fuv  -labs  Pt states that her ears and sinus area have been hurting   -pt states this has been going on for 1 week    1. Have you been to the ER, urgent care clinic since your last visit? Hospitalized since your last visit? No    2. Have you seen or consulted any other health care providers outside of the [address] [place] [address] since your last visit? Include any pap smears or colon screening.  No     Pt has no other concerns

## 2022-01-28 LAB
CHOLEST SERPL-MCNC: 219 MG/DL (ref 100–199)
HDLC SERPL-MCNC: 37 MG/DL
IMP & REVIEW OF LAB RESULTS: NORMAL
LDLC SERPL CALC-MCNC: 167 MG/DL (ref 0–99)
TRIGL SERPL-MCNC: 84 MG/DL (ref 0–149)
TSH SERPL-ACNC: 0.24 UIU/ML (ref 0.45–4.5)
VLDLC SERPL CALC-MCNC: 15 MG/DL (ref 5–40)

## 2022-02-01 NOTE — PROGRESS NOTES
Hey there, your labs look good for thyroid but cholesterol still high, start taking 2 crestor pills to make 10mg a day.  Eben Piper

## 2022-02-01 NOTE — PROGRESS NOTES
HISTORY OF PRESENT ILLNESS  Odilia Bolden is a 31 y.o. female. HPI  Cardiovascular Review:  The patient has hyperlipidemia. Diet and Lifestyle: generally follows a low fat low cholesterol diet, generally follows a low sodium diet, sedentary, nonsmoker  Home BP Monitoring: is not measured at home. Pertinent ROS: taking medications as instructed, no medication side effects noted, no TIA's, no chest pain on exertion, no dyspnea on exertion, no swelling of ankles. Thyroid Review:  Patient is seen for followup of hypothyroidism. Thyroid ROS: denies fatigue, weight changes, heat/cold intolerance, bowel/skin changes or CVS symptoms. She is also having ear pain, pressure, frontal headache and yellow exudates  Sx x 1 week    Patient Active Problem List    Diagnosis Date Noted    Recurrent depression ([facility] 12/27/2017    Obesity, morbid ([facility] 11/30/2017    Inflammatory polyarthritis ([facility] 10/27/2016    Periodic headache syndrome, not intractable 05/22/2015    Polyuria 01/16/2012    Long-term use of Plaquenil 11/25/2011    Vitamin D deficiency 11/25/2011    Asthma 11/03/2011    Anemia, iron deficiency 04/06/2011    Hypothyroid 04/01/2010    Environmental allergies 04/01/2010    [place] (hard of hearing) 04/01/2010    Strabismus 04/01/2010     Current Outpatient Medications   Medication Sig Dispense Refill    cefdinir (OMNICEF) 300 mg capsule Take 1 Capsule by mouth two (2) times a day for 10 days. 20 Capsule 0    furosemide (LASIX) 20 mg tablet Take 1 tablet by mouth once daily 30 Tablet 0    rosuvastatin (CRESTOR) 5 mg tablet Take 1 Tablet by mouth nightly. 30 Tablet 5    levothyroxine (SYNTHROID) 125 mcg tablet Take 2 Tablets by mouth Daily (before breakfast). 60 Tablet 3    senna (Senna) 8.6 mg tablet Take 1 Tablet by mouth daily.  30 Tablet 2    Sprintec, 28, 0.25-35 mg-mcg tab       traZODone (DESYREL) 100 mg tablet TAKE 1 & 1 2 (ONE & ONE HALF) TABLETS BY MOUTH AT BEDTIME AS NEEDED      mupirocin (BACTROBAN) 2 % ointment Apply  to affected area two (2) times a day. 30 g 0    albuterol (ProAir HFA) 90 mcg/actuation inhaler Take 2 Puffs by inhalation every four (4) hours as needed for Wheezing. 9 Inhaler 1    ondansetron (ZOFRAN ODT) 4 mg disintegrating tablet DISSOLVE 1 TABLET IN MOUTH EVERY 8 HOURS AS NEEDED FOR NAUSEA AND VOMITING      busPIRone (BUSPAR) 15 mg tablet Take 1 Tab by mouth three (3) times daily. 270 Tab 0    buPROPion XL (WELLBUTRIN XL) 300 mg XL tablet Take 300 mg by mouth daily.  clonazePAM (KlonoPIN) 1 mg tablet TAKE 1 TABLET BY MOUTH ONCE DAILY AS NEEDED FOR ANXIETY 30 Tab 0    fluticasone propionate (FLONASE) 50 mcg/actuation nasal spray 2 Sprays by Both Nostrils route daily. 1 Bottle 5    cetirizine (ZYRTEC) 10 mg tablet Take 1 Tab by mouth daily. 90 Tab 3    ferrous sulfate 325 mg (65 mg iron) tablet Take 1 Tab by mouth two (2) times a day. 60 Tab 5    FLUoxetine (PROzac) 40 mg capsule  (Patient not taking: Reported on 6/16/2021)       Family History   Problem Relation Age of Onset    Osteoporosis Other     OSTEOARTHRITIS Other     Breast Cancer Other         maternal great aunt    Breast Cancer Other         Maternal great aunt    Stroke Maternal Aunt     Hypertension Sister      Social History     Tobacco Use    Smoking status: Never Smoker    Smokeless tobacco: Never Used   Substance Use Topics    Alcohol use: No           ROS  A comprehensive review of system was obtained and negative except findings in the HPI    Visit Vitals  /73 (BP 1 Location: Right arm, BP Patient Position: Sitting)   Pulse 72   Temp 98.3 °F (36.8 °C) (Oral)   Resp 20   Ht 5' 4\" (1.626 m)   Wt 245 lb (111.1 kg)   LMP 01/15/2022   SpO2 97%   BMI 42.05 kg/m²     Physical Exam  Vitals and nursing note reviewed. Constitutional:       Appearance: She is well-developed. Comments:      Neck:      Vascular: No JVD.    Cardiovascular:      Rate and Rhythm: Normal rate and regular rhythm. Heart sounds: No murmur heard. No friction rub. No gallop. Pulmonary:      Effort: Pulmonary effort is normal. No respiratory distress. Breath sounds: Normal breath sounds. No wheezing. Skin:     General: Skin is warm. Neurological:      Mental Status: She is alert and oriented to person, place, and time. ASSESSMENT and PLAN  Encounter Diagnoses   Name Primary?  Hypothyroidism, unspecified type Yes    Mixed hyperlipidemia  Sinusitis      Orders Placed This Encounter    TSH 3RD GENERATION    LIPID PANEL    LIPID PANEL    TSH 3RD GENERATION    CVD REPORT    cefdinir (OMNICEF) 300 mg capsule     Reveiwed adr/se of medication  Push fluids, rest, suggested mucinex for congestion and drainage  Recheck 5-7 days if sx not improved. I have discussed the diagnosis with the patient and the intended plan as seen in the above orders. The patient has received an after-visit summary and questions were answered concerning future plans. Patient conveyed understanding of the plan at the time of the visit.     Nyla Woody, MSN, ANP  1/31/2022

## 2022-02-22 RX ORDER — FUROSEMIDE 20 MG/1
TABLET ORAL
Qty: 30 TABLET | Refills: 0 | Status: SHIPPED | OUTPATIENT
Start: 2022-02-22 | End: 2022-04-11

## 2022-02-23 ENCOUNTER — OFFICE VISIT (OUTPATIENT)
Dept: FAMILY MEDICINE CLINIC | Age: 37
End: 2022-02-23
Payer: COMMERCIAL

## 2022-02-23 VITALS
TEMPERATURE: 98 F | BODY MASS INDEX: 41.83 KG/M2 | HEART RATE: 82 BPM | SYSTOLIC BLOOD PRESSURE: 110 MMHG | OXYGEN SATURATION: 97 % | HEIGHT: 64 IN | DIASTOLIC BLOOD PRESSURE: 73 MMHG | RESPIRATION RATE: 20 BRPM | WEIGHT: 245 LBS

## 2022-02-23 DIAGNOSIS — R21 RASH AND OTHER NONSPECIFIC SKIN ERUPTION: Primary | ICD-10-CM

## 2022-02-23 PROCEDURE — 99213 OFFICE O/P EST LOW 20 MIN: CPT | Performed by: FAMILY MEDICINE

## 2022-02-23 RX ORDER — CEPHALEXIN 500 MG/1
1000 CAPSULE ORAL 2 TIMES DAILY
Qty: 40 CAPSULE | Refills: 0 | Status: SHIPPED | OUTPATIENT
Start: 2022-02-23 | End: 2022-03-05

## 2022-02-23 NOTE — PROGRESS NOTES
Patient here for right big toe infection, pain, swelling. She thinks she may have gotten bitten from something. 1. Have you been to the ER, urgent care clinic since your last visit? Hospitalized since your last visit? No    2. Have you seen or consulted any other health care providers outside of the [address] [place] [address] since your last visit? Include any pap smears or colon screening. No          Chief Complaint   Patient presents with    Toe Injury     right great toe infection     She is a 31 y.o. female who presents for evalution. Reviewed PmHx, RxHx, FmHx, SocHx, AllgHx and updated and dated in the chart. Patient Active Problem List    Diagnosis    [facility]    Obesity, morbid [facility]    Inflammatory polyarthritis ([facility]    Periodic headache syndrome, not intractable    Polyuria    Long-term use of Plaquenil    Vitamin D deficiency    Asthma    Anemia, iron deficiency    Hypothyroid    Environmental allergies    [place] (hard of hearing)    Strabismus       Review of Systems - negative except as listed above in the HPI    Objective:     Vitals:    02/23/22 0849   BP: 110/73   Pulse: 82   Resp: 20   Temp: 98 °F (36.7 °C)   SpO2: 97%   Weight: 245 lb (111.1 kg)   Height: 5' 4\" (1.626 m)         Assessment/ Plan:   Diagnoses and all orders for this visit:    1. Rash and other nonspecific skin eruption  -     cephALEXin (KEFLEX) 500 mg capsule; Take 2 Capsules by mouth two (2) times a day for 10 days. Patient has rash top of right foot appears to be infected. Wet cephalexin as above discussed patient using Epson salt soaks and return to office if symptoms worsen. I have discussed the diagnosis with the patient and the intended plan as seen in the above orders. The patient understands and agrees with the plan. The patient has received an after-visit summary and questions were answered concerning future plans.      Medication Side Effects and Warnings were discussed with patient  Patient Labs were reviewed and or requested:  Patient Past Records were reviewed and or requested    Rosalia Scott M.D. There are no Patient Instructions on file for this visit.

## 2022-03-19 PROBLEM — F33.9 RECURRENT DEPRESSION (HCC): Status: ACTIVE | Noted: 2017-12-27

## 2022-03-19 PROBLEM — E66.01 OBESITY, MORBID (HCC): Status: ACTIVE | Noted: 2017-11-30

## 2022-03-24 PROBLEM — E78.00 HYPERCHOLESTEROLEMIA: Status: ACTIVE | Noted: 2022-03-24

## 2022-04-08 NOTE — TELEPHONE ENCOUNTER
Called Lorenzo-Mom to let her know that we are moving the case up to 7:30am start and to arrive at 6:00am.  She understood.   Referral written

## 2022-04-11 RX ORDER — FUROSEMIDE 20 MG/1
TABLET ORAL
Qty: 30 TABLET | Refills: 0 | Status: SHIPPED | OUTPATIENT
Start: 2022-04-11

## 2022-04-19 RX ORDER — LEVOTHYROXINE SODIUM 125 UG/1
TABLET ORAL
Qty: 60 TABLET | Refills: 0 | Status: SHIPPED | OUTPATIENT
Start: 2022-04-19 | End: 2022-05-30

## 2022-05-30 RX ORDER — LEVOTHYROXINE SODIUM 125 UG/1
TABLET ORAL
Qty: 60 TABLET | Refills: 0 | Status: SHIPPED | OUTPATIENT
Start: 2022-05-30

## 2022-05-30 RX ORDER — LEVOTHYROXINE SODIUM 125 UG/1
TABLET ORAL
Qty: 60 TABLET | Refills: 0 | Status: SHIPPED | OUTPATIENT
Start: 2022-05-30 | End: 2022-07-11 | Stop reason: SDUPTHER

## 2022-07-11 ENCOUNTER — OFFICE VISIT (OUTPATIENT)
Dept: FAMILY MEDICINE CLINIC | Age: 37
End: 2022-07-11
Payer: COMMERCIAL

## 2022-07-11 VITALS
WEIGHT: 243 LBS | DIASTOLIC BLOOD PRESSURE: 68 MMHG | HEART RATE: 68 BPM | SYSTOLIC BLOOD PRESSURE: 106 MMHG | BODY MASS INDEX: 41.48 KG/M2 | RESPIRATION RATE: 18 BRPM | HEIGHT: 64 IN | OXYGEN SATURATION: 96 % | TEMPERATURE: 98.3 F

## 2022-07-11 DIAGNOSIS — E03.9 HYPOTHYROIDISM, UNSPECIFIED TYPE: Primary | ICD-10-CM

## 2022-07-11 DIAGNOSIS — E78.2 MIXED HYPERLIPIDEMIA: ICD-10-CM

## 2022-07-11 PROCEDURE — 99213 OFFICE O/P EST LOW 20 MIN: CPT | Performed by: NURSE PRACTITIONER

## 2022-07-11 RX ORDER — LEVOTHYROXINE SODIUM 125 UG/1
TABLET ORAL
Qty: 60 TABLET | Refills: 5 | Status: SHIPPED | OUTPATIENT
Start: 2022-07-11

## 2022-07-11 RX ORDER — ROSUVASTATIN CALCIUM 10 MG/1
10 TABLET, COATED ORAL
Qty: 30 TABLET | Refills: 5 | Status: SHIPPED | OUTPATIENT
Start: 2022-07-11

## 2022-07-11 NOTE — PROGRESS NOTES
Chief Complaint   Patient presents with    Labs     Pt being seen for labs for thyroid    1. Have you been to the ER, urgent care clinic since your last visit? Hospitalized since your last visit? No    2. Have you seen or consulted any other health care providers outside of the [address] [place] [address] since your last visit? Include any pap smears or colon screening.  No     Pt has no other concerns

## 2022-07-11 NOTE — PROGRESS NOTES
HISTORY OF PRESENT ILLNESS  Kit Guerirer is a 31 y.o. female. HPI  Cardiovascular Review:  The patient has hyperlipidemia. Diet and Lifestyle: generally follows a low fat low cholesterol diet, generally follows a low sodium diet, sedentary, nonsmoker  Home BP Monitoring: is not measured at home. Pertinent ROS: taking medications as instructed, no medication side effects noted, no TIA's, no chest pain on exertion, no dyspnea on exertion, no swelling of ankles. Thyroid Review:  Patient is seen for followup of hypothyroidism. Thyroid ROS: denies fatigue, weight changes, heat/cold intolerance, bowel/skin changes or CVS symptoms. Patient Active Problem List    Diagnosis Date Noted    Hypercholesterolemia 03/24/2022    Recurrent depression ([facility] 12/27/2017    Obesity, morbid ([facility] 11/30/2017    Inflammatory polyarthritis ([facility] 10/27/2016    Periodic headache syndrome, not intractable 05/22/2015    Polyuria 01/16/2012    Long-term use of Plaquenil 11/25/2011    Vitamin D deficiency 11/25/2011    Asthma 11/03/2011    Anemia, iron deficiency 04/06/2011    Hypothyroid 04/01/2010    Environmental allergies 04/01/2010    [place] (hard of hearing) 04/01/2010    Strabismus 04/01/2010     Current Outpatient Medications   Medication Sig Dispense Refill    levothyroxine (Euthyrox) 125 mcg tablet TAKE 2 TABLETS BY MOUTH ONCE DAILY BEFORE BREAKFAST 60 Tablet 5    rosuvastatin (CRESTOR) 10 mg tablet Take 1 Tablet by mouth nightly. 30 Tablet 5    Euthyrox 125 mcg tablet TAKE 2 TABLETS BY MOUTH ONCE DAILY BEFORE BREAKFAST 60 Tablet 0    furosemide (LASIX) 20 mg tablet Take 1 tablet by mouth once daily 30 Tablet 0    senna (Senna) 8.6 mg tablet Take 1 Tablet by mouth daily. 30 Tablet 2    Sprintec, 28, 0.25-35 mg-mcg tab       traZODone (DESYREL) 100 mg tablet TAKE 1 & 1 2 (ONE & ONE HALF) TABLETS BY MOUTH AT BEDTIME AS NEEDED      mupirocin (BACTROBAN) 2 % ointment Apply  to affected area two (2) times a day. 30 g 0    albuterol (ProAir HFA) 90 mcg/actuation inhaler Take 2 Puffs by inhalation every four (4) hours as needed for Wheezing. 9 Inhaler 1    FLUoxetine (PROzac) 40 mg capsule       ondansetron (ZOFRAN ODT) 4 mg disintegrating tablet DISSOLVE 1 TABLET IN MOUTH EVERY 8 HOURS AS NEEDED FOR NAUSEA AND VOMITING      busPIRone (BUSPAR) 15 mg tablet Take 1 Tab by mouth three (3) times daily. 270 Tab 0    buPROPion XL (WELLBUTRIN XL) 300 mg XL tablet Take 300 mg by mouth daily.  clonazePAM (KlonoPIN) 1 mg tablet TAKE 1 TABLET BY MOUTH ONCE DAILY AS NEEDED FOR ANXIETY 30 Tab 0    fluticasone propionate (FLONASE) 50 mcg/actuation nasal spray 2 Sprays by Both Nostrils route daily. 1 Bottle 5    cetirizine (ZYRTEC) 10 mg tablet Take 1 Tab by mouth daily. 90 Tab 3    ferrous sulfate 325 mg (65 mg iron) tablet Take 1 Tab by mouth two (2) times a day. 61 Tab 5     Family History   Problem Relation Age of Onset    Osteoporosis Other     OSTEOARTHRITIS Other     Breast Cancer Other         maternal great aunt    Breast Cancer Other         Maternal great aunt    Stroke Maternal Aunt     Hypertension Sister      Social History     Tobacco Use    Smoking status: Never Smoker    Smokeless tobacco: Never Used   Substance Use Topics    Alcohol use: No           ROS  A comprehensive review of system was obtained and negative except findings in the HPI    Visit Vitals  /68 (BP 1 Location: Left upper arm, BP Patient Position: Sitting)   Pulse 68   Temp 98.3 °F (36.8 °C) (Oral)   Resp 18   Ht 5' 4\" (1.626 m)   Wt 243 lb (110.2 kg)   LMP 06/24/2022   SpO2 96%   BMI 41.71 kg/m²     Physical Exam  Vitals and nursing note reviewed. Cardiovascular:      Rate and Rhythm: Normal rate and regular rhythm. Heart sounds: Normal heart sounds. Pulmonary:      Breath sounds: Normal breath sounds. Musculoskeletal:         General: No swelling. ASSESSMENT and PLAN  Encounter Diagnoses   Name Primary?     Hypothyroidism, unspecified type Yes    Mixed hyperlipidemia      Orders Placed This Encounter    LIPID PANEL    TSH 3RD GENERATION    levothyroxine (Euthyrox) 125 mcg tablet    rosuvastatin (CRESTOR) 10 mg tablet     Labs and refills were updated today. Develop plan with results. I have discussed the diagnosis with the patient and the intended plan as seen in the above orders. The patient has received an after-visit summary and questions were answered concerning future plans. Patient conveyed understanding of the plan at the time of the visit.     Dawood Robert, MSN, ANP  7/11/2022

## 2022-07-12 LAB
CHOLEST SERPL-MCNC: 176 MG/DL
HDLC SERPL-MCNC: 40 MG/DL
HDLC SERPL: 4.4 {RATIO} (ref 0–5)
LDLC SERPL CALC-MCNC: 124.2 MG/DL (ref 0–100)
TRIGL SERPL-MCNC: 59 MG/DL (ref ?–150)
TSH SERPL DL<=0.05 MIU/L-ACNC: 2.03 UIU/ML (ref 0.36–3.74)
VLDLC SERPL CALC-MCNC: 11.8 MG/DL

## 2022-07-14 ENCOUNTER — TELEPHONE (OUTPATIENT)
Dept: FAMILY MEDICINE CLINIC | Age: 37
End: 2022-07-14

## 2022-07-14 NOTE — TELEPHONE ENCOUNTER
----- Message from Kierra Gordon sent at 7/14/2022  8:55 AM EDT -----  Subject: Results Request    QUESTIONS  Results: lab work; Ordered by: Tricia Lopez   Date Performed: 2022-07-11  ---------------------------------------------------------------------------  --------------  [address] [employer]    [phone]; OK to leave message on voicemail  ---------------------------------------------------------------------------  --------------

## 2022-08-02 ENCOUNTER — TELEPHONE (OUTPATIENT)
Dept: FAMILY MEDICINE CLINIC | Age: 37
End: 2022-08-02

## 2022-08-02 NOTE — TELEPHONE ENCOUNTER
Returned call to pt and id x 3. Advised pt of current CDC COVID guidelines on isolation and quarantine. Pt verbalized understanding.

## 2022-08-02 NOTE — TELEPHONE ENCOUNTER
----- Message from Jorja Cheadle sent at 8/2/2022  8:43 AM EDT -----  Subject: Message to Provider    QUESTIONS  Information for Provider? Pt called in to let the  know that she tested   positive for COVID on 08/02. She would like someone to call her back to   advise what she can do. Please call  ---------------------------------------------------------------------------  --------------  Cassia Harrison INFO  [phone]; OK to leave message on voicemail  ---------------------------------------------------------------------------  --------------  SCRIPT ANSWERS  Relationship to Patient?  Self

## 2022-09-20 DIAGNOSIS — J40 BRONCHITIS: ICD-10-CM

## 2022-09-20 RX ORDER — ALBUTEROL SULFATE 90 UG/1
2 AEROSOL, METERED RESPIRATORY (INHALATION)
Qty: 9 EACH | Refills: 1 | Status: SHIPPED | OUTPATIENT
Start: 2022-09-20

## 2022-09-22 ENCOUNTER — TELEPHONE (OUTPATIENT)
Dept: FAMILY MEDICINE CLINIC | Age: 37
End: 2022-09-22

## 2022-09-22 NOTE — TELEPHONE ENCOUNTER
Pt called in and states she needs a hospital fuv please, unable to get her in, in two weeks. Can you please advise. Call back number for her is [phone]. Thanks.

## 2022-10-03 ENCOUNTER — VIRTUAL VISIT (OUTPATIENT)
Dept: FAMILY MEDICINE CLINIC | Age: 37
End: 2022-10-03
Payer: COMMERCIAL

## 2022-10-03 DIAGNOSIS — R94.5 ABNORMAL LIVER FUNCTION: ICD-10-CM

## 2022-10-03 DIAGNOSIS — A08.4 STOMACH FLU: Primary | ICD-10-CM

## 2022-10-03 PROCEDURE — 99213 OFFICE O/P EST LOW 20 MIN: CPT | Performed by: NURSE PRACTITIONER

## 2022-10-03 NOTE — PROGRESS NOTES
Wil Rivera (: 1985) is a 31 y.o. female, established patient, here for evaluation of the following chief complaint(s):   Hospital Follow Up       ASSESSMENT/PLAN:  Below is the assessment and plan developed based on review of pertinent history, labs, studies, and medications. 1. Stomach flu  2. Abnormal liver function    Cont with plan to come in next week for repeat labs and follow up  She will keep appt dec 23 with GI  Also advised to bring papers from the [facility] ER visit to review next week      No follow-ups on file. SUBJECTIVE/OBJECTIVE:  HPI  Seen last week at [facility] ER, advised she had stomach virus  Labs were high for liver functions  Also CT showed pancreas enlarged? ?  Did get report to bring to the office for review    Review of Systems   A comprehensive review of system was obtained and negative except findings in the HPI    No data recorded     Physical Exam    [INSTRUCTIONS:  \"[x]\" Indicates a positive item  \"[]\" Indicates a negative item  -- DELETE ALL ITEMS NOT EXAMINED]    Constitutional: [x] Appears well-developed and well-nourished [x] No apparent distress      [] Abnormal -     Mental status: [x] Alert and awake  [x] Oriented to person/place/time [x] Able to follow commands    [] Abnormal -     Eyes:   EOM    [x]  Normal    [] Abnormal -   Sclera  [x]  Normal    [] Abnormal -          Discharge [x]  None visible   [] Abnormal -     HENT: [x] Normocephalic, atraumatic  [] Abnormal -   [x] Mouth/Throat: Mucous membranes are moist    External Ears [x] Normal  [] Abnormal -    Neck: [x] No visualized mass [] Abnormal -     Pulmonary/Chest: [x] Respiratory effort normal   [x] No visualized signs of difficulty breathing or respiratory distress        [] Abnormal -      Musculoskeletal:   [x] Normal gait with no signs of ataxia         [x] Normal range of motion of neck        [] Abnormal -     Neurological:        [x] No Facial Asymmetry (Cranial nerve 7 motor function) (limited exam due to video visit)          [x] No gaze palsy        [] Abnormal -          Skin:        [x] No significant exanthematous lesions or discoloration noted on facial skin         [] Abnormal -            Psychiatric:       [x] Normal Affect [] Abnormal -        [x] No Hallucinations    Other pertinent observable physical exam findings:-    On this date 10/03/2022 I have spent 15 minutes reviewing previous notes, test results and face to face (virtual) with the patient discussing the diagnosis and importance of compliance with the treatment plan as well as documenting on the day of the visit. Nyasia Paez, was evaluated through a synchronous (real-time) audio-video encounter. The patient (or guardian if applicable) is aware that this is a billable service, which includes applicable co-pays. This Virtual Visit was conducted with patient's (and/or legal guardian's) consent. The visit was conducted pursuant to the emergency declaration under the [address] [place] [address] and the [employer] Act. Patient identification was verified, and a caregiver was present when appropriate. The patient was located at: Home: [address] [place] [id number]  The provider was located at: Home: [unfilled]       An electronic signature was used to authenticate this note.   -- Nathan Mitchell NP

## 2022-10-03 NOTE — PROGRESS NOTES
Chief Complaint   Patient presents with    Hospital Follow Up     Pt being seen for fuv from the hospital  -pt states she was seen for abdominal pain and was told to follow up with GI and PCP    1. Have you been to the ER, urgent care clinic since your last visit? Hospitalized since your last visit? chipp    2. Have you seen or consulted any other health care providers outside of the [address] [place] [address] since your last visit? Include any pap smears or colon screening.  No    Pt has no other concerns

## 2022-10-10 ENCOUNTER — TELEPHONE (OUTPATIENT)
Dept: FAMILY MEDICINE CLINIC | Age: 37
End: 2022-10-10

## 2022-10-11 ENCOUNTER — TELEPHONE (OUTPATIENT)
Dept: FAMILY MEDICINE CLINIC | Age: 37
End: 2022-10-11

## 2022-10-11 ENCOUNTER — VIRTUAL VISIT (OUTPATIENT)
Dept: FAMILY MEDICINE CLINIC | Age: 37
End: 2022-10-11

## 2022-10-11 NOTE — PROGRESS NOTES
Chief Complaint   Patient presents with    Sebas Jasmine for her cholesterol      Pt being seen for labs  -pt states she needed to have her cholesterol rechecked     1. Have you been to the ER, urgent care clinic since your last visit? Hospitalized since your last visit? No    2. Have you seen or consulted any other health care providers outside of the [address] [place] [address] since your last visit? Include any pap smears or colon screening.  No    Pt has no other concerns

## 2022-10-12 NOTE — TELEPHONE ENCOUNTER
Pt has been advised via vm. This is a [facility] patient. We are [facility].

## 2022-11-01 ENCOUNTER — OFFICE VISIT (OUTPATIENT)
Dept: FAMILY MEDICINE CLINIC | Age: 37
End: 2022-11-01
Payer: COMMERCIAL

## 2022-11-01 VITALS
RESPIRATION RATE: 18 BRPM | SYSTOLIC BLOOD PRESSURE: 107 MMHG | DIASTOLIC BLOOD PRESSURE: 69 MMHG | OXYGEN SATURATION: 97 % | HEART RATE: 74 BPM | HEIGHT: 64 IN | TEMPERATURE: 98.4 F | WEIGHT: 241 LBS | BODY MASS INDEX: 41.15 KG/M2

## 2022-11-01 DIAGNOSIS — J01.00 ACUTE MAXILLARY SINUSITIS, RECURRENCE NOT SPECIFIED: Primary | ICD-10-CM

## 2022-11-01 PROCEDURE — 99213 OFFICE O/P EST LOW 20 MIN: CPT | Performed by: NURSE PRACTITIONER

## 2022-11-01 RX ORDER — CEFDINIR 300 MG/1
300 CAPSULE ORAL 2 TIMES DAILY
Qty: 20 CAPSULE | Refills: 0 | Status: SHIPPED | OUTPATIENT
Start: 2022-11-01 | End: 2022-11-11

## 2022-11-01 NOTE — PROGRESS NOTES
Chief Complaint   Patient presents with    Nasal Congestion    Sore Throat     Pt being seen for nasal congestion and sore throat  -pt states this has been going on since Friday  -pt states that she does feel a little better  Pt has treated with benadryl     1. Have you been to the ER, urgent care clinic since your last visit? Hospitalized since your last visit? No    2. Have you seen or consulted any other health care providers outside of the [address] [place] [address] since your last visit? Include any pap smears or colon screening.  No    Pt has no other concerns

## 2022-11-01 NOTE — PROGRESS NOTES
HPI/ROS  Patient complains of bilateral ear pressure/pain. Symptoms include congestion, headache described as frontal, lightheadedness, low grade fever, post nasal drip, productive cough with  yellow colored sputum, sinus pressure, tooth pain and vertigo. Onset of symptoms was 5 days ago, gradually worsening since that time. Patient is drinking plenty of fluids. .  Past history is significant for no history of pneumonia or bronchitis. Patient is non-smoker. She is on her allergy meds daily without relief. Visit Vitals  /69 (BP 1 Location: Right arm, BP Patient Position: Sitting)   Pulse 74   Temp 98.4 °F (36.9 °C) (Oral)   Resp 18   Ht 5' 4\" (1.626 m)   Wt 241 lb (109.3 kg)   LMP 10/05/2022   SpO2 97%   BMI 41.37 kg/m²         Physical Examination:   GENERAL ASSESSMENT: well developed and well nourished  SKIN: normal color, no lesions  HEAD: normocephalic  EYES: normal eyes  EARS: external auditory canal: clear and tympanic membrane: yellow, bulging  NOSE: normal external appearance and nares patent  MOUTH: yellow exudates of OP  NECK: normal  CHEST: normal air exchange, no rales, no rhonchi, no wheezes, respiratory effort normal with no retractions  HEART: regular rate and rhythm, normal S1/S2, no murmurs  ABDOMEN:  not examined  EXTREMITY: not examined  NEURO: not examined    Diagnoses and all orders for this visit:    1. Acute maxillary sinusitis, recurrence not specified    Other orders  -     cefdinir (OMNICEF) 300 mg capsule; Take 1 Capsule by mouth two (2) times a day for 10 days. Reveiwed adr/se of medication  Push fluids, rest, suggested mucinex for congestion and drainage  Recheck 5-7 days if sx not improved. I have discussed the diagnosis with the patient and the intended plan as seen in the above orders. The patient has received an after-visit summary and questions were answered concerning future plans. Patient conveyed understanding of the plan at the time of the visit.     Gabriella SEGUNDO Margarita Chow, MSN, ANP  11/1/2022

## 2022-11-23 ENCOUNTER — APPOINTMENT (OUTPATIENT)
Dept: GENERAL RADIOLOGY | Age: 37
End: 2022-11-23
Attending: STUDENT IN AN ORGANIZED HEALTH CARE EDUCATION/TRAINING PROGRAM
Payer: COMMERCIAL

## 2022-11-23 ENCOUNTER — HOSPITAL ENCOUNTER (EMERGENCY)
Age: 37
Discharge: HOME | End: 2022-11-23
Attending: STUDENT IN AN ORGANIZED HEALTH CARE EDUCATION/TRAINING PROGRAM
Payer: COMMERCIAL

## 2022-11-23 VITALS
HEIGHT: 64 IN | HEART RATE: 83 BPM | WEIGHT: 241 LBS | SYSTOLIC BLOOD PRESSURE: 119 MMHG | DIASTOLIC BLOOD PRESSURE: 58 MMHG | TEMPERATURE: 98 F | OXYGEN SATURATION: 99 % | BODY MASS INDEX: 41.15 KG/M2 | RESPIRATION RATE: 18 BRPM

## 2022-11-23 DIAGNOSIS — S93.402A SPRAIN OF LEFT ANKLE, UNSPECIFIED LIGAMENT, INITIAL ENCOUNTER: Primary | ICD-10-CM
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
## 2022-11-23 PROCEDURE — 73610 X-RAY EXAM OF ANKLE: CPT

## 2022-11-23 PROCEDURE — 99283 EMERGENCY DEPT VISIT LOW MDM: CPT

## 2022-11-23 PROCEDURE — 74011250637 HC RX REV CODE- 250/637: Performed by: STUDENT IN AN ORGANIZED HEALTH CARE EDUCATION/TRAINING PROGRAM

## 2022-11-23 RX ORDER — ACETAMINOPHEN 500 MG
1000 TABLET ORAL ONCE
Status: COMPLETED | OUTPATIENT
Start: 2022-11-23 | End: 2022-11-23

## 2022-11-23 RX ADMIN — ACETAMINOPHEN 1000 MG: 500 TABLET ORAL at 19:35

## 2022-11-24 NOTE — ED TRIAGE NOTES
Pt moved off EMS stretcher w/ c/o a GLF that happened about 30min ago. Pt was walking down steps and fell and is complaining of left ankle pain. No LOC. No blood thinners.

## 2022-11-24 NOTE — DISCHARGE INSTRUCTIONS
You presented to the ED after a fall. X-ray showed no fractures of the right or left ankle. Most likely you have sprained/twisted her left ankle. A air splint was placed in the ED and crutches were provided. Use these to help with ambulation. Take Tylenol 1000 mg every 6 hours, Motrin 4 mg every 6 hours and apply ice for the first 24 hours after the injury. Elevate as well. Follow-up with your PCP.

## 2022-11-24 NOTE — ED PROVIDER NOTES
Patient is a 29-year-old female who had a fall presenting with EMS with left ankle pain and swelling. No other injuries. But now endorsing some right ankle pain. No LOC or any other concerning reports. Ice in place. We will get x-rays Tylenol and appropriate additional treatment    The history is provided by the patient and the EMS personnel. Fall  The accident occurred Less than 1 hour ago. The fall occurred while standing. She fell from a height of ground level. She landed on [place]. There was no blood loss. Point of impact: Left ankle. Pain location: Left ankle. The pain is at a severity of 5/10. The pain is moderate. She was Not ambulatory at the scene. There was No entrapment after the fall. There was No drug use involved in the accident. There was No alcohol use involved in the accident. Pertinent negatives include no nausea, no vomiting, no loss of consciousness and no laceration. The symptoms are aggravated by standing and pressure on injury. She has tried ice for the symptoms. The treatment provided mild relief. It is unknown when the patient last had a tetanus shot. Past Medical History:   Diagnosis Date    Allergies     Anemia 2008    Anxiety     Asthma     inhaler    Depression     Ear infection     Environmental allergies 4/1/2010    GERD (gastroesophageal reflux disease)     Hypothyroid 4/1/2010    Inflammatory arthritis     Lupus (HCC)     Nausea & vomiting     Other ill-defined conditions(799.89) noted 7/25/2012    \"Intellectually  Disabled\" signs consents/self.  Mom &  concur    Otitis media 4/1/2010    Strabismus 4/1/2010    Unspecified adverse effect of anesthesia 2/10/2011    slow to wake up, sleepy    Wears hearing aid        Past Surgical History:   Procedure Laterality Date    HX BREAST REDUCTION  2005    HX CHOLECYSTECTOMY  2014    HX KNEE ARTHROSCOPY Left     HX ORTHOPAEDIC      heel cord lengthing; right thumb surgery (pin placed)    HX OTHER SURGICAL      Ear surgery    HX TONSIL AND ADENOIDECTOMY  1990         Family History:   Problem Relation Age of Onset    Osteoporosis Other     OSTEOARTHRITIS Other     Breast Cancer Other         maternal great aunt    Breast Cancer Other         Maternal great aunt    Stroke Maternal Aunt     Hypertension Sister        Social History     Socioeconomic History    Marital status: SINGLE     Spouse name: Not on file    Number of children: Not on file    Years of education: Not on file    Highest education level: Not on file   Occupational History    Not on file   Tobacco Use    Smoking status: Never    Smokeless tobacco: Never   Vaping Use    Vaping Use: Never used   Substance and Sexual Activity    Alcohol use: No    Drug use: No    Sexual activity: Never   Other Topics Concern    Not on file   Social History Narrative    Not on file     Social Determinants of Health     Financial Resource Strain: Not on file   Food Insecurity: Not on file   Transportation Needs: Not on file   Physical Activity: Not on file   Stress: Not on file   Social Connections: Not on file   Intimate Partner Violence: Not on file   Housing Stability: Not on file         ALLERGIES: Latex, Augmentin [amoxicillin-pot clavulanate], Codeine, Morphine sulfate, Nuts [tree nut], and Pcn [penicillins]    Review of Systems   Constitutional: Negative. HENT: Negative. Eyes: Negative. Respiratory: Negative. Cardiovascular: Negative. Gastrointestinal: Negative. Negative for nausea and vomiting. Endocrine: Negative. Genitourinary: Negative. Musculoskeletal:  Positive for arthralgias. Skin: Negative. Allergic/Immunologic: Negative. Neurological: Negative. Negative for loss of consciousness. Hematological: Negative. Psychiatric/Behavioral: Negative.        Vitals:    11/23/22 1921 11/23/22 1947 11/23/22 2102   BP: (!) 118/59 (!) 106/52 (!) 119/58   Pulse: 83     Resp: 18     Temp: 98 °F (36.7 °C)     SpO2: 99% 100% 99%   Weight: 109.3 kg (241 lb)     Height: 5' 4\" (1.626 m)              Physical Exam  Vitals and nursing note reviewed. Constitutional:       General: She is not in acute distress. Appearance: Normal appearance. HENT:      Head: Normocephalic and atraumatic. Right Ear: External ear normal.      Left Ear: External ear normal.      Nose: Nose normal.   Eyes:      Extraocular Movements: Extraocular movements intact. Conjunctiva/sclera: Conjunctivae normal.   Cardiovascular:      Rate and Rhythm: Normal rate. Pulses: Normal pulses. Radial pulses are 2+ on the right side and 2+ on the left side. Heart sounds: Normal heart sounds. Pulmonary:      Effort: Pulmonary effort is normal.      Breath sounds: Normal breath sounds. Chest:      Chest wall: No deformity or tenderness. Abdominal:      General: Abdomen is flat. There is no distension. Tenderness: There is no abdominal tenderness. Musculoskeletal:         General: Swelling, tenderness and signs of injury present. No deformity. Normal range of motion. Cervical back: Normal range of motion and neck supple. No tenderness. Skin:     General: Skin is warm and dry. Capillary Refill: Capillary refill takes less than 2 seconds. Findings: No laceration. Neurological:      General: No focal deficit present. Mental Status: She is alert and oriented to person, place, and time. Psychiatric:         Attention and Perception: Attention normal.         Mood and Affect: Mood normal.         Behavior: Behavior normal.        MDM         IMAGING RESULTS:  XR ANKLE RT MIN 3 V   Final Result   1. Soft tissue swelling. XR ANKLE LT MIN 3 V   Final Result   1.  Soft tissue swelling          MEDICATIONS GIVEN:  Medications   acetaminophen (TYLENOL) tablet 1,000 mg (1,000 mg Oral Given 11/23/22 1935)       Differential diagnosis: Ankle sprain, ankle fracture, soft tissue injury, fall    ED physician interpretation of imaging: X-rays without acute fracture of the bilateral ankles    MDM: Patient is a 81-year-old female presented ED after a ground-level fall with unremarkable x-rays who most likely has a sprained left ankle as this is where most of her pain is. Crutches provided and air splint placed. Patient is stable for discharge home and symptomatic management. Further personalized recommendations for outpatient care as below. Key Discharge Instructions and summary of care: You presented to the ED after a fall. X-ray showed no fractures of the right or left ankle. Most likely you have sprained/twisted her left ankle. A air splint was placed in the ED and crutches were provided. Use these to help with ambulation. Take Tylenol 1000 mg every 6 hours, Motrin 4 mg every 6 hours and apply ice for the first 24 hours after the injury. Elevate as well. Follow-up with your PCP. Patient is stable for discharge. All available radiology and laboratory results have been reviewed with patient and/or available family. Patient and/or family verbally conveyed their understanding and agreement of the patient's signs, symptoms, diagnosis, treatment and prognosis and additionally agree to follow-up as recommended in the discharge instructions or to return to the Emergency Department should their condition change or worsen prior to their follow-up appointment. All questions have been answered and patient and/or available family express understanding. ED Impression:    ICD-10-CM ICD-9-CM    1. Sprain of left ankle, unspecified ligament, initial encounter  S93.402A 845.00       2. Fall, initial encounter  [address] Jamila Roof C909.4            DISPOSITION: Discharged    Ricky Cordero MD          Procedures

## 2022-12-15 ENCOUNTER — DOCUMENTATION ONLY (OUTPATIENT)
Dept: FAMILY MEDICINE CLINIC | Age: 37
End: 2022-12-15

## 2022-12-19 RX ORDER — ROSUVASTATIN CALCIUM 10 MG/1
10 TABLET, COATED ORAL
Qty: 90 TABLET | Refills: 0 | Status: SHIPPED | OUTPATIENT
Start: 2022-12-19

## 2023-01-25 ENCOUNTER — VIRTUAL VISIT (OUTPATIENT)
Dept: FAMILY MEDICINE CLINIC | Age: 38
End: 2023-01-25
Payer: MEDICARE

## 2023-01-25 DIAGNOSIS — J01.00 ACUTE MAXILLARY SINUSITIS, RECURRENCE NOT SPECIFIED: Primary | ICD-10-CM

## 2023-01-25 PROCEDURE — G9717 DOC PT DX DEP/BP F/U NT REQ: HCPCS | Performed by: NURSE PRACTITIONER

## 2023-01-25 PROCEDURE — G8427 DOCREV CUR MEDS BY ELIG CLIN: HCPCS | Performed by: NURSE PRACTITIONER

## 2023-01-25 PROCEDURE — 99213 OFFICE O/P EST LOW 20 MIN: CPT | Performed by: NURSE PRACTITIONER

## 2023-01-25 RX ORDER — DOXYCYCLINE 100 MG/1
100 CAPSULE ORAL 2 TIMES DAILY
Qty: 20 CAPSULE | Refills: 0 | Status: SHIPPED | OUTPATIENT
Start: 2023-01-25 | End: 2023-02-04

## 2023-01-25 NOTE — PROGRESS NOTES
Chief Complaint   Patient presents with    Nasal Congestion     Pt being seen for nasal congestion  -pt states that she has blood in her mucous when she blows her nose  -pt reports this has been going on for 1 week and has treated with otc meds    1. Have you been to the ER, urgent care clinic since your last visit? Hospitalized since your last visit? No    2. Have you seen or consulted any other health care providers outside of the [address] [place] [address] since your last visit? Include any pap smears or colon screening.  No    Pt has no other concerns

## 2023-01-25 NOTE — PROGRESS NOTES
Francie Michelle (: 1985) is a 30 y.o. female, established patient, here for evaluation of the following chief complaint(s):   Nasal Congestion       ASSESSMENT/PLAN:  Below is the assessment and plan developed based on review of pertinent history, labs, studies, and medications. Diagnoses and all orders for this visit:    1. Acute maxillary sinusitis, recurrence not specified    Other orders  -     doxycycline (MONODOX) 100 mg capsule; Take 1 Capsule by mouth two (2) times a day for 10 days. Reveiwed adr/se of medication  Push fluids, rest, suggested mucinex for congestion and drainage  Recheck 5-7 days if sx not improved. SUBJECTIVE/OBJECTIVE:  HPI  Patient complains of bilateral ear pressure/pain. Symptoms include congestion, headache described as frontal, lightheadedness, low grade fever, post nasal drip, productive cough with  yellow colored sputum, sinus pressure, tooth pain and vertigo. Onset of symptoms was 5 days ago, gradually worsening since that time. Patient is drinking plenty of fluids. .  Past history is significant for no history of pneumonia or bronchitis. Patient is non-smoker. She is using otc sudafed without improvement.       Review of Systems   A comprehensive review of system was obtained and negative except findings in the HPI    No data recorded     Physical Exam    [INSTRUCTIONS:  \"[x]\" Indicates a positive item  \"[]\" Indicates a negative item  -- DELETE ALL ITEMS NOT EXAMINED]    Constitutional: [x] Appears well-developed and well-nourished [x] No apparent distress      [] Abnormal -     Mental status: [x] Alert and awake  [x] Oriented to person/place/time [x] Able to follow commands    [] Abnormal -     Eyes:   EOM    [x]  Normal    [] Abnormal -   Sclera  [x]  Normal    [] Abnormal -          Discharge [x]  None visible   [] Abnormal -     HENT: [x] Normocephalic, atraumatic  [] Abnormal -   [x] Mouth/Throat: Mucous membranes are moist    External Ears [x] Normal  [] Abnormal -    Neck: [x] No visualized mass [] Abnormal -     Pulmonary/Chest: [x] Respiratory effort normal   [x] No visualized signs of difficulty breathing or respiratory distress        [] Abnormal -      Musculoskeletal:   [x] Normal gait with no signs of ataxia         [x] Normal range of motion of neck        [] Abnormal -     Neurological:        [x] No Facial Asymmetry (Cranial nerve 7 motor function) (limited exam due to video visit)          [x] No gaze palsy        [] Abnormal -          Skin:        [x] No significant exanthematous lesions or discoloration noted on facial skin         [] Abnormal -            Psychiatric:       [x] Normal Affect [] Abnormal -        [x] No Hallucinations    Other pertinent observable physical exam findings:-    On this date 01/25/2023 I have spent 15 minutes reviewing previous notes, test results and face to face (virtual) with the patient discussing the diagnosis and importance of compliance with the treatment plan as well as documenting on the day of the visit. Claudette , was evaluated through a synchronous (real-time) audio-video encounter. The patient (or guardian if applicable) is aware that this is a billable service, which includes applicable co-pays. This Virtual Visit was conducted with patient's (and/or legal guardian's) consent. The visit was conducted pursuant to the emergency declaration under the [facility], [address] [place] [address] authority and the [employer] General Act. Patient identification was verified, and a caregiver was present when appropriate. The patient was located at: Home: [address] [address]  The provider was located at: Home: [address] [place] [address] was used to authenticate this note.   -- Sandra Jones NP

## 2023-02-14 ENCOUNTER — OFFICE VISIT (OUTPATIENT)
Dept: FAMILY MEDICINE CLINIC | Age: 38
End: 2023-02-14
Payer: MEDICARE

## 2023-02-14 VITALS
DIASTOLIC BLOOD PRESSURE: 74 MMHG | TEMPERATURE: 98.2 F | RESPIRATION RATE: 16 BRPM | OXYGEN SATURATION: 96 % | HEIGHT: 64 IN | BODY MASS INDEX: 41.66 KG/M2 | WEIGHT: 244 LBS | SYSTOLIC BLOOD PRESSURE: 116 MMHG | HEART RATE: 74 BPM

## 2023-02-14 DIAGNOSIS — D50.9 IRON DEFICIENCY ANEMIA, UNSPECIFIED IRON DEFICIENCY ANEMIA TYPE: ICD-10-CM

## 2023-02-14 DIAGNOSIS — E03.9 HYPOTHYROIDISM, UNSPECIFIED TYPE: ICD-10-CM

## 2023-02-14 DIAGNOSIS — Z00.00 WELL ADULT EXAM: Primary | ICD-10-CM

## 2023-02-14 DIAGNOSIS — R73.01 IMPAIRED FASTING BLOOD SUGAR: ICD-10-CM

## 2023-02-14 DIAGNOSIS — E78.2 MIXED HYPERLIPIDEMIA: ICD-10-CM

## 2023-02-14 PROCEDURE — 99214 OFFICE O/P EST MOD 30 MIN: CPT | Performed by: NURSE PRACTITIONER

## 2023-02-14 PROCEDURE — G8417 CALC BMI ABV UP PARAM F/U: HCPCS | Performed by: NURSE PRACTITIONER

## 2023-02-14 PROCEDURE — G0439 PPPS, SUBSEQ VISIT: HCPCS | Performed by: NURSE PRACTITIONER

## 2023-02-14 PROCEDURE — G9717 DOC PT DX DEP/BP F/U NT REQ: HCPCS | Performed by: NURSE PRACTITIONER

## 2023-02-14 PROCEDURE — G8427 DOCREV CUR MEDS BY ELIG CLIN: HCPCS | Performed by: NURSE PRACTITIONER

## 2023-02-14 RX ORDER — EPINEPHRINE 0.3 MG/.3ML
0.3 INJECTION SUBCUTANEOUS
Qty: 0.6 ML | Refills: 1 | Status: SHIPPED | OUTPATIENT
Start: 2023-02-14 | End: 2023-02-14

## 2023-02-14 RX ORDER — EPINEPHRINE 0.3 MG/.3ML
0.3 INJECTION SUBCUTANEOUS
COMMUNITY
End: 2023-02-14 | Stop reason: SDUPTHER

## 2023-02-14 RX ORDER — PANTOPRAZOLE SODIUM 40 MG/1
40 TABLET, DELAYED RELEASE ORAL DAILY
Qty: 30 TABLET | Refills: 2 | Status: SHIPPED | OUTPATIENT
Start: 2023-02-14

## 2023-02-14 NOTE — PROGRESS NOTES
This is the Subsequent Medicare Annual Wellness Exam, performed 12 months or more after the Initial AWV or the last Subsequent AWV    I have reviewed the patient's medical history in detail and updated the computerized patient record. She is also here for follow up fasting labs  Managed for lipids and thyroid  Has had elevated sugar in the past  Having GERD sx with vomiting if it gets bad    Assessment/Plan   Education and counseling provided:  Are appropriate based on today's review and evaluation    Diagnoses and all orders for this visit:    1. Well adult exam  -     LIPID PANEL; Future  -     TSH 3RD GENERATION; Future  -     METABOLIC PANEL, COMPREHENSIVE; Future  -     CBC WITH AUTOMATED DIFF; Future    2. Hypothyroidism, unspecified type  -     TSH 3RD GENERATION; Future    3. Mixed hyperlipidemia  -     LIPID PANEL; Future    4. Iron deficiency anemia, unspecified iron deficiency anemia type  -     CBC WITH AUTOMATED DIFF; Future    5. Impaired fasting blood sugar  -     HEMOGLOBIN A1C WITH EAG; Future    Other orders  -     EPINEPHrine (EPIPEN) 0.3 mg/0.3 mL injection; 0.3 mL by IntraMUSCular route once as needed for Allergic Response for up to 1 dose. -     pantoprazole (PROTONIX) 40 mg tablet; Take 1 Tablet by mouth daily.  As needed for reflux/acid    Given protonix to use for acid reflux  Follow up prn         Depression Risk Factor Screening     3 most recent PHQ Screens 2/14/2023   PHQ Not Done -   Little interest or pleasure in doing things Not at all   Feeling down, depressed, irritable, or hopeless Not at all   Total Score PHQ 2 0   Trouble falling or staying asleep, or sleeping too much -   Feeling tired or having little energy -   Poor appetite, weight loss, or overeating -   Feeling bad about yourself - or that you are a failure or have let yourself or your family down -   Trouble concentrating on things such as school, work, reading, or watching TV -   Moving or speaking so slowly that other people could have noticed; or the opposite being so fidgety that others notice -   Thoughts of being better off dead, or hurting yourself in some way -   PHQ 9 Score -       Alcohol & Drug Abuse Risk Screen    Do you average more than 1 drink per night or more than 7 drinks a week:  No    On any one occasion in the past three months have you have had more than 3 drinks containing alcohol:  No          Functional Ability and Level of Safety    Hearing: Hearing is good. Activities of Daily Living: The home contains: no safety equipment. Patient does total self care      Ambulation: with no difficulty     Fall Risk:  Fall Risk Assessment, last 12 mths 2/14/2023   Able to walk? Yes   Fall in past 12 months? 0   Do you feel unsteady? 0   Are you worried about falling 0      Abuse Screen:  Patient is not abused       Cognitive Screening    Has your family/caregiver stated any concerns about your memory: no     Cognitive Screening: Normal - Mini Cog Test    Health Maintenance Due     Health Maintenance Due   Topic Date Due    COVID-19 Vaccine (4 - Booster for Moderna series) 01/14/2022    Flu Vaccine (1) 08/01/2022       Patient Care Team   Patient Care Team:  Yarely Cano NP as PCP - General (Family Medicine)  Yarely Cano NP as PCP - [facility] Empaneled Provider  Bernadette Sawant LPN as [facility] (Family Medicine)  Crispin Oreilly MD as Physician (Obstetrics & Gynecology)    History     Patient Active Problem List   Diagnosis Code    Hypothyroid E03.9    Environmental allergies Z91.09    [place] (hard of hearing) H91.90    Strabismus H50.9    Anemia, iron deficiency D50.9    Asthma J45.909    Long-term use of Plaquenil Z79.899    Vitamin D deficiency E55.9    Polyuria R35.89    Periodic headache syndrome, not intractable G43. C0    Inflammatory polyarthritis (HCC) M06.4    Obesity, morbid ([address] E66.01    Recurrent depression ([address] F33.9    Hypercholesterolemia E78.00     Past Medical History:   Diagnosis Date    Allergies     Anemia 2008    Anxiety     Asthma     inhaler    Depression     Ear infection     Environmental allergies 4/1/2010    GERD (gastroesophageal reflux disease)     Hypothyroid 4/1/2010    Inflammatory arthritis     Lupus (HCC)     Nausea & vomiting     Other ill-defined conditions(799.89) noted 7/25/2012    \"Intellectually  Disabled\" signs consents/self. Mom &  concur    Otitis media 4/1/2010    Strabismus 4/1/2010    Unspecified adverse effect of anesthesia 2/10/2011    slow to wake up, sleepy    Wears hearing aid       Past Surgical History:   Procedure Laterality Date    HX BREAST REDUCTION  2005    HX CHOLECYSTECTOMY  2014    HX KNEE ARTHROSCOPY Left     HX ORTHOPAEDIC      heel cord lengthing; right thumb surgery (pin placed)    HX OTHER SURGICAL      Ear surgery    HX TONSIL AND ADENOIDECTOMY  1990     Current Outpatient Medications   Medication Sig Dispense Refill    EPINEPHrine (EPIPEN) 0.3 mg/0.3 mL injection 0.3 mL by IntraMUSCular route once as needed for Allergic Response for up to 1 dose. 0.6 mL 1    pantoprazole (PROTONIX) 40 mg tablet Take 1 Tablet by mouth daily. As needed for reflux/acid 30 Tablet 2    rosuvastatin (CRESTOR) 10 mg tablet Take 1 tablet by mouth nightly 90 Tablet 0    albuterol (ProAir HFA) 90 mcg/actuation inhaler Take 2 Puffs by inhalation every four (4) hours as needed for Wheezing. 9 Each 1    levothyroxine (Euthyrox) 125 mcg tablet TAKE 2 TABLETS BY MOUTH ONCE DAILY BEFORE BREAKFAST 60 Tablet 5    Euthyrox 125 mcg tablet TAKE 2 TABLETS BY MOUTH ONCE DAILY BEFORE BREAKFAST 60 Tablet 0    traZODone (DESYREL) 100 mg tablet TAKE 1 & 1 2 (ONE & ONE HALF) TABLETS BY MOUTH AT BEDTIME AS NEEDED      FLUoxetine (PROzac) 40 mg capsule       busPIRone (BUSPAR) 15 mg tablet Take 1 Tab by mouth three (3) times daily. 270 Tab 0    buPROPion XL (WELLBUTRIN XL) 300 mg XL tablet Take 300 mg by mouth daily.       clonazePAM (KlonoPIN) 1 mg tablet TAKE 1 TABLET BY MOUTH ONCE DAILY AS NEEDED FOR ANXIETY 30 Tab 0    fluticasone propionate (FLONASE) 50 mcg/actuation nasal spray 2 Sprays by Both Nostrils route daily. 1 Bottle 5    cetirizine (ZYRTEC) 10 mg tablet Take 1 Tab by mouth daily. 90 Tab 3    ferrous sulfate 325 mg (65 mg iron) tablet Take 1 Tab by mouth two (2) times a day.  60 Tab 5     Allergies   Allergen Reactions    Latex Hives    Augmentin [Amoxicillin-Pot Clavulanate] Nausea and Vomiting    Codeine Nausea and Vomiting    Morphine Sulfate Nausea and Vomiting    Nuts [Tree Nut] Swelling    Pcn [Penicillins] Hives       Family History   Problem Relation Age of Onset    Osteoporosis Other     OSTEOARTHRITIS Other     Breast Cancer Other         maternal great aunt    Breast Cancer Other         Maternal great aunt    Stroke Maternal Aunt     Hypertension Sister      Social History     Tobacco Use    Smoking status: Never    Smokeless tobacco: Never   Substance Use Topics    Alcohol use: No         Yary Bourgeois NP

## 2023-02-14 NOTE — PATIENT INSTRUCTIONS
Medicare Wellness Visit, Female     The best way to live healthy is to have a lifestyle where you eat a well-balanced diet, exercise regularly, limit alcohol use, and quit all forms of tobacco/nicotine, if applicable. Regular preventive services are another way to keep healthy. Preventive services (vaccines, screening tests, monitoring & exams) can help personalize your care plan, which helps you manage your own care. Screening tests can find health problems at the earliest stages, when they are easiest to treat. [facility] follows the current, evidence-based guidelines published by the [facility] Daniel Aponte ([facility]) when recommending preventive services for our patients. Because we follow these guidelines, sometimes recommendations change over time as research supports it. (For example, mammograms used to be recommended annually. Even though Medicare will still pay for an annual mammogram, the newer guidelines recommend a mammogram every two years for women of average risk). Of course, you and your doctor may decide to screen more often for some diseases, based on your risk and your co-morbidities (chronic disease you are already diagnosed with). Preventive services for you include:  - Medicare offers their members a free annual wellness visit, which is time for you and your primary care provider to discuss and plan for your preventive service needs.  Take advantage of this benefit every year!    -Over the age of 72 should receive the recommended pneumonia vaccines.    -All adults should have a flu vaccine yearly.  -All adults should have a tetanus vaccine every 10 years.   -Over the age 48 should receive the shingles vaccines.        -All adults should be screened once for Hepatitis C.  -All adults age 38-68 who are overweight should have a diabetes screening test once every three years.   -Other screening tests and preventive services for persons with diabetes include: an eye exam to screen for diabetic retinopathy, a kidney function test, a foot exam, and stricter control over your cholesterol.   -Cardiovascular screening for adults with routine risk involves an electrocardiogram (ECG) at intervals determined by your doctor.     -Colorectal cancer screenings should be done for adults age 39-70 with no increased risk factors for colorectal cancer. There are a number of acceptable methods of screening for this type of cancer. Each test has its own benefits and drawbacks. Discuss with your doctor what is most appropriate for you during your annual wellness visit. The different tests include: colonoscopy (considered the best screening method), a fecal occult blood test, a fecal DNA test, and sigmoidoscopy.    -Lung cancer screening is recommended annually with a low dose CT scan for adults between age 54 and 68, who have smoked at least 30 pack years (equivalent of 1 pack per day for 30 days), and who is a current smoker or quit less than 15 years ago.    -A bone mass density test is recommended when a woman turns 65 to screen for osteoporosis. This test is only recommended one time, as a screening. Some providers will use this same test as a disease monitoring tool if you already have osteoporosis. -Breast cancer screenings are recommended every other year for women of normal risk, age 54-69.    -Cervical cancer screenings for women over age 72 are only recommended with certain risk factors.      Here is a list of your current Health Maintenance items (your personalized list of preventive services) with a due date:  Health Maintenance Due   Topic Date Due    COVID-19 Vaccine (4 - Booster for Shayan Argenis series) 01/14/2022    Yearly Flu Vaccine (1) 08/01/2022

## 2023-02-14 NOTE — PROGRESS NOTES
Chief Complaint   Patient presents with    Annual Wellness Visit    Labs     Pt being seen for wellness visit  -labs    1. Have you been to the ER, urgent care clinic since your last visit? Hospitalized since your last visit? No    2. Have you seen or consulted any other health care providers outside of the [address] [place] [address] since your last visit? Include any pap smears or colon screening.  No    Pt has no other concerns

## 2023-02-15 LAB
ALBUMIN SERPL-MCNC: 3.8 G/DL (ref 3.5–5)
ALBUMIN/GLOB SERPL: 1.4 (ref 1.1–2.2)
ALP SERPL-CCNC: 84 U/L (ref 45–117)
ALT SERPL-CCNC: 28 U/L (ref 12–78)
ANION GAP SERPL CALC-SCNC: 7 MMOL/L (ref 5–15)
AST SERPL-CCNC: 11 U/L (ref 15–37)
BASOPHILS # BLD: 0.1 K/UL (ref 0–0.1)
BASOPHILS NFR BLD: 1 % (ref 0–1)
BILIRUB SERPL-MCNC: 0.8 MG/DL (ref 0.2–1)
BUN SERPL-MCNC: 12 MG/DL (ref 6–20)
BUN/CREAT SERPL: 13 (ref 12–20)
CALCIUM SERPL-MCNC: 9 MG/DL (ref 8.5–10.1)
CHLORIDE SERPL-SCNC: 107 MMOL/L (ref 97–108)
CHOLEST SERPL-MCNC: 116 MG/DL
CO2 SERPL-SCNC: 26 MMOL/L (ref 21–32)
CREAT SERPL-MCNC: 0.94 MG/DL (ref 0.55–1.02)
DIFFERENTIAL METHOD BLD: ABNORMAL
EOSINOPHIL # BLD: 0.2 K/UL (ref 0–0.4)
EOSINOPHIL NFR BLD: 3 % (ref 0–7)
ERYTHROCYTE [DISTWIDTH] IN BLOOD BY AUTOMATED COUNT: 13.3 % (ref 11.5–14.5)
EST. AVERAGE GLUCOSE BLD GHB EST-MCNC: 100 MG/DL
GLOBULIN SER CALC-MCNC: 2.7 G/DL (ref 2–4)
GLUCOSE SERPL-MCNC: 96 MG/DL (ref 65–100)
HBA1C MFR BLD: 5.1 % (ref 4–5.6)
HCT VFR BLD AUTO: 41 % (ref 35–47)
HDLC SERPL-MCNC: 47 MG/DL
HDLC SERPL: 2.5 (ref 0–5)
HGB BLD-MCNC: 12.8 G/DL (ref 11.5–16)
IMM GRANULOCYTES # BLD AUTO: 0 K/UL (ref 0–0.04)
IMM GRANULOCYTES NFR BLD AUTO: 0 % (ref 0–0.5)
LDLC SERPL CALC-MCNC: 57 MG/DL (ref 0–100)
LYMPHOCYTES # BLD: 2.1 K/UL (ref 0.8–3.5)
LYMPHOCYTES NFR BLD: 32 % (ref 12–49)
MCH RBC QN AUTO: 31.1 PG (ref 26–34)
MCHC RBC AUTO-ENTMCNC: 31.2 G/DL (ref 30–36.5)
MCV RBC AUTO: 99.5 FL (ref 80–99)
MONOCYTES # BLD: 0.4 K/UL (ref 0–1)
MONOCYTES NFR BLD: 6 % (ref 5–13)
NEUTS SEG # BLD: 3.7 K/UL (ref 1.8–8)
NEUTS SEG NFR BLD: 58 % (ref 32–75)
NRBC # BLD: 0 K/UL (ref 0–0.01)
NRBC BLD-RTO: 0 PER 100 WBC
PLATELET # BLD AUTO: 277 K/UL (ref 150–400)
PMV BLD AUTO: 11.8 FL (ref 8.9–12.9)
POTASSIUM SERPL-SCNC: 4.9 MMOL/L (ref 3.5–5.1)
PROT SERPL-MCNC: 6.5 G/DL (ref 6.4–8.2)
RBC # BLD AUTO: 4.12 M/UL (ref 3.8–5.2)
SODIUM SERPL-SCNC: 140 MMOL/L (ref 136–145)
TRIGL SERPL-MCNC: 60 MG/DL (ref ?–150)
TSH SERPL DL<=0.05 MIU/L-ACNC: 0.6 UIU/ML (ref 0.36–3.74)
VLDLC SERPL CALC-MCNC: 12 MG/DL
WBC # BLD AUTO: 6.4 K/UL (ref 3.6–11)

## 2023-03-05 RX ORDER — LEVOTHYROXINE SODIUM 125 UG/1
TABLET ORAL
Qty: 60 TABLET | Refills: 0 | Status: SHIPPED | OUTPATIENT
Start: 2023-03-05

## 2023-04-03 ENCOUNTER — DOCUMENTATION ONLY (OUTPATIENT)
Dept: FAMILY MEDICINE CLINIC | Age: 38
End: 2023-04-03

## 2023-04-21 ENCOUNTER — OFFICE VISIT (OUTPATIENT)
Dept: FAMILY MEDICINE CLINIC | Age: 38
End: 2023-04-21

## 2023-04-21 VITALS
SYSTOLIC BLOOD PRESSURE: 106 MMHG | WEIGHT: 253 LBS | HEART RATE: 76 BPM | RESPIRATION RATE: 18 BRPM | OXYGEN SATURATION: 96 % | BODY MASS INDEX: 43.19 KG/M2 | TEMPERATURE: 98.1 F | DIASTOLIC BLOOD PRESSURE: 70 MMHG | HEIGHT: 64 IN

## 2023-04-21 DIAGNOSIS — H00.012 HORDEOLUM EXTERNUM OF RIGHT LOWER EYELID: Primary | ICD-10-CM

## 2023-04-21 RX ORDER — ERYTHROMYCIN 5 MG/G
OINTMENT OPHTHALMIC
Qty: 3.5 G | Refills: 0 | Status: SHIPPED | OUTPATIENT
Start: 2023-04-21

## 2023-04-21 NOTE — PROGRESS NOTES
Chief Complaint   Patient presents with    Stye     Patient presents in office today with c/o a stye on her right eye. States that is it painful. No other concerns. 1. Have you been to the ER, urgent care clinic since your last visit? Hospitalized since your last visit? No    2. Have you seen or consulted any other health care providers outside of the [address] [place] [address] since your last visit? Include any pap smears or colon screening.  No      Learning Assessment 1/23/2018   PRIMARY LEARNER Patient   HIGHEST LEVEL OF EDUCATION - PRIMARY LEARNER  GRADUATED HIGH SCHOOL OR GED   BARRIERS PRIMARY LEARNER NONE   CO-LEARNER CAREGIVER No   PRIMARY LANGUAGE ENGLISH   LEARNER PREFERENCE PRIMARY DEMONSTRATION   LEARNING SPECIAL TOPICS no   ANSWERED BY pt   RELATIONSHIP SELF

## 2023-04-21 NOTE — PROGRESS NOTES
Chief Complaint   Patient presents with    Stye     Patient presents in office today with c/o a stye on her right eye. States that is it painful. No other concerns. 1. Have you been to the ER, urgent care clinic since your last visit? Hospitalized since your last visit? No    2. Have you seen or consulted any other health care providers outside of the [address] [place] [address] since your last visit? Include any pap smears or colon screening. No      Learning Assessment 1/23/2018   PRIMARY LEARNER Patient   HIGHEST LEVEL OF EDUCATION - PRIMARY LEARNER  GRADUATED HIGH SCHOOL OR GED   BARRIERS PRIMARY LEARNER NONE   CO-LEARNER CAREGIVER No   PRIMARY LANGUAGE ENGLISH   LEARNER PREFERENCE PRIMARY DEMONSTRATION   LEARNING SPECIAL TOPICS no   ANSWERED BY pt   RELATIONSHIP SELF     Chief Complaint   Patient presents with    Stye     she is a 30y.o. year old female who presents for evaluation. Reviewed and agree with Nurse Note and duplicated in this note. Reviewed PmHx, RxHx, FmHx, SocHx, AllgHx and updated and dated in the chart. Review of Systems - negative except as listed above    Objective:     Vitals:    04/21/23 0808   BP: 106/70   Pulse: 76   Resp: 18   Temp: 98.1 °F (36.7 °C)   TempSrc: Oral   SpO2: 96%   Weight: 253 lb (114.8 kg)   Height: 5' 4\" (1.626 m)     Physical Examination: General appearance - alert, well appearing, and in no distress  Eyes -right lower eyelid-slightly raised papule noted of the eyelash margin, mild tenderness noted    Assessment/ Plan:   Diagnoses and all orders for this visit:    1. Hordeolum externum of right lower eyelid  -     erythromycin (ILOTYCIN) ophthalmic ointment; Apply small ribbon to the right lower lid 4 times a day for 7 days. Advised patient to take medication as prescribed. Also suggested patient she may use a warm compress to the area throughout the day. patient to follow-up if her symptoms persist or do not improve.     I have discussed the diagnosis with the patient and the intended plan as seen in the above orders. The patient has received an after-visit summary and questions were answered concerning future plans.      Medication Side Effects and Warnings were discussed with patient: yes  Patient Labs were reviewed and or requested: n/a  Patient Past Records were reviewed and or requested  yes    Jazzy Horan PA-C

## 2023-05-22 RX ORDER — LEVOTHYROXINE SODIUM 0.12 MG/1
TABLET ORAL
Qty: 60 TABLET | Refills: 0 | Status: SHIPPED | OUTPATIENT
Start: 2023-05-22

## 2023-07-15 RX ORDER — LEVOTHYROXINE SODIUM 0.12 MG/1
TABLET ORAL
Qty: 60 TABLET | Refills: 0 | Status: SHIPPED | OUTPATIENT
Start: 2023-07-15

## 2023-07-26 SDOH — ECONOMIC STABILITY: FOOD INSECURITY: WITHIN THE PAST 12 MONTHS, YOU WORRIED THAT YOUR FOOD WOULD RUN OUT BEFORE YOU GOT MONEY TO BUY MORE.: PATIENT DECLINED

## 2023-07-26 SDOH — ECONOMIC STABILITY: TRANSPORTATION INSECURITY
IN THE PAST 12 MONTHS, HAS LACK OF TRANSPORTATION KEPT YOU FROM MEETINGS, WORK, OR FROM GETTING THINGS NEEDED FOR DAILY LIVING?: YES

## 2023-07-26 SDOH — ECONOMIC STABILITY: FOOD INSECURITY: WITHIN THE PAST 12 MONTHS, THE FOOD YOU BOUGHT JUST DIDN'T LAST AND YOU DIDN'T HAVE MONEY TO GET MORE.: OFTEN TRUE

## 2023-07-26 SDOH — ECONOMIC STABILITY: INCOME INSECURITY: HOW HARD IS IT FOR YOU TO PAY FOR THE VERY BASICS LIKE FOOD, HOUSING, MEDICAL CARE, AND HEATING?: SOMEWHAT HARD

## 2023-07-26 SDOH — ECONOMIC STABILITY: HOUSING INSECURITY
IN THE LAST 12 MONTHS, WAS THERE A TIME WHEN YOU DID NOT HAVE A STEADY PLACE TO SLEEP OR SLEPT IN A SHELTER (INCLUDING NOW)?: NO

## 2023-07-27 ENCOUNTER — OFFICE VISIT (OUTPATIENT)
Age: 38
End: 2023-07-27
Payer: MEDICARE

## 2023-07-27 VITALS
SYSTOLIC BLOOD PRESSURE: 113 MMHG | BODY MASS INDEX: 44.35 KG/M2 | DIASTOLIC BLOOD PRESSURE: 77 MMHG | HEIGHT: 64 IN | TEMPERATURE: 98.2 F | OXYGEN SATURATION: 98 % | RESPIRATION RATE: 20 BRPM | WEIGHT: 259.8 LBS | HEART RATE: 82 BPM

## 2023-07-27 DIAGNOSIS — R31.9 HEMATURIA, UNSPECIFIED TYPE: Primary | ICD-10-CM

## 2023-07-27 DIAGNOSIS — H10.31 ACUTE BACTERIAL CONJUNCTIVITIS OF RIGHT EYE: ICD-10-CM

## 2023-07-27 LAB
BILIRUBIN, URINE, POC: NEGATIVE
BLOOD URINE, POC: NORMAL
GLUCOSE URINE, POC: NEGATIVE
KETONES, URINE, POC: NEGATIVE
LEUKOCYTE ESTERASE, URINE, POC: NEGATIVE
NITRITE, URINE, POC: NEGATIVE
PH, URINE, POC: 6 (ref 4.6–8)
PROTEIN,URINE, POC: NEGATIVE
SPECIFIC GRAVITY, URINE, POC: 1.02 (ref 1–1.03)
URINALYSIS CLARITY, POC: CLEAR
URINALYSIS COLOR, POC: YELLOW
UROBILINOGEN, POC: NORMAL

## 2023-07-27 PROCEDURE — 99214 OFFICE O/P EST MOD 30 MIN: CPT | Performed by: PHYSICIAN ASSISTANT

## 2023-07-27 PROCEDURE — PBSHW AMB POC URINALYSIS DIP STICK MANUAL W/O MICRO: Performed by: PHYSICIAN ASSISTANT

## 2023-07-27 PROCEDURE — 81002 URINALYSIS NONAUTO W/O SCOPE: CPT | Performed by: PHYSICIAN ASSISTANT

## 2023-07-27 RX ORDER — BUSPIRONE HYDROCHLORIDE 10 MG/1
TABLET ORAL
COMMUNITY
Start: 2023-07-14

## 2023-07-27 RX ORDER — CIPROFLOXACIN HYDROCHLORIDE 3.5 MG/ML
SOLUTION/ DROPS TOPICAL
Qty: 10 ML | Refills: 0 | Status: SHIPPED | OUTPATIENT
Start: 2023-07-27

## 2023-07-27 RX ORDER — SULFAMETHOXAZOLE AND TRIMETHOPRIM 800; 160 MG/1; MG/1
1 TABLET ORAL 2 TIMES DAILY
Qty: 10 TABLET | Refills: 0 | Status: SHIPPED | OUTPATIENT
Start: 2023-07-27 | End: 2023-08-01

## 2023-07-27 ASSESSMENT — PATIENT HEALTH QUESTIONNAIRE - PHQ9
5. POOR APPETITE OR OVEREATING: 0
SUM OF ALL RESPONSES TO PHQ QUESTIONS 1-9: 0
8. MOVING OR SPEAKING SO SLOWLY THAT OTHER PEOPLE COULD HAVE NOTICED. OR THE OPPOSITE, BEING SO FIGETY OR RESTLESS THAT YOU HAVE BEEN MOVING AROUND A LOT MORE THAN USUAL: 0
7. TROUBLE CONCENTRATING ON THINGS, SUCH AS READING THE NEWSPAPER OR WATCHING TELEVISION: 0
2. FEELING DOWN, DEPRESSED OR HOPELESS: 0
6. FEELING BAD ABOUT YOURSELF - OR THAT YOU ARE A FAILURE OR HAVE LET YOURSELF OR YOUR FAMILY DOWN: 0
3. TROUBLE FALLING OR STAYING ASLEEP: 0
10. IF YOU CHECKED OFF ANY PROBLEMS, HOW DIFFICULT HAVE THESE PROBLEMS MADE IT FOR YOU TO DO YOUR WORK, TAKE CARE OF THINGS AT HOME, OR GET ALONG WITH OTHER PEOPLE: 0
SUM OF ALL RESPONSES TO PHQ9 QUESTIONS 1 & 2: 0
4. FEELING TIRED OR HAVING LITTLE ENERGY: 0
SUM OF ALL RESPONSES TO PHQ QUESTIONS 1-9: 0
1. LITTLE INTEREST OR PLEASURE IN DOING THINGS: 0
SUM OF ALL RESPONSES TO PHQ QUESTIONS 1-9: 0
SUM OF ALL RESPONSES TO PHQ QUESTIONS 1-9: 0
9. THOUGHTS THAT YOU WOULD BE BETTER OFF DEAD, OR OF HURTING YOURSELF: 0

## 2023-07-27 ASSESSMENT — ANXIETY QUESTIONNAIRES
4. TROUBLE RELAXING: 0
6. BECOMING EASILY ANNOYED OR IRRITABLE: 0
3. WORRYING TOO MUCH ABOUT DIFFERENT THINGS: 0
IF YOU CHECKED OFF ANY PROBLEMS ON THIS QUESTIONNAIRE, HOW DIFFICULT HAVE THESE PROBLEMS MADE IT FOR YOU TO DO YOUR WORK, TAKE CARE OF THINGS AT HOME, OR GET ALONG WITH OTHER PEOPLE: NOT DIFFICULT AT ALL
5. BEING SO RESTLESS THAT IT IS HARD TO SIT STILL: 0
GAD7 TOTAL SCORE: 0
2. NOT BEING ABLE TO STOP OR CONTROL WORRYING: 0
1. FEELING NERVOUS, ANXIOUS, OR ON EDGE: 0
7. FEELING AFRAID AS IF SOMETHING AWFUL MIGHT HAPPEN: 0

## 2023-07-29 LAB — BACTERIA UR CULT: NO GROWTH

## 2023-08-15 ENCOUNTER — OFFICE VISIT (OUTPATIENT)
Age: 38
End: 2023-08-15
Payer: MEDICAID

## 2023-08-15 VITALS
HEIGHT: 64 IN | DIASTOLIC BLOOD PRESSURE: 79 MMHG | BODY MASS INDEX: 44.12 KG/M2 | RESPIRATION RATE: 20 BRPM | WEIGHT: 258.4 LBS | HEART RATE: 85 BPM | OXYGEN SATURATION: 97 % | TEMPERATURE: 98 F | SYSTOLIC BLOOD PRESSURE: 125 MMHG

## 2023-08-15 DIAGNOSIS — K90.41 GLUTEN INTOLERANCE: ICD-10-CM

## 2023-08-15 DIAGNOSIS — E03.9 HYPOTHYROIDISM, UNSPECIFIED TYPE: Primary | ICD-10-CM

## 2023-08-15 DIAGNOSIS — E78.2 MIXED HYPERLIPIDEMIA: ICD-10-CM

## 2023-08-15 PROCEDURE — 99214 OFFICE O/P EST MOD 30 MIN: CPT | Performed by: NURSE PRACTITIONER

## 2023-08-15 ASSESSMENT — PATIENT HEALTH QUESTIONNAIRE - PHQ9
SUM OF ALL RESPONSES TO PHQ QUESTIONS 1-9: 0
8. MOVING OR SPEAKING SO SLOWLY THAT OTHER PEOPLE COULD HAVE NOTICED. OR THE OPPOSITE, BEING SO FIGETY OR RESTLESS THAT YOU HAVE BEEN MOVING AROUND A LOT MORE THAN USUAL: 0
7. TROUBLE CONCENTRATING ON THINGS, SUCH AS READING THE NEWSPAPER OR WATCHING TELEVISION: 0
9. THOUGHTS THAT YOU WOULD BE BETTER OFF DEAD, OR OF HURTING YOURSELF: 0
SUM OF ALL RESPONSES TO PHQ QUESTIONS 1-9: 0
SUM OF ALL RESPONSES TO PHQ9 QUESTIONS 1 & 2: 0
3. TROUBLE FALLING OR STAYING ASLEEP: 0
SUM OF ALL RESPONSES TO PHQ QUESTIONS 1-9: 0
6. FEELING BAD ABOUT YOURSELF - OR THAT YOU ARE A FAILURE OR HAVE LET YOURSELF OR YOUR FAMILY DOWN: 0
SUM OF ALL RESPONSES TO PHQ QUESTIONS 1-9: 0
10. IF YOU CHECKED OFF ANY PROBLEMS, HOW DIFFICULT HAVE THESE PROBLEMS MADE IT FOR YOU TO DO YOUR WORK, TAKE CARE OF THINGS AT HOME, OR GET ALONG WITH OTHER PEOPLE: 0
1. LITTLE INTEREST OR PLEASURE IN DOING THINGS: 0
2. FEELING DOWN, DEPRESSED OR HOPELESS: 0
5. POOR APPETITE OR OVEREATING: 0
4. FEELING TIRED OR HAVING LITTLE ENERGY: 0

## 2023-08-15 ASSESSMENT — ANXIETY QUESTIONNAIRES
3. WORRYING TOO MUCH ABOUT DIFFERENT THINGS: 0
IF YOU CHECKED OFF ANY PROBLEMS ON THIS QUESTIONNAIRE, HOW DIFFICULT HAVE THESE PROBLEMS MADE IT FOR YOU TO DO YOUR WORK, TAKE CARE OF THINGS AT HOME, OR GET ALONG WITH OTHER PEOPLE: NOT DIFFICULT AT ALL
5. BEING SO RESTLESS THAT IT IS HARD TO SIT STILL: 0
1. FEELING NERVOUS, ANXIOUS, OR ON EDGE: 0
6. BECOMING EASILY ANNOYED OR IRRITABLE: 0
4. TROUBLE RELAXING: 0
2. NOT BEING ABLE TO STOP OR CONTROL WORRYING: 0
GAD7 TOTAL SCORE: 0
7. FEELING AFRAID AS IF SOMETHING AWFUL MIGHT HAPPEN: 0

## 2023-08-15 NOTE — PROGRESS NOTES
Shila Barth is a 30 y.o. female , id x 2(name and ). Reviewed record, history, and  medications. Chief Complaint   Patient presents with    Follow-up     Patient would like to get tested for gluten and needs her thyroid done. Vitals:    08/15/23 1522   Weight: 258 lb 6.4 oz (117.2 kg)   Height: 5' 4\" (1.626 m)       Coordination of Care Questionnaire:   1. Have you been to the ER, urgent care clinic since your last visit? Hospitalized since your last visit? No    2. Have you seen or consulted any other health care providers outside of the [address] [place] [address] since your last visit? Include any pap smears or colon screening. No      PHQ-9  8/15/2023   Little interest or pleasure in doing things 0   Little interest or pleasure in doing things -   Feeling down, depressed, or hopeless 0   Trouble falling or staying asleep, or sleeping too much 0   Feeling tired or having little energy 0   Poor appetite or overeating 0   Feeling bad about yourself - or that you are a failure or have let yourself or your family down 0   Trouble concentrating on things, such as reading the newspaper or watching television 0   Moving or speaking so slowly that other people could have noticed.  Or the opposite - being so fidgety or restless that you have been moving around a lot more than usual 0   Thoughts that you would be better off dead, or of hurting yourself in some way 0   PHQ-2 Score 0   Total Score PHQ 2 -   PHQ-9 Total Score 0   If you checked off any problems, how difficult have these problems made it for you to do your work, take care of things at home, or get along with other people? 0       FERNANDO-7 SCREENING 8/15/2023 2023   Feeling nervous, anxious, or on edge Not at all Not at all   Not being able to stop or control worrying Not at all Not at all   Worrying too much about different things Not at all Not at all   Trouble relaxing Not at all Not at all   Being so restless that it is hard to sit still Not

## 2023-08-17 LAB
CHOLEST SERPL-MCNC: 167 MG/DL (ref 100–199)
HDLC SERPL-MCNC: 44 MG/DL
IMP & REVIEW OF LAB RESULTS: NORMAL
LDLC SERPL CALC-MCNC: 108 MG/DL (ref 0–99)
TRIGL SERPL-MCNC: 77 MG/DL (ref 0–149)
TSH SERPL DL<=0.005 MIU/L-ACNC: 1.6 UIU/ML (ref 0.45–4.5)
VLDLC SERPL CALC-MCNC: 15 MG/DL (ref 5–40)

## 2023-08-18 LAB
ENDOMYSIUM IGA SER QL: NEGATIVE
IGA SERPL-MCNC: 83 MG/DL (ref 87–352)
TTG IGA SER-ACNC: <2 U/ML (ref 0–3)
TTG IGG SER-ACNC: <2 U/ML (ref 0–5)

## 2023-08-28 RX ORDER — LEVOTHYROXINE SODIUM 0.12 MG/1
TABLET ORAL
Qty: 60 TABLET | Refills: 0 | Status: SHIPPED | OUTPATIENT
Start: 2023-08-28

## 2023-10-09 RX ORDER — LEVOTHYROXINE SODIUM 0.12 MG/1
TABLET ORAL
Qty: 60 TABLET | Refills: 0 | Status: SHIPPED | OUTPATIENT
Start: 2023-10-09

## 2023-11-07 RX ORDER — LEVOTHYROXINE SODIUM 0.12 MG/1
TABLET ORAL
Qty: 60 TABLET | Refills: 0 | Status: SHIPPED | OUTPATIENT
Start: 2023-11-07

## 2024-01-04 ENCOUNTER — TELEMEDICINE (OUTPATIENT)
Age: 39
End: 2024-01-04
Payer: MEDICARE

## 2024-01-04 DIAGNOSIS — B96.89 ACUTE BACTERIAL SINUSITIS: Primary | ICD-10-CM

## 2024-01-04 DIAGNOSIS — J01.90 ACUTE BACTERIAL SINUSITIS: Primary | ICD-10-CM

## 2024-01-04 PROCEDURE — 99213 OFFICE O/P EST LOW 20 MIN: CPT | Performed by: FAMILY MEDICINE

## 2024-01-04 RX ORDER — AZITHROMYCIN 250 MG/1
TABLET, FILM COATED ORAL
Qty: 6 TABLET | Refills: 0 | Status: SHIPPED | OUTPATIENT
Start: 2024-01-04

## 2024-01-04 RX ORDER — LORATADINE 10 MG/1
10 TABLET ORAL DAILY
Qty: 90 TABLET | Refills: 1 | Status: SHIPPED | OUTPATIENT
Start: 2024-01-04

## 2024-01-04 NOTE — PROGRESS NOTES
for in-person clinic visit.   Patient and provider were located at their individual homes.    Lina Landeros, was evaluated through a synchronous (real-time) audio-video encounter. The patient (or guardian if applicable) is aware that this is a billable service, which includes applicable co-pays. This Virtual Visit was conducted with patient's (and/or legal guardian's) consent. Patient identification was verified, and a caregiver was present when appropriate.   The patient was located at Home: [address] [place] [address]  Provider was located at Home [place]        --Hernandez Robles MD on 1/4/2024 at 9:37 AM    An electronic signature was used to authenticate this note.    I have discussed the diagnosis with the patient and the intended plan as seen in the above orders.  The patient understands and agrees with the plan. The patient has received an after-visit summary and questions were answered concerning future plans.     Medication Side Effects and Warnings were discussed with patient  Patient Labs were reviewed and or requested:  Patient Past Records were reviewed and or requested    Hernandez Robles M.D.    There are no Patient Instructions on file for this visit.    Please note that this dictation was completed with [employer], the computer voice recognition software.  Quite often unanticipated grammatical, syntax, homophones, and other interpretive errors are inadvertently transcribed by the computer software.  Please disregard these errors.  Please excuse any errors that have escaped final proofreading.  Thank you.

## 2024-01-18 NOTE — PATIENT INSTRUCTIONS
Chief Complaint:    Chief Complaint   Patient presents with    Urinary Catheter        Vital Signs:   There were no vitals taken for this visit.  There is no height or weight on file to calculate BMI.      HPI:  Stephenie Valdez Jr. is a 89 y.o. male who presents today for {Blank multiple:02713}    History of Present Illness    Past Medical History:  Past Medical History:   Diagnosis Date    Dementia     Renal disorder        Current Meds:  Current Outpatient Medications   Medication Sig Dispense Refill    Apixaban Starter Pack tablet therapy pack Take two 5 mg tablets by mouth every 12 hours for 7 days. Followed by one 5 mg tablet every 12 hours. (Dispense starter pack if available) 74 tablet 0    cefdinir (OMNICEF) 300 MG capsule Take 1 capsule by mouth Daily. 7 capsule 0    cetirizine (zyrTEC) 10 MG tablet Take 1 tablet by mouth Daily.      clotrimazole-betamethasone (Lotrisone) 1-0.05 % cream Apply 1 application topically to the appropriate area as directed 2 (Two) Times a Day. 45 g 2    Cyanocobalamin (Vitamin B 12) 100 MCG lozenge Take 100 mcg by mouth 1 (One) Time Per Week.      furosemide (LASIX) 20 MG tablet Take 1 tablet by mouth Daily As Needed.      imiquimod (ALDARA) 5 % cream Apply to warts 3 times weekly at bedtime.  Leave on for 6 to 10 hours. 12 each 1    phenazopyridine (Pyridium) 200 MG tablet Take 1 tablet by mouth 3 (Three) Times a Day As Needed for Bladder Spasms. 20 tablet 0    potassium chloride (MICRO-K) 10 MEQ CR capsule TAKE 1 TABLET BY MOUTH EVERY DAY WITH LASIX AS NEEDED      terazosin (HYTRIN) 5 MG capsule Take 1 capsule by mouth Every Night.       No current facility-administered medications for this visit.        Allergies:   Allergies   Allergen Reactions    Penicillins Hives        Past Surgical History:  No past surgical history on file.    Social History:  Social History     Socioeconomic History    Marital status:    Tobacco Use    Smoking status: Former    Smokeless tobacco:  Sinusitis: Care Instructions Your Care Instructions Sinusitis is an infection of the lining of the sinus cavities in your head. Sinusitis often follows a cold. It causes pain and pressure in your head and face. In most cases, sinusitis gets better on its own in 1 to 2 weeks. But some mild symptoms may last for several weeks. Sometimes antibiotics are needed. Follow-up care is a key part of your treatment and safety. Be sure to make and go to all appointments, and call your doctor if you are having problems. It's also a good idea to know your test results and keep a list of the medicines you take. How can you care for yourself at home? · Take an over-the-counter pain medicine, such as acetaminophen (Tylenol), ibuprofen (Advil, Motrin), or naproxen (Aleve). Read and follow all instructions on the label. · If the doctor prescribed antibiotics, take them as directed. Do not stop taking them just because you feel better. You need to take the full course of antibiotics. · Be careful when taking over-the-counter cold or flu medicines and Tylenol at the same time. Many of these medicines have acetaminophen, which is Tylenol. Read the labels to make sure that you are not taking more than the recommended dose. Too much acetaminophen (Tylenol) can be harmful. · Breathe warm, moist air from a steamy shower, a hot bath, or a sink filled with hot water. Avoid cold, dry air. Using a humidifier in your home may help. Follow the directions for cleaning the machine. · Use saline (saltwater) nasal washes to help keep your nasal passages open and wash out mucus and bacteria. You can buy saline nose drops at a grocery store or drugstore. Or you can make your own at home by adding 1 teaspoon of salt and 1 teaspoon of baking soda to 2 cups of distilled water. If you make your own, fill a bulb syringe with the solution, insert the tip into your nostril, and squeeze gently. Isidoro Ijeoma your nose. Never   Vaping Use    Vaping Use: Never used   Substance and Sexual Activity    Alcohol use: Never    Drug use: Never    Sexual activity: Defer       Family History:  Family History   Problem Relation Age of Onset    No Known Problems Father     No Known Problems Mother        Review of Systems:  Review of Systems   Constitutional:  Negative for fatigue, fever and unexpected weight change.   Respiratory:  Negative for chest tightness and shortness of breath.    Cardiovascular:  Negative for chest pain.   Gastrointestinal:  Negative for abdominal pain, constipation, diarrhea, nausea and vomiting.   Genitourinary:  Positive for genital sores. Negative for difficulty urinating, dysuria, frequency, penile discharge, penile pain, penile swelling and urgency.   Skin:  Negative for rash.   Psychiatric/Behavioral:  Negative for confusion and suicidal ideas.      Physical Exam:  Physical Exam    IPSS Questionnaire (AUA-7):  IPSS Questionnaire (AUA-7):                  IPSS Questionnaire (AUA-7):  Over the past month…    1)  Incomplete Emptying  How often have you had a sensation of not emptying your bladder?  {Ratin}   2)  Frequency  How often have you had to urinate less than every two hours? {Ratin}   3)  Intermittency  How often have you found you stopped and started again several times when you urinated?  {Ratin}   4) Urgency  How often have you found it difficult to postpone urination?  {Ratin}   5) Weak Stream  How often have you had a weak urinary stream?  {Ratin}   6) Straining  How often have you had to push or strain to begin urination?  {Ratin}   7) Nocturia  How many times did you typically get up at night to urinate?  {Ratin}   Total Score:  {0-35:70763}   The International Prostate Symptom Score (IPSS) is used to screen, diagnose, track symptoms of benign prostatic hyperplasia (BPH).    0-7 pts (Mild Symptoms)  / 8-19 pts (Moderate) / 20-35 (Severe)    Quality of  · Put a hot, wet towel or a warm gel pack on your face 3 or 4 times a day for 5 to 10 minutes each time. · Try a decongestant nasal spray like oxymetazoline (Afrin). Do not use it for more than 3 days in a row. Using it for more than 3 days can make your congestion worse. When should you call for help? Call your doctor now or seek immediate medical care if: 
  · You have new or worse swelling or redness in your face or around your eyes.  
  · You have a new or higher fever.  
 Watch closely for changes in your health, and be sure to contact your doctor if: 
  · You have new or worse facial pain.  
  · The mucus from your nose becomes thicker (like pus) or has new blood in it.  
  · You are not getting better as expected. Where can you learn more? Go to http://sallie-bladimir.info/ Enter A837 in the search box to learn more about \"Sinusitis: Care Instructions. \" Current as of: July 28, 2019Content Version: 12.4 © 3398-5709 [employer]wise[employer] Incorporated. Care instructions adapted under license by [employer] (which disclaims liability or warranty for this information). If you have questions about a medical condition or this instruction, always ask your healthcare professional. [address] any warranty or liability for your use of this information. life due to urinary symptoms:  If you were to spend the rest of your life with your urinary condition the way it is now, how would you feel about that? {Ratin}   Urine Leakage (Incontinence) {Ratin}       Recent Image (CT and/or KUB):   CT Abdomen and Pelvis: No results found for this or any previous visit.     CT Stone Protocol: Results for orders placed during the hospital encounter of 22    CT Abdomen Pelvis Stone Protocol    Narrative  CT Abdomen Pelvis WO    INDICATION:  Hematuria    TECHNIQUE:  CT of the abdomen and pelvis without IV contrast. Coronal and sagittal reconstructions were obtained.  Radiation dose reduction techniques included automated exposure control or exposure modulation based on body size. Count of known CT and cardiac nuc  med studies performed in previous 12 months: 1.    COMPARISON:  2022    FINDINGS:  Abdomen: Multiple hepatic cysts are again seen, unchanged. Nonspecific low-density splenic lesions are again noted, also unchanged. The pancreas is unremarkable. There is a 2 to 3 cm duodenal diverticulum again noted. Bilateral hydronephrosis is seen and  this has increased since the previous examination. Both ureters are dilated down to the bladder but no ureteral stones are seen. The bladder is distended. There is a Leos catheter. The balloon is within the penile urethra. No evidence of retroperitoneal  adenopathy. No distended bowel loops.    Pelvis: Normal appendix. No adenopathy or pelvic mass.    Impression  The Leos catheter has slipped distally such that the balloon is in the penile urethra. The bladder is distended consistent with bladder outlet obstruction and there is bilateral hydronephrosis.          Signer Name: Ricky Arredondo MD  Signed: 2022 7:13 PM  Workstation Name: [id number]  [facility]     KUB: No results found for this or any previous visit.       Labs:  Brief Urine Lab Results  (Last result in the past 365 days)         Color   Clarity   Blood   Leuk Est   Nitrite   Protein   CREAT   Urine HCG        10/16/23 1957 Yellow   Clear   Small (1+)   Large (3+)   Positive   Negative                 Admission on 01/17/2024, Discharged on 01/17/2024   Component Date Value Ref Range Status    Glucose 01/17/2024 109 (H)  65 - 99 mg/dL Final    BUN 01/17/2024 20  8 - 23 mg/dL Final    Creatinine 01/17/2024 1.43 (H)  0.76 - 1.27 mg/dL Final    Sodium 01/17/2024 138  136 - 145 mmol/L Final    Potassium 01/17/2024 4.9  3.5 - 5.2 mmol/L Final    Chloride 01/17/2024 102  98 - 107 mmol/L Final    CO2 01/17/2024 26.9  22.0 - 29.0 mmol/L Final    Calcium 01/17/2024 9.3  8.2 - 9.6 mg/dL Final    Total Protein 01/17/2024 6.9  6.0 - 8.5 g/dL Final    Albumin 01/17/2024 3.8  3.5 - 5.2 g/dL Final    ALT (SGPT) 01/17/2024 13  1 - 41 U/L Final    AST (SGOT) 01/17/2024 16  1 - 40 U/L Final    Alkaline Phosphatase 01/17/2024 95  39 - 117 U/L Final    Total Bilirubin 01/17/2024 0.5  0.0 - 1.2 mg/dL Final    Globulin 01/17/2024 3.1  gm/dL Final    A/G Ratio 01/17/2024 1.2  g/dL Final    BUN/Creatinine Ratio 01/17/2024 14.0  7.0 - 25.0 Final    Anion Gap 01/17/2024 9.1  5.0 - 15.0 mmol/L Final    eGFR 01/17/2024 46.3 (L)  >60.0 mL/min/1.73 Final    WBC 01/17/2024 9.55  3.40 - 10.80 10*3/mm3 Final    RBC 01/17/2024 4.99  4.14 - 5.80 10*6/mm3 Final    Hemoglobin 01/17/2024 14.5  13.0 - 17.7 g/dL Final    Hematocrit 01/17/2024 45.8  37.5 - 51.0 % Final    MCV 01/17/2024 91.8  79.0 - 97.0 fL Final    MCH 01/17/2024 29.1  26.6 - 33.0 pg Final    MCHC 01/17/2024 31.7  31.5 - 35.7 g/dL Final    RDW 01/17/2024 15.2  12.3 - 15.4 % Final    RDW-SD 01/17/2024 50.5  37.0 - 54.0 fl Final    MPV 01/17/2024 9.6  6.0 - 12.0 fL Final    Platelets 01/17/2024 149  140 - 450 10*3/mm3 Final    Neutrophil % 01/17/2024 76.1 (H)  42.7 - 76.0 % Final    Lymphocyte % 01/17/2024 14.1 (L)  19.6 - 45.3 % Final    Monocyte % 01/17/2024 8.3  5.0 - 12.0 % Final    Eosinophil % 01/17/2024  0.7  0.3 - 6.2 % Final    Basophil % 01/17/2024 0.3  0.0 - 1.5 % Final    Immature Grans % 01/17/2024 0.5  0.0 - 0.5 % Final    Neutrophils, Absolute 01/17/2024 7.26 (H)  1.70 - 7.00 10*3/mm3 Final    Lymphocytes, Absolute 01/17/2024 1.35  0.70 - 3.10 10*3/mm3 Final    Monocytes, Absolute 01/17/2024 0.79  0.10 - 0.90 10*3/mm3 Final    Eosinophils, Absolute 01/17/2024 0.07  0.00 - 0.40 10*3/mm3 Final    Basophils, Absolute 01/17/2024 0.03  0.00 - 0.20 10*3/mm3 Final    Immature Grans, Absolute 01/17/2024 0.05  0.00 - 0.05 10*3/mm3 Final    nRBC 01/17/2024 0.0  0.0 - 0.2 /100 WBC Final    Extra Tube 01/17/2024 Hold for add-ons.   Final    Auto resulted.    Extra Tube 01/17/2024 hold for add-on   Final    Auto resulted    Extra Tube 01/17/2024 Hold for add-ons.   Final    Auto resulted.    Extra Tube 01/17/2024 Hold for add-ons.   Final    Auto resulted        Procedure:  Procedures     Assessment/Plan:   Problem List Items Addressed This Visit    None      Health Maintenance:   Health Maintenance Due   Topic Date Due    ZOSTER VACCINE (1 of 2) Never done    Pneumococcal Vaccine 65+ (1 of 1 - PCV) Never done    ANNUAL WELLNESS VISIT  Never done    BMI FOLLOWUP  06/30/2023    INFLUENZA VACCINE  Never done    COVID-19 Vaccine (3 - 2023-24 season) 09/01/2023        Smoking Counseling:    Urine Incontinence: Patient reports that he is not currently experiencing any symptoms of urinary incontinence.    Patient was given instructions and counseling regarding his condition or for health maintenance advice. Please see specific information pulled into the AVS if appropriate.    Patient Education:   ***     Visit Diagnoses:  No diagnosis found.    Meds Ordered During Visit:  No orders of the defined types were placed in this encounter.      Follow Up Appointment:   No follow-ups on file.      This document has been electronically signed by Angelica Painting MA   January 18, 2024 10:42 EST    Part of this note may be an  electronic transcription/translation of spoken language to printed text using the Dragon Dictation System.

## 2024-01-29 RX ORDER — LEVOTHYROXINE SODIUM 0.12 MG/1
TABLET ORAL
Qty: 60 TABLET | Refills: 0 | Status: SHIPPED | OUTPATIENT
Start: 2024-01-29

## 2024-02-21 ENCOUNTER — TELEPHONE (OUTPATIENT)
Age: 39
End: 2024-02-21
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
## 2024-02-22 ENCOUNTER — HOSPITAL ENCOUNTER (EMERGENCY)
Facility: HOSPITAL | Age: 39
Discharge: HOME | End: 2024-02-22
Attending: STUDENT IN AN ORGANIZED HEALTH CARE EDUCATION/TRAINING PROGRAM
Payer: MEDICARE

## 2024-02-22 ENCOUNTER — CLINICAL DOCUMENTATION (OUTPATIENT)
Age: 39
End: 2024-02-22

## 2024-02-22 VITALS
HEIGHT: 64 IN | WEIGHT: 240 LBS | TEMPERATURE: 98 F | OXYGEN SATURATION: 97 % | RESPIRATION RATE: 16 BRPM | SYSTOLIC BLOOD PRESSURE: 149 MMHG | DIASTOLIC BLOOD PRESSURE: 89 MMHG | BODY MASS INDEX: 40.97 KG/M2 | HEART RATE: 81 BPM
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
## 2024-02-22 LAB
APPEARANCE UR: ABNORMAL
BACTERIA URNS QL MICRO: ABNORMAL /HPF
BILIRUB UR QL: NEGATIVE
CLUE CELLS VAG QL WET PREP: NORMAL
COLOR UR: ABNORMAL
EPITH CASTS URNS QL MICRO: ABNORMAL /LPF
GLUCOSE UR STRIP.AUTO-MCNC: NEGATIVE MG/DL
HCG UR QL: NEGATIVE
HGB UR QL STRIP: NEGATIVE
KETONES UR QL STRIP.AUTO: 5 MG/DL
KOH PREP SPEC: NORMAL
LEUKOCYTE ESTERASE UR QL STRIP.AUTO: NEGATIVE
Lab: NORMAL
MUCOUS THREADS URNS QL MICRO: ABNORMAL /LPF
NITRITE UR QL STRIP.AUTO: NEGATIVE
PH UR STRIP: 7 (ref 5–8)
PROT UR STRIP-MCNC: 30 MG/DL
RBC #/AREA URNS HPF: ABNORMAL /HPF (ref 0–5)
SP GR UR REFRACTOMETRY: 1.02 (ref 1–1.03)
T VAGINALIS VAG QL WET PREP: NEGATIVE
URINE CULTURE IF INDICATED: ABNORMAL
UROBILINOGEN UR QL STRIP.AUTO: 4 EU/DL (ref 0.1–1)
WBC URNS QL MICRO: ABNORMAL /HPF (ref 0–4)

## 2024-02-22 PROCEDURE — 4500000002 HC ER NO CHARGE

## 2024-02-22 PROCEDURE — 86592 SYPHILIS TEST NON-TREP QUAL: CPT

## 2024-02-22 PROCEDURE — 6360000002 HC RX W HCPCS: Performed by: STUDENT IN AN ORGANIZED HEALTH CARE EDUCATION/TRAINING PROGRAM

## 2024-02-22 PROCEDURE — 96372 THER/PROPH/DIAG INJ SC/IM: CPT

## 2024-02-22 PROCEDURE — 6370000000 HC RX 637 (ALT 250 FOR IP): Performed by: EMERGENCY MEDICINE

## 2024-02-22 PROCEDURE — 81025 URINE PREGNANCY TEST: CPT

## 2024-02-22 PROCEDURE — 87591 N.GONORRHOEAE DNA AMP PROB: CPT

## 2024-02-22 PROCEDURE — 87070 CULTURE OTHR SPECIMN AEROBIC: CPT

## 2024-02-22 PROCEDURE — 6370000000 HC RX 637 (ALT 250 FOR IP): Performed by: STUDENT IN AN ORGANIZED HEALTH CARE EDUCATION/TRAINING PROGRAM

## 2024-02-22 PROCEDURE — 87491 CHLMYD TRACH DNA AMP PROBE: CPT

## 2024-02-22 PROCEDURE — 81001 URINALYSIS AUTO W/SCOPE: CPT

## 2024-02-22 PROCEDURE — 99281 EMR DPT VST MAYX REQ PHY/QHP: CPT

## 2024-02-22 PROCEDURE — 87140 CULTURE TYPE IMMUNOFLUORESC: CPT

## 2024-02-22 PROCEDURE — 87210 SMEAR WET MOUNT SALINE/INK: CPT

## 2024-02-22 PROCEDURE — 2580000003 HC RX 258: Performed by: STUDENT IN AN ORGANIZED HEALTH CARE EDUCATION/TRAINING PROGRAM

## 2024-02-22 PROCEDURE — 87110 CHLAMYDIA CULTURE: CPT

## 2024-02-22 RX ORDER — DOXYCYCLINE HYCLATE 100 MG/1
100 CAPSULE ORAL ONCE
Status: COMPLETED | OUTPATIENT
Start: 2024-02-22 | End: 2024-02-22

## 2024-02-22 RX ORDER — EMTRICITABINE AND TENOFOVIR DISOPROXIL FUMARATE 200; 300 MG/1; MG/1
1 TABLET, FILM COATED ORAL ONCE
Status: DISCONTINUED | OUTPATIENT
Start: 2024-02-22 | End: 2024-02-22

## 2024-02-22 RX ORDER — AZITHROMYCIN 500 MG/1
1000 TABLET, FILM COATED ORAL
Status: COMPLETED | OUTPATIENT
Start: 2024-02-22 | End: 2024-02-22

## 2024-02-22 RX ADMIN — CEFTRIAXONE SODIUM 500 MG: 500 INJECTION, POWDER, FOR SOLUTION INTRAMUSCULAR; INTRAVENOUS at 16:35

## 2024-02-22 RX ADMIN — AZITHROMYCIN DIHYDRATE 1000 MG: 500 TABLET ORAL at 16:36

## 2024-02-22 RX ADMIN — DOXYCYCLINE HYCLATE 100 MG: 100 CAPSULE ORAL at 16:36

## 2024-02-22 ASSESSMENT — PAIN SCALES - GENERAL: PAINLEVEL_OUTOF10: 7

## 2024-02-22 ASSESSMENT — PAIN DESCRIPTION - PAIN TYPE: TYPE: ACUTE PAIN

## 2024-02-22 ASSESSMENT — LIFESTYLE VARIABLES
HOW MANY STANDARD DRINKS CONTAINING ALCOHOL DO YOU HAVE ON A TYPICAL DAY: PATIENT DOES NOT DRINK
HOW OFTEN DO YOU HAVE A DRINK CONTAINING ALCOHOL: NEVER

## 2024-02-22 ASSESSMENT — PAIN - FUNCTIONAL ASSESSMENT: PAIN_FUNCTIONAL_ASSESSMENT: 0-10
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
## 2024-02-23 LAB
C TRACH DNA SPEC QL NAA+PROBE: NEGATIVE
C TRACH DNA SPEC QL NAA+PROBE: NEGATIVE
N GONORRHOEA DNA SPEC QL NAA+PROBE: NEGATIVE
N GONORRHOEA DNA SPEC QL NAA+PROBE: NEGATIVE
RPR SER QL: NONREACTIVE
SAMPLE TYPE: NORMAL
SAMPLE TYPE: NORMAL
SERVICE CMNT-IMP: NORMAL
SERVICE CMNT-IMP: NORMAL
SPECIMEN SOURCE: NORMAL
SPECIMEN SOURCE: NORMAL

## 2024-02-23 SDOH — HEALTH STABILITY: PHYSICAL HEALTH: ON AVERAGE, HOW MANY MINUTES DO YOU ENGAGE IN EXERCISE AT THIS LEVEL?: 10 MIN

## 2024-02-23 SDOH — HEALTH STABILITY: PHYSICAL HEALTH: ON AVERAGE, HOW MANY DAYS PER WEEK DO YOU ENGAGE IN MODERATE TO STRENUOUS EXERCISE (LIKE A BRISK WALK)?: 2 DAYS

## 2024-02-23 ASSESSMENT — LIFESTYLE VARIABLES
HOW OFTEN DO YOU HAVE A DRINK CONTAINING ALCOHOL: 1
HOW MANY STANDARD DRINKS CONTAINING ALCOHOL DO YOU HAVE ON A TYPICAL DAY: PATIENT DECLINED
HOW MANY STANDARD DRINKS CONTAINING ALCOHOL DO YOU HAVE ON A TYPICAL DAY: 98
HOW OFTEN DO YOU HAVE A DRINK CONTAINING ALCOHOL: NEVER
HOW OFTEN DO YOU HAVE SIX OR MORE DRINKS ON ONE OCCASION: 98

## 2024-02-23 ASSESSMENT — PATIENT HEALTH QUESTIONNAIRE - PHQ9
SUM OF ALL RESPONSES TO PHQ QUESTIONS 1-9: 2
1. LITTLE INTEREST OR PLEASURE IN DOING THINGS: 0
SUM OF ALL RESPONSES TO PHQ QUESTIONS 1-9: 2
SUM OF ALL RESPONSES TO PHQ QUESTIONS 1-9: 2
SUM OF ALL RESPONSES TO PHQ9 QUESTIONS 1 & 2: 2
2. FEELING DOWN, DEPRESSED OR HOPELESS: 2
SUM OF ALL RESPONSES TO PHQ QUESTIONS 1-9: 2

## 2024-02-26 ENCOUNTER — OFFICE VISIT (OUTPATIENT)
Age: 39
End: 2024-02-26
Payer: MEDICARE

## 2024-02-26 VITALS
BODY MASS INDEX: 44.9 KG/M2 | HEART RATE: 86 BPM | SYSTOLIC BLOOD PRESSURE: 112 MMHG | OXYGEN SATURATION: 95 % | TEMPERATURE: 98.6 F | HEIGHT: 64 IN | DIASTOLIC BLOOD PRESSURE: 61 MMHG | RESPIRATION RATE: 18 BRPM | WEIGHT: 263 LBS

## 2024-02-26 DIAGNOSIS — D50.9 IRON DEFICIENCY ANEMIA, UNSPECIFIED IRON DEFICIENCY ANEMIA TYPE: ICD-10-CM

## 2024-02-26 DIAGNOSIS — E03.9 HYPOTHYROIDISM, UNSPECIFIED TYPE: ICD-10-CM

## 2024-02-26 DIAGNOSIS — E78.2 MIXED HYPERLIPIDEMIA: ICD-10-CM

## 2024-02-26 DIAGNOSIS — Z00.00 WELL EXAM WITHOUT ABNORMAL FINDINGS OF PATIENT 18 YEARS OF AGE OR OLDER: Primary | ICD-10-CM

## 2024-02-26 DIAGNOSIS — R73.01 IMPAIRED FASTING GLUCOSE: ICD-10-CM

## 2024-02-26 DIAGNOSIS — T74.21XD SEXUAL ASSAULT OF ADULT, SUBSEQUENT ENCOUNTER: ICD-10-CM

## 2024-02-26 PROCEDURE — G0439 PPPS, SUBSEQ VISIT: HCPCS | Performed by: NURSE PRACTITIONER

## 2024-02-26 PROCEDURE — 99214 OFFICE O/P EST MOD 30 MIN: CPT | Performed by: NURSE PRACTITIONER

## 2024-02-26 RX ORDER — LEVOTHYROXINE SODIUM 0.12 MG/1
TABLET ORAL
Qty: 60 TABLET | Refills: 5 | Status: SHIPPED | OUTPATIENT
Start: 2024-02-26

## 2024-02-26 RX ORDER — LEVOTHYROXINE SODIUM 0.12 MG/1
TABLET ORAL
Qty: 60 TABLET | Refills: 0 | OUTPATIENT
Start: 2024-02-26

## 2024-02-26 ASSESSMENT — PATIENT HEALTH QUESTIONNAIRE - PHQ9
6. FEELING BAD ABOUT YOURSELF - OR THAT YOU ARE A FAILURE OR HAVE LET YOURSELF OR YOUR FAMILY DOWN: 0
2. FEELING DOWN, DEPRESSED OR HOPELESS: 3
SUM OF ALL RESPONSES TO PHQ QUESTIONS 1-9: 6
SUM OF ALL RESPONSES TO PHQ9 QUESTIONS 1 & 2: 3
3. TROUBLE FALLING OR STAYING ASLEEP: 3
7. TROUBLE CONCENTRATING ON THINGS, SUCH AS READING THE NEWSPAPER OR WATCHING TELEVISION: 0
5. POOR APPETITE OR OVEREATING: 0
10. IF YOU CHECKED OFF ANY PROBLEMS, HOW DIFFICULT HAVE THESE PROBLEMS MADE IT FOR YOU TO DO YOUR WORK, TAKE CARE OF THINGS AT HOME, OR GET ALONG WITH OTHER PEOPLE: 0
SUM OF ALL RESPONSES TO PHQ QUESTIONS 1-9: 6
SUM OF ALL RESPONSES TO PHQ QUESTIONS 1-9: 6
9. THOUGHTS THAT YOU WOULD BE BETTER OFF DEAD, OR OF HURTING YOURSELF: 0
SUM OF ALL RESPONSES TO PHQ QUESTIONS 1-9: 6
4. FEELING TIRED OR HAVING LITTLE ENERGY: 0
1. LITTLE INTEREST OR PLEASURE IN DOING THINGS: 0
8. MOVING OR SPEAKING SO SLOWLY THAT OTHER PEOPLE COULD HAVE NOTICED. OR THE OPPOSITE, BEING SO FIGETY OR RESTLESS THAT YOU HAVE BEEN MOVING AROUND A LOT MORE THAN USUAL: 0

## 2024-02-26 ASSESSMENT — LIFESTYLE VARIABLES
HOW OFTEN DO YOU HAVE A DRINK CONTAINING ALCOHOL: NEVER
HOW MANY STANDARD DRINKS CONTAINING ALCOHOL DO YOU HAVE ON A TYPICAL DAY: PATIENT DOES NOT DRINK

## 2024-02-26 NOTE — PATIENT INSTRUCTIONS
Forms for cpx and special olympics will be picked up by Gabriela Ruggiero #[phone]       Preventing Falls: Care Instructions  Injuries and health problems such as trouble walking or poor eyesight can increase your risk of falling. So can some medicines. But there are things you can do to help prevent falls. You can exercise to get stronger. You can also arrange your home to make it safer.    Talk to your doctor about the medicines you take. Ask if any of them increase the risk of falls and whether they can be changed or stopped.   Try to exercise regularly. It can help improve your strength and balance. This can help lower your risk of falling.     Practice fall safety and prevention.    Wear low-heeled shoes that fit well and give your feet good support. Talk to your doctor if you have foot problems that make this hard.  Carry a cellphone or wear a medical alert device that you can use to call for help.  Use stepladders instead of chairs to reach high objects. Don't climb if you're at risk for falls. Ask for help, if needed.  Wear the correct eyeglasses, if you need them.    Make your home safer.    Remove rugs, cords, clutter, and furniture from walkways.  Keep your house well lit. Use night-lights in hallways and bathrooms.  Install and use sturdy handrails on stairways.  Wear nonskid footwear, even inside. Don't walk barefoot or in socks without shoes.    Be safe outside.    Use handrails, curb cuts, and ramps whenever possible.  Keep your hands free by using a shoulder bag or backpack.  Try to walk in well-lit areas. Watch out for uneven ground, changes in pavement, and debris.  Be careful in the winter. Walk on the grass or gravel when sidewalks are slippery. Use de-icer on steps and walkways. Add non-slip devices to shoes.    Put grab bars and nonskid mats in your shower or tub and near the toilet. Try to use a shower chair or bath bench when bathing.   Get into a tub or shower by putting in your weaker leg
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
## 2024-02-27 ENCOUNTER — CLINICAL DOCUMENTATION (OUTPATIENT)
Age: 39
End: 2024-02-27

## 2024-02-27 LAB
ALBUMIN SERPL-MCNC: 4.3 G/DL (ref 3.9–4.9)
ALBUMIN/GLOB SERPL: 2.3 {RATIO} (ref 1.2–2.2)
ALP SERPL-CCNC: 90 IU/L (ref 44–121)
ALT SERPL-CCNC: 26 IU/L (ref 0–32)
AST SERPL-CCNC: 20 IU/L (ref 0–40)
BASOPHILS # BLD AUTO: 0.1 X10E3/UL (ref 0–0.2)
BASOPHILS NFR BLD AUTO: 1 %
BILIRUB SERPL-MCNC: 0.5 MG/DL (ref 0–1.2)
BUN SERPL-MCNC: 9 MG/DL (ref 6–20)
BUN/CREAT SERPL: 10 (ref 9–23)
C TRACH DNA ANORECTL QL NAA+PROBE: NEGATIVE
C TRACH RRNA NPH QL NAA+PROBE: NEGATIVE
CALCIUM SERPL-MCNC: 9 MG/DL (ref 8.7–10.2)
CHLORIDE SERPL-SCNC: 106 MMOL/L (ref 96–106)
CHOLEST SERPL-MCNC: 179 MG/DL (ref 100–199)
CO2 SERPL-SCNC: 19 MMOL/L (ref 20–29)
CREAT SERPL-MCNC: 0.9 MG/DL (ref 0.57–1)
EGFRCR SERPLBLD CKD-EPI 2021: 85 ML/MIN/1.73
EOSINOPHIL # BLD AUTO: 0.3 X10E3/UL (ref 0–0.4)
EOSINOPHIL NFR BLD AUTO: 4 %
ERYTHROCYTE [DISTWIDTH] IN BLOOD BY AUTOMATED COUNT: 12.4 % (ref 11.7–15.4)
GLOBULIN SER CALC-MCNC: 1.9 G/DL (ref 1.5–4.5)
GLUCOSE SERPL-MCNC: 89 MG/DL (ref 70–99)
HBA1C MFR BLD: 5.3 % (ref 4.8–5.6)
HCT VFR BLD AUTO: 39.2 % (ref 34–46.6)
HDLC SERPL-MCNC: 37 MG/DL
HGB BLD-MCNC: 12.8 G/DL (ref 11.1–15.9)
IMM GRANULOCYTES # BLD AUTO: 0 X10E3/UL (ref 0–0.1)
IMM GRANULOCYTES NFR BLD AUTO: 0 %
IMP & REVIEW OF LAB RESULTS: NORMAL
LDLC SERPL CALC-MCNC: 128 MG/DL (ref 0–99)
LYMPHOCYTES # BLD AUTO: 2.1 X10E3/UL (ref 0.7–3.1)
LYMPHOCYTES NFR BLD AUTO: 26 %
MCH RBC QN AUTO: 31.5 PG (ref 26.6–33)
MCHC RBC AUTO-ENTMCNC: 32.7 G/DL (ref 31.5–35.7)
MCV RBC AUTO: 97 FL (ref 79–97)
MONOCYTES # BLD AUTO: 0.4 X10E3/UL (ref 0.1–0.9)
MONOCYTES NFR BLD AUTO: 5 %
N GONORRHOEA RRNA ANORECTL QL NAA+PROBE: NEGATIVE
N GONORRHOEA RRNA NPH QL NAA+PROBE: NEGATIVE
NEUTROPHILS # BLD AUTO: 5.3 X10E3/UL (ref 1.4–7)
NEUTROPHILS NFR BLD AUTO: 64 %
PLATELET # BLD AUTO: 293 X10E3/UL (ref 150–450)
POTASSIUM SERPL-SCNC: 3.7 MMOL/L (ref 3.5–5.2)
PROT SERPL-MCNC: 6.2 G/DL (ref 6–8.5)
RBC # BLD AUTO: 4.06 X10E6/UL (ref 3.77–5.28)
SODIUM SERPL-SCNC: 140 MMOL/L (ref 134–144)
SPECIMEN SOURCE: NORMAL
SPECIMEN SOURCE: NORMAL
TRIGL SERPL-MCNC: 76 MG/DL (ref 0–149)
TSH SERPL DL<=0.005 MIU/L-ACNC: 0.61 UIU/ML (ref 0.45–4.5)
VLDLC SERPL CALC-MCNC: 14 MG/DL (ref 5–40)
WBC # BLD AUTO: 8.1 X10E3/UL (ref 3.4–10.8)

## 2024-03-06 ENCOUNTER — HOSPITAL ENCOUNTER (EMERGENCY)
Facility: HOSPITAL | Age: 39
Discharge: HOME | End: 2024-03-06
Payer: MEDICARE

## 2024-03-06 VITALS
SYSTOLIC BLOOD PRESSURE: 117 MMHG | BODY MASS INDEX: 44.9 KG/M2 | WEIGHT: 263 LBS | HEIGHT: 64 IN | RESPIRATION RATE: 15 BRPM | OXYGEN SATURATION: 97 % | TEMPERATURE: 98.4 F | DIASTOLIC BLOOD PRESSURE: 90 MMHG | HEART RATE: 75 BPM

## 2024-03-06 DIAGNOSIS — Z04.89 FORENSIC EXAMINATION PERFORMED: Primary | ICD-10-CM

## 2024-03-06 PROCEDURE — 99282 EMERGENCY DEPT VISIT SF MDM: CPT

## 2024-03-06 PROCEDURE — 4500000002 HC ER NO CHARGE

## 2024-03-06 ASSESSMENT — PAIN - FUNCTIONAL ASSESSMENT: PAIN_FUNCTIONAL_ASSESSMENT: 0-10

## 2024-03-06 ASSESSMENT — PAIN SCALES - GENERAL: PAINLEVEL_OUTOF10: 0

## 2024-03-06 NOTE — ED PROVIDER NOTES
Fe) MG tablet  Commonly known as: IRON 325     FLUoxetine 40 MG capsule  Commonly known as: PROzac     fluticasone 50 MCG/ACT nasal spray  Commonly known as: FLONASE     levothyroxine 125 MCG tablet  Commonly known as: SYNTHROID  TAKE 2 TABLETS BY MOUTH ONCE DAILY BEFORE BREAKFAST     loratadine 10 MG tablet  Commonly known as: CLARITIN  Take 1 tablet by mouth daily     pantoprazole 40 MG tablet  Commonly known as: PROTONIX     rosuvastatin 10 MG tablet  Commonly known as: CRESTOR     Sprintec 28 0.25-35 MG-MCG per tablet  Generic drug: norgestimate-ethinyl estradiol     traZODone 100 MG tablet  Commonly known as: DESYREL                DISCONTINUED MEDICATIONS:  Discharge Medication List as of 3/6/2024 11:14 AM          I am the Primary Clinician of Record: Shantal Garcia PA-C (electronically signed)    (Please note that parts of this dictation were completed with voice recognition software. Quite often unanticipated grammatical, syntax, homophones, and other interpretive errors are inadvertently transcribed by the computer software. Please disregards these errors. Please excuse any errors that have escaped final proofreading.)       Shantal Garcia PA-C  03/06/24 2028
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
## 2024-03-08 ENCOUNTER — TELEPHONE (OUTPATIENT)
Age: 39
End: 2024-03-08

## 2024-03-08 DIAGNOSIS — Z11.1 SCREENING-PULMONARY TB: Primary | ICD-10-CM

## 2024-03-08 NOTE — TELEPHONE ENCOUNTER
Patient came into office stating she needs a TB test done in order to go into residential housing. Can she be given a lab slip to have this completed? Last MWV 2/26/2024.     # [phone]

## 2024-03-11 NOTE — TELEPHONE ENCOUNTER
I have printed a lab slip. We can fax it over to a lab len if she tells us where she will be going to get it done. Pema

## 2024-03-19 ENCOUNTER — CLINICAL DOCUMENTATION (OUTPATIENT)
Age: 39
End: 2024-03-19

## 2024-03-19 NOTE — PROGRESS NOTES
[facility] medication list request for dosages was put on VINITA Lofton's desk to process   Yes

## 2024-03-27 RX ORDER — EPINEPHRINE 0.3 MG/.3ML
0.3 INJECTION SUBCUTANEOUS ONCE
Qty: 0.6 ML | Refills: 1 | Status: SHIPPED | OUTPATIENT
Start: 2024-03-27 | End: 2024-03-27

## 2024-05-02 DIAGNOSIS — B96.89 ACUTE BACTERIAL SINUSITIS: ICD-10-CM

## 2024-05-02 DIAGNOSIS — J01.90 ACUTE BACTERIAL SINUSITIS: ICD-10-CM

## 2024-05-02 RX ORDER — LORATADINE 10 MG/1
10 TABLET ORAL DAILY
Qty: 31 TABLET | Refills: 0 | Status: SHIPPED | OUTPATIENT
Start: 2024-05-02

## 2024-05-14 ENCOUNTER — CLINICAL DOCUMENTATION (OUTPATIENT)
Age: 39
End: 2024-05-14

## 2024-07-09 DIAGNOSIS — B96.89 ACUTE BACTERIAL SINUSITIS: ICD-10-CM

## 2024-07-09 DIAGNOSIS — J01.90 ACUTE BACTERIAL SINUSITIS: ICD-10-CM

## 2024-07-09 RX ORDER — LORATADINE 10 MG/1
10 TABLET ORAL DAILY
Qty: 31 TABLET | Refills: 0 | Status: SHIPPED | OUTPATIENT
Start: 2024-07-09

## 2024-07-09 RX ORDER — LEVOTHYROXINE SODIUM 0.12 MG/1
TABLET ORAL
Qty: 62 TABLET | Refills: 0 | Status: SHIPPED | OUTPATIENT
Start: 2024-07-09 | End: 2024-07-11

## 2024-07-10 DIAGNOSIS — J01.90 ACUTE BACTERIAL SINUSITIS: ICD-10-CM

## 2024-07-10 DIAGNOSIS — B96.89 ACUTE BACTERIAL SINUSITIS: ICD-10-CM

## 2024-07-11 RX ORDER — LORATADINE 10 MG/1
10 TABLET ORAL DAILY
Qty: 31 TABLET | Refills: 0 | OUTPATIENT
Start: 2024-07-11

## 2024-07-11 RX ORDER — LEVOTHYROXINE SODIUM 0.12 MG/1
TABLET ORAL
Qty: 62 TABLET | Refills: 0 | Status: SHIPPED | OUTPATIENT
Start: 2024-07-11

## 2024-08-25 ENCOUNTER — OFFICE VISIT (OUTPATIENT)
Age: 39
End: 2024-08-25

## 2024-08-25 VITALS
BODY MASS INDEX: 44.83 KG/M2 | OXYGEN SATURATION: 96 % | TEMPERATURE: 98.4 F | SYSTOLIC BLOOD PRESSURE: 140 MMHG | HEART RATE: 83 BPM | DIASTOLIC BLOOD PRESSURE: 80 MMHG | WEIGHT: 261.2 LBS

## 2024-08-25 DIAGNOSIS — R68.89 FLU-LIKE SYMPTOMS: ICD-10-CM

## 2024-08-25 DIAGNOSIS — H10.31 ACUTE BACTERIAL CONJUNCTIVITIS OF RIGHT EYE: ICD-10-CM

## 2024-08-25 DIAGNOSIS — U07.1 COVID: Primary | ICD-10-CM

## 2024-08-25 DIAGNOSIS — R03.0 ELEVATED BLOOD PRESSURE READING: ICD-10-CM

## 2024-08-25 LAB
Lab: ABNORMAL
PERFORMING INSTRUMENT: ABNORMAL
QC PASS/FAIL: ABNORMAL
SARS-COV-2, POC: DETECTED

## 2024-08-25 RX ORDER — GUAIFENESIN 200 MG/10ML
200 LIQUID ORAL 3 TIMES DAILY PRN
Qty: 210 ML | Refills: 0 | Status: SHIPPED | OUTPATIENT
Start: 2024-08-25 | End: 2024-08-25 | Stop reason: SDUPTHER

## 2024-08-25 RX ORDER — ERYTHROMYCIN 5 MG/G
OINTMENT OPHTHALMIC
Qty: 1 G | Refills: 0 | Status: SHIPPED | OUTPATIENT
Start: 2024-08-25 | End: 2024-08-25 | Stop reason: SDUPTHER

## 2024-08-25 RX ORDER — ERYTHROMYCIN 5 MG/G
OINTMENT OPHTHALMIC
Qty: 1 G | Refills: 0 | Status: SHIPPED | OUTPATIENT
Start: 2024-08-25 | End: 2024-09-04

## 2024-08-25 RX ORDER — GUAIFENESIN 200 MG/10ML
200 LIQUID ORAL 3 TIMES DAILY PRN
Qty: 210 ML | Refills: 0 | Status: SHIPPED | OUTPATIENT
Start: 2024-08-25 | End: 2024-09-01

## 2024-08-26 ENCOUNTER — TELEPHONE (OUTPATIENT)
Age: 39
End: 2024-08-26

## 2024-08-26 NOTE — TELEPHONE ENCOUNTER
Pt caregiver called and stated pt was seen here yesterday and pharmacy was out of stock for her PAXLOVID rx . Needs rx sent to [facility] off [id number] [address] , [facility] . Updated pharmacy on med list .

## 2024-09-05 RX ORDER — LEVOTHYROXINE SODIUM 125 UG/1
TABLET ORAL
Qty: 62 TABLET | Refills: 0 | Status: SHIPPED | OUTPATIENT
Start: 2024-09-05

## 2024-09-09 DIAGNOSIS — B96.89 ACUTE BACTERIAL SINUSITIS: ICD-10-CM

## 2024-09-09 DIAGNOSIS — J01.90 ACUTE BACTERIAL SINUSITIS: ICD-10-CM

## 2024-09-09 RX ORDER — LORATADINE 10 MG/1
10 TABLET ORAL DAILY
Qty: 31 TABLET | Refills: 11 | Status: SHIPPED | OUTPATIENT
Start: 2024-09-09

## 2024-09-30 ENCOUNTER — TELEPHONE (OUTPATIENT)
Age: 39
End: 2024-09-30

## 2024-09-30 SDOH — ECONOMIC STABILITY: FOOD INSECURITY: WITHIN THE PAST 12 MONTHS, THE FOOD YOU BOUGHT JUST DIDN'T LAST AND YOU DIDN'T HAVE MONEY TO GET MORE.: NEVER TRUE

## 2024-09-30 SDOH — ECONOMIC STABILITY: FOOD INSECURITY: WITHIN THE PAST 12 MONTHS, YOU WORRIED THAT YOUR FOOD WOULD RUN OUT BEFORE YOU GOT MONEY TO BUY MORE.: NEVER TRUE

## 2024-09-30 SDOH — ECONOMIC STABILITY: INCOME INSECURITY: HOW HARD IS IT FOR YOU TO PAY FOR THE VERY BASICS LIKE FOOD, HOUSING, MEDICAL CARE, AND HEATING?: NOT HARD AT ALL

## 2024-09-30 SDOH — ECONOMIC STABILITY: TRANSPORTATION INSECURITY
IN THE PAST 12 MONTHS, HAS LACK OF TRANSPORTATION KEPT YOU FROM MEETINGS, WORK, OR FROM GETTING THINGS NEEDED FOR DAILY LIVING?: NO

## 2024-09-30 NOTE — TELEPHONE ENCOUNTER
Left vm for Pt regarding appt on 10/3, provider needs to leave early and would lik to know if Pt can come in at either 745 or 930 on the same day, also can reschedule if necessary

## 2024-10-21 ENCOUNTER — OFFICE VISIT (OUTPATIENT)
Age: 39
End: 2024-10-21

## 2024-10-21 VITALS
WEIGHT: 266.4 LBS | HEIGHT: 64 IN | BODY MASS INDEX: 45.48 KG/M2 | RESPIRATION RATE: 18 BRPM | DIASTOLIC BLOOD PRESSURE: 72 MMHG | OXYGEN SATURATION: 97 % | SYSTOLIC BLOOD PRESSURE: 130 MMHG | TEMPERATURE: 98.2 F | HEART RATE: 84 BPM

## 2024-10-21 DIAGNOSIS — M79.601 PAIN OF RIGHT UPPER EXTREMITY: Primary | ICD-10-CM

## 2024-10-21 DIAGNOSIS — S52.021A CLOSED FRACTURE OF OLECRANON PROCESS OF RIGHT ULNA, INITIAL ENCOUNTER: ICD-10-CM

## 2024-10-21 RX ORDER — NORETHINDRONE ACETATE AND ETHINYL ESTRADIOL AND FERROUS FUMARATE 1MG-20(21)
KIT ORAL
COMMUNITY
Start: 2024-06-07

## 2024-10-21 RX ORDER — KETOROLAC TROMETHAMINE 30 MG/ML
30 INJECTION, SOLUTION INTRAMUSCULAR; INTRAVENOUS ONCE
Status: COMPLETED | OUTPATIENT
Start: 2024-10-21 | End: 2024-10-21

## 2024-10-21 RX ADMIN — KETOROLAC TROMETHAMINE 30 MG: 30 INJECTION, SOLUTION INTRAMUSCULAR; INTRAVENOUS at 20:19

## 2024-10-22 NOTE — PROGRESS NOTES
10/21/2024   Lina Landeros (: 1985) is a 39 y.o. female, New patient, here for evaluation of the following chief complaint(s):  Arm Pain (Fell on right arm on Friday. )     ASSESSMENT/PLAN:  Below is the assessment and plan developed based on review of pertinent history, physical exam, labs, studies, and medications.  1. Pain of right upper extremity  -     ketorolac (TORADOL) injection 30 mg; 30 mg, IntraMUSCular, ONCE, 1 dose, On Mon 10/21/24 at 2030Do not administer for more than 5 days  -     XR ELBOW RIGHT (MIN 3 VIEWS); Future  -     XR RADIUS ULNA RIGHT (2 VIEWS); Future  2. Closed fracture of olecranon process of right ulna, initial encounter  -     [facility] - Hernandez Guthrie MD, Orthopedic Surgery (elbow, hand, wrist), [place]  -     [facility] ORTH[facility]IC SUPPLIES Shoulder Immobilizer, Right; XL     The patient presents post trauma for evaluation of injuries.  Based on physical exam and X-ray evaluation, there is an apparent fracture.  Patient will be treated as an occult significant injury with right posterior long arm splint and right arm sling.  There is good distal neurovascular status and tendon function is completely intact.  The patient is aware that although the initial evaluation does not show a more serious injury, if there is any worsening or persistent symptoms, they need to go to the ER or to an Orthopaedic Specialist for immediate re-evaluation.  They have agreed to appropriate orthopaedic follow up within one week for definitive fracture care.      Handout given with care instructions  2. OTC for symptom management. Increase fluid intake, ensure adequate nutritional intake.  3. Follow up with PCP as needed.  4. Go to ED with development of any acute symptoms.     Follow up:  Return in about 3 days (around 10/24/2024), or if symptoms worsen or fail to improve.  Follow up immediately for any new, worsening or changes or if symptoms are not improving over the next 5-7 days.

## 2024-10-22 NOTE — PATIENT INSTRUCTIONS
Please follow up with orthopedics as soon as possible for further evaluation and management     Take an over-the-counter pain medicine, such as acetaminophen (Tylenol), ibuprofen (Advil, Motrin), or naproxen (Aleve) to reduce fever and relieve body aches. Read and follow all instructions on the label. Increase fluid intake.     Please keep your splint on until you are seen by orthopedics. Keep it clean and try.    If you develop any numbness or tingling, changes in color or sensation to your right finger please remove the splint immediately and seek direct care in the emergency department

## 2024-10-29 ENCOUNTER — OFFICE VISIT (OUTPATIENT)
Age: 39
End: 2024-10-29
Payer: MEDICARE

## 2024-10-29 VITALS
HEART RATE: 84 BPM | WEIGHT: 278 LBS | HEIGHT: 64 IN | DIASTOLIC BLOOD PRESSURE: 77 MMHG | TEMPERATURE: 98.2 F | BODY MASS INDEX: 47.46 KG/M2 | RESPIRATION RATE: 19 BRPM | SYSTOLIC BLOOD PRESSURE: 115 MMHG | OXYGEN SATURATION: 95 %

## 2024-10-29 DIAGNOSIS — E03.9 HYPOTHYROIDISM, UNSPECIFIED TYPE: Primary | ICD-10-CM

## 2024-10-29 DIAGNOSIS — S59.901D INJURY OF RIGHT ELBOW, SUBSEQUENT ENCOUNTER: ICD-10-CM

## 2024-10-29 PROCEDURE — 99213 OFFICE O/P EST LOW 20 MIN: CPT | Performed by: NURSE PRACTITIONER
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
## 2024-10-29 NOTE — PROGRESS NOTES
Chief Complaint   Patient presents with    Follow-up    Lab Collection     Thyroid     Patient presents in the office today for a f/u and labs.  Patient stated that she would like her thyroid checked.   No other concerns.    \"Have you been to the ER, urgent care clinic since your last visit?  Hospitalized since your last visit?\"    NO    “Have you seen or consulted any other health care providers outside our system since your last visit?”    NO

## 2024-10-30 LAB — TSH SERPL DL<=0.005 MIU/L-ACNC: 0.35 UIU/ML (ref 0.45–4.5)

## 2024-11-04 RX ORDER — LEVOTHYROXINE SODIUM 200 UG/1
200 TABLET ORAL DAILY
Qty: 30 TABLET | Refills: 5 | Status: SHIPPED | OUTPATIENT
Start: 2024-11-04 | End: 2024-11-06 | Stop reason: SDUPTHER

## 2024-11-06 ENCOUNTER — TELEPHONE (OUTPATIENT)
Age: 39
End: 2024-11-06

## 2024-11-06 RX ORDER — LEVOTHYROXINE SODIUM 200 UG/1
200 TABLET ORAL DAILY
Qty: 30 TABLET | Refills: 5 | Status: SHIPPED | OUTPATIENT
Start: 2024-11-06

## 2024-11-30 ENCOUNTER — OFFICE VISIT (OUTPATIENT)
Age: 39
End: 2024-11-30

## 2024-11-30 VITALS
HEIGHT: 64 IN | DIASTOLIC BLOOD PRESSURE: 87 MMHG | HEART RATE: 97 BPM | TEMPERATURE: 99.3 F | RESPIRATION RATE: 16 BRPM | OXYGEN SATURATION: 93 % | SYSTOLIC BLOOD PRESSURE: 137 MMHG | BODY MASS INDEX: 47.29 KG/M2 | WEIGHT: 277 LBS

## 2024-11-30 DIAGNOSIS — J01.10 ACUTE FRONTAL SINUSITIS, RECURRENCE NOT SPECIFIED: ICD-10-CM

## 2024-11-30 DIAGNOSIS — H66.001 ACUTE SUPPURATIVE OTITIS MEDIA OF RIGHT EAR WITHOUT SPONTANEOUS RUPTURE OF TYMPANIC MEMBRANE, RECURRENCE NOT SPECIFIED: Primary | ICD-10-CM

## 2024-11-30 RX ORDER — CEFDINIR 300 MG/1
300 CAPSULE ORAL 2 TIMES DAILY
Qty: 20 CAPSULE | Refills: 0 | Status: SHIPPED | OUTPATIENT
Start: 2024-11-30 | End: 2024-11-30

## 2024-11-30 RX ORDER — CEFDINIR 300 MG/1
300 CAPSULE ORAL 2 TIMES DAILY
Qty: 20 CAPSULE | Refills: 0 | Status: SHIPPED | OUTPATIENT
Start: 2024-11-30 | End: 2024-12-10

## 2024-11-30 ASSESSMENT — ENCOUNTER SYMPTOMS
SINUS PAIN: 1
SINUS PRESSURE: 1
RESPIRATORY NEGATIVE: 1

## 2024-11-30 NOTE — PROGRESS NOTES
2024   Lina Landeros (: 1985) is a 39 y.o. female, New patient, here for evaluation of the following chief complaint(s):  Ear Pain, Congestion, and Pharyngitis (Pt c/o ear ache, congestion, sore throat this started on Thursday.)     ASSESSMENT/PLAN:  Below is the assessment and plan developed based on review of pertinent history, physical exam, labs, studies, and medications.  1. Acute suppurative otitis media of right ear without spontaneous rupture of tympanic membrane, recurrence not specified  2. Acute frontal sinusitis, recurrence not specified    - cefidinir     Handout given with care instructions  2. OTC for symptom management. Increase fluid intake, ensure adequate nutritional intake.  3. Follow up with PCP as needed.  4. Go to ED with development of any acute symptoms.     Follow up:  Return if symptoms worsen or fail to improve.  Follow up immediately for any new, worsening or changes or if symptoms are not improving over the next 5-7 days.     SUBJECTIVE/OBJECTIVE:    Ear Pain   Associated symptoms include headaches.   Pharyngitis  Associated symptoms: congestion, ear pain, fatigue, fever and headaches         Ear Pain, Congestion, and Pharyngitis (Pt c/o ear ache, congestion, sore throat this started on Thursday.)    Review of Systems   Constitutional:  Positive for fatigue and fever. Negative for chills.   HENT:  Positive for congestion, ear pain, sinus pressure and sinus pain.    Respiratory: Negative.     Cardiovascular: Negative.    Neurological:  Positive for headaches.         Physical Exam  Constitutional:       Appearance: Normal appearance.   HENT:      Head: Normocephalic.      Right Ear: Ear canal and external ear normal. A middle ear effusion is present. Tympanic membrane is injected and bulging.      Left Ear: Ear canal and external ear normal. A middle ear effusion is present.      Nose: Mucosal edema, congestion and rhinorrhea present.      Right Turbinates: Enlarged.

## 2025-01-12 DIAGNOSIS — R06.83 SNORING: Primary | ICD-10-CM

## 2025-03-18 NOTE — PROGRESS NOTES
Chief Complaint   Patient presents with    Cough    Nasal Congestion    Head Pain     Patient in office today for sx that began Saturday; have been treating with allergy medication with no improvement. Sx started on Saturday. Sick contacts include uncle who had the flu. Denies any fever but has been having hot flashes. Denies any HA. Associated ST, sinus congestion and pressure, cough. Last abx was bactrim for skin boil which has since resolved. Denies any relief with taking OTC medication. Pt received her flu shot this year. Denies any other concerns at this time. Chief Complaint   Patient presents with    Cough    Nasal Congestion    Head Pain     she is a 29y.o. year old female who presents for evalution. Reviewed PmHx, RxHx, FmHx, SocHx, AllgHx and updated and dated in the chart. Review of Systems - negative except as listed above in the HPI    Objective:     Vitals:    12/27/17 0713   BP: 126/87   Pulse: 62   Resp: 18   Temp: 98.3 °F (36.8 °C)   TempSrc: Oral   SpO2: 98%   Weight: 272 lb (123.4 kg)   Height: 5' 4\" (1.626 m)     Physical Examination: General appearance - alert, well appearing, and in no distress  Eyes - pupils equal and reactive, extraocular eye movements intact  Ears - bilateral TM's and external ear canals normal  Nose - mucosal congestion, mucosal erythema, clear rhinorrhea and sinus tenderness noted   Mouth - mucous membranes moist, pharynx normal without lesions  Neck - supple, no significant adenopathy  Chest - clear to auscultation, no wheezes, rales or rhonchi, symmetric air entry; dry cough  Heart - normal rate, regular rhythm, normal S1, S2, no murmurs    Assessment/ Plan:   Diagnoses and all orders for this visit:    1. Acute URI  -     loratadine-pseudoephedrine (CLARITIN-D 12 HOUR) 5-120 mg per tablet; Take 1 Tab by mouth two (2) times a day. Start and complete full course of claritin D. Dwp ADRs/SEs of medication. Push fluids.  Rest. Saline nasal Pt was informed that the visit today will be transitioned from Urgent Care to Emergency Room Care and billing. Understanding expressed, ER ACKNOWLEDGEMENT form signed, and all questions answered.     spray for nasal congestion. Reviewed s/sx of bacterial infection that warrant follow up. OTC motrin/apap for fevers. RTC if sx persist or worsen. 2. Flu-like symptoms  -     AMB POC RAPID INFLUENZA TEST  Neg flu. Enc supportive measures. Follow-up Disposition:  Return if symptoms worsen or fail to improve. I have discussed the diagnosis with the patient and the intended plan as seen in the above orders. The patient has received an after-visit summary and questions were answered concerning future plans. Medication Side Effects and Warnings were discussed with patient: yes  Patient Labs were reviewed and or requested: yes  Patient Past Records were reviewed and or requested  yes  Patient / Caregiver Understanding of treatment plan was verbalized during office visit YES    RUSTY Frank    There are no Patient Instructions on file for this visit.

## 2025-04-25 ENCOUNTER — OFFICE VISIT (OUTPATIENT)
Age: 40
End: 2025-04-25
Payer: MEDICARE

## 2025-04-25 VITALS
BODY MASS INDEX: 45.99 KG/M2 | OXYGEN SATURATION: 99 % | HEIGHT: 64 IN | DIASTOLIC BLOOD PRESSURE: 86 MMHG | TEMPERATURE: 97.9 F | HEART RATE: 87 BPM | SYSTOLIC BLOOD PRESSURE: 133 MMHG | WEIGHT: 269.4 LBS

## 2025-04-25 DIAGNOSIS — E03.4 HYPOTHYROIDISM DUE TO ACQUIRED ATROPHY OF THYROID: Primary | ICD-10-CM

## 2025-04-25 PROCEDURE — 99204 OFFICE O/P NEW MOD 45 MIN: CPT | Performed by: INTERNAL MEDICINE

## 2025-04-25 RX ORDER — HYDROXYZINE PAMOATE 25 MG/1
25 CAPSULE ORAL PRN
COMMUNITY
Start: 2024-06-07

## 2025-04-25 RX ORDER — MIRTAZAPINE 7.5 MG/1
7.5 TABLET, FILM COATED ORAL NIGHTLY
COMMUNITY
Start: 2024-10-15

## 2025-04-25 RX ORDER — NORETHINDRONE ACETATE AND ETHINYL ESTRADIOL AND FERROUS FUMARATE 1.5-30(21)
1 KIT ORAL DAILY
COMMUNITY
Start: 2025-04-08

## 2025-04-25 NOTE — PROGRESS NOTES
[facility] DIABETES AND ENDOCRINOLOGY              Noemy Orlando MD FACE        PCP: Cathy Telles FNP     Referring Physician: [facility] ,  Heather PEREZ     Lina Landeros is a 39 y.o. female patient.    HPI     Initial visit  for  management  of hypothyroidism   Pt has  h/o major depression,  FERNANDO,  mild intellectual disability   and  HPL    Pt is diagnosed with the condition since age 12     She  is accompanied by a lady ( mental health counselor )   She has stayed on dose of 200 mcg for quite a long time     Pt changed care recently, TSH drawn was @ 5 at new PCP office   Pt was told that she Is maxed out on this dose           Current Outpatient Medications   Medication Sig Dispense Refill    hydrOXYzine pamoate (VISTARIL) 25 MG capsule Take 1 capsule by mouth as needed for Anxiety      mirtazapine (REMERON) 7.5 MG tablet Take 1 tablet by mouth nightly      JUNEL FE 1.5/30 1.5-30 MG-MCG tablet Take 1 tablet by mouth daily      levothyroxine (SYNTHROID) 200 MCG tablet Take 1 tablet by mouth Daily 30 tablet 5    loratadine (CLARITIN) 10 MG tablet Take 1 tablet by mouth daily 31 tablet 11    EPINEPHrine (EPIPEN 2-KING) 0.3 MG/0.3ML SOAJ injection Inject 0.3 mLs into the muscle once for 1 dose Use as directed for allergic reaction 0.6 mL 1    busPIRone (BUSPAR) 10 MG tablet Take 2 tablets by mouth in the morning and at bedtime      buPROPion (WELLBUTRIN XL) 150 MG extended release tablet Take 2 tablets by mouth daily      JUNEL FE 1/20 1-20 MG-MCG per tablet  (Patient not taking: Reported on 4/25/2025)      traZODone (DESYREL) 100 MG tablet TAKE 1 & 1 2 (ONE & ONE HALF) TABLETS BY MOUTH AT BEDTIME AS NEEDED (Patient not taking: Reported on 4/25/2025)      cetirizine (ZYRTEC) 10 MG tablet Take by mouth daily (Patient not taking: Reported on 4/25/2025)      FLUoxetine (PROZAC) 40 MG capsule ceived the following from [facility]: Outside name: FLUoxetine (PROzac) 40 mg

## 2025-04-25 NOTE — PROGRESS NOTES
Lina Landeros is a 39 y.o. female here for   Chief Complaint   Patient presents with    New Patient    Thyroid Problem       1. Have you been to the ER, urgent care clinic since your last visit?  Hospitalized since your last visit? - n/a    2. Have you seen or consulted any other health care providers outside of the [facility] since your last visit?  Include any pap smears or colon screening.- n/a

## 2025-04-27 LAB
T4 FREE SERPL-MCNC: 1.43 NG/DL (ref 0.82–1.77)
TSH SERPL DL<=0.005 MIU/L-ACNC: 2.82 UIU/ML (ref 0.45–4.5)

## 2025-04-28 ASSESSMENT — SLEEP AND FATIGUE QUESTIONNAIRES
HOW LIKELY ARE YOU TO NOD OFF OR FALL ASLEEP WHILE SITTING INACTIVE IN A PUBLIC PLACE: WOULD NEVER DOZE
HOW LIKELY ARE YOU TO NOD OFF OR FALL ASLEEP WHILE SITTING QUIETLY AFTER LUNCH WITHOUT ALCOHOL: WOULD NEVER DOZE
WHAT TIME DO YOU USUALLY WAKE UP: 06:45
HOW LIKELY ARE YOU TO NOD OFF OR FALL ASLEEP IN A CAR, WHILE STOPPED FOR A FEW MINUTES IN TRAFFIC: WOULD NEVER DOZE
HOW LIKELY ARE YOU TO NOD OFF OR FALL ASLEEP WHILE SITTING AND READING: SLIGHT CHANCE OF DOZING
HOW LONG DO YOU NAP: 45 MINUTES TO 1 HOUR
DO YOU HAVE DIFFICULTY WATCHING A MOVIE OR VIDEO BECAUSE YOU BECOME SLEEPY OR TIRED: YES, EXTREME
DO YOU GET TOO LITTLE SLEEP AT NIGHT: NO
DO YOU HAVE PROBLEMS WITH FREQUENT AWAKENINGS AT NIGHT: YES
HOW LIKELY ARE YOU TO NOD OFF OR FALL ASLEEP WHILE SITTING INACTIVE IN A PUBLIC PLACE: WOULD NEVER DOZE
HAS YOUR RELATIONSHIP WITH FAMILY, FRIENDS OR WORK COLLEAGUES BEEN AFFECTED BECAUSE YOU ARE SLEEPY OR TIRED: YES, MODERATE
FOSQ SCORE: 12.5
SELECT ANY OF THE FOLLOWING BEHAVIORS OBSERVED WHILE YOU ARE ASLEEP: LOUD SNORING
HOW LIKELY ARE YOU TO NOD OFF OR FALL ASLEEP WHILE LYING DOWN TO REST IN THE AFTERNOON WHEN CIRCUMSTANCES PERMIT: HIGH CHANCE OF DOZING
HOW LIKELY ARE YOU TO NOD OFF OR FALL ASLEEP IN A CAR, WHILE STOPPED FOR A FEW MINUTES IN TRAFFIC: WOULD NEVER DOZE
ESS TOTAL SCORE: 10
DO YOU WORK SHIFTS: NO
HOW LIKELY ARE YOU TO NOD OFF OR FALL ASLEEP WHILE SITTING AND TALKING TO SOMEONE: WOULD NEVER DOZE
DO YOU TAKE NAPS: YES
DO YOU HAVE DIFFICULTY OPERATING A MOTOR VEHICLE FOR LONG DISTANCES (GREATER THAN 100 MILES) BECAUSE YOU BECOME SLEEPY: NO
NUMBER OF TIMES YOU WAKE PER NIGHT: 2
DO YOU HAVE DIFFICULTY BEING AS ACTIVE AS YOU WANT TO BE IN THE MORNING BECAUSE YOU ARE SLEEPY OR TIRED: YES, LITTLE
DO YOU HAVE DIFFICULTY CONCENTRATING ON THE THINGS YOU DO BECAUSE YOU ARE SLEEPY OR TIRED: YES, A LITTLE
HOW LIKELY ARE YOU TO NOD OFF OR FALL ASLEEP WHILE WATCHING TV: HIGH CHANCE OF DOZING
ARE YOU BOTHERED BY WAKING UP TOO EARLY AND NOT BEING ABLE TO GET BACK TO SLEEP: YES
HOW LIKELY ARE YOU TO NOD OFF OR FALL ASLEEP WHILE LYING DOWN TO REST IN THE AFTERNOON WHEN CIRCUMSTANCES PERMIT: HIGH CHANCE OF DOZING
DO YOU HAVE DIFFICULTY OPERATING A MOTOR VEHICLE FOR SHORT DISTANCES (LESS THAN 100 MILES) BECAUSE YOU BECOME SLEEPY: NO
SELECT ANY OF THE FOLLOWING BEHAVIORS OBSERVED WHILE PATIENT ASLEEP: LOUD SNORING
HAS YOUR MOOD BEEN AFFECTED BECAUSE YOU ARE SLEEPY OR TIRED: YES, LITTLE
AVERAGE NUMBER OF SLEEP HOURS: 6
AVERAGE NUMBER OF SLEEP HOURS: 6
HOW LIKELY ARE YOU TO NOD OFF OR FALL ASLEEP WHILE WATCHING TV: HIGH CHANCE OF DOZING
DO YOU HAVE DIFFICULTY BEING AS ACTIVE AS YOU WANT TO BE IN THE EVENING BECAUSE YOU ARE SLEEPY OR TIRED: YES, MODERATE
DO YOU HAVE DIFFICULTY VISITING YOUR FAMILY OR FRIENDS IN THEIR HOME BECAUSE YOU BECOME SLEEPY OR TIRED: YES, EXTREME
HOW MANY NAPS DO YOU TAKE PER WEEK: 2
WHAT TIME DO YOU USUALLY GO TO BED: 21:00
HOW LIKELY ARE YOU TO NOD OFF OR FALL ASLEEP WHEN YOU ARE A PASSENGER IN A CAR FOR AN HOUR WITHOUT A BREAK: HIGH CHANCE OF DOZING
DO YOU GET TOO LITTLE SLEEP AT NIGHT: NO
HOW LIKELY ARE YOU TO NOD OFF OR FALL ASLEEP WHILE SITTING QUIETLY AFTER LUNCH WITHOUT ALCOHOL: WOULD NEVER DOZE
HOW LIKELY ARE YOU TO NOD OFF OR FALL ASLEEP WHILE SITTING AND READING: SLIGHT CHANCE OF DOZING
HOW LIKELY ARE YOU TO NOD OFF OR FALL ASLEEP WHILE SITTING AND TALKING TO SOMEONE: WOULD NEVER DOZE
DO YOU GENERALLY HAVE DIFFICULTY REMEMBERING THINGS BECAUSE YOU ARE SLEEPY OR TIRED: YES, MODERATE
ARE YOU BOTHERED BY WAKING UP TOO EARLY AND NOT BEING ABLE TO GET BACK TO SLEEP: YES
HOW LIKELY ARE YOU TO NOD OFF OR FALL ASLEEP WHEN YOU ARE A PASSENGER IN A CAR FOR AN HOUR WITHOUT A BREAK: HIGH CHANCE OF DOZING

## 2025-04-29 DIAGNOSIS — E03.4 HYPOTHYROIDISM DUE TO ACQUIRED ATROPHY OF THYROID: Primary | ICD-10-CM

## 2025-04-29 RX ORDER — LEVOTHYROXINE SODIUM 200 UG/1
200 TABLET ORAL DAILY
Qty: 30 TABLET | Refills: 5 | Status: SHIPPED | OUTPATIENT
Start: 2025-04-29

## 2025-04-30 ENCOUNTER — RESULTS FOLLOW-UP (OUTPATIENT)
Age: 40
End: 2025-04-30

## 2025-04-30 LAB
THYROGLOB AB SERPL-ACNC: <1 IU/ML (ref 0–0.9)
THYROPEROXIDASE AB SERPL-ACNC: <9 IU/ML (ref 0–34)

## 2025-05-01 ENCOUNTER — OFFICE VISIT (OUTPATIENT)
Age: 40
End: 2025-05-01
Payer: MEDICARE

## 2025-05-01 VITALS
TEMPERATURE: 97.5 F | BODY MASS INDEX: 46.06 KG/M2 | HEART RATE: 80 BPM | SYSTOLIC BLOOD PRESSURE: 115 MMHG | WEIGHT: 269.8 LBS | OXYGEN SATURATION: 96 % | HEIGHT: 64 IN | DIASTOLIC BLOOD PRESSURE: 75 MMHG

## 2025-05-01 DIAGNOSIS — R41.89 COGNITIVE IMPAIRMENT: ICD-10-CM

## 2025-05-01 DIAGNOSIS — G47.33 OSA (OBSTRUCTIVE SLEEP APNEA): Primary | ICD-10-CM

## 2025-05-01 PROBLEM — E66.813 OBESITY, CLASS 3 (HCC): Status: ACTIVE | Noted: 2025-05-01

## 2025-05-01 PROCEDURE — 99203 OFFICE O/P NEW LOW 30 MIN: CPT | Performed by: SPECIALIST

## 2025-05-01 NOTE — PROGRESS NOTES
surgical history; Knee arthroscopy (Left); Tonsillectomy and adenoidectomy (1990); Cholecystectomy (2014); and Breast reduction surgery (2005).    She family history includes Breast Cancer in some other family members; Early Death in her mother; Gout in her maternal uncle; High Blood Pressure in her maternal grandmother; Hypertension in her maternal grandmother and sister; Hypothyroidism in her maternal aunt and sister; Osteoarthritis in an other family member; Osteoporosis in her mother and another family member; Stroke in her maternal aunt.    She  reports that she has never smoked. She has never used smokeless tobacco. She reports that she does not drink alcohol and does not use drugs.     Review of Systems:  Review of Systems        Objective:   /75 (BP Site: Left Upper Arm, Patient Position: Sitting, BP Cuff Size: Medium Adult)   Pulse 80   Temp 97.5 °F (36.4 °C) (Temporal)   Ht 1.626 m (5' 4\")   Wt 122.4 kg (269 lb 12.8 oz)   SpO2 96%   BMI 46.31 kg/m²   Body mass index is 46.31 kg/m².    General:   cooperative       Oropharynx:   Mallampati score IV, tongue normal, narrow posterior oral airway       Neck:   No carotid bruits;     Chest/Lungs:  Clear on auscultation    CVS:  Normal rate, regular rhythm       Neuro:   face symmetrical, tongue movement normal   Psych:  Normal affect        Assessment:   1. GAURANG (obstructive sleep apnea)  -     SLEEP STUDY FULL; Future  2. Body mass index [BMI] 45.0-49.9, adult (Z68.42)  3. Cognitive impairment     History consistent with sleep disordered breathing.  Patient has a long soft palate, narrow posterior oral airway.  Sleep breathing abnormalities may be more prominent when supine, and/or in REM sleep.    Plan:     * Patient has a history and examination consistent with the diagnosis of sleep apnea.  * Sleep testing was ordered for initial evaluation.    * She was provided information on sleep apnea including corresponding risk factors and the importance of

## 2025-05-20 ENCOUNTER — HOSPITAL ENCOUNTER (OUTPATIENT)
Facility: HOSPITAL | Age: 40
Discharge: HOME | End: 2025-05-23
Payer: MEDICARE

## 2025-05-20 DIAGNOSIS — G47.33 OSA (OBSTRUCTIVE SLEEP APNEA): ICD-10-CM

## 2025-05-20 PROCEDURE — 95810 POLYSOM 6/> YRS 4/> PARAM: CPT | Performed by: SPECIALIST

## 2025-05-21 VITALS
DIASTOLIC BLOOD PRESSURE: 75 MMHG | OXYGEN SATURATION: 96 % | HEART RATE: 80 BPM | SYSTOLIC BLOOD PRESSURE: 115 MMHG | BODY MASS INDEX: 45.93 KG/M2 | HEIGHT: 64 IN | WEIGHT: 269 LBS | TEMPERATURE: 97.5 F

## 2025-05-22 ENCOUNTER — CLINICAL DOCUMENTATION (OUTPATIENT)
Age: 40
End: 2025-05-22

## 2025-05-22 ENCOUNTER — OFFICE VISIT (OUTPATIENT)
Age: 40
End: 2025-05-22

## 2025-05-22 VITALS
OXYGEN SATURATION: 98 % | HEIGHT: 64 IN | TEMPERATURE: 98.2 F | SYSTOLIC BLOOD PRESSURE: 110 MMHG | HEART RATE: 83 BPM | BODY MASS INDEX: 45.27 KG/M2 | RESPIRATION RATE: 18 BRPM | WEIGHT: 265.2 LBS | DIASTOLIC BLOOD PRESSURE: 72 MMHG

## 2025-05-22 DIAGNOSIS — R39.9 UTI SYMPTOMS: Primary | ICD-10-CM

## 2025-05-22 LAB
BILIRUBIN, URINE, POC: NEGATIVE
BLOOD URINE, POC: NORMAL
GLUCOSE URINE, POC: NEGATIVE
HCG, PREGNANCY, URINE, POC: NEGATIVE
KETONES, URINE, POC: NEGATIVE
LEUKOCYTE ESTERASE, URINE, POC: NEGATIVE
NITRITE, URINE, POC: NEGATIVE
PH, URINE, POC: 6 (ref 4.6–8)
PROTEIN,URINE, POC: NEGATIVE
SPECIFIC GRAVITY, URINE, POC: 1.02 (ref 1–1.03)
URINALYSIS CLARITY, POC: CLEAR
URINALYSIS COLOR, POC: YELLOW
UROBILINOGEN, POC: NORMAL MG/DL
VALID INTERNAL CONTROL, POC: NORMAL

## 2025-05-22 ASSESSMENT — ENCOUNTER SYMPTOMS
ALLERGIC/IMMUNOLOGIC NEGATIVE: 1
RESPIRATORY NEGATIVE: 1
GASTROINTESTINAL NEGATIVE: 1
EYES NEGATIVE: 1

## 2025-05-22 NOTE — PROGRESS NOTES
PSG performed for potential sleep disordered breathing.  497.4 minutes recorded of which 395 minutes of sleep with a sleep efficiency of 79.4%.  Sleep onset at 20.8 minutes; REM onset at 206 minutes with total REM representing 22.8% of sleep time.  All sleep stages were observed.    63 respiratory events occurred in which 62 hypopnea and 1 obstructive apnea.  Overall AHI 6.4 /h ( 4% desaturation definition) .Patient slept right lateral.  Minimal SaO2 91%.  Mild snoring noted.    Impression: Mild sleep disordered breathing not associated with significant arterial desaturations.  Events were primarily hypopnea and would benefit from weight reduction measures.    Sleep technologist: Please advise patient of study results and recommendations.

## 2025-05-22 NOTE — PROGRESS NOTES
2025   Lina Landeros (: 1985) is a 39 y.o. female, patient, here for evaluation of the following chief complaint(s):  Urinary Tract Infection (Pt c/o possible uti /Symptoms include :hearing things/Psychiatrist apt  25 )       SUBJECTIVE/OBJECTIVE:    Urinary Tract Infection           Urinary Tract Infection (Pt c/o possible uti /Symptoms include :hearing things/Psychiatrist apt  25 )      Results for orders placed or performed in visit on 25   AMB POC URINALYSIS DIP STICK MANUAL W/O MICRO   Result Value Ref Range    Color (UA POC) Yellow     Clarity (UA POC) Clear     Glucose, Urine, POC Negative     Bilirubin, Urine, POC Negative     Ketones, Urine, POC Negative     Specific Gravity, Urine, POC 1.020 1.001 - 1.035    Blood (UA POC) Trace     pH, Urine, POC 6.0 4.6 - 8.0    Protein, Urine, POC Negative     Urobilinogen, POC 0.2 mg/dL <1.1 mg/dL    Nitrite, Urine, POC Negative Negative    Leukocyte Esterase, Urine, POC Negative    AMB POC URINE PREGNANCY TEST, VISUAL COLOR COMPARISON   Result Value Ref Range    Valid Internal Control, POC p     HCG, Pregnancy, Urine, POC Negative        Results for orders placed or performed in visit on 25   AMB POC URINALYSIS DIP STICK MANUAL W/O MICRO   Result Value Ref Range    Color (UA POC) Yellow     Clarity (UA POC) Clear     Glucose, Urine, POC Negative     Bilirubin, Urine, POC Negative     Ketones, Urine, POC Negative     Specific Gravity, Urine, POC 1.020 1.001 - 1.035    Blood (UA POC) Trace     pH, Urine, POC 6.0 4.6 - 8.0    Protein, Urine, POC Negative     Urobilinogen, POC 0.2 mg/dL <1.1 mg/dL    Nitrite, Urine, POC Negative Negative    Leukocyte Esterase, Urine, POC Negative    AMB POC URINE PREGNANCY TEST, VISUAL COLOR COMPARISON   Result Value Ref Range    Valid Internal Control, POC p     HCG, Pregnancy, Urine, POC Negative             Review of Systems   Constitutional: Negative.    HENT: Negative.     Eyes:

## 2025-05-22 NOTE — PATIENT INSTRUCTIONS
Follow up with PCP within 7 days or sooner   Please seek medical attention at the nearest ED or call 911 for any new or worsening symptoms.       I have discussed the results, diagnosis and treatment plan with the patient. The patient also understands that early in the process of an illness, an urgent care workup can be falsely reassuring. Routine discharge counseling and specific return precautions discussed with patient and the patient understands that worsening, changing or persistent symptoms should prompt an immediate return to the urgent care or emergency department. Patient/Guardian expressed understanding and agrees with the discharge plan. No further questions at time of discharge.

## 2025-07-08 ENCOUNTER — OFFICE VISIT (OUTPATIENT)
Age: 40
End: 2025-07-08
Payer: MEDICARE

## 2025-07-08 VITALS
SYSTOLIC BLOOD PRESSURE: 127 MMHG | HEIGHT: 64 IN | TEMPERATURE: 98.1 F | BODY MASS INDEX: 44.68 KG/M2 | DIASTOLIC BLOOD PRESSURE: 84 MMHG | WEIGHT: 261.7 LBS | OXYGEN SATURATION: 95 % | HEART RATE: 90 BPM

## 2025-07-08 DIAGNOSIS — G47.33 OSA (OBSTRUCTIVE SLEEP APNEA): Primary | ICD-10-CM

## 2025-07-08 PROCEDURE — 99213 OFFICE O/P EST LOW 20 MIN: CPT | Performed by: SPECIALIST

## 2025-07-08 ASSESSMENT — SLEEP AND FATIGUE QUESTIONNAIRES
HOW LIKELY ARE YOU TO NOD OFF OR FALL ASLEEP IN A CAR, WHILE STOPPED FOR A FEW MINUTES IN TRAFFIC: SLIGHT CHANCE OF DOZING
HOW LIKELY ARE YOU TO NOD OFF OR FALL ASLEEP WHILE SITTING AND TALKING TO SOMEONE: WOULD NEVER DOZE
HOW LIKELY ARE YOU TO NOD OFF OR FALL ASLEEP WHILE WATCHING TV: MODERATE CHANCE OF DOZING
HOW LIKELY ARE YOU TO NOD OFF OR FALL ASLEEP WHILE SITTING QUIETLY AFTER LUNCH WITHOUT ALCOHOL: HIGH CHANCE OF DOZING
ESS TOTAL SCORE: 15
HOW LIKELY ARE YOU TO NOD OFF OR FALL ASLEEP WHEN YOU ARE A PASSENGER IN A CAR FOR AN HOUR WITHOUT A BREAK: HIGH CHANCE OF DOZING
HOW LIKELY ARE YOU TO NOD OFF OR FALL ASLEEP WHILE LYING DOWN TO REST IN THE AFTERNOON WHEN CIRCUMSTANCES PERMIT: HIGH CHANCE OF DOZING
HOW LIKELY ARE YOU TO NOD OFF OR FALL ASLEEP WHILE SITTING AND READING: MODERATE CHANCE OF DOZING
HOW LIKELY ARE YOU TO NOD OFF OR FALL ASLEEP WHILE SITTING INACTIVE IN A PUBLIC PLACE: SLIGHT CHANCE OF DOZING

## 2025-07-08 NOTE — PROGRESS NOTES
[facility] - [address]  [facility]  [address] [facility] [address]  Dept: [phone]              [address]  [place] [address]  Tel.  [phone]  Fax. [phone] [address]  [place] [address]  Tel.  [phone]  Fax. [phone] [address] [facility] [place] [address]  Tel.  [phone]  Fax. [phone]         Chief Complaint       Chief Complaint   Patient presents with    Sleep Problem     Results review         HPI        Lina Landeros is a 39 y.o. female seen for follow-up.  She was evaluated with a sleep study demonstrating 63 respiratory events occurred in which 62 hypopnea and 1 obstructive apnea.  Overall AHI 6.4 /h ( 4% desaturation definition) .Patient slept right lateral.  Minimal SaO2 91%.  Mild snoring noted.     Study reviewed in detail with her today.    Allergies   Allergen Reactions    Latex Hives    Macadamia Nut Oil Swelling    Peanut (Diagnostic) Swelling    Penicillins Hives    Amoxicillin-Pot Clavulanate Nausea And Vomiting    Codeine Nausea And Vomiting    Morphine Nausea And Vomiting       No current facility-administered medications for this visit.     She  has a past medical history of Anemia, Anxiety, Asthma, Depression, Ear infection, Environmental allergies, GERD (gastroesophageal reflux disease), Hypothyroid, Inflammatory arthritis, Lupus, Nausea & vomiting, Other ill-defined conditions(799.89), Otitis media, Strabismus, Unspecified adverse effect of anesthesia, and Wears hearing aid.    She  has a past surgical history that includes orthopedic surgery; other surgical history; Knee arthroscopy (Left); Tonsillectomy and adenoidectomy (1990); Cholecystectomy (2014); and Breast reduction surgery (2005).    She family history includes Breast Cancer in some other family members; Early Death in her mother; Gout in her maternal uncle; High Blood Pressure in her maternal